# Patient Record
Sex: FEMALE | Race: BLACK OR AFRICAN AMERICAN | Employment: OTHER | URBAN - METROPOLITAN AREA
[De-identification: names, ages, dates, MRNs, and addresses within clinical notes are randomized per-mention and may not be internally consistent; named-entity substitution may affect disease eponyms.]

---

## 2018-08-09 ENCOUNTER — APPOINTMENT (OUTPATIENT)
Dept: GENERAL RADIOLOGY | Age: 63
DRG: 871 | End: 2018-08-09
Attending: EMERGENCY MEDICINE
Payer: COMMERCIAL

## 2018-08-09 ENCOUNTER — HOSPITAL ENCOUNTER (INPATIENT)
Age: 63
LOS: 14 days | Discharge: HOME | DRG: 871 | End: 2018-08-23
Attending: EMERGENCY MEDICINE | Admitting: INTERNAL MEDICINE
Payer: COMMERCIAL

## 2018-08-09 ENCOUNTER — APPOINTMENT (OUTPATIENT)
Dept: ULTRASOUND IMAGING | Age: 63
DRG: 871 | End: 2018-08-09
Attending: EMERGENCY MEDICINE
Payer: COMMERCIAL

## 2018-08-09 DIAGNOSIS — L03.116 CELLULITIS OF LEFT LOWER EXTREMITY: Primary | ICD-10-CM

## 2018-08-09 DIAGNOSIS — A41.9 SEPSIS, DUE TO UNSPECIFIED ORGANISM: ICD-10-CM

## 2018-08-09 LAB
ALBUMIN SERPL-MCNC: 3.5 G/DL (ref 3.5–5)
ALBUMIN/GLOB SERPL: 0.6 {RATIO} (ref 1.1–2.2)
ALP SERPL-CCNC: 108 U/L (ref 45–117)
ALT SERPL-CCNC: 23 U/L (ref 12–78)
ANION GAP SERPL CALC-SCNC: 7 MMOL/L (ref 5–15)
APPEARANCE UR: CLEAR
AST SERPL-CCNC: 22 U/L (ref 15–37)
BACTERIA URNS QL MICRO: NEGATIVE /HPF
BASOPHILS # BLD: 0 K/UL (ref 0–0.1)
BASOPHILS NFR BLD: 0 % (ref 0–1)
BILIRUB SERPL-MCNC: 0.8 MG/DL (ref 0.2–1)
BILIRUB UR QL CFM: NEGATIVE
BNP SERPL-MCNC: 603 PG/ML (ref 0–125)
BUN SERPL-MCNC: 22 MG/DL (ref 6–20)
BUN/CREAT SERPL: 21 (ref 12–20)
CALCIUM SERPL-MCNC: 10 MG/DL (ref 8.5–10.1)
CHLORIDE SERPL-SCNC: 99 MMOL/L (ref 97–108)
CO2 SERPL-SCNC: 26 MMOL/L (ref 21–32)
COLOR UR: ABNORMAL
CREAT SERPL-MCNC: 1.07 MG/DL (ref 0.55–1.02)
DIFFERENTIAL METHOD BLD: ABNORMAL
EOSINOPHIL # BLD: 0 K/UL (ref 0–0.4)
EOSINOPHIL NFR BLD: 0 % (ref 0–7)
EPITH CASTS URNS QL MICRO: ABNORMAL /LPF
ERYTHROCYTE [DISTWIDTH] IN BLOOD BY AUTOMATED COUNT: 14.6 % (ref 11.5–14.5)
GLOBULIN SER CALC-MCNC: 5.8 G/DL (ref 2–4)
GLUCOSE SERPL-MCNC: 102 MG/DL (ref 65–100)
GLUCOSE UR STRIP.AUTO-MCNC: NEGATIVE MG/DL
HCT VFR BLD AUTO: 41.1 % (ref 35–47)
HGB BLD-MCNC: 13.4 G/DL (ref 11.5–16)
HGB UR QL STRIP: NEGATIVE
HYALINE CASTS URNS QL MICRO: ABNORMAL /LPF (ref 0–5)
IMM GRANULOCYTES # BLD: 0 K/UL (ref 0–0.04)
IMM GRANULOCYTES NFR BLD AUTO: 0 % (ref 0–0.5)
KETONES UR QL STRIP.AUTO: ABNORMAL MG/DL
LACTATE SERPL-SCNC: 1.1 MMOL/L (ref 0.4–2)
LEUKOCYTE ESTERASE UR QL STRIP.AUTO: ABNORMAL
LYMPHOCYTES # BLD: 0.9 K/UL (ref 0.8–3.5)
LYMPHOCYTES NFR BLD: 4 % (ref 12–49)
MCH RBC QN AUTO: 28.8 PG (ref 26–34)
MCHC RBC AUTO-ENTMCNC: 32.6 G/DL (ref 30–36.5)
MCV RBC AUTO: 88.4 FL (ref 80–99)
MONOCYTES # BLD: 0.9 K/UL (ref 0–1)
MONOCYTES NFR BLD: 4 % (ref 5–13)
NEUTS BAND NFR BLD MANUAL: 10 %
NEUTS SEG # BLD: 20.8 K/UL (ref 1.8–8)
NEUTS SEG NFR BLD: 82 % (ref 32–75)
NITRITE UR QL STRIP.AUTO: NEGATIVE
NRBC # BLD: 0 K/UL (ref 0–0.01)
NRBC BLD-RTO: 0 PER 100 WBC
PH UR STRIP: 5 [PH] (ref 5–8)
PLATELET # BLD AUTO: 207 K/UL (ref 150–400)
PMV BLD AUTO: 11.3 FL (ref 8.9–12.9)
POTASSIUM SERPL-SCNC: 4 MMOL/L (ref 3.5–5.1)
PROT SERPL-MCNC: 9.3 G/DL (ref 6.4–8.2)
PROT UR STRIP-MCNC: ABNORMAL MG/DL
RBC # BLD AUTO: 4.65 M/UL (ref 3.8–5.2)
RBC #/AREA URNS HPF: ABNORMAL /HPF (ref 0–5)
RBC MORPH BLD: ABNORMAL
SODIUM SERPL-SCNC: 132 MMOL/L (ref 136–145)
SP GR UR REFRACTOMETRY: 1.03 (ref 1–1.03)
TROPONIN I SERPL-MCNC: <0.05 NG/ML
UA: UC IF INDICATED,UAUC: ABNORMAL
UROBILINOGEN UR QL STRIP.AUTO: 1 EU/DL (ref 0.2–1)
WBC # BLD AUTO: 22.6 K/UL (ref 3.6–11)
WBC URNS QL MICRO: ABNORMAL /HPF (ref 0–4)

## 2018-08-09 PROCEDURE — 96361 HYDRATE IV INFUSION ADD-ON: CPT

## 2018-08-09 PROCEDURE — 81001 URINALYSIS AUTO W/SCOPE: CPT | Performed by: EMERGENCY MEDICINE

## 2018-08-09 PROCEDURE — 87040 BLOOD CULTURE FOR BACTERIA: CPT | Performed by: EMERGENCY MEDICINE

## 2018-08-09 PROCEDURE — 74011250637 HC RX REV CODE- 250/637: Performed by: INTERNAL MEDICINE

## 2018-08-09 PROCEDURE — 83880 ASSAY OF NATRIURETIC PEPTIDE: CPT | Performed by: EMERGENCY MEDICINE

## 2018-08-09 PROCEDURE — 84484 ASSAY OF TROPONIN QUANT: CPT | Performed by: EMERGENCY MEDICINE

## 2018-08-09 PROCEDURE — 85025 COMPLETE CBC W/AUTO DIFF WBC: CPT | Performed by: EMERGENCY MEDICINE

## 2018-08-09 PROCEDURE — 74011000258 HC RX REV CODE- 258: Performed by: EMERGENCY MEDICINE

## 2018-08-09 PROCEDURE — 96374 THER/PROPH/DIAG INJ IV PUSH: CPT

## 2018-08-09 PROCEDURE — 74011250636 HC RX REV CODE- 250/636: Performed by: EMERGENCY MEDICINE

## 2018-08-09 PROCEDURE — 83605 ASSAY OF LACTIC ACID: CPT | Performed by: EMERGENCY MEDICINE

## 2018-08-09 PROCEDURE — 93005 ELECTROCARDIOGRAM TRACING: CPT

## 2018-08-09 PROCEDURE — 87086 URINE CULTURE/COLONY COUNT: CPT | Performed by: EMERGENCY MEDICINE

## 2018-08-09 PROCEDURE — 74011000258 HC RX REV CODE- 258: Performed by: INTERNAL MEDICINE

## 2018-08-09 PROCEDURE — 71046 X-RAY EXAM CHEST 2 VIEWS: CPT

## 2018-08-09 PROCEDURE — 36415 COLL VENOUS BLD VENIPUNCTURE: CPT | Performed by: EMERGENCY MEDICINE

## 2018-08-09 PROCEDURE — 65660000000 HC RM CCU STEPDOWN

## 2018-08-09 PROCEDURE — 74011250636 HC RX REV CODE- 250/636: Performed by: INTERNAL MEDICINE

## 2018-08-09 PROCEDURE — 74011250637 HC RX REV CODE- 250/637: Performed by: EMERGENCY MEDICINE

## 2018-08-09 PROCEDURE — 99285 EMERGENCY DEPT VISIT HI MDM: CPT

## 2018-08-09 PROCEDURE — 80053 COMPREHEN METABOLIC PANEL: CPT | Performed by: EMERGENCY MEDICINE

## 2018-08-09 PROCEDURE — 94762 N-INVAS EAR/PLS OXIMTRY CONT: CPT

## 2018-08-09 PROCEDURE — 93971 EXTREMITY STUDY: CPT

## 2018-08-09 RX ORDER — SODIUM CHLORIDE 9 MG/ML
75 INJECTION, SOLUTION INTRAVENOUS CONTINUOUS
Status: DISCONTINUED | OUTPATIENT
Start: 2018-08-09 | End: 2018-08-10

## 2018-08-09 RX ORDER — VANCOMYCIN/0.9 % SOD CHLORIDE 1.5G/250ML
1500 PLASTIC BAG, INJECTION (ML) INTRAVENOUS
Status: COMPLETED | OUTPATIENT
Start: 2018-08-10 | End: 2018-08-18

## 2018-08-09 RX ORDER — BUSPIRONE HYDROCHLORIDE 15 MG/1
15 TABLET ORAL
COMMUNITY
End: 2018-10-16 | Stop reason: SDUPTHER

## 2018-08-09 RX ORDER — LABETALOL 300 MG/1
300 TABLET, FILM COATED ORAL EVERY 12 HOURS
COMMUNITY
End: 2018-12-17 | Stop reason: SDUPTHER

## 2018-08-09 RX ORDER — SODIUM CHLORIDE 0.9 % (FLUSH) 0.9 %
5-10 SYRINGE (ML) INJECTION EVERY 8 HOURS
Status: DISCONTINUED | OUTPATIENT
Start: 2018-08-09 | End: 2018-08-23 | Stop reason: HOSPADM

## 2018-08-09 RX ORDER — ASCORBIC ACID 500 MG
500 TABLET ORAL DAILY
Status: DISCONTINUED | OUTPATIENT
Start: 2018-08-10 | End: 2018-08-23 | Stop reason: HOSPADM

## 2018-08-09 RX ORDER — ENOXAPARIN SODIUM 100 MG/ML
40 INJECTION SUBCUTANEOUS EVERY 12 HOURS
Status: DISCONTINUED | OUTPATIENT
Start: 2018-08-10 | End: 2018-08-23 | Stop reason: HOSPADM

## 2018-08-09 RX ORDER — ACETAMINOPHEN 325 MG/1
650 TABLET ORAL
Status: COMPLETED | OUTPATIENT
Start: 2018-08-09 | End: 2018-08-09

## 2018-08-09 RX ORDER — SODIUM CHLORIDE 0.9 % (FLUSH) 0.9 %
5-10 SYRINGE (ML) INJECTION AS NEEDED
Status: DISCONTINUED | OUTPATIENT
Start: 2018-08-09 | End: 2018-08-23 | Stop reason: HOSPADM

## 2018-08-09 RX ORDER — ONDANSETRON 2 MG/ML
4 INJECTION INTRAMUSCULAR; INTRAVENOUS
Status: DISCONTINUED | OUTPATIENT
Start: 2018-08-09 | End: 2018-08-23 | Stop reason: HOSPADM

## 2018-08-09 RX ORDER — OLMESARTAN MEDOXOMIL, AMLODIPINE AND HYDROCHLOROTHIAZIDE TABLET 40/10/25 MG 40; 10; 25 MG/1; MG/1; MG/1
1 TABLET ORAL DAILY
COMMUNITY
End: 2018-09-24

## 2018-08-09 RX ORDER — ACETAMINOPHEN 325 MG/1
650 TABLET ORAL
Status: DISCONTINUED | OUTPATIENT
Start: 2018-08-09 | End: 2018-08-23 | Stop reason: HOSPADM

## 2018-08-09 RX ORDER — HYDRALAZINE HYDROCHLORIDE 20 MG/ML
20 INJECTION INTRAMUSCULAR; INTRAVENOUS
Status: DISCONTINUED | OUTPATIENT
Start: 2018-08-09 | End: 2018-08-23 | Stop reason: HOSPADM

## 2018-08-09 RX ORDER — LOSARTAN POTASSIUM 50 MG/1
50 TABLET ORAL DAILY
Status: DISCONTINUED | OUTPATIENT
Start: 2018-08-10 | End: 2018-08-23 | Stop reason: HOSPADM

## 2018-08-09 RX ORDER — FUROSEMIDE 40 MG/1
40 TABLET ORAL EVERY OTHER DAY
COMMUNITY
End: 2018-08-23

## 2018-08-09 RX ORDER — BUSPIRONE HYDROCHLORIDE 5 MG/1
15 TABLET ORAL
Status: DISCONTINUED | OUTPATIENT
Start: 2018-08-09 | End: 2018-08-23 | Stop reason: HOSPADM

## 2018-08-09 RX ORDER — OXYCODONE AND ACETAMINOPHEN 5; 325 MG/1; MG/1
1 TABLET ORAL
Status: DISCONTINUED | OUTPATIENT
Start: 2018-08-09 | End: 2018-08-23 | Stop reason: HOSPADM

## 2018-08-09 RX ORDER — AMLODIPINE BESYLATE 5 MG/1
10 TABLET ORAL DAILY
Status: DISCONTINUED | OUTPATIENT
Start: 2018-08-09 | End: 2018-08-23 | Stop reason: HOSPADM

## 2018-08-09 RX ORDER — FACIAL-BODY WIPES
10 EACH TOPICAL DAILY PRN
Status: DISCONTINUED | OUTPATIENT
Start: 2018-08-09 | End: 2018-08-23 | Stop reason: HOSPADM

## 2018-08-09 RX ORDER — NALOXONE HYDROCHLORIDE 0.4 MG/ML
0.4 INJECTION, SOLUTION INTRAMUSCULAR; INTRAVENOUS; SUBCUTANEOUS AS NEEDED
Status: DISCONTINUED | OUTPATIENT
Start: 2018-08-09 | End: 2018-08-23 | Stop reason: HOSPADM

## 2018-08-09 RX ADMIN — ACETAMINOPHEN 650 MG: 325 TABLET ORAL at 19:06

## 2018-08-09 RX ADMIN — ACETAMINOPHEN 650 MG: 325 TABLET ORAL at 14:04

## 2018-08-09 RX ADMIN — SODIUM CHLORIDE 75 ML/HR: 0.9 INJECTION, SOLUTION INTRAVENOUS at 15:45

## 2018-08-09 RX ADMIN — LABETALOL HYDROCHLORIDE 300 MG: 200 TABLET, FILM COATED ORAL at 20:19

## 2018-08-09 RX ADMIN — CEFTRIAXONE 2 G: 2 INJECTION, POWDER, FOR SOLUTION INTRAMUSCULAR; INTRAVENOUS at 23:35

## 2018-08-09 RX ADMIN — AMLODIPINE BESYLATE 10 MG: 5 TABLET ORAL at 18:13

## 2018-08-09 RX ADMIN — VANCOMYCIN HYDROCHLORIDE 2500 MG: 10 INJECTION, POWDER, LYOPHILIZED, FOR SOLUTION INTRAVENOUS at 18:04

## 2018-08-09 RX ADMIN — SODIUM CHLORIDE 1000 ML: 900 INJECTION, SOLUTION INTRAVENOUS at 14:04

## 2018-08-09 RX ADMIN — Medication 10 ML: at 18:01

## 2018-08-09 RX ADMIN — SODIUM CHLORIDE 3 G: 900 INJECTION, SOLUTION INTRAVENOUS at 15:32

## 2018-08-09 RX ADMIN — BUSPIRONE HYDROCHLORIDE 15 MG: 5 TABLET ORAL at 20:20

## 2018-08-09 RX ADMIN — Medication 10 ML: at 20:19

## 2018-08-09 NOTE — PROGRESS NOTES
[address] [address] approved protocol for lovenox: Wt: 136.1kg  BMI: 45.61kg/m2  Crcl: ~81mL/min    Per protocol, will change dvt prophylaxis dose to lovenox 40mg SC every 12h for BMI > 40.     Jovon Elise

## 2018-08-09 NOTE — PROGRESS NOTES
Pharmacy Automatic Renal Dosing Protocol - Antimicrobials    Indication for Antimicrobials: SSTI    Current Regimen of Each Antimicrobial:  Vancomycin 2500 mg x 1 then 1500 mg Q16H (Start , Day )  Ceftriaxone 2 grams Q24H (Start , Day 1)    Previous Antibiotics  Unasyn 3 gm q6h    Significant Cultures:   18 - blood - pending    Radiology / Imaging results: (X-ray, CT scan or MRI): none at this time    Paralysis, amputations, malnutrition: n/a    Labs:  Recent Labs      18   1405   CREA  1.07*   BUN  22*   WBC  22.6*     Temp (24hrs), Av.6 °F (39.2 °C), Min:101.9 °F (38.8 °C), Max:103.2 °F (39.6 °C)    Creatinine Clearance (mL/min) or Dialysis: 56    Impression/Plan:   · Vancomycin 2500 mg x 1 then 1500 mg Q16H with anticipated trough of 15 mcg/ml  · BMP daily  · Vancomycin x 7 days     Pharmacy will follow daily and adjust medications as appropriate for renal function and/or serum levels. Thank you,  Kimberly Hernández, [facility]    Recommended duration of therapy  http://[facility]/[place]/[facility]/Pharmacy/Clinical%20Companion/Duration%20of%20ABX%20therapy. docx    Renal Dosing  http://[facility]/[place]/[facility]/Pharmacy/Clinical%20Companion/Renal%[email or web address]. pdf

## 2018-08-09 NOTE — ROUTINE PROCESS
TRANSFER - IN REPORT:    Verbal report received from [address], RN (name) on Sussy Torresr  being received from ED (unit) for routine progression of care      Report consisted of patients Situation, Background, Assessment and   Recommendations(SBAR). Information from the following report(s) SBAR, ED Summary, Intake/Output, MAR, Accordion and Recent Results was reviewed with the receiving nurse. Opportunity for questions and clarification was provided. Assessment completed upon patients arrival to unit and care assumed.

## 2018-08-09 NOTE — ED PROVIDER NOTES
EMERGENCY DEPARTMENT HISTORY AND PHYSICAL EXAM      Date: 8/9/2018  Patient Name: Cat Vanessa    History of Presenting Illness     Chief Complaint   Patient presents with    Fever     pt arrived via ems, pt comes to the ED after going to [address] [place] [address] for increased dyspnea and swelling and redness in her left lower leg, pt has increased wheezing and says she hasnt been feeling well for the last 4 days       History Provided By: Patient    HPI: Cat Vanessa, 66 y.o. female with PMHx significant for HTN, arthritis and morbid obesity, presents via EMS to the ED with cc of a fever of 103.2 today with associated SOB, wheezing, and swelling with redness to her left lower leg for 4 days. She does not have a hx of blood clots or asthma. She also notes that she works at a , and has multiple sick contacts through children. Pt denies any nausea, vomiting, coughing, or urinary symptoms. There are no other complaints, changes, or physical findings at this time. PCP: Pancho Flores MD    Current Facility-Administered Medications   Medication Dose Route Frequency Provider Last Rate Last Dose    ampicillin-sulbactam (UNASYN) 3 g in 0.9% sodium chloride (MBP/ADV) 100 mL  3 g IntraVENous Q6H Bob Rothman MD         Current Outpatient Prescriptions   Medication Sig Dispense Refill    busPIRone (BUSPAR) 15 mg tablet Take 15 mg by mouth nightly.  Olmesartan-amLODIPine-HCTZ 40-10-25 mg tab Take 1 Tab by mouth daily.  furosemide (LASIX) 40 mg tablet Take 40 mg by mouth every other day. Takes every other day in the evening (confirmed w/ patient)      labetalol (NORMODYNE) 300 mg tablet Take 300 mg by mouth every twelve (12) hours. Confirmed w/ patient. Rx for Tid, patient only tolerates it BiD      potassium chloride SA (MICRO-K) 10 mEq capsule Take 10 mEq by mouth daily.  cholecalciferol (VITAMIN D3) 1,000 unit tablet Take 1,000 Units by mouth every Sunday.  Indications: Vitamin D Deficiency, Takes unknown units of D3 once weekly      ascorbic acid (VITAMIN C) 500 mg tablet Take 500 mg by mouth daily. Past History     Past Medical History:  Past Medical History:   Diagnosis Date    Arthritis     knees, lower back.  Hypertension     Morbid obesity ([address]        Past Surgical History:  Past Surgical History:   Procedure Laterality Date    HX GYN  2009    D&C       Family History:  Family History   Problem Relation Age of Onset    Stroke Father 36    Heart Disease Father     Arthritis-osteo Mother     Cancer Mother [de-identified]     breast cancer       Social History:  Social History   Substance Use Topics    Smoking status: Never Smoker    Smokeless tobacco: Never Used    Alcohol use No       Allergies:  No Known Allergies      Review of Systems   Review of Systems   Constitutional: Positive for fever. Negative for activity change and chills. HENT: Negative for congestion and sore throat. Eyes: Negative for pain and redness. Respiratory: Positive for shortness of breath and wheezing. Negative for cough and chest tightness. Cardiovascular: Positive for leg swelling (left lower leg). Negative for chest pain and palpitations. Gastrointestinal: Negative for abdominal pain, diarrhea, nausea and vomiting. Genitourinary: Negative for dysuria, frequency and urgency. Musculoskeletal: Negative for back pain and neck pain. Skin: Negative for rash. Neurological: Negative for syncope, light-headedness and headaches. Psychiatric/Behavioral: Negative for confusion. All other systems reviewed and are negative. Physical Exam   Physical Exam   Constitutional: She is oriented to person, place, and time. She appears well-developed and well-nourished. No distress. Nasal cannula in place. HENT:   Head: Normocephalic. Nose: Nose normal.   Mouth/Throat: Oropharynx is clear and moist. No oropharyngeal exudate. Eyes: Conjunctivae are normal. Pupils are equal, round, and reactive to light.  No scleral icterus. Neck: Normal range of motion. Neck supple. No JVD present. No tracheal deviation present. No thyromegaly present. Cardiovascular: Regular rhythm and intact distal pulses. Tachycardia present. Exam reveals no gallop and no friction rub. No murmur heard. Pulmonary/Chest: Effort normal and breath sounds normal. No stridor. No respiratory distress. She has no wheezes. She has no rales. Abdominal: Soft. Bowel sounds are normal. She exhibits no distension. There is no tenderness. There is no rebound and no guarding. Musculoskeletal: Normal range of motion. She exhibits no edema. Lymphadenopathy:     She has no cervical adenopathy. Neurological: She is alert and oriented to person, place, and time. No cranial nerve deficit. She exhibits normal muscle tone. Coordination normal.   Skin: Skin is warm, dry and intact. No rash noted. She is not diaphoretic. There is erythema (circumferential distal left leg from ankle to mid shin). Mild increased warmth to left leg, no drainage   Psychiatric: She has a normal mood and affect. Her behavior is normal.   Nursing note and vitals reviewed.       Diagnostic Study Results     Labs -     Recent Results (from the past 12 hour(s))   EKG, 12 LEAD, INITIAL    Collection Time: 08/09/18  1:52 PM   Result Value Ref Range    Ventricular Rate 113 BPM    Atrial Rate 113 BPM    P-R Interval 170 ms    QRS Duration 74 ms    Q-T Interval 306 ms    QTC Calculation (Bezet) 419 ms    Calculated P Axis 31 degrees    Calculated R Axis -19 degrees    Calculated T Axis 50 degrees    Diagnosis       Sinus tachycardia with occasional premature ventricular complexes  Minimal voltage criteria for LVH, may be normal variant  No previous ECGs available     CBC WITH AUTOMATED DIFF    Collection Time: 08/09/18  2:05 PM   Result Value Ref Range    WBC 22.6 (H) 3.6 - 11.0 K/uL    RBC 4.65 3.80 - 5.20 M/uL    HGB 13.4 11.5 - 16.0 g/dL    HCT 41.1 35.0 - 47.0 %    MCV 88.4 80.0 - 99.0 FL    MCH 28.8 26.0 - 34.0 PG    MCHC 32.6 30.0 - 36.5 g/dL    RDW 14.6 (H) 11.5 - 14.5 %    PLATELET 541 402 - 953 K/uL    MPV 11.3 8.9 - 12.9 FL    NRBC 0.0 0  WBC    ABSOLUTE NRBC 0.00 0.00 - 0.01 K/uL    NEUTROPHILS 82 (H) 32 - 75 %    BAND NEUTROPHILS 10 %    LYMPHOCYTES 4 (L) 12 - 49 %    MONOCYTES 4 (L) 5 - 13 %    EOSINOPHILS 0 0 - 7 %    BASOPHILS 0 0 - 1 %    IMMATURE GRANULOCYTES 0 0.0 - 0.5 %    ABS. NEUTROPHILS 20.8 (H) 1.8 - 8.0 K/UL    ABS. LYMPHOCYTES 0.9 0.8 - 3.5 K/UL    ABS. MONOCYTES 0.9 0.0 - 1.0 K/UL    ABS. EOSINOPHILS 0.0 0.0 - 0.4 K/UL    ABS. BASOPHILS 0.0 0.0 - 0.1 K/UL    ABS. IMM. GRANS. 0.0 0.00 - 0.04 K/UL    DF MANUAL      RBC COMMENTS NORMOCYTIC, NORMOCHROMIC     LACTIC ACID    Collection Time: 08/09/18  2:05 PM   Result Value Ref Range    Lactic acid 1.1 0.4 - 2.0 MMOL/L   TROPONIN I    Collection Time: 08/09/18  2:05 PM   Result Value Ref Range    Troponin-I, Qt. <0.05 <0.05 ng/mL   NT-PRO BNP    Collection Time: 08/09/18  2:05 PM   Result Value Ref Range    NT pro- (H) 0 - 679 PG/ML   METABOLIC PANEL, COMPREHENSIVE    Collection Time: 08/09/18  2:05 PM   Result Value Ref Range    Sodium 132 (L) 136 - 145 mmol/L    Potassium 4.0 3.5 - 5.1 mmol/L    Chloride 99 97 - 108 mmol/L    CO2 26 21 - 32 mmol/L    Anion gap 7 5 - 15 mmol/L    Glucose 102 (H) 65 - 100 mg/dL    BUN 22 (H) 6 - 20 MG/DL    Creatinine 1.07 (H) 0.55 - 1.02 MG/DL    BUN/Creatinine ratio 21 (H) 12 - 20      GFR est AA >60 >60 ml/min/1.73m2    GFR est non-AA 52 (L) >60 ml/min/1.73m2    Calcium 10.0 8.5 - 10.1 MG/DL    Bilirubin, total 0.8 0.2 - 1.0 MG/DL    ALT (SGPT) 23 12 - 78 U/L    AST (SGOT) 22 15 - 37 U/L    Alk.  phosphatase 108 45 - 117 U/L    Protein, total 9.3 (H) 6.4 - 8.2 g/dL    Albumin 3.5 3.5 - 5.0 g/dL    Globulin 5.8 (H) 2.0 - 4.0 g/dL    A-G Ratio 0.6 (L) 1.1 - 2.2         Radiologic Studies -   XR CHEST PA LAT   Final Result      DUPLEX LOWER EXT VENOUS LEFT    (Results Pending) CT Results  (Last 48 hours)    None        CXR Results  (Last 48 hours)               08/09/18 1434  XR CHEST PA LAT Final result    Impression:   impression: Limited inspiration. Elevation of the left hemidiaphragm no focal   consolidation. Narrative:  Clinical indication: Increased dyspnea swelling left lower leg increased   wheezing worsening over past 4 days no trauma. Fever. Frontal and lateral views of the chest obtained no prior. Inspiration is very   limited related to patient's body habitus. There is elevation of the left   hemidiaphragm. The heart size is prominent. Ectasia of the thoracic aorta. No   focal consolidation. Medical Decision Making   I am the first provider for this patient. I reviewed the vital signs, available nursing notes, past medical history, past surgical history, family history and social history. Vital Signs-Reviewed the patient's vital signs. Patient Vitals for the past 12 hrs:   Temp Pulse Resp BP SpO2   08/09/18 1453 (!) 101.9 °F (38.8 °C) - - - -   08/09/18 1416 - (!) 109 26 (!) 174/134 93 %   08/09/18 1400 - (!) 113 (!) 44 (!) 177/94 94 %   08/09/18 1348 (!) 103.2 °F (39.6 °C) (!) 117 20 (!) 162/91 90 %       Pulse Oximetry Analysis - 95% on 2L nasal cannula    Cardiac Monitor:   Rate: 112 bpm  Rhythm: Sinus Tachycardia 177/94     ED EKG interpretation: 13:52  Rhythm: sinus tachycardia; and regular . Rate (approx.): 113; Axis: normal; AR Interval: normal; QRS interval: normal ; ST/T wave: non-specific changes; This EKG was interpreted by Alyssa Cheng MD,ED Provider. Records Reviewed: Nursing Notes, Old Medical Records and Ambulance Run Sheet    Provider Notes (Medical Decision Making):   DDx: cellulitis, PNA, sepsis    ED Course:   Initial assessment performed. The patients presenting problems have been discussed, and they are in agreement with the care plan formulated and outlined with them.   I have encouraged them to ask questions as they arise throughout their visit. CONSULT NOTE:   3:25 PM  Claire León MD spoke with Cuba San MD   Specialty: Hospitalist  Discussed pt's hx, disposition, and available diagnostic and imaging results. Reviewed care plans. Consultant will evaluate pt for admission. Written by Callie Stoll, ED Scribe, as dictated by Claire León MD.    Disposition:  ADMIT NOTE:  3:25 PM  Patient is being admitted to the hospital by Dr. Jailene Rivera. The results of their tests and reasons for their admission have been discussed with them and/or available family. They convey agreement and understanding for the need to be admitted and for their admission diagnosis. Consultation has been made with the inpatient physician specialist for hospitalization. Will admit to hospitalist for lle cellulitis and sepsis; wbc markedly increased at 22. Fever resolved; vss; started unasyn; doppler negative for dvt; clear cxr; normal lactate; negative troponin; Claire León MD      PLAN:  Admit to Hospitalist    Diagnosis     Clinical Impression:   1. Cellulitis of left lower extremity    2. Sepsis, due to unspecified organism [facility])        Attestations: This note is prepared by Callie Stoll, acting as Scribe for Claire León MD.    Claire León MD: The scribe's documentation has been prepared under my direction and personally reviewed by me in its entirety. I confirm that the note above accurately reflects all work, treatment, procedures, and medical decision making performed by me.

## 2018-08-09 NOTE — PROGRESS NOTES
PCU SHIFT NURSING NOTE          Shift Summary:   Patient arrived to the unit and I assumed care of patient. Sinus tach on the monitor. Mild fever of 100.1. Tylenol last administered at 1404 so unable to give tylenol at this time due to Q6 hour administration. Patient reports no pain unless LLE touched or moved too much. LLE red, hot, and tender. Lungs bilaterally diminished with audible expiratory wheezes. Patient tachypneic with shallow breathing. Patient stated she is very hungry. Called nutrition services to make sure they are sending a dinner tray. Tried to provide comfort with ice chips. Set up Vancomycin and administered any other overdue meds. /56 (BP 1 Location: Left arm, BP Patient Position: At rest)  Pulse (!) 107  Temp 100 °F (37.8 °C)  Resp 22  Ht 5' 8\" (1.727 m)  Wt 136.1 kg (300 lb)  SpO2 97%  BMI 45.61 kg/m2      Bedside and Verbal shift change report given to OLYA Lux (oncoming nurse) by Perla Pendleton RN (offgoing nurse). Report included the following information SBAR, Kardex, ED Summary, Intake/Output, MAR, Accordion and Recent Results. Admission Date 8/9/2018   Admission Diagnosis Sepsis ([facility] [address]   Consults IP CONSULT TO INTENSIVIST        Consults   []PT   []OT   []Speech   []Case Management      [] Palliative      Cardiac Monitoring Order   [x]Yes   []No     IV drips   [x]Yes    Drip:     NS                       Dose: 75 ml/hr  Drip:                            Dose:  Drip:                            Dose:   []No     GI Prophylaxis   []Yes   [x]No         DVT Prophylaxis   SCDs:             Thanh stockings:         [x] Medication   []Contraindicated   []None      Activity Level Activity Level: Up with Assistance     Activity Assistance: Partial (one person)   Purposeful Rounding every 1-2 hour?    [x]Yes   Quintanilla Score  Total Score: 1   Bed Alarm (If score 3 or >)   []Yes   [] Refused (See signed refusal form in chart)   Celestino Score  Celestino Score: 21   Celestino Score (if score 14 or less)   []PMT consult   []Wound Care consult      []Specialty bed   [] Nutrition consult          Needs prior to discharge:   Home O2 required:    []Yes   [x]No    If yes, how much O2 required? Other:    Last Bowel Movement: Last Bowel Movement Date: 08/09/18      Influenza Vaccine          Pneumonia Vaccine           Diet Active Orders   Diet    DIET CARDIAC Regular      LDAs               Peripheral IV 08/09/18 Left Forearm (Active)   Site Assessment Clean, dry, & intact 8/9/2018  2:10 PM   Phlebitis Assessment 0 8/9/2018  2:10 PM   Infiltration Assessment 0 8/9/2018  2:10 PM   Dressing Status Clean, dry, & intact 8/9/2018  2:10 PM   Dressing Type Tape;Transparent 8/9/2018  2:10 PM   Hub Color/Line Status Pink;Flushed 8/9/2018  2:10 PM   Action Taken Blood drawn 8/9/2018  2:10 PM                      Urinary Catheter      Intake & Output        Readmission Risk Assessment Tool Score Low Risk            0       Total Score            Criteria that do not apply:    Has Seen PCP in Last 6 Months (Yes=3, No=0)    . Living with Significant Other. Assisted Living. LTAC. SNF. or   Rehab    Patient Length of Stay (>5 days = 3)    IP Visits Last 12 Months (1-3=4, 4=9, >4=11)    Pt.  Coverage (Medicare=5 , Medicaid, or Self-Pay=4)    Charlson Comorbidity Score (Age + Comorbid Conditions)       Expected Length of Stay - - -   Actual Length of Stay 0

## 2018-08-09 NOTE — IP AVS SNAPSHOT
[address] [facility] 
[phone] Patient: Annalise Quintana MRN: [id number] UVE:[id number] About your hospitalization You were admitted on:  August 9, 2018 You last received care in the:  [facility] TELE You were discharged on:  August 23, 2018 Why you were hospitalized Your primary diagnosis was:  Not on File Your diagnoses also included:  Sepsis (Hcc) Follow-up Information Follow up With Details Comments Contact Info Rhina Lancaster MD Go on 8/29/2018 Hospital follow-up scheduled at [facility] ( If you have questions or need to reschedule please call [facility] [address] [facility] 
[phone] [address] [place] [address]  This is your home care provider. [address] [facility] [address] [phone] [employer]  This is the provider for your rolling walker. and oxygen [address] [facility] 3 [facility] [address] [phone] Ann Campuzano MD  call make an appt [address] [facility] 
[phone] [facility] Schedule an appointment as soon as possible for a visit  [address] [facility] [facility] 
[phone] Your Scheduled Appointments Wednesday August 29, 2018 10:30 AM EDT  
ESTABLISHED PATIENT with Rosanne Santiago MD  
[facility]) [address] [facility]  
[phone] Wednesday August 29, 2018 12:00 PM EDT Office Visit with Rhina Lancaster MD  
[facility] [place] [facility] [address] [facility]  
[phone] Discharge Orders None A check mariam indicates which time of day the medication should be taken. My Medications START taking these medications Instructions Each Dose to Equal  
 Morning Noon Evening Bedtime  
 oxyCODONE-acetaminophen 5-325 mg per tablet Commonly known as:  PERCOCET Your last dose was: Your next dose is: Take 1 Tab by mouth every six (6) hours as needed. Max Daily Amount: 4 Tabs. 1 Tab  
    
   
   
   
  
 potassium chloride SR 10 mEq tablet Commonly known as:  KLOR-CON 10 Replaces:  potassium chloride SA 10 mEq capsule Your last dose was: Your next dose is: Take 2 Tabs by mouth two (2) times a day. 20 mEq CHANGE how you take these medications Instructions Each Dose to Equal  
 Morning Noon Evening Bedtime  
 busPIRone 15 mg tablet Commonly known as:  BUSPAR What changed:  Another medication with the same name was removed. Continue taking this medication, and follow the directions you see here. Your last dose was: Your next dose is: Take 15 mg by mouth nightly. 15 mg  
    
   
   
   
  
 furosemide 80 mg tablet Commonly known as:  LASIX What changed:   
- medication strength 
- how much to take 
- how to take this - when to take this 
- additional instructions - Another medication with the same name was removed. Continue taking this medication, and follow the directions you see here. Your last dose was: Your next dose is: One tab daily in the morning  
     
   
   
   
  
 labetalol 300 mg tablet Commonly known as:  Yuki Emerson What changed:  Another medication with the same name was removed. Continue taking this medication, and follow the directions you see here. Your last dose was: Your next dose is: Take 300 mg by mouth every twelve (12) hours. Confirmed w/ patient. Rx for Tid, patient only tolerates it BiD  
 300 mg CONTINUE taking these medications  Instructions Each Dose to Equal  
 Morning Noon Evening Bedtime Olmesartan-amLODIPine-HCTZ 40-10-25 mg Tab Your last dose was: Your next dose is: Take 1 Tab by mouth daily. 1 Tab VITAMIN C 500 mg tablet Generic drug:  ascorbic acid (vitamin C) Your last dose was: Your next dose is: Take 500 mg by mouth daily. 500 mg  
    
   
   
   
  
 VITAMIN D3 1,000 unit tablet Generic drug:  cholecalciferol Your last dose was: Your next dose is: Take 1,000 Units by mouth every Sunday. Indications: Vitamin D Deficiency, Takes unknown units of D3 once weekly 1000 Units STOP taking these medications   
 potassium chloride SA 10 mEq capsule Commonly known as:  Estefanía Garciasavi Replaced by:  potassium chloride SR 10 mEq tablet Where to Get Your Medications Information on where to get these meds will be given to you by the nurse or doctor. ! Ask your nurse or doctor about these medications  
  furosemide 80 mg tablet  
 oxyCODONE-acetaminophen 5-325 mg per tablet  
 potassium chloride SR 10 mEq tablet Opioid Education Prescription Opioids: What You Need to Know: 
 
 
NAME: Roberta Miner :  1955 MRN:  [id number] Date/Time:  2018 9:26 AM 
 
ADMIT DATE: 2018 DISCHARGE DATE: 2018 · It is important that you take the medication exactly as they are prescribed. · Keep your medication in the bottles provided by the pharmacist and keep a list of the medication names, dosages, and times to be taken in your wallet. · Do not take other medications without consulting your doctor. What to do at [facility] Recommended diet:  Cardiac Diet Recommended activity: Activity as tolerated If you have questions regarding the hospital related prescriptions or hospital related issues please call [facility] at [phone]. You can always direct your questions to your primary care doctor if you are unable to reach your hospital physician; your PCP works as an extension of your hospital doctor just like your hospital doctor is an extension of your PCP for your time at the hospital [facility]) If you experience any of the following symptoms then please call your primary care physician or return to the emergency room if you cannot get hold of your doctor: 
 
Fever, chills, nausea, vomiting, or persistent diarrhea Worsening weakness or new problems with your speech or balance Dark stools or visible blood in your stools New Leg swelling or shortness of breath as these could be signs of a clot Additional Instructions: 
 
USE OXYGEN at 1 Liter  At Rest and with Exertion  And at for Sleep. Weigh yourself Daily. Take your Lasix daily. If you gain more than 5 pounds while taking your Lasix,  Please make an appt. With your PCP  For a dose change FOllowup with Pulmonary  To see if your oxygen can be weaned off so you can do a Sleep Study Bring these papers with you to your follow up appointments. The papers will help your doctors be sure to continue the care plan from the hospital. 
 
 
 
 
 
 
Information obtained by : 
I understand that if any problems occur once I am at home I am to contact my physician. I understand and acknowledge receipt of the instructions indicated above. Physician's or R.N.'s Signature                                                                  Date/Time Patient or Representative Signature [employer] ([employer]) Announcement We are excited to announce that we are making your provider's discharge notes available to you in [employer] ([employer]). You will see these notes when they are completed and signed by the physician that discharged you from your recent hospital stay. If you have any questions or concerns about any information you see in [employer] ([employer]), please call the Health Information Department where you were seen or reach out to your Primary Care Provider for more information about your plan of care. Introducing [facility]! Dear Trevor Stauffer: Thank you for requesting a [employer] (formerly [employer] Not[employer]) account. Our records indicate that you already have an active [employer] ([employer]) account. You can access your account anytime at [email or web address] Did you know that you can access your hospital and ER discharge instructions at any time in [employer] ([employer])? You can also review all of your test results from your hospital stay or ER visit. Additional Information If you have questions, please visit the Frequently Asked Questions section of the [employer] (formerly [employer] Not[employer]) website at [email or web address] Remember, [employer] (formerly [employer] Not[employer]) is NOT to be used for urgent needs. For medical emergencies, dial 911. Now available from your iPhone and Android! Introducing Dennys Grijalva As a [employer] patient, I wanted to make you aware of our electronic visit tool called Dennys Jimenezbobby. [employer] 24/7 allows you to connect within minutes with a medical provider 24 hours a day, seven days a week via a mobile device or tablet or logging into a secure website from your computer. You can access Dennys Grijalva from anywhere in the [place].  
 
A virtual visit might be right for you when you have a simple condition and feel like you just dont want to get out of bed, or cant get away from work for an appointment, when your regular [facility] provider is not available (evenings, weekends or holidays), or when youre out of town and need minor care. Electronic visits cost only $49 and if the [employer] 24/7 provider determines a prescription is needed to treat your condition, one can be electronically transmitted to a nearby pharmacy*. Please take a moment to enroll today if you have not already done so. The enrollment process is free and takes just a few minutes. To enroll, please download the [employer] orquidea to your tablet or phone, or visit [email or web address] to enroll on your computer. And, as an [address] [place] [address] patient with a [employer] account, the results of your visits will be scanned into your electronic medical record and your primary care provider will be able to view the scanned results. We urge you to continue to see your regular [facility] provider for your ongoing medical care. And while your primary care provider may not be the one available when you seek a [employer] virtual visit, the peace of mind you get from getting a real diagnosis real time can be priceless. For more information on [employer], view our Frequently Asked Questions (FAQs) at [email or web address]. Sincerely, 
 
Gregory Hunter MD 
Chief Medical Officer Stephanie Samantha Wood *:  certain medications cannot be prescribed via [employer] Unresulted Labs-Please follow up with your PCP about these lab tests Order Current Status BILIRUBIN, CONFIRM In process Providers Seen During Your Hospitalization Provider Specialty Primary office phone Pavan Miller MD Emergency Medicine [phone] An Sanchez MD Internal Medicine [phone] Talya Fuentes MD Hospitalist [phone] Curtis Ashby MD Internal Medicine [phone] Miguelito Mccauley MD Internal Medicine [phone] Vinnie Elizalde MD Internal Medicine [phone] Your Primary Care Physician (PCP) Primary Care Physician Office Phone Office Fax Tamela Montemayor [phone] You are allergic to the following No active allergies Recent Documentation Height Weight BMI OB Status Smoking Status 1.727 m 136.8 kg 45.86 kg/m2 Postmenopausal Never Smoker Emergency Contacts Name Discharge Info Relation Home Work Mobile Andrea Corona DISCHARGE CAREGIVER [3] Spouse [3] [phone] Lisa Corona DISCHARGE CAREGIVER [3] Child [2] [phone] Patient Belongings The following personal items are in your possession at time of discharge: 
  Dental Appliances: None  Visual Aid: Glasses      Home Medications: None   Jewelry: None  Clothing: Pajamas, Undergarments, With patient, Slippers, Footwear, Shirt, Pants    Other Valuables: Cell Phone Please provide this summary of care documentation to your next provider. Signatures-by signing, you are acknowledging that this After Visit Summary has been reviewed with you and you have received a copy. Patient Signature:  ____________________________________________________________ Date:  ____________________________________________________________  
  
Yaakov Mendieta Provider Signature:  ____________________________________________________________ Date:  ____________________________________________________________

## 2018-08-09 NOTE — ROUTINE PROCESS
TRANSFER - OUT REPORT:    Verbal report given to Maris Alexander RN(name) on Chris Anderson  being transferred to PCU(unit) for routine progression of care       Report consisted of patients Situation, Background, Assessment and   Recommendations(SBAR). Information from the following report(s) SBAR, Kardex, ED Summary, Intake/Output, MAR, Recent Results and Med Rec Status was reviewed with the receiving nurse. Lines:   Peripheral IV 08/09/18 Left Forearm (Active)   Site Assessment Clean, dry, & intact 8/9/2018  2:10 PM   Phlebitis Assessment 0 8/9/2018  2:10 PM   Infiltration Assessment 0 8/9/2018  2:10 PM   Dressing Status Clean, dry, & intact 8/9/2018  2:10 PM   Dressing Type Tape;Transparent 8/9/2018  2:10 PM   Hub Color/Line Status Pink;Flushed 8/9/2018  2:10 PM   Action Taken Blood drawn 8/9/2018  2:10 PM        Opportunity for questions and clarification was provided.       Patient transported with:   Registered Nurse (Candie Alejandro RN)

## 2018-08-09 NOTE — PROGRESS NOTES
Pharmacy Medication Reconciliation     The patient was interviewed regarding current PTA medication list, use and drug allergies;  patient and patient's  were present in room and obtained permission from patient to discuss drug regimen with visitor(s) present. The patient was questioned regarding use of any other inhalers, topical products, over the counter medications, herbal medications, vitamin products or ophthalmic/nasal/otic medication use. Allergy Update: [facility]    Recommendations/Findings: The following amendments were made to the [facility]:   1) Additions: None    2) Deletions: None    3) Changes:   (Old regimen: exforge (not taking)  Маринаrena Lorne regimen: olmesartan 40-amlodipine 10-hctz 25m tab daily)  (Old regimen: buspirone 5mg qhs /New regimen: buspirone 15mg PO qhs)  (Old regimen: furosemide 40mg daily /New regimen: furosemide 40mg every other day in the evening)(dose due 18 in evening)  (Old regimen: labetalol 300mg PO tiD /New regimen: labetalol 300mg PO BiD)  (Old regimen: potassium chloride 10 meq BiD /New regimen: potassium chloride 10meq daily)      -Clarified PTA med list with patient interview, [facility] on [address] [facility], and Rx query. PTA medication list was corrected to the following:     Prior to Admission Medications   Prescriptions Last Dose Informant Patient Reported? Taking? Olmesartan-amLODIPine-HCTZ 40-10-25 mg tab 2018 at am Other Yes Yes   Sig: Take 1 Tab by mouth daily. ascorbic acid (VITAMIN C) 500 mg tablet 2018 at 2100 Self Yes Yes   Sig: Take 500 mg by mouth daily. busPIRone (BUSPAR) 15 mg tablet 2018 at 2100 Self Yes Yes   Sig: Take 15 mg by mouth nightly. cholecalciferol (VITAMIN D3) 1,000 unit tablet 2018 Self Yes Yes   Sig: Take 1,000 Units by mouth every .  Indications: Vitamin D Deficiency, Takes unknown units of D3 once weekly   furosemide (LASIX) 40 mg tablet 2018 Self Yes Yes   Sig: Take 40 mg by mouth every other day. Takes every other day in the evening (confirmed w/ patient)   labetalol (NORMODYNE) 300 mg tablet 8/9/2018 at Unknown time Other Yes Yes   Sig: Take 300 mg by mouth every twelve (12) hours. Confirmed w/ patient. Rx for Tid, patient only tolerates it BiD   potassium chloride SA (MICRO-K) 10 mEq capsule 8/9/2018 at Unknown time Self Yes Yes   Sig: Take 10 mEq by mouth daily.       Facility-Administered Medications: None          Thank you,  EDUARDO Romero

## 2018-08-09 NOTE — PROGRESS NOTES
Pharmacy Automatic Renal Dosing Protocol - Antimicrobials    Indication for Antimicrobials: sepsis with suspected respiratory infection    Current Regimen of Each Antimicrobial:  Unasyn 3 gm q6h      Significant Cultures:   18 - blood - pending    Radiology / Imaging results: (X-ray, CT scan or MRI): none at this time    Paralysis, amputations, malnutrition: n/a    Labs:  No results for input(s): CREA, BUN, WBC in the last 72 hours. Temp (24hrs), Av.2 °F (39.6 °C), Min:103.2 °F (39.6 °C), Max:103.2 °F (39.6 °C)    Creatinine Clearance (mL/min) or Dialysis: not yet available    Impression/Plan:   · Continue as ordered until able to confirm that dose should be altered or antibiotic changed  · Antimicrobial stop date TBD     Pharmacy will follow daily and adjust medications as appropriate for renal function and/or serum levels.     Thank you,  Yahaira Simon

## 2018-08-09 NOTE — ED NOTES
Patient does not have to urinate and urinate before coming to the ER. Patient does not want to be cathed.

## 2018-08-10 ENCOUNTER — APPOINTMENT (OUTPATIENT)
Dept: CT IMAGING | Age: 63
DRG: 871 | End: 2018-08-10
Attending: INTERNAL MEDICINE
Payer: COMMERCIAL

## 2018-08-10 LAB
ALBUMIN SERPL-MCNC: 3 G/DL (ref 3.5–5)
ALBUMIN/GLOB SERPL: 0.6 {RATIO} (ref 1.1–2.2)
ALP SERPL-CCNC: 97 U/L (ref 45–117)
ALT SERPL-CCNC: 19 U/L (ref 12–78)
ANION GAP SERPL CALC-SCNC: 9 MMOL/L (ref 5–15)
ARTERIAL PATENCY WRIST A: YES
AST SERPL-CCNC: 19 U/L (ref 15–37)
ATRIAL RATE: 113 BPM
BASE EXCESS BLDA CALC-SCNC: 0.7 MMOL/L
BASOPHILS # BLD: 0 K/UL (ref 0–0.1)
BASOPHILS NFR BLD: 0 % (ref 0–1)
BDY SITE: ABNORMAL
BILIRUB SERPL-MCNC: 0.6 MG/DL (ref 0.2–1)
BREATHS.SPONTANEOUS ON VENT: 26
BUN SERPL-MCNC: 23 MG/DL (ref 6–20)
BUN/CREAT SERPL: 20 (ref 12–20)
CALCIUM SERPL-MCNC: 10 MG/DL (ref 8.5–10.1)
CALCULATED P AXIS, ECG09: 31 DEGREES
CALCULATED R AXIS, ECG10: -19 DEGREES
CALCULATED T AXIS, ECG11: 50 DEGREES
CHLORIDE SERPL-SCNC: 101 MMOL/L (ref 97–108)
CK MB CFR SERPL CALC: 0.4 % (ref 0–2.5)
CK MB SERPL-MCNC: 1.5 NG/ML (ref 5–25)
CK SERPL-CCNC: 353 U/L (ref 26–192)
CO2 SERPL-SCNC: 25 MMOL/L (ref 21–32)
CREAT SERPL-MCNC: 1.13 MG/DL (ref 0.55–1.02)
D DIMER PPP FEU-MCNC: 0.97 MG/L FEU (ref 0–0.65)
DIAGNOSIS, 93000: NORMAL
DIFFERENTIAL METHOD BLD: ABNORMAL
EOSINOPHIL # BLD: 0 K/UL (ref 0–0.4)
EOSINOPHIL NFR BLD: 0 % (ref 0–7)
ERYTHROCYTE [DISTWIDTH] IN BLOOD BY AUTOMATED COUNT: 14.9 % (ref 11.5–14.5)
GAS FLOW.O2 O2 DELIVERY SYS: 2 L/MIN
GLOBULIN SER CALC-MCNC: 5.4 G/DL (ref 2–4)
GLUCOSE SERPL-MCNC: 88 MG/DL (ref 65–100)
HCO3 BLDA-SCNC: 27 MMOL/L (ref 22–26)
HCT VFR BLD AUTO: 38.7 % (ref 35–47)
HGB BLD-MCNC: 12.1 G/DL (ref 11.5–16)
IMM GRANULOCYTES # BLD: 0 K/UL (ref 0–0.04)
IMM GRANULOCYTES NFR BLD AUTO: 0 % (ref 0–0.5)
LYMPHOCYTES # BLD: 0.5 K/UL (ref 0.8–3.5)
LYMPHOCYTES NFR BLD: 2 % (ref 12–49)
MCH RBC QN AUTO: 28.7 PG (ref 26–34)
MCHC RBC AUTO-ENTMCNC: 31.3 G/DL (ref 30–36.5)
MCV RBC AUTO: 91.9 FL (ref 80–99)
MONOCYTES # BLD: 0.2 K/UL (ref 0–1)
MONOCYTES NFR BLD: 1 % (ref 5–13)
NEUTS BAND NFR BLD MANUAL: 19 %
NEUTS SEG # BLD: 23.8 K/UL (ref 1.8–8)
NEUTS SEG NFR BLD: 78 % (ref 32–75)
NRBC # BLD: 0 K/UL (ref 0–0.01)
NRBC BLD-RTO: 0 PER 100 WBC
P-R INTERVAL, ECG05: 170 MS
PCO2 BLDA: 49 MMHG (ref 35–45)
PH BLDA: 7.36 [PH] (ref 7.35–7.45)
PLATELET # BLD AUTO: 171 K/UL (ref 150–400)
PMV BLD AUTO: 11.7 FL (ref 8.9–12.9)
PO2 BLDA: 58 MMHG (ref 80–100)
POTASSIUM SERPL-SCNC: 3.6 MMOL/L (ref 3.5–5.1)
PROT SERPL-MCNC: 8.4 G/DL (ref 6.4–8.2)
Q-T INTERVAL, ECG07: 306 MS
QRS DURATION, ECG06: 74 MS
QTC CALCULATION (BEZET), ECG08: 419 MS
RBC # BLD AUTO: 4.21 M/UL (ref 3.8–5.2)
RBC MORPH BLD: ABNORMAL
SAO2 % BLD: 89 % (ref 92–97)
SAO2% DEVICE SAO2% SENSOR NAME: ABNORMAL
SODIUM SERPL-SCNC: 135 MMOL/L (ref 136–145)
SPECIMEN SITE: ABNORMAL
TROPONIN I SERPL-MCNC: <0.05 NG/ML
VENTRICULAR RATE, ECG03: 113 BPM
WBC # BLD AUTO: 24.5 K/UL (ref 3.6–11)

## 2018-08-10 PROCEDURE — 74011250636 HC RX REV CODE- 250/636: Performed by: EMERGENCY MEDICINE

## 2018-08-10 PROCEDURE — 74011000250 HC RX REV CODE- 250: Performed by: INTERNAL MEDICINE

## 2018-08-10 PROCEDURE — 94640 AIRWAY INHALATION TREATMENT: CPT

## 2018-08-10 PROCEDURE — 65660000000 HC RM CCU STEPDOWN

## 2018-08-10 PROCEDURE — 74011636320 HC RX REV CODE- 636/320: Performed by: INTERNAL MEDICINE

## 2018-08-10 PROCEDURE — 36415 COLL VENOUS BLD VENIPUNCTURE: CPT | Performed by: INTERNAL MEDICINE

## 2018-08-10 PROCEDURE — 82803 BLOOD GASES ANY COMBINATION: CPT | Performed by: INTERNAL MEDICINE

## 2018-08-10 PROCEDURE — 84484 ASSAY OF TROPONIN QUANT: CPT | Performed by: INTERNAL MEDICINE

## 2018-08-10 PROCEDURE — 74011250636 HC RX REV CODE- 250/636: Performed by: INTERNAL MEDICINE

## 2018-08-10 PROCEDURE — 82550 ASSAY OF CK (CPK): CPT | Performed by: INTERNAL MEDICINE

## 2018-08-10 PROCEDURE — 85025 COMPLETE CBC W/AUTO DIFF WBC: CPT | Performed by: INTERNAL MEDICINE

## 2018-08-10 PROCEDURE — 77010033678 HC OXYGEN DAILY

## 2018-08-10 PROCEDURE — 80053 COMPREHEN METABOLIC PANEL: CPT | Performed by: INTERNAL MEDICINE

## 2018-08-10 PROCEDURE — 85379 FIBRIN DEGRADATION QUANT: CPT | Performed by: INTERNAL MEDICINE

## 2018-08-10 PROCEDURE — 74011250637 HC RX REV CODE- 250/637: Performed by: INTERNAL MEDICINE

## 2018-08-10 PROCEDURE — 77030013140 HC MSK NEB VYRM -A

## 2018-08-10 PROCEDURE — 5A09457 ASSISTANCE WITH RESPIRATORY VENTILATION, 24-96 CONSECUTIVE HOURS, CONTINUOUS POSITIVE AIRWAY PRESSURE: ICD-10-PCS | Performed by: INTERNAL MEDICINE

## 2018-08-10 PROCEDURE — C8929 TTE W OR WO FOL WCON,DOPPLER: HCPCS

## 2018-08-10 PROCEDURE — 94660 CPAP INITIATION&MGMT: CPT

## 2018-08-10 PROCEDURE — 77030029684 HC NEB SM VOL KT MONA -A

## 2018-08-10 PROCEDURE — 74011000258 HC RX REV CODE- 258: Performed by: INTERNAL MEDICINE

## 2018-08-10 PROCEDURE — 36600 WITHDRAWAL OF ARTERIAL BLOOD: CPT | Performed by: INTERNAL MEDICINE

## 2018-08-10 PROCEDURE — 71275 CT ANGIOGRAPHY CHEST: CPT

## 2018-08-10 PROCEDURE — 77030012879 HC MSK CPAP FLL FAC PHIL -B

## 2018-08-10 RX ORDER — FUROSEMIDE 10 MG/ML
40 INJECTION INTRAMUSCULAR; INTRAVENOUS DAILY
Status: DISCONTINUED | OUTPATIENT
Start: 2018-08-10 | End: 2018-08-10

## 2018-08-10 RX ORDER — IPRATROPIUM BROMIDE AND ALBUTEROL SULFATE 2.5; .5 MG/3ML; MG/3ML
3 SOLUTION RESPIRATORY (INHALATION)
Status: DISCONTINUED | OUTPATIENT
Start: 2018-08-10 | End: 2018-08-12

## 2018-08-10 RX ORDER — SODIUM CHLORIDE 0.9 % (FLUSH) 0.9 %
10 SYRINGE (ML) INJECTION
Status: COMPLETED | OUTPATIENT
Start: 2018-08-10 | End: 2018-08-10

## 2018-08-10 RX ORDER — SODIUM CHLORIDE 9 MG/ML
50 INJECTION, SOLUTION INTRAVENOUS
Status: COMPLETED | OUTPATIENT
Start: 2018-08-10 | End: 2018-08-18

## 2018-08-10 RX ORDER — FUROSEMIDE 10 MG/ML
40 INJECTION INTRAMUSCULAR; INTRAVENOUS EVERY 12 HOURS
Status: DISCONTINUED | OUTPATIENT
Start: 2018-08-10 | End: 2018-08-11

## 2018-08-10 RX ADMIN — AMLODIPINE BESYLATE 10 MG: 5 TABLET ORAL at 08:03

## 2018-08-10 RX ADMIN — LABETALOL HYDROCHLORIDE 300 MG: 200 TABLET, FILM COATED ORAL at 08:03

## 2018-08-10 RX ADMIN — Medication 10 ML: at 13:59

## 2018-08-10 RX ADMIN — OXYCODONE HYDROCHLORIDE AND ACETAMINOPHEN 500 MG: 500 TABLET ORAL at 08:03

## 2018-08-10 RX ADMIN — Medication 10 ML: at 05:39

## 2018-08-10 RX ADMIN — BUSPIRONE HYDROCHLORIDE 15 MG: 5 TABLET ORAL at 23:08

## 2018-08-10 RX ADMIN — PERFLUTREN 2 ML: 6.52 INJECTION, SUSPENSION INTRAVENOUS at 10:24

## 2018-08-10 RX ADMIN — Medication 10 ML: at 13:26

## 2018-08-10 RX ADMIN — ENOXAPARIN SODIUM 40 MG: 100 INJECTION SUBCUTANEOUS at 08:04

## 2018-08-10 RX ADMIN — LABETALOL HYDROCHLORIDE 300 MG: 200 TABLET, FILM COATED ORAL at 20:59

## 2018-08-10 RX ADMIN — IPRATROPIUM BROMIDE AND ALBUTEROL SULFATE 3 ML: .5; 3 SOLUTION RESPIRATORY (INHALATION) at 11:54

## 2018-08-10 RX ADMIN — ACETAMINOPHEN 650 MG: 325 TABLET ORAL at 08:03

## 2018-08-10 RX ADMIN — SODIUM CHLORIDE 50 ML/HR: 900 INJECTION, SOLUTION INTRAVENOUS at 13:26

## 2018-08-10 RX ADMIN — IOPAMIDOL 100 ML: 755 INJECTION, SOLUTION INTRAVENOUS at 13:26

## 2018-08-10 RX ADMIN — ENOXAPARIN SODIUM 40 MG: 100 INJECTION SUBCUTANEOUS at 20:58

## 2018-08-10 RX ADMIN — Medication 10 ML: at 20:58

## 2018-08-10 RX ADMIN — LOSARTAN POTASSIUM 50 MG: 50 TABLET ORAL at 08:03

## 2018-08-10 RX ADMIN — FUROSEMIDE 40 MG: 10 INJECTION, SOLUTION INTRAMUSCULAR; INTRAVENOUS at 20:58

## 2018-08-10 RX ADMIN — ACETAMINOPHEN 650 MG: 325 TABLET ORAL at 16:16

## 2018-08-10 RX ADMIN — CEFTRIAXONE 2 G: 2 INJECTION, POWDER, FOR SOLUTION INTRAMUSCULAR; INTRAVENOUS at 20:58

## 2018-08-10 RX ADMIN — VANCOMYCIN HYDROCHLORIDE 1500 MG: 10 INJECTION, POWDER, LYOPHILIZED, FOR SOLUTION INTRAVENOUS at 11:07

## 2018-08-10 RX ADMIN — Medication 10 ML: at 23:08

## 2018-08-10 RX ADMIN — IPRATROPIUM BROMIDE AND ALBUTEROL SULFATE 3 ML: .5; 3 SOLUTION RESPIRATORY (INHALATION) at 20:46

## 2018-08-10 RX ADMIN — FUROSEMIDE 40 MG: 10 INJECTION, SOLUTION INTRAMUSCULAR; INTRAVENOUS at 11:03

## 2018-08-10 NOTE — PROGRESS NOTES
PCU SHIFT NURSING NOTE      Bedside and Verbal shift change report given to Sofia Salgado RN (oncoming nurse) by Karla Kimbrough RN (offgoing nurse). Report included the following information SBAR, Kardex, Intake/Output, MAR, Recent Results and Cardiac Rhythm ST. Shift Summary:   56 - Spoke with Echo technician; pt is next in line. 9181 - Echo technician at bedside. 0 - Notified Dr. Julian Ordoñez of elevated D-Dimer and pt wheezing; MD to place orders for breathing treatment & CT chest.   1145 - Pt up eating breakfast; notified RT of pt's need for breathing treatment. 1215 - Pt very drowsy; called RT to administer breathing treatment via mask. 26 - Requested Dr. Julian Ordoñez to come see pt as she is very lethargic and labored breathing. Received order to obtain STAT ABGs. Notified RT.   1250 - Waiting for transport to arrived for CT scan; saunders catheter inserted for accurate I/O as pt very short of breath and unable to ambulate to bathroom. Inserted #[id number] PIV to Right AC for CT Scan with Contrast.   1310 - Transported pt with RT to CT scan; pt on CPAP.   1700 - Pt continually removes CPAP, then complains that it is not on appropriately. RR 30s without CPAP & O2 Sats drop to 80s. Admission Date 8/9/2018   Admission Diagnosis Sepsis ([address]   Consults IP CONSULT TO INTENSIVIST        Consults   []PT   []OT   []Speech   []Case Management      [] Palliative      Cardiac Monitoring Order   []Yes   []No     IV drips   []Yes    Drip:                            Dose:  Drip:                            Dose:  Drip:                            Dose:   []No     GI Prophylaxis   []Yes   []No         DVT Prophylaxis   SCDs:             Htanh stockings:         [] Medication   []Contraindicated   []None      Activity Level Activity Level: Bath Room Privileges     Activity Assistance: Partial (one person)   Purposeful Rounding every 1-2 hour?    []Yes   Quintanilla Score  Total Score: 1   Bed Alarm (If score 3 or >)   []Yes   [] Refused (See signed refusal form in chart)   Celestino Score  Celestino Score: 20   Celestino Score (if score 14 or less)   []PMT consult   []Wound Care consult      []Specialty bed   [] Nutrition consult          Needs prior to discharge:   Home O2 required:    []Yes   []No    If yes, how much O2 required? Other:    Last Bowel Movement: Last Bowel Movement Date: 08/09/18      Influenza Vaccine          Pneumonia Vaccine           Diet Active Orders   Diet    DIET CARDIAC Regular      LDAs               Peripheral IV 08/09/18 Left Forearm (Active)   Site Assessment Clean, dry, & intact 8/10/2018 12:00 AM   Phlebitis Assessment 0 8/10/2018 12:00 AM   Infiltration Assessment 0 8/10/2018 12:00 AM   Dressing Status Clean, dry, & intact 8/10/2018 12:00 AM   Dressing Type Transparent;Tape 8/10/2018 12:00 AM   Hub Color/Line Status Pink; Infusing 8/10/2018 12:00 AM   Action Taken Open ports on tubing capped 8/10/2018 12:00 AM   Alcohol Cap Used Yes 8/10/2018 12:00 AM                      Urinary Catheter      Intake & Output   Date 08/09/18 0700 - 08/10/18 0659 08/10/18 0700 - 08/11/18 0659   Shift 7922-9497 3048-6006 24 Hour Total 9937-8482 6769-6267 24 Hour Total   I  N  T  A  K  E   P.O. 240  240         P. O. 240  240       I.V.  (mL/kg/hr) 673.8  (0.4) 830  (0.5) 1503.8  (0.5)         Volume (0.9% sodium chloride infusion) 173.8 780 953.8         Volume (cefTRIAXone (ROCEPHIN) 2 g in 0.9% sodium chloride (MBP/ADV) 50 mL)  50 50         Volume (vancomycin (VANCOCIN) 2,500 mg in 0.9% sodium chloride 500 mL IVPB) 500  500       Shift Total  (mL/kg) 913.8  (6.7) 830  (6.1) 1743.8  (12.8)      O  U  T  P  U  T   Urine  (mL/kg/hr) 110  (0.1)  110  (0)         Urine Voided 110  110         Urine Occurrence(s) 1 x  1 x       Shift Total  (mL/kg) 110  (0.8)  110  (0.8)      .8 830 1633.8      Weight (kg) 136.1 136.1 136.1 136.1 136.1 136.1         Readmission Risk Assessment Tool Score Low Risk            0       Total Score Criteria that do not apply:    Has Seen PCP in Last 6 Months (Yes=3, No=0)    . Living with Significant Other. Assisted Living. LTAC. SNF. or   Rehab    Patient Length of Stay (>5 days = 3)    IP Visits Last 12 Months (1-3=4, 4=9, >4=11)    Pt.  Coverage (Medicare=5 , Medicaid, or Self-Pay=4)    Charlson Comorbidity Score (Age + Comorbid Conditions)       Expected Length of Stay - - -   Actual Length of Stay 1

## 2018-08-10 NOTE — PROGRESS NOTES
Hospitalist Progress Note    NAME: Sagrario Coleman   :  1955   MRN:  [id number]       Assessment / Plan:  Sepsis ([address] (2018) POA WBC 22.6, , RR to 40, temp 103.2, normal lactate  Left leg cellulitis POA: c/w CTX/Vancomycin, Duplex is negative for DVT. HTN urgency POA: c/w Norvasc, BB, ARB, monitor. Use hydralazine prn. Acute Hypoxic Respiratory Failure POA/Elevated left hemidiaphragm POA: CXR looks congested with CMG, she may have some degree of CHF, will monitor I/O and weight, check ECHO, start diuresis. Very tachypnic will start her on CPAP and get Pulmonology evaluation  Hyponatremia POA so far improving, monitor. History of spider bite in left leg 15 years ago  Morbid Obesity POA Body mass index is 45.61 kg/(m^2). NOK:   Code status: Full  Prophylaxis: Lovenox  Recommended Disposition:  PT, OT, RN     Subjective:     Chief Complaint / Reason for Physician Visit  \"I feel SOB\". Discussed with RN events overnight. Review of Systems:  Symptom Y/N Comments  Symptom Y/N Comments   Fever/Chills y   Chest Pain     Poor Appetite    Edema     Cough    Abdominal Pain     Sputum    Joint Pain y    SOB/FLORENCE y   Pruritis/Rash y    Nausea/vomit    Tolerating PT/OT     Diarrhea    Tolerating Diet y    Constipation    Other       Could NOT obtain due to:      Objective:     VITALS:   Last 24hrs VS reviewed since prior progress note.  Most recent are:  Patient Vitals for the past 24 hrs:   Temp Pulse Resp BP SpO2   08/10/18 0803 100.4 °F (38 °C) - - - -   08/10/18 0714 - (!) 110 20 149/88 97 %   08/10/18 0422 99.7 °F (37.6 °C) (!) 107 20 138/73 97 %   18 2345 99.9 °F (37.7 °C) 98 20 117/56 96 %   18 1914 (!) 102.5 °F (39.2 °C) (!) 118 24 151/90 96 %   18 1738 - (!) 107 - 128/56 -   18 1735 100 °F (37.8 °C) (!) 108 22 153/66 97 %   18 1630 (!) 100.8 °F (38.2 °C) (!) 105 16 173/72 93 %   18 1615 - (!) 103 25 155/73 94 %   18 1600 - (!) 101 (!) 33 171/76 92 %   08/09/18 1545 - (!) 105 30 148/81 93 %   08/09/18 1537 - (!) 104 (!) 31 154/75 94 %   08/09/18 1453 (!) 101.9 °F (38.8 °C) - - - -   08/09/18 1416 - (!) 109 26 (!) 174/134 93 %   08/09/18 1400 - (!) 113 (!) 44 (!) 177/94 94 %   08/09/18 1348 (!) 103.2 °F (39.6 °C) (!) 117 20 (!) 162/91 90 %       Intake/Output Summary (Last 24 hours) at 08/10/18 0850  Last data filed at 08/10/18 0428   Gross per 24 hour   Intake          1743.75 ml   Output              110 ml   Net          1633.75 ml        PHYSICAL EXAM:  General: WD, WN. Alert, cooperative, SOB  EENT:  EOMI. Anicteric sclerae. MMM  Resp:  Coarse BS, mild crackles  CV:  Regular  rhythm,  + edema  GI:  Soft, Non distended, Non tender.  +Bowel sounds  Neurologic:  Alert and oriented X 3, normal speech,   Psych:   Good insight. Not anxious nor agitated  Skin:  +  Rashes in left leg. No jaundice    Reviewed most current lab test results and cultures  YES  Reviewed most current radiology test results   YES  Review and summation of old records today    NO  Reviewed patient's current orders and MAR    YES  PMH/SH reviewed - no change compared to H&P  ________________________________________________________________________  Care Plan discussed with:    Comments   Patient y    Family  y    RN y    Care Manager     Consultant                        Multidiciplinary team rounds were held today with , nursing, pharmacist and clinical coordinator. Patient's plan of care was discussed; medications were reviewed and discharge planning was addressed.      ________________________________________________________________________  Total NON critical care TIME:  35   Minutes    Total CRITICAL CARE TIME Spent:   Minutes non procedure based      Comments   >50% of visit spent in counseling and coordination of care y    ________________________________________________________________________  Derrek Olivarez MD     Procedures: see electronic medical records for all procedures/Xrays and details which were not copied into this note but were reviewed prior to creation of Plan. LABS:  I reviewed today's most current labs and imaging studies.   Pertinent labs include:  Recent Labs      08/10/18   0418  08/09/18   1405   WBC  24.5*  22.6*   HGB  12.1  13.4   HCT  38.7  41.1   PLT  171  207     Recent Labs      08/10/18   0418  08/09/18   1405   NA  135*  132*   K  3.6  4.0   CL  101  99   CO2  25  26   GLU  88  102*   BUN  23*  22*   CREA  1.13*  1.07*   CA  10.0  10.0   ALB  3.0*  3.5   TBILI  0.6  0.8   SGOT  19  22   ALT  19  23       Signed: Nilesh Campbell MD

## 2018-08-10 NOTE — PROGRESS NOTES
Bedside shift report received from [facility]. SBAR, MAR,VS, KARDEX reviewed. Pt being medicated with prn Tylenol for fever. LLE edema and redness persists although pt denies pain in the area noted.

## 2018-08-10 NOTE — CONSULTS
[facility]  Pulmonary, Critical Care, and Sleep Medicine    Initial Patient Consult    Name: Sylvia Gudino MRN: [id number]   : 1955 Hospital: [facility]   Date: 8/10/2018        IMPRESSION:   · Acute (and likely chronic) hypoxic/hypercapnic respiratory failure  · Sepsis  · Cellulitis  · Suspect she has MONSERRAT/OHS leading to her hypoxia/hypercapnia   · No evidence of pneumonia or other acute process in the lung  · Eventration of the left hemidiaphragm leading to abnormal chest X-ray   · HTN  · Obesity       RECOMMENDATIONS:   · CPAP for now  · O2 when more awake, wean as tolerated  · Ultimately will need evaluation in the sleep lab and polysomnography  · Continue antibiotics for cellulitis  · DVT prophylaxis     Subjective: This patient has been seen and evaluated at the request of Dr. Mariah Gibson  for respiratory failure and by Dr. Cliff Jimenez for elevated hemidiaphragm. Patient is a 66 y.o. female nonsmoker, admitted with sepsis and cellulitis of the left lower extremity. She became lethargic today and an ABG showed chronic CO2 retention. She is a reluctant historian, but her family at the bedside reports that she has had some chronic shortness of breath over at least the last several months and did not seek out medical care for fear of being hospitalized. They note she has severe snoring and has significant daytime sleepiness. Also awakens at night with dyspnea and has to sit up (though she says it is to sit up to read). Currently she is comfortable on CPAP. Denies pain, dyspnea, cough, etc.     Past Medical History:   Diagnosis Date    Arthritis     knees, lower back.  Hypertension     Morbid obesity [facility])       Past Surgical History:   Procedure Laterality Date    HX GYN  2009    D&C      Prior to Admission medications    Medication Sig Start Date End Date Taking? Authorizing Provider   busPIRone (BUSPAR) 15 mg tablet Take 15 mg by mouth nightly.    Yes Historical Provider   Olmesartan-amLODIPine-HCTZ 40-10-25 mg tab Take 1 Tab by mouth daily. Yes Historical Provider   furosemide (LASIX) 40 mg tablet Take 40 mg by mouth every other day. Takes every other day in the evening (confirmed w/ patient)   Yes Historical Provider   labetalol (NORMODYNE) 300 mg tablet Take 300 mg by mouth every twelve (12) hours. Confirmed w/ patient. Rx for Tid, patient only tolerates it BiD   Yes Historical Provider   potassium chloride SA (MICRO-K) 10 mEq capsule Take 10 mEq by mouth daily. Yes Historical Provider   cholecalciferol (VITAMIN D3) 1,000 unit tablet Take 1,000 Units by mouth every Sunday. Indications: Vitamin D Deficiency, Takes unknown units of D3 once weekly   Yes Historical Provider   ascorbic acid (VITAMIN C) 500 mg tablet Take 500 mg by mouth daily.    Yes Historical Provider     No Known Allergies   Social History   Substance Use Topics    Smoking status: Never Smoker    Smokeless tobacco: Never Used    Alcohol use No      Family History   Problem Relation Age of Onset    Stroke Father 36    Heart Disease Father     Arthritis-osteo Mother     Cancer Mother [de-identified]     breast cancer        Current Facility-Administered Medications   Medication Dose Route Frequency    albuterol-ipratropium (DUO-NEB) 2.5 MG-0.5 MG/3 ML  3 mL Nebulization Q6H RT    [START ON 8/11/2018] VANCOMYCIN INFORMATION NOTE   Other ONCE    furosemide (LASIX) injection 40 mg  40 mg IntraVENous Q12H    sodium chloride (NS) flush 5-10 mL  5-10 mL IntraVENous Q8H    enoxaparin (LOVENOX) injection 40 mg  40 mg SubCUTAneous Q12H    cefTRIAXone (ROCEPHIN) 2 g in 0.9% sodium chloride (MBP/ADV) 50 mL  2 g IntraVENous Q24H    vancomycin (VANCOCIN) 1500 mg in  ml infusion  1,500 mg IntraVENous Q16H    busPIRone (BUSPAR) tablet 15 mg  15 mg Oral QHS    labetalol (NORMODYNE) tablet 300 mg  300 mg Oral Q12H    ascorbic acid (vitamin C) (VITAMIN C) tablet 500 mg  500 mg Oral DAILY    amLODIPine (NORVASC) tablet 10 mg  10 mg Oral DAILY    losartan (COZAAR) tablet 50 mg  50 mg Oral DAILY       Review of Systems:  A comprehensive review of systems was negative except for that written in the HPI. Objective:   Vital Signs:    Visit Vitals    /62    Pulse 89    Temp 98.4 °F (36.9 °C)    Resp 22    Ht 5' 8\" (1.727 m)    Wt 136.1 kg (300 lb)    SpO2 100%    BMI 45.61 kg/m2       O2 Device: CPAP mask   O2 Flow Rate (L/min): 2 l/min   Temp (24hrs), Av.5 °F (38.1 °C), Min:98.4 °F (36.9 °C), Max:102.5 °F (39.2 °C)       Intake/Output:   Last shift:      08/10 07 - 08/10 1900  In: 671.3 [I.V.:671.3]  Out: 700 [Urine:700]  Last 3 shifts: 1901 - 08/10 0700  In: 1932.5 [P.O.:240; I.V.:1692.5]  Out: 110 [Urine:110]    Intake/Output Summary (Last 24 hours) at 08/10/18 1452  Last data filed at 08/10/18 1417   Gross per 24 hour   Intake          2603.75 ml   Output              810 ml   Net          1793.75 ml      Physical Exam:   General:  Alert, obese, on CPAP   Head:  Normocephalic, without obvious abnormality, atraumatic. Eyes:  Conjunctivae/corneas clear. Nose: Nares normal. Septum midline. Mucosa normal.    Throat: Lips, mucosa, and tongue normal.     Neck: Supple, symmetrical, trachea midline    Back:   Symmetric, no curvature. ROM normal.   Lungs:   Clear to auscultation bilaterally. Chest wall:  No tenderness or deformity. Heart:  Regular rate and rhythm    Abdomen:   Soft, non-tender.  Bowel sounds normal.    Extremities: Extremities normal, atraumatic, no cyanosis, no clubbing   Skin: Old brown recluse bite on left foot, with cellulitis on left lower extremity (family reports this is much better than yesterday   Lymph nodes: Cervical, supraclavicular nodes normal.   Neurologic: Grossly nonfocal     Data review:     Recent Results (from the past 24 hour(s))   METABOLIC PANEL, COMPREHENSIVE    Collection Time: 08/10/18  4:18 AM   Result Value Ref Range    Sodium 135 (L) 136 - 145 mmol/L    Potassium 3.6 3.5 - 5.1 mmol/L    Chloride 101 97 - 108 mmol/L    CO2 25 21 - 32 mmol/L    Anion gap 9 5 - 15 mmol/L    Glucose 88 65 - 100 mg/dL    BUN 23 (H) 6 - 20 MG/DL    Creatinine 1.13 (H) 0.55 - 1.02 MG/DL    BUN/Creatinine ratio 20 12 - 20      GFR est AA 59 (L) >60 ml/min/1.73m2    GFR est non-AA 49 (L) >60 ml/min/1.73m2    Calcium 10.0 8.5 - 10.1 MG/DL    Bilirubin, total 0.6 0.2 - 1.0 MG/DL    ALT (SGPT) 19 12 - 78 U/L    AST (SGOT) 19 15 - 37 U/L    Alk. phosphatase 97 45 - 117 U/L    Protein, total 8.4 (H) 6.4 - 8.2 g/dL    Albumin 3.0 (L) 3.5 - 5.0 g/dL    Globulin 5.4 (H) 2.0 - 4.0 g/dL    A-G Ratio 0.6 (L) 1.1 - 2.2     CBC WITH AUTOMATED DIFF    Collection Time: 08/10/18  4:18 AM   Result Value Ref Range    WBC 24.5 (H) 3.6 - 11.0 K/uL    RBC 4.21 3.80 - 5.20 M/uL    HGB 12.1 11.5 - 16.0 g/dL    HCT 38.7 35.0 - 47.0 %    MCV 91.9 80.0 - 99.0 FL    MCH 28.7 26.0 - 34.0 PG    MCHC 31.3 30.0 - 36.5 g/dL    RDW 14.9 (H) 11.5 - 14.5 %    PLATELET 483 366 - 129 K/uL    MPV 11.7 8.9 - 12.9 FL    NRBC 0.0 0  WBC    ABSOLUTE NRBC 0.00 0.00 - 0.01 K/uL    NEUTROPHILS 78 (H) 32 - 75 %    BAND NEUTROPHILS 19 %    LYMPHOCYTES 2 (L) 12 - 49 %    MONOCYTES 1 (L) 5 - 13 %    EOSINOPHILS 0 0 - 7 %    BASOPHILS 0 0 - 1 %    IMMATURE GRANULOCYTES 0 0.0 - 0.5 %    ABS. NEUTROPHILS 23.8 (H) 1.8 - 8.0 K/UL    ABS. LYMPHOCYTES 0.5 (L) 0.8 - 3.5 K/UL    ABS. MONOCYTES 0.2 0.0 - 1.0 K/UL    ABS. EOSINOPHILS 0.0 0.0 - 0.4 K/UL    ABS. BASOPHILS 0.0 0.0 - 0.1 K/UL    ABS. IMM.  GRANS. 0.0 0.00 - 0.04 K/UL    DF MANUAL      RBC COMMENTS NORMOCYTIC, NORMOCHROMIC     TROPONIN I    Collection Time: 08/10/18  4:18 AM   Result Value Ref Range    Troponin-I, Qt. <0.05 <0.05 ng/mL   CK W/ CKMB & INDEX    Collection Time: 08/10/18  4:18 AM   Result Value Ref Range     (H) 26 - 192 U/L    CK - MB 1.5 <3.6 NG/ML    CK-MB Index 0.4 0 - 2.5     D DIMER    Collection Time: 08/10/18  9:14 AM   Result Value Ref Range    D-dimer 0.97 (H) 0.00 - 0.65 mg/L FEU   BLOOD GAS, ARTERIAL    Collection Time: 08/10/18 12:33 PM   Result Value Ref Range    pH 7.36 7.35 - 7.45      PCO2 49 (H) 35.0 - 45.0 mmHg    PO2 58 (L) 80 - 100 mmHg    O2 SAT 89 (L) 92 - 97 %    BICARBONATE 27 (H) 22 - 26 mmol/L    BASE EXCESS 0.7 mmol/L    O2 METHOD JET VENTILATOR      O2 FLOW RATE 2.00 L/min    SPONTANEOUS RATE 26.0      Sample source ARTERIAL      SITE RIGHT RADIAL      JORDAN'S TEST YES         Imaging:  I have personally reviewed the patients radiographs and have reviewed the reports:  Eventration of left hemidiaphragm.   No acute disease in the lung        Joselyn Echols MD

## 2018-08-10 NOTE — PROGRESS NOTES
Physical Therapy Note    Orders received. Chart reviewed. Pt currently receiving ECHO at bedside. Will defer and continue to follow. Thank you,  Kimberley Estrada, PT, DPT    14:53 PM: Spoke with RN. RN requesting PT defer evaluation this date as pt with increased lethargy and SOB, requiring CPAP for support. Will defer and follow back tomorrow as appropriate.

## 2018-08-11 LAB
ALBUMIN SERPL-MCNC: 2.6 G/DL (ref 3.5–5)
ALBUMIN/GLOB SERPL: 0.5 {RATIO} (ref 1.1–2.2)
ALP SERPL-CCNC: 92 U/L (ref 45–117)
ALT SERPL-CCNC: 18 U/L (ref 12–78)
ANION GAP SERPL CALC-SCNC: 8 MMOL/L (ref 5–15)
AST SERPL-CCNC: 20 U/L (ref 15–37)
BACTERIA SPEC CULT: NORMAL
BASOPHILS # BLD: 0 K/UL (ref 0–0.1)
BASOPHILS NFR BLD: 0 % (ref 0–1)
BILIRUB SERPL-MCNC: 0.4 MG/DL (ref 0.2–1)
BUN SERPL-MCNC: 29 MG/DL (ref 6–20)
BUN/CREAT SERPL: 22 (ref 12–20)
CALCIUM SERPL-MCNC: 9.9 MG/DL (ref 8.5–10.1)
CC UR VC: NORMAL
CHLORIDE SERPL-SCNC: 101 MMOL/L (ref 97–108)
CO2 SERPL-SCNC: 24 MMOL/L (ref 21–32)
CREAT SERPL-MCNC: 1.29 MG/DL (ref 0.55–1.02)
DATE LAST DOSE: ABNORMAL
DIFFERENTIAL METHOD BLD: ABNORMAL
EOSINOPHIL # BLD: 0 K/UL (ref 0–0.4)
EOSINOPHIL NFR BLD: 0 % (ref 0–7)
ERYTHROCYTE [DISTWIDTH] IN BLOOD BY AUTOMATED COUNT: 15.2 % (ref 11.5–14.5)
GLOBULIN SER CALC-MCNC: 5.2 G/DL (ref 2–4)
GLUCOSE SERPL-MCNC: 111 MG/DL (ref 65–100)
HCT VFR BLD AUTO: 36 % (ref 35–47)
HGB BLD-MCNC: 11.4 G/DL (ref 11.5–16)
IMM GRANULOCYTES # BLD: 0.1 K/UL (ref 0–0.04)
IMM GRANULOCYTES NFR BLD AUTO: 1 % (ref 0–0.5)
LYMPHOCYTES # BLD: 1.4 K/UL (ref 0.8–3.5)
LYMPHOCYTES NFR BLD: 7 % (ref 12–49)
MAGNESIUM SERPL-MCNC: 2.1 MG/DL (ref 1.6–2.4)
MCH RBC QN AUTO: 28.8 PG (ref 26–34)
MCHC RBC AUTO-ENTMCNC: 31.7 G/DL (ref 30–36.5)
MCV RBC AUTO: 90.9 FL (ref 80–99)
MONOCYTES # BLD: 0.9 K/UL (ref 0–1)
MONOCYTES NFR BLD: 5 % (ref 5–13)
NEUTS SEG # BLD: 16.7 K/UL (ref 1.8–8)
NEUTS SEG NFR BLD: 87 % (ref 32–75)
NRBC # BLD: 0 K/UL (ref 0–0.01)
NRBC BLD-RTO: 0 PER 100 WBC
PLATELET # BLD AUTO: 161 K/UL (ref 150–400)
PMV BLD AUTO: 11.8 FL (ref 8.9–12.9)
POTASSIUM SERPL-SCNC: 3.7 MMOL/L (ref 3.5–5.1)
PROT SERPL-MCNC: 7.8 G/DL (ref 6.4–8.2)
RBC # BLD AUTO: 3.96 M/UL (ref 3.8–5.2)
REPORTED DOSE,DOSE: ABNORMAL UNITS
REPORTED DOSE/TIME,TMG: ABNORMAL
SERVICE CMNT-IMP: NORMAL
SODIUM SERPL-SCNC: 133 MMOL/L (ref 136–145)
VANCOMYCIN TROUGH SERPL-MCNC: 15.5 UG/ML (ref 5–10)
WBC # BLD AUTO: 19.1 K/UL (ref 3.6–11)

## 2018-08-11 PROCEDURE — 74011000250 HC RX REV CODE- 250: Performed by: INTERNAL MEDICINE

## 2018-08-11 PROCEDURE — 85025 COMPLETE CBC W/AUTO DIFF WBC: CPT | Performed by: INTERNAL MEDICINE

## 2018-08-11 PROCEDURE — 74011250637 HC RX REV CODE- 250/637: Performed by: INTERNAL MEDICINE

## 2018-08-11 PROCEDURE — 77010033678 HC OXYGEN DAILY

## 2018-08-11 PROCEDURE — 65660000000 HC RM CCU STEPDOWN

## 2018-08-11 PROCEDURE — G8979 MOBILITY GOAL STATUS: HCPCS | Performed by: PHYSICAL THERAPIST

## 2018-08-11 PROCEDURE — 74011250636 HC RX REV CODE- 250/636: Performed by: INTERNAL MEDICINE

## 2018-08-11 PROCEDURE — 74011250636 HC RX REV CODE- 250/636: Performed by: EMERGENCY MEDICINE

## 2018-08-11 PROCEDURE — 36415 COLL VENOUS BLD VENIPUNCTURE: CPT | Performed by: INTERNAL MEDICINE

## 2018-08-11 PROCEDURE — 74011000258 HC RX REV CODE- 258: Performed by: INTERNAL MEDICINE

## 2018-08-11 PROCEDURE — 83735 ASSAY OF MAGNESIUM: CPT | Performed by: INTERNAL MEDICINE

## 2018-08-11 PROCEDURE — 94760 N-INVAS EAR/PLS OXIMETRY 1: CPT

## 2018-08-11 PROCEDURE — 97530 THERAPEUTIC ACTIVITIES: CPT | Performed by: PHYSICAL THERAPIST

## 2018-08-11 PROCEDURE — 80053 COMPREHEN METABOLIC PANEL: CPT | Performed by: INTERNAL MEDICINE

## 2018-08-11 PROCEDURE — 94660 CPAP INITIATION&MGMT: CPT

## 2018-08-11 PROCEDURE — G8978 MOBILITY CURRENT STATUS: HCPCS | Performed by: PHYSICAL THERAPIST

## 2018-08-11 PROCEDURE — 97161 PT EVAL LOW COMPLEX 20 MIN: CPT | Performed by: PHYSICAL THERAPIST

## 2018-08-11 PROCEDURE — 80202 ASSAY OF VANCOMYCIN: CPT | Performed by: INTERNAL MEDICINE

## 2018-08-11 PROCEDURE — 94640 AIRWAY INHALATION TREATMENT: CPT

## 2018-08-11 RX ORDER — DIPHENHYDRAMINE HYDROCHLORIDE 50 MG/ML
25 INJECTION, SOLUTION INTRAMUSCULAR; INTRAVENOUS
Status: COMPLETED | OUTPATIENT
Start: 2018-08-11 | End: 2018-08-11

## 2018-08-11 RX ORDER — NYSTATIN 100000 [USP'U]/G
POWDER TOPICAL 2 TIMES DAILY
Status: DISCONTINUED | OUTPATIENT
Start: 2018-08-11 | End: 2018-08-23 | Stop reason: HOSPADM

## 2018-08-11 RX ORDER — FUROSEMIDE 10 MG/ML
40 INJECTION INTRAMUSCULAR; INTRAVENOUS DAILY
Status: DISCONTINUED | OUTPATIENT
Start: 2018-08-12 | End: 2018-08-15

## 2018-08-11 RX ADMIN — OXYCODONE HYDROCHLORIDE AND ACETAMINOPHEN 500 MG: 500 TABLET ORAL at 08:56

## 2018-08-11 RX ADMIN — FUROSEMIDE 40 MG: 10 INJECTION, SOLUTION INTRAMUSCULAR; INTRAVENOUS at 08:55

## 2018-08-11 RX ADMIN — IPRATROPIUM BROMIDE AND ALBUTEROL SULFATE 3 ML: .5; 3 SOLUTION RESPIRATORY (INHALATION) at 19:26

## 2018-08-11 RX ADMIN — LOSARTAN POTASSIUM 50 MG: 50 TABLET ORAL at 08:56

## 2018-08-11 RX ADMIN — IPRATROPIUM BROMIDE AND ALBUTEROL SULFATE 3 ML: .5; 3 SOLUTION RESPIRATORY (INHALATION) at 07:55

## 2018-08-11 RX ADMIN — ACETAMINOPHEN 650 MG: 325 TABLET ORAL at 17:45

## 2018-08-11 RX ADMIN — Medication 10 ML: at 04:14

## 2018-08-11 RX ADMIN — Medication 10 ML: at 13:51

## 2018-08-11 RX ADMIN — IPRATROPIUM BROMIDE AND ALBUTEROL SULFATE 3 ML: .5; 3 SOLUTION RESPIRATORY (INHALATION) at 14:49

## 2018-08-11 RX ADMIN — NYSTATIN: 100000 POWDER TOPICAL at 17:46

## 2018-08-11 RX ADMIN — ENOXAPARIN SODIUM 40 MG: 100 INJECTION SUBCUTANEOUS at 20:52

## 2018-08-11 RX ADMIN — ACETAMINOPHEN 650 MG: 325 TABLET ORAL at 04:14

## 2018-08-11 RX ADMIN — DIPHENHYDRAMINE HYDROCHLORIDE 25 MG: 50 INJECTION, SOLUTION INTRAMUSCULAR; INTRAVENOUS at 05:59

## 2018-08-11 RX ADMIN — IPRATROPIUM BROMIDE AND ALBUTEROL SULFATE 3 ML: .5; 3 SOLUTION RESPIRATORY (INHALATION) at 02:33

## 2018-08-11 RX ADMIN — LABETALOL HYDROCHLORIDE 300 MG: 200 TABLET, FILM COATED ORAL at 20:53

## 2018-08-11 RX ADMIN — LABETALOL HYDROCHLORIDE 300 MG: 200 TABLET, FILM COATED ORAL at 08:56

## 2018-08-11 RX ADMIN — VANCOMYCIN HYDROCHLORIDE 1500 MG: 10 INJECTION, POWDER, LYOPHILIZED, FOR SOLUTION INTRAVENOUS at 02:45

## 2018-08-11 RX ADMIN — NYSTATIN: 100000 POWDER TOPICAL at 08:57

## 2018-08-11 RX ADMIN — Medication 10 ML: at 20:53

## 2018-08-11 RX ADMIN — BUSPIRONE HYDROCHLORIDE 15 MG: 5 TABLET ORAL at 20:53

## 2018-08-11 RX ADMIN — AMLODIPINE BESYLATE 10 MG: 5 TABLET ORAL at 08:56

## 2018-08-11 RX ADMIN — CEFTRIAXONE 2 G: 2 INJECTION, POWDER, FOR SOLUTION INTRAMUSCULAR; INTRAVENOUS at 20:52

## 2018-08-11 RX ADMIN — ENOXAPARIN SODIUM 40 MG: 100 INJECTION SUBCUTANEOUS at 08:56

## 2018-08-11 NOTE — PROGRESS NOTES
1915  Bedside shift report received from [facility]. SBAR, MAR,VS, KARDEX reviewed. Pt family at bedside. Pt resting in bed on CPAP. Denies pain or discomfort. RLE redness and edema persists. 0500 Pt up getting cleaned up with the help or her daughter. RLE noted to be angry red and itching. 0530 Dr. Lori Hernandez notified. New orders received.

## 2018-08-11 NOTE — PROGRESS NOTES
PCU SHIFT NURSING NOTE      Bedside and Verbal shift change report given to Carol Espinal RN (oncoming nurse) by Connie Zuluaga RN (offgoing nurse). Report included the following information SBAR, Kardex, Intake/Output, MAR, Recent Results and Cardiac Rhythm NSR. Shift Summary:   0 - Oldest daughter at bedside during report, claiming that she is a nurse and being argumentative about a variety of issues (size of blood pressure cuff, her antibiotic being too strong and causing her leg to get worse, she should not be running fevers if she received the right antibiotic, etc.) Offered to call physician this AM for her to speak to him as she refused teaching from this nurse and is very argumentative stating that she \"knows more cause I worked at [employer]. \" Daughter refuses to speak to physician via phone at this time. Daughter requested to know the scheduled time of her vancomycin trough and stated that she does not want her receiving any until it results. Pt expresses desire to follow what her daughter says. 46 - Informed MD of family's arguments this AM. MD to speak with family and pt.   1450 - Received verbal order from Dr. Nimo Odom to place pt on 3L NC and to apply CPAP at night. Pt and family verbalize understanding that if pt becomes tachypnic or begins to labor breathing, that pt needs to go back on CPAP. 1730 - Vancomycin trough; pt refusing vancomycin until trough results as pt believes daughter is correct in thinking. Pt refusing teaching from physician and this nurse. Rescheduled abx for 1900 in the event that night nurse may be able to persuade pt to accept.     Admission Date 8/9/2018   Admission Diagnosis Sepsis ([address]   Consults IP CONSULT TO INTENSIVIST  IP CONSULT TO PULMONOLOGY        Consults   []PT   []OT   []Speech   []Case Management      [] Palliative      Cardiac Monitoring Order   []Yes   []No     IV drips   []Yes    Drip:                            Dose:  Drip:                            Dose:  Drip: Dose:   []No     GI Prophylaxis   []Yes   []No         DVT Prophylaxis   SCDs:             Thanh stockings:         [] Medication   []Contraindicated   []None      Activity Level Activity Level: Bath Room Privileges     Activity Assistance: Partial (one person)   Purposeful Rounding every 1-2 hour? []Yes   Quintanilla Score  Total Score: 3   Bed Alarm (If score 3 or >)   []Yes   [] Refused (See signed refusal form in chart)   Celestino Score  Celestino Score: 20   Celestino Score (if score 14 or less)   []PMT consult   []Wound Care consult      []Specialty bed   [] Nutrition consult          Needs prior to discharge:   Home O2 required:    []Yes   []No    If yes, how much O2 required? Other:    Last Bowel Movement: Last Bowel Movement Date: 08/09/18      Influenza Vaccine          Pneumonia Vaccine           Diet Active Orders   Diet    DIET CARDIAC Regular      LDAs               Peripheral IV 08/09/18 Left Forearm (Active)   Site Assessment Clean, dry, & intact 8/10/2018 10:51 PM   Phlebitis Assessment 0 8/10/2018 10:51 PM   Infiltration Assessment 0 8/10/2018 10:51 PM   Dressing Status Clean, dry, & intact 8/10/2018 10:51 PM   Dressing Type Tape;Transparent 8/10/2018 10:51 PM   Hub Color/Line Status Pink;Patent; Flushed 8/10/2018 10:51 PM   Action Taken Open ports on tubing capped 8/10/2018 10:51 PM   Alcohol Cap Used Yes 8/10/2018 12:00 AM       Peripheral IV 08/10/18 Right Antecubital (Active)   Site Assessment Clean, dry, & intact 8/10/2018 10:51 PM   Phlebitis Assessment 0 8/10/2018 10:51 PM   Infiltration Assessment 0 8/10/2018 10:51 PM   Dressing Status Clean, dry, & intact 8/10/2018 10:51 PM   Dressing Type Tape 8/10/2018 10:51 PM   Hub Color/Line Status Patent;Pink 8/10/2018 10:51 PM                      Urinary Catheter Urinary Catheter 08/10/18 Hernandez-Indications for Use: Accurate measurement of urinary output    Intake & Output   Date 08/10/18 0700 - 08/11/18 0659 08/11/18 0700 - 08/12/18 0659   Shift 8160-7307 1719-4494 24 Hour Total 0705-7862 7380-6938 24 Hour Total   I  N  T  A  K  E   I.V.  (mL/kg/hr) 671.3  (0.4)  671.3  (0.2)         Volume (0.9% sodium chloride infusion) 171.3  171.3         Volume (vancomycin (VANCOCIN) 1500 mg in  ml infusion) 500  500       Shift Total  (mL/kg) 671.3  (4.9)  671.3  (4.5)      O  U  T  P  U  T   Urine  (mL/kg/hr) 700  (0.4) 700  (0.4) 1400  (0.4)         Urine Voided 300  300         Urine Output (mL) (Urinary Catheter 08/10/18 Hernandez)        Shift Total  (mL/kg) 700  (5.1) 700  (4.7) 1400  (9.5)      NET -28.8 -700 -728.8      Weight (kg) 136.1 147.6 147.6 147.6 147.6 147.6         Readmission Risk Assessment Tool Score Low Risk            0       Total Score            Criteria that do not apply:    Has Seen PCP in Last 6 Months (Yes=3, No=0)    . Living with Significant Other. Assisted Living. LTAC. SNF. or   Rehab    Patient Length of Stay (>5 days = 3)    IP Visits Last 12 Months (1-3=4, 4=9, >4=11)    Pt.  Coverage (Medicare=5 , Medicaid, or Self-Pay=4)    Charlson Comorbidity Score (Age + Comorbid Conditions)       Expected Length of Stay 4d 21h   Actual Length of Stay 2

## 2018-08-11 NOTE — PROGRESS NOTES
Hospitalist Progress Note    NAME: Chris Aceves   :  1955   MRN:  [id number]       Assessment / Plan:  Sepsis ([address] (2018) POA WBC 22.6, , RR to 40, temp 103.2, normal lactate, WBC trending down, still spiking fever this AM.  Left leg cellulitis POA: c/w CTX/Vancomycin, Duplex is negative for DVT. HTN urgency POA: c/w Norvasc, BB, ARB, monitor. Use hydralazine prn. Acute Hypoxic Respiratory Failure POA/Elevated left hemidiaphragm POA: CXR looks congested with CMG, she may have some degree of CHF, will monitor I/O and weight, Pulmonology help appreciated, CT chest no pathology in lung, ECHO EF 65%, c/w diuretic decreased dose, still on CPAP, will try to wean off and use it only at night  Obesity Hypoventilation Sd: c/w CPAP at night  Hyponatremia POA so far improving, monitor. History of spider bite in left leg 15 years ago  Morbid Obesity POA Body mass index is 45.61 kg/(m^2). NOK:   Code status: Full  D/W , DTR Lisa Patel [phone]  Prophylaxis: Lovenox  Recommended Disposition:  PT, OT, RN     Subjective:     Chief Complaint / Reason for Physician Visit  \"I feel SOB\". Discussed with RN events overnight. Review of Systems:  Symptom Y/N Comments  Symptom Y/N Comments   Fever/Chills y   Chest Pain     Poor Appetite    Edema     Cough    Abdominal Pain     Sputum    Joint Pain y    SOB/FLORENCE y   Pruritis/Rash y    Nausea/vomit    Tolerating PT/OT     Diarrhea    Tolerating Diet y    Constipation    Other       Could NOT obtain due to:      Objective:     VITALS:   Last 24hrs VS reviewed since prior progress note.  Most recent are:  Patient Vitals for the past 24 hrs:   Temp Pulse Resp BP SpO2   18 1127 99.7 °F (37.6 °C) 81 18 135/72 99 %   18 0757 - - - - 99 %   18 0724 97.9 °F (36.6 °C) 74 24 137/69 100 %   18 0604 98.6 °F (37 °C) - - - -   18 0414 (!) 100.7 °F (38.2 °C) 90 28 111/80 96 %   18 0345 - - - - 96 %   18 0231 - - - - 96 % 08/10/18 2316 99.7 °F (37.6 °C) 90 24 122/68 95 %   08/10/18 2256 - - - - 98 %   08/10/18 2045 - - - - 97 %   08/10/18 1936 99.4 °F (37.4 °C) 95 26 130/68 92 %   08/10/18 1700 99.4 °F (37.4 °C) - - - -   08/10/18 1612 (!) 101 °F (38.3 °C) (!) 109 24 148/75 97 %   08/10/18 1522 - - - - 94 %       Intake/Output Summary (Last 24 hours) at 08/11/18 1355  Last data filed at 08/11/18 0733   Gross per 24 hour   Intake              550 ml   Output             1550 ml   Net            -1000 ml        PHYSICAL EXAM:  General: WD, WN. Alert, cooperative, SOB  EENT:  EOMI. Anicteric sclerae. MMM  Resp:  Coarse BS, mild crackles  CV:  Regular  rhythm,  + edema  GI:  Soft, Non distended, Non tender.  +Bowel sounds  Neurologic:  Alert and oriented X 3, normal speech,   Psych:   Good insight. Not anxious nor agitated  Skin:  +  Rashes in left leg. No jaundice    Reviewed most current lab test results and cultures  YES  Reviewed most current radiology test results   YES  Review and summation of old records today    NO  Reviewed patient's current orders and MAR    YES  PMH/SH reviewed - no change compared to H&P  ________________________________________________________________________  Care Plan discussed with:    Comments   Patient y    Family  y    RN y    Care Manager     Consultant                        Multidiciplinary team rounds were held today with , nursing, pharmacist and clinical coordinator. Patient's plan of care was discussed; medications were reviewed and discharge planning was addressed.      ________________________________________________________________________  Total NON critical care TIME:  35   Minutes    Total CRITICAL CARE TIME Spent:   Minutes non procedure based      Comments   >50% of visit spent in counseling and coordination of care y    ________________________________________________________________________  Shamika Ayers MD     Procedures: see electronic medical records for all procedures/Xrays and details which were not copied into this note but were reviewed prior to creation of Plan. LABS:  I reviewed today's most current labs and imaging studies.   Pertinent labs include:  Recent Labs      08/11/18   0348  08/10/18   0418  08/09/18   1405   WBC  19.1*  24.5*  22.6*   HGB  11.4*  12.1  13.4   HCT  36.0  38.7  41.1   PLT  161  171  207     Recent Labs      08/11/18   0348  08/10/18   0418  08/09/18   1405   NA  133*  135*  132*   K  3.7  3.6  4.0   CL  101  101  99   CO2  24  25  26   GLU  111*  88  102*   BUN  29*  23*  22*   CREA  1.29*  1.13*  1.07*   CA  9.9  10.0  10.0   MG  2.1   --    --    ALB  2.6*  3.0*  3.5   TBILI  0.4  0.6  0.8   SGOT  20  19  22   ALT  18  19  23       Signed: Monique Ruth MD

## 2018-08-11 NOTE — PROGRESS NOTES
Problem: Mobility Impaired (Adult and Pediatric)  Goal: *Acute Goals and Plan of Care (Insert Text)  Physical Therapy Goals  Initiated 8/11/2018  1. Patient will move from supine to sit and sit to supine  in bed with independence within 7 day(s). 2.  Patient will transfer from bed to chair and chair to bed with independence using the least restrictive device within 7 day(s). 3.  Patient will perform sit to stand with independence within 7 day(s). 4.  Patient will ambulate with supervision/set-up for 100 feet with the least restrictive device within 7 day(s). 5.  Patient will ascend/descend 13 stairs with 2 handrail(s) with supervision/set-up within 7 day(s). physical Therapy EVALUATION  Patient: Damon Gates (28 y.o. female)  Date: 8/11/2018  Primary Diagnosis: Sepsis ([address]        Precautions:        ASSESSMENT :  Based on the objective data described below, the patient presents with general weakness/decreased activity tolerance, decreased standing balance/tolerance, and decreased functional mobility (bed mobility, transfers, and gait) as compared to stated baseline. Patient had an episode last night with rapid response and constant CPAP leading up to just before this evaluation (she is now on 3L O2 via nasal cannula), and she feels a little \"shaky\", so gait assessment was deferred today. Vitals stable throughout this evaluation, and SpO2 > 94%. She is able to transfer bed to chair with contact guard x 1-2 for safety. She will hopefully be able to return to her home setting with support of her  and no follow-up PT needed, but HHPT may be necessary to maximize her functional independence/strength. Patient will benefit from skilled intervention to address the above impairments.   Patients rehabilitation potential is considered to be Good  Factors which may influence rehabilitation potential include:   [x]         None noted  []         Mental ability/status  []         Medical condition  [] Home/family situation and support systems  []         Safety awareness  []         Pain tolerance/management  []         Other:      PLAN :  Recommendations and Planned Interventions:  [x]           Bed Mobility Training             [x]    Neuromuscular Re-Education  [x]           Transfer Training                   []    Orthotic/Prosthetic Training  [x]           Gait Training                         []    Modalities  [x]           Therapeutic Exercises           [x]    Edema Management/Control  [x]           Therapeutic Activities            [x]    Patient and Family Training/Education  []           Other (comment):    Frequency/Duration: Patient will be followed by physical therapy  5 times a week to address goals. Discharge Recommendations: Home Health? Further Equipment Recommendations for Discharge: shower chair and grab bars for bathroom, possibly ambulatory device (TBD)     SUBJECTIVE:   Patient stated I run a  from my home.     OBJECTIVE DATA SUMMARY:   HISTORY:    Past Medical History:   Diagnosis Date    Arthritis     knees, lower back.  Hypertension     Morbid obesity ([facility]      Past Surgical History:   Procedure Laterality Date    HX GYN  2009    D&C     Prior Level of Function/Home Situation: Lives in a 2-level home with bedroom on second floor with her . She was working up to the time of admission, running a  (4 children), independent with ADL/IADLs and gait. She does not own any medical equipment.   Personal factors and/or comorbidities impacting plan of care:     Home Situation  Home Environment: Private residence  # Steps to Enter: 3  Rails to Enter: Yes  Hand Rails : Right  One/Two Story Residence: Two story  # of Interior Steps: 15  Interior Rails: Both  Living Alone: No  Support Systems: Family member(s), Spouse/Significant Other/Partner  Patient Expects to be Discharged to[de-identified] Private residence  Tub or Shower Type: Shower    EXAMINATION/PRESENTATION/DECISION MAKING: Critical Behavior:  Neurologic State: Drowsy  Orientation Level: Appropriate for age, Oriented X4  Cognition: Appropriate decision making, Appropriate for age attention/concentration, Appropriate safety awareness  Safety/Judgement: Awareness of environment, Fall prevention, Insight into deficits  Hearing: Auditory  Auditory Impairment: None  Skin:  Redness at distal left LE, + telemetry, + saunders, + O2 (3L)  Edema: distal left LE  Range Of Motion:  AROM: Generally decreased, functional                       Strength:    Strength: Generally decreased, functional                    Tone & Sensation:   Tone: Normal              Sensation: Intact               Coordination:  Coordination: Generally decreased, functional  Vision:      Functional Mobility:  Bed Mobility:     Supine to Sit: Supervision  Sit to Supine: Supervision     Transfers:  Sit to Stand: Contact guard assistance;Assist x1  Stand to Sit: Contact guard assistance;Assist x1        Bed to Chair: Contact guard assistance;Assist x1              Balance:   Sitting: Intact  Standing: Impaired  Standing - Static: Good  Standing - Dynamic : Fair  Ambulation/Gait Training:   Deferred due to patient feeling \"shaky\"                                                           Therapeutic Exercises:   Gentle AROM both UE/LEs prior to mobility. Functional Measure:    Elder Mobility Scale    7/20         EMS and G-code impairment scale:  Percentage of impairment CH  0% CI  1-19% CJ  20-39% CK  40-59% CL  60-79% CM  80-99% CN  100%   EMS Score 0-20 20 17-19 13-16 9-12 5-8 1-4 0      Scores under 10  generally these patients are dependent in mobility maneuvers; require help with  basic ADL, such as transfers, toileting and dressing. Scores between 10  13  generally these patients are borderline in terms of safe mobility and  independence in ADL i.e. they require some help with some mobility maneuvers.     Scores over 14  Generally these patients are able to perform mobility maneuvers alone and safely  and are independent in basic ADL. G codes: In compliance with CMSs Claims Based Outcome Reporting, the following G-code set was chosen for this patient based on their primary functional limitation being treated: The outcome measure chosen to determine the severity of the functional limitation was the Elderly Mobility Scale with a score of 7/20 which was correlated with the impairment scale. ? Mobility - Walking and Moving Around:     - CURRENT STATUS: CL - 60%-79% impaired, limited or restricted    - GOAL STATUS: CI - 1%-19% impaired, limited or restricted    - D/C STATUS:  ---------------To be determined---------------      Physical Therapy Evaluation Charge Determination   History Examination Presentation Decision-Making   MEDIUM  Complexity : 1-2 comorbidities / personal factors will impact the outcome/ POC  LOW Complexity : 1-2 Standardized tests and measures addressing body structure, function, activity limitation and / or participation in recreation  LOW Complexity : Stable, uncomplicated  LOW Complexity : FOTO score of       Based on the above components, the patient evaluation is determined to be of the following complexity level: LOW     Pain:  Pain Scale 1: Numeric (0 - 10)  Pain Intensity 1: 0              Activity Tolerance:   Fair, a little shaky, tolerated sitting at edge of bed well and left sitting in chair. Please refer to the flowsheet for vital signs taken during this treatment. After treatment:   [x]         Patient left in no apparent distress sitting up in chair  []         Patient left in no apparent distress in bed  [x]         Call bell left within reach  [x]         Nursing notified  []         Caregiver present  []         Bed alarm activated    COMMUNICATION/EDUCATION:   The patients plan of care was discussed with: Registered Nurse.   [x]         Fall prevention education was provided and the patient/caregiver indicated understanding. [x]         Patient/family have participated as able in goal setting and plan of care. [x]         Patient/family agree to work toward stated goals and plan of care. []         Patient understands intent and goals of therapy, but is neutral about his/her participation. []         Patient is unable to participate in goal setting and plan of care.     Thank you for this referral.  Kirk Sher, PT   Time Calculation: 14 mins

## 2018-08-12 LAB
ALBUMIN SERPL-MCNC: 2.5 G/DL (ref 3.5–5)
ALBUMIN/GLOB SERPL: 0.5 {RATIO} (ref 1.1–2.2)
ALP SERPL-CCNC: 94 U/L (ref 45–117)
ALT SERPL-CCNC: 19 U/L (ref 12–78)
ANION GAP SERPL CALC-SCNC: 9 MMOL/L (ref 5–15)
ARTERIAL PATENCY WRIST A: YES
AST SERPL-CCNC: 18 U/L (ref 15–37)
BASE DEFICIT BLDA-SCNC: 1.2 MMOL/L
BASOPHILS # BLD: 0 K/UL (ref 0–0.1)
BASOPHILS NFR BLD: 0 % (ref 0–1)
BDY SITE: ABNORMAL
BILIRUB SERPL-MCNC: 0.4 MG/DL (ref 0.2–1)
BREATHS.SPONTANEOUS ON VENT: 24
BUN SERPL-MCNC: 33 MG/DL (ref 6–20)
BUN/CREAT SERPL: 27 (ref 12–20)
CALCIUM SERPL-MCNC: 10.3 MG/DL (ref 8.5–10.1)
CHLORIDE SERPL-SCNC: 102 MMOL/L (ref 97–108)
CO2 SERPL-SCNC: 24 MMOL/L (ref 21–32)
CREAT SERPL-MCNC: 1.24 MG/DL (ref 0.55–1.02)
DIFFERENTIAL METHOD BLD: ABNORMAL
EOSINOPHIL # BLD: 0.1 K/UL (ref 0–0.4)
EOSINOPHIL NFR BLD: 1 % (ref 0–7)
ERYTHROCYTE [DISTWIDTH] IN BLOOD BY AUTOMATED COUNT: 15.3 % (ref 11.5–14.5)
GAS FLOW.O2 O2 DELIVERY SYS: 3 L/MIN
GLOBULIN SER CALC-MCNC: 5.3 G/DL (ref 2–4)
GLUCOSE SERPL-MCNC: 116 MG/DL (ref 65–100)
HCO3 BLDA-SCNC: 25 MMOL/L (ref 22–26)
HCT VFR BLD AUTO: 34.3 % (ref 35–47)
HGB BLD-MCNC: 10.7 G/DL (ref 11.5–16)
IMM GRANULOCYTES # BLD: 0.1 K/UL (ref 0–0.04)
IMM GRANULOCYTES NFR BLD AUTO: 0 % (ref 0–0.5)
LYMPHOCYTES # BLD: 1.4 K/UL (ref 0.8–3.5)
LYMPHOCYTES NFR BLD: 9 % (ref 12–49)
MAGNESIUM SERPL-MCNC: 2.2 MG/DL (ref 1.6–2.4)
MCH RBC QN AUTO: 28.5 PG (ref 26–34)
MCHC RBC AUTO-ENTMCNC: 31.2 G/DL (ref 30–36.5)
MCV RBC AUTO: 91.2 FL (ref 80–99)
MONOCYTES # BLD: 1.2 K/UL (ref 0–1)
MONOCYTES NFR BLD: 7 % (ref 5–13)
NEUTS SEG # BLD: 12.9 K/UL (ref 1.8–8)
NEUTS SEG NFR BLD: 82 % (ref 32–75)
NRBC # BLD: 0 K/UL (ref 0–0.01)
NRBC BLD-RTO: 0 PER 100 WBC
PCO2 BLDA: 49 MMHG (ref 35–45)
PH BLDA: 7.33 [PH] (ref 7.35–7.45)
PLATELET # BLD AUTO: 166 K/UL (ref 150–400)
PMV BLD AUTO: 11.7 FL (ref 8.9–12.9)
PO2 BLDA: 74 MMHG (ref 80–100)
POTASSIUM SERPL-SCNC: 3.9 MMOL/L (ref 3.5–5.1)
PROT SERPL-MCNC: 7.8 G/DL (ref 6.4–8.2)
RBC # BLD AUTO: 3.76 M/UL (ref 3.8–5.2)
SAO2 % BLD: 94 % (ref 92–97)
SAO2% DEVICE SAO2% SENSOR NAME: ABNORMAL
SODIUM SERPL-SCNC: 135 MMOL/L (ref 136–145)
SPECIMEN SITE: ABNORMAL
WBC # BLD AUTO: 15.7 K/UL (ref 3.6–11)

## 2018-08-12 PROCEDURE — 36415 COLL VENOUS BLD VENIPUNCTURE: CPT | Performed by: INTERNAL MEDICINE

## 2018-08-12 PROCEDURE — 94640 AIRWAY INHALATION TREATMENT: CPT

## 2018-08-12 PROCEDURE — 85025 COMPLETE CBC W/AUTO DIFF WBC: CPT | Performed by: INTERNAL MEDICINE

## 2018-08-12 PROCEDURE — 74011250636 HC RX REV CODE- 250/636: Performed by: INTERNAL MEDICINE

## 2018-08-12 PROCEDURE — 82803 BLOOD GASES ANY COMBINATION: CPT | Performed by: INTERNAL MEDICINE

## 2018-08-12 PROCEDURE — 65660000000 HC RM CCU STEPDOWN

## 2018-08-12 PROCEDURE — 74011250637 HC RX REV CODE- 250/637: Performed by: INTERNAL MEDICINE

## 2018-08-12 PROCEDURE — 74011000250 HC RX REV CODE- 250: Performed by: INTERNAL MEDICINE

## 2018-08-12 PROCEDURE — 94660 CPAP INITIATION&MGMT: CPT

## 2018-08-12 PROCEDURE — 74011250636 HC RX REV CODE- 250/636: Performed by: EMERGENCY MEDICINE

## 2018-08-12 PROCEDURE — 83735 ASSAY OF MAGNESIUM: CPT | Performed by: INTERNAL MEDICINE

## 2018-08-12 PROCEDURE — 94760 N-INVAS EAR/PLS OXIMETRY 1: CPT

## 2018-08-12 PROCEDURE — 77010033678 HC OXYGEN DAILY

## 2018-08-12 PROCEDURE — 74011000258 HC RX REV CODE- 258: Performed by: INTERNAL MEDICINE

## 2018-08-12 PROCEDURE — 36600 WITHDRAWAL OF ARTERIAL BLOOD: CPT | Performed by: INTERNAL MEDICINE

## 2018-08-12 PROCEDURE — 80053 COMPREHEN METABOLIC PANEL: CPT | Performed by: INTERNAL MEDICINE

## 2018-08-12 RX ORDER — POLYETHYLENE GLYCOL 3350 17 G/17G
17 POWDER, FOR SOLUTION ORAL DAILY
Status: DISCONTINUED | OUTPATIENT
Start: 2018-08-12 | End: 2018-08-23 | Stop reason: HOSPADM

## 2018-08-12 RX ORDER — IPRATROPIUM BROMIDE AND ALBUTEROL SULFATE 2.5; .5 MG/3ML; MG/3ML
3 SOLUTION RESPIRATORY (INHALATION)
Status: DISCONTINUED | OUTPATIENT
Start: 2018-08-12 | End: 2018-08-23 | Stop reason: HOSPADM

## 2018-08-12 RX ADMIN — NYSTATIN: 100000 POWDER TOPICAL at 09:00

## 2018-08-12 RX ADMIN — IPRATROPIUM BROMIDE AND ALBUTEROL SULFATE 3 ML: .5; 3 SOLUTION RESPIRATORY (INHALATION) at 02:19

## 2018-08-12 RX ADMIN — LABETALOL HYDROCHLORIDE 300 MG: 200 TABLET, FILM COATED ORAL at 11:01

## 2018-08-12 RX ADMIN — LABETALOL HYDROCHLORIDE 300 MG: 200 TABLET, FILM COATED ORAL at 21:04

## 2018-08-12 RX ADMIN — Medication 10 ML: at 05:30

## 2018-08-12 RX ADMIN — NYSTATIN: 100000 POWDER TOPICAL at 16:55

## 2018-08-12 RX ADMIN — BUSPIRONE HYDROCHLORIDE 15 MG: 5 TABLET ORAL at 21:04

## 2018-08-12 RX ADMIN — Medication 10 ML: at 21:05

## 2018-08-12 RX ADMIN — LOSARTAN POTASSIUM 50 MG: 50 TABLET ORAL at 11:04

## 2018-08-12 RX ADMIN — ENOXAPARIN SODIUM 40 MG: 100 INJECTION SUBCUTANEOUS at 21:04

## 2018-08-12 RX ADMIN — VANCOMYCIN HYDROCHLORIDE 1500 MG: 10 INJECTION, POWDER, LYOPHILIZED, FOR SOLUTION INTRAVENOUS at 00:31

## 2018-08-12 RX ADMIN — CEFTRIAXONE 2 G: 2 INJECTION, POWDER, FOR SOLUTION INTRAMUSCULAR; INTRAVENOUS at 21:04

## 2018-08-12 RX ADMIN — IPRATROPIUM BROMIDE AND ALBUTEROL SULFATE 3 ML: .5; 3 SOLUTION RESPIRATORY (INHALATION) at 11:28

## 2018-08-12 RX ADMIN — FUROSEMIDE 40 MG: 10 INJECTION, SOLUTION INTRAMUSCULAR; INTRAVENOUS at 11:04

## 2018-08-12 RX ADMIN — Medication 10 ML: at 14:00

## 2018-08-12 RX ADMIN — VANCOMYCIN HYDROCHLORIDE 1500 MG: 10 INJECTION, POWDER, LYOPHILIZED, FOR SOLUTION INTRAVENOUS at 11:00

## 2018-08-12 RX ADMIN — POLYETHYLENE GLYCOL 3350 17 G: 17 POWDER, FOR SOLUTION ORAL at 11:04

## 2018-08-12 RX ADMIN — ENOXAPARIN SODIUM 40 MG: 100 INJECTION SUBCUTANEOUS at 11:03

## 2018-08-12 RX ADMIN — OXYCODONE HYDROCHLORIDE AND ACETAMINOPHEN 500 MG: 500 TABLET ORAL at 11:02

## 2018-08-12 RX ADMIN — AMLODIPINE BESYLATE 10 MG: 5 TABLET ORAL at 11:03

## 2018-08-12 RX ADMIN — ACETAMINOPHEN 650 MG: 325 TABLET ORAL at 00:37

## 2018-08-12 NOTE — PROGRESS NOTES
Spoke to pt regarding medication Vancomycin antibiotic for LLE cellulitis. Pt agrees to be given this medication. Spoke wit pharmacist Wilkin Learn regarding Vanc trough resulted at 15.5. No increase of dosing required. Medication given at this time.

## 2018-08-12 NOTE — PROGRESS NOTES
ADULT PROTOCOL: JET AEROSOL ASSESSMENT    Patient  Kurtis Coles     66 y.o.   female     8/11/2018  9:53 PM    Breath Sounds Pre Procedure: Right Breath Sounds: Diminished                               Left Breath Sounds: Diminished    Breath Sounds Post Procedure: Right Breath Sounds: Diminished                                 Left Breath Sounds: Diminished    Breathing pattern: Pre procedure Breathing Pattern: Tachypneic          Post procedure Breathing Pattern: Tachypneic    Heart Rate: Pre procedure Pulse: 102           Post procedure Pulse: 103    Resp Rate: Pre procedure Respirations: 26           Post procedure Respirations: 26      Cough: Pre procedure Cough: Non-productive               Post procedure        Oxygen: O2 Device: Nasal cannula   Flow rate (L/min) 3     Changed: NO    SpO2: Pre procedure SpO2: 92 %   with oxygen              Post procedure SpO2: 93 %  with oxygen    Nebulizer Therapy: Current medications Aerosolized Medications: DuoNeb      Changed: NO    Smoking History: never    Problem List:   Patient Active Problem List   Diagnosis Code    Sepsis ([facility] A41.9       Respiratory Therapist: Alexia Robert, RT

## 2018-08-12 NOTE — PROGRESS NOTES
Pharmacy Automatic Renal Dosing Protocol - Antimicrobials    Indication for Antimicrobials: SSTI, Sepsis    Current Regimen of Each Antimicrobial:  Vancomycin 1500 mg IV Q16H (Start  Day 4 of )  Ceftriaxone 2 grams IV Q24H (Start   Day 4 of )    Previous Antibiotics  Unasyn 3 gm q6h    Significant Cultures:    PCX - ng pending   UCx - ng  (10k) final    Radiology / Imaging results: (X-ray, CT scan or MRI): none     Paralysis, amputations, malnutrition: n/a    Labs:  Recent Labs      18   0223  18   0348  08/10/18   0418   CREA  1.24*  1.29*  1.13*   BUN  33*  29*  23*   WBC  15.7*  19.1*  24.5*     Temp (24hrs), Av.2 °F (37.3 °C), Min:96.8 °F (36 °C), Max:101 °F (38.3 °C)    Creatinine Clearance (mL/min) or Dialysis:  Estimated Creatinine Clearance: 72.4 mL/min (based on Cr of 1.24). Estimated Creatinine Clearance (using IBW):48.1 mL/min    Impression/Plan:   · Afebrile this am, WBC improved, SCr stable  · Events of  evening noted. It appears that Vancomycin was retimed to give 16 hours after midnight dose, however since the trough level  17:30 was therapeutic at 15.5 (15.2 extrapolated), waiting until  16:00 will result if subtherapeutic Vancomycin level. Thus, Vancomycin will be retimed to redose 16 hours from previous due time  18:00 or 10:00 . Rationale for Vancomycin retimed dose explained to nurse Iwona Ayala  · Continue Ceftriaxone and Vancomycin current regimens  · Duration - Vancomycin and Ceftriaxone =  7 days     Pharmacy will follow daily and adjust medications as appropriate for renal function and/or serum levels.     Thank you,  Blake Obregon, [facility]

## 2018-08-12 NOTE — PROGRESS NOTES
Hospitalist Progress Note    NAME: Cat Vanessa   :  1955   MRN:  [id number]       Assessment / Plan:  Sepsis ([address] (2018) POA WBC 22.6, , RR to 40, temp 103.2, normal lactate, WBC trending down, last fever was last night  Left leg cellulitis POA: c/w CTX/Vancomycin, Duplex is negative for DVT. Erythema is slightly better, but still warm and swollen  HTN urgency POA: c/w Norvasc, BB, ARB, monitor. Use hydralazine prn. So far better control  Acute Hypoxic Respiratory Failure POA/Elevated left hemidiaphragm POA: CXR looks congested with CMG, she may have some degree of CHF, will monitor I/O and weight, Pulmonology help appreciated, CT chest no pathology in lung, ECHO EF 65%, c/w diuretic decreased dose, ABG is better, c/w O2 during the day, CPAP at night  Obesity Hypoventilation Sd: c/w CPAP at night  Hyponatremia POA so far improving, monitor. History of spider bite in left leg 15 years ago  Morbid Obesity POA Body mass index is 45.61 kg/(m^2). NOK:   Code status: Full  D/W , DTR Lisa Patel [phone]  Prophylaxis: Lovenox  Recommended Disposition:  PT, OT, RN     Subjective:     Chief Complaint / Reason for Physician Visit  \"I feel a little better\". Discussed with RN events overnight. Review of Systems:  Symptom Y/N Comments  Symptom Y/N Comments   Fever/Chills y   Chest Pain     Poor Appetite    Edema     Cough    Abdominal Pain     Sputum    Joint Pain y    SOB/FLORENCE y   Pruritis/Rash y    Nausea/vomit    Tolerating PT/OT     Diarrhea    Tolerating Diet y    Constipation    Other       Could NOT obtain due to:      Objective:     VITALS:   Last 24hrs VS reviewed since prior progress note.  Most recent are:  Patient Vitals for the past 24 hrs:   Temp Pulse Resp BP SpO2   18 1128 - - - - 97 %   18 0750 - - - - 98 %   18 0720 98.3 °F (36.8 °C) 90 24 140/74 95 %   18 0409 98.4 °F (36.9 °C) - - - -   18 0219 - - - - 94 %   18 3751 - - - - 99 % 08/11/18 2352 99.7 °F (37.6 °C) 81 26 124/76 99 %   08/11/18 2350 96.8 °F (36 °C) 80 28 124/76 98 %   08/11/18 1926 - - - - 92 %   08/11/18 1920 100.1 °F (37.8 °C) (!) 104 20 117/65 92 %   08/11/18 1915 100.1 °F (37.8 °C) - - - -   08/11/18 1748 (!) 101 °F (38.3 °C) - - - -   08/11/18 1454 - - - - 99 %   08/11/18 1447 98.4 °F (36.9 °C) 99 18 140/77 99 %       Intake/Output Summary (Last 24 hours) at 08/12/18 1147  Last data filed at 08/12/18 0509   Gross per 24 hour   Intake              910 ml   Output             1450 ml   Net             -540 ml        PHYSICAL EXAM:  General: WD, WN. Alert, cooperative, SOB  EENT:  EOMI. Anicteric sclerae. MMM  Resp:  Coarse BS, mild crackles  CV:  Regular  rhythm,  + edema  GI:  Soft, Non distended, Non tender.  +Bowel sounds  Neurologic:  Alert and oriented X 3, normal speech,   Psych:   Good insight. Not anxious nor agitated  Skin:  +  Rashes in left leg. No jaundice    Reviewed most current lab test results and cultures  YES  Reviewed most current radiology test results   YES  Review and summation of old records today    NO  Reviewed patient's current orders and MAR    YES  PMH/SH reviewed - no change compared to H&P  ________________________________________________________________________  Care Plan discussed with:    Comments   Patient y    Family  y    RN y    Care Manager     Consultant                        Multidiciplinary team rounds were held today with , nursing, pharmacist and clinical coordinator. Patient's plan of care was discussed; medications were reviewed and discharge planning was addressed.      ________________________________________________________________________  Total NON critical care TIME:  25   Minutes    Total CRITICAL CARE TIME Spent:   Minutes non procedure based      Comments   >50% of visit spent in counseling and coordination of care y    ________________________________________________________________________  Pinkey New Mignon Baez MD     Procedures: see electronic medical records for all procedures/Xrays and details which were not copied into this note but were reviewed prior to creation of Plan. LABS:  I reviewed today's most current labs and imaging studies.   Pertinent labs include:  Recent Labs      08/12/18   0223  08/11/18   0348  08/10/18   0418   WBC  15.7*  19.1*  24.5*   HGB  10.7*  11.4*  12.1   HCT  34.3*  36.0  38.7   PLT  166  161  171     Recent Labs      08/12/18   0223  08/11/18   0348  08/10/18   0418   NA  135*  133*  135*   K  3.9  3.7  3.6   CL  102  101  101   CO2  24  24  25   GLU  116*  111*  88   BUN  33*  29*  23*   CREA  1.24*  1.29*  1.13*   CA  10.3*  9.9  10.0   MG  2.2  2.1   --    ALB  2.5*  2.6*  3.0*   TBILI  0.4  0.4  0.6   SGOT  18  20  19   ALT  19  18  19       Signed: Deyanira Jalloh MD

## 2018-08-12 NOTE — PROGRESS NOTES
Bedside report given; Pt is not in any distress and sleeping quietly with daughter at bedside and another visitor. Pt is on C-PAP. AM assessment completed; vss; and Left leg assessed and it is warm to touch along with a old bruise in that area the daughter stated was due to a spider bite and not the actual infection prior to this admit. Will cont to assess pt; bed lock and low; call light within reach.

## 2018-08-13 LAB
ALBUMIN SERPL-MCNC: 2.5 G/DL (ref 3.5–5)
ALBUMIN/GLOB SERPL: 0.4 {RATIO} (ref 1.1–2.2)
ALP SERPL-CCNC: 98 U/L (ref 45–117)
ALT SERPL-CCNC: 20 U/L (ref 12–78)
ANION GAP SERPL CALC-SCNC: 7 MMOL/L (ref 5–15)
AST SERPL-CCNC: 17 U/L (ref 15–37)
BASOPHILS # BLD: 0 K/UL (ref 0–0.1)
BASOPHILS NFR BLD: 0 % (ref 0–1)
BILIRUB SERPL-MCNC: 0.4 MG/DL (ref 0.2–1)
BUN SERPL-MCNC: 29 MG/DL (ref 6–20)
BUN/CREAT SERPL: 30 (ref 12–20)
CALCIUM SERPL-MCNC: 10.2 MG/DL (ref 8.5–10.1)
CHLORIDE SERPL-SCNC: 100 MMOL/L (ref 97–108)
CO2 SERPL-SCNC: 25 MMOL/L (ref 21–32)
CREAT SERPL-MCNC: 0.96 MG/DL (ref 0.55–1.02)
DIFFERENTIAL METHOD BLD: ABNORMAL
EOSINOPHIL # BLD: 0.1 K/UL (ref 0–0.4)
EOSINOPHIL NFR BLD: 1 % (ref 0–7)
ERYTHROCYTE [DISTWIDTH] IN BLOOD BY AUTOMATED COUNT: 15.7 % (ref 11.5–14.5)
GLOBULIN SER CALC-MCNC: 5.8 G/DL (ref 2–4)
GLUCOSE SERPL-MCNC: 110 MG/DL (ref 65–100)
HCT VFR BLD AUTO: 36.2 % (ref 35–47)
HGB BLD-MCNC: 11.1 G/DL (ref 11.5–16)
IMM GRANULOCYTES # BLD: 0.1 K/UL (ref 0–0.04)
IMM GRANULOCYTES NFR BLD AUTO: 1 % (ref 0–0.5)
LYMPHOCYTES # BLD: 1.5 K/UL (ref 0.8–3.5)
LYMPHOCYTES NFR BLD: 10 % (ref 12–49)
MAGNESIUM SERPL-MCNC: 2.3 MG/DL (ref 1.6–2.4)
MCH RBC QN AUTO: 28.5 PG (ref 26–34)
MCHC RBC AUTO-ENTMCNC: 30.7 G/DL (ref 30–36.5)
MCV RBC AUTO: 93.1 FL (ref 80–99)
MONOCYTES # BLD: 1.5 K/UL (ref 0–1)
MONOCYTES NFR BLD: 11 % (ref 5–13)
NEUTS SEG # BLD: 11.1 K/UL (ref 1.8–8)
NEUTS SEG NFR BLD: 77 % (ref 32–75)
NRBC # BLD: 0.02 K/UL (ref 0–0.01)
NRBC BLD-RTO: 0.1 PER 100 WBC
PLATELET # BLD AUTO: 165 K/UL (ref 150–400)
PMV BLD AUTO: 12 FL (ref 8.9–12.9)
POTASSIUM SERPL-SCNC: 4.2 MMOL/L (ref 3.5–5.1)
PROT SERPL-MCNC: 8.3 G/DL (ref 6.4–8.2)
RBC # BLD AUTO: 3.89 M/UL (ref 3.8–5.2)
SODIUM SERPL-SCNC: 132 MMOL/L (ref 136–145)
WBC # BLD AUTO: 14.4 K/UL (ref 3.6–11)

## 2018-08-13 PROCEDURE — 77010033678 HC OXYGEN DAILY

## 2018-08-13 PROCEDURE — 74011250637 HC RX REV CODE- 250/637: Performed by: INTERNAL MEDICINE

## 2018-08-13 PROCEDURE — 74011250636 HC RX REV CODE- 250/636: Performed by: INTERNAL MEDICINE

## 2018-08-13 PROCEDURE — 74011250636 HC RX REV CODE- 250/636: Performed by: EMERGENCY MEDICINE

## 2018-08-13 PROCEDURE — 94640 AIRWAY INHALATION TREATMENT: CPT

## 2018-08-13 PROCEDURE — 97530 THERAPEUTIC ACTIVITIES: CPT

## 2018-08-13 PROCEDURE — 36415 COLL VENOUS BLD VENIPUNCTURE: CPT | Performed by: INTERNAL MEDICINE

## 2018-08-13 PROCEDURE — 83735 ASSAY OF MAGNESIUM: CPT | Performed by: INTERNAL MEDICINE

## 2018-08-13 PROCEDURE — 85025 COMPLETE CBC W/AUTO DIFF WBC: CPT | Performed by: INTERNAL MEDICINE

## 2018-08-13 PROCEDURE — 74011000258 HC RX REV CODE- 258: Performed by: INTERNAL MEDICINE

## 2018-08-13 PROCEDURE — 80053 COMPREHEN METABOLIC PANEL: CPT | Performed by: INTERNAL MEDICINE

## 2018-08-13 PROCEDURE — 94762 N-INVAS EAR/PLS OXIMTRY CONT: CPT

## 2018-08-13 PROCEDURE — 74011000250 HC RX REV CODE- 250: Performed by: INTERNAL MEDICINE

## 2018-08-13 PROCEDURE — 65660000000 HC RM CCU STEPDOWN

## 2018-08-13 RX ADMIN — LABETALOL HYDROCHLORIDE 300 MG: 200 TABLET, FILM COATED ORAL at 09:59

## 2018-08-13 RX ADMIN — AMLODIPINE BESYLATE 10 MG: 5 TABLET ORAL at 09:59

## 2018-08-13 RX ADMIN — ENOXAPARIN SODIUM 40 MG: 100 INJECTION SUBCUTANEOUS at 09:59

## 2018-08-13 RX ADMIN — Medication 10 ML: at 15:00

## 2018-08-13 RX ADMIN — Medication 10 ML: at 05:16

## 2018-08-13 RX ADMIN — CEFTRIAXONE 2 G: 2 INJECTION, POWDER, FOR SOLUTION INTRAMUSCULAR; INTRAVENOUS at 21:53

## 2018-08-13 RX ADMIN — NYSTATIN: 100000 POWDER TOPICAL at 16:55

## 2018-08-13 RX ADMIN — VANCOMYCIN HYDROCHLORIDE 1500 MG: 10 INJECTION, POWDER, LYOPHILIZED, FOR SOLUTION INTRAVENOUS at 19:56

## 2018-08-13 RX ADMIN — LOSARTAN POTASSIUM 50 MG: 50 TABLET ORAL at 09:59

## 2018-08-13 RX ADMIN — IPRATROPIUM BROMIDE AND ALBUTEROL SULFATE 3 ML: .5; 3 SOLUTION RESPIRATORY (INHALATION) at 15:37

## 2018-08-13 RX ADMIN — VANCOMYCIN HYDROCHLORIDE 1500 MG: 10 INJECTION, POWDER, LYOPHILIZED, FOR SOLUTION INTRAVENOUS at 03:33

## 2018-08-13 RX ADMIN — POLYETHYLENE GLYCOL 3350 17 G: 17 POWDER, FOR SOLUTION ORAL at 10:00

## 2018-08-13 RX ADMIN — OXYCODONE HYDROCHLORIDE AND ACETAMINOPHEN 500 MG: 500 TABLET ORAL at 09:59

## 2018-08-13 RX ADMIN — Medication 10 ML: at 21:56

## 2018-08-13 RX ADMIN — ENOXAPARIN SODIUM 40 MG: 100 INJECTION SUBCUTANEOUS at 21:54

## 2018-08-13 RX ADMIN — LABETALOL HYDROCHLORIDE 300 MG: 200 TABLET, FILM COATED ORAL at 21:54

## 2018-08-13 RX ADMIN — NYSTATIN: 100000 POWDER TOPICAL at 09:59

## 2018-08-13 RX ADMIN — FUROSEMIDE 40 MG: 10 INJECTION, SOLUTION INTRAMUSCULAR; INTRAVENOUS at 09:59

## 2018-08-13 RX ADMIN — BUSPIRONE HYDROCHLORIDE 15 MG: 5 TABLET ORAL at 21:55

## 2018-08-13 NOTE — PROGRESS NOTES
Problem: Mobility Impaired (Adult and Pediatric)  Goal: *Acute Goals and Plan of Care (Insert Text)  Physical Therapy Goals  Initiated 8/11/2018  1. Patient will move from supine to sit and sit to supine  in bed with independence within 7 day(s). 2.  Patient will transfer from bed to chair and chair to bed with independence using the least restrictive device within 7 day(s). 3.  Patient will perform sit to stand with independence within 7 day(s). 4.  Patient will ambulate with supervision/set-up for 100 feet with the least restrictive device within 7 day(s). 5.  Patient will ascend/descend 13 stairs with 2 handrail(s) with supervision/set-up within 7 day(s). physical Therapy TREATMENT  Patient: Denise Graham (17 y.o. female)  Date: 8/13/2018  Diagnosis: Sepsis ([address] <principal problem not specified>       Precautions:  Falls  Chart, physical therapy assessment, plan of care and goals were reviewed. ASSESSMENT:  Patient received supine in bed with daughter present. Patient tolerated session fairly. Noted LLE with increased erythema, edema and warm to touch--RN aware. Pt attributed this to a spider bite she had ~15 years ago. Session and mobility limited by elevated BP with minimal exertion--RN aware and recovered on doc flow sheet. Patient completed supine to sit with mod assist; assist provided at LLE and at trunk. HOB elevated ~65 degrees and use of bed rails. Patient performed sit<>stand with min A and increased forward flexed trunk before standing position obtained. Pt able to side step along the bedside with CGA demonstrating lateral trunk sway, decreased shaniqua, wide FRANCISCO J. Pt returned to supine position with mod assist to bring bilateral LEs onto the bed. Pt will continue to benefit from PT to progress mobility as tolerated and reach highest level of independence. D/C recommendations: TBD pending further mobility once BP stable.     Documentation for home O2:     ROOM AIR    AT REST   O2 SATS  89 HR  84   ROOM AIR WITH ACTIVITY 02 SATS  82 HR  83   (3    ) LITERS OF O2 WITH ACTIVITY O2 SATS  91 HR  86   (3    )LITERS OF 02 PATIENT LEFT COMFORTABLY  SITTING/SUPINE 02 SATS  92 HR  86          Progression toward goals:  []    Improving appropriately and progressing toward goals  [x]    Improving slowly and progressing toward goals  []    Not making progress toward goals and plan of care will be adjusted     PLAN:  Patient continues to benefit from skilled intervention to address the above impairments. Continue treatment per established plan of care. Discharge Recommendations: To Be Determined  Further Equipment Recommendations for Discharge:  tbd     SUBJECTIVE:   Patient stated Jameson Coughlin got up to the bathroom earlier.     OBJECTIVE DATA SUMMARY:   Critical Behavior:  Neurologic State: Alert  Orientation Level: Oriented X4  Cognition: Follows commands  Safety/Judgement: Awareness of environment, Fall prevention, Insight into deficits  Functional Mobility Training:  Bed Mobility:     Supine to Sit: Moderate assistance  Sit to Supine: Moderate assistance           Transfers:  Sit to Stand: Minimum assistance  Stand to Sit: Contact guard assistance                             Balance:  Sitting: Intact  Standing: Impaired  Standing - Static: Good  Standing - Dynamic : Fair  Ambulation/Gait Training:  Distance (ft): 3 Feet (ft)  Assistive Device: Gait belt  Ambulation - Level of Assistance: Minimal assistance        Gait Abnormalities: Decreased step clearance;Trunk sway increased; Shuffling gait        Base of Support: Widened     Speed/Jammie: Pace decreased (<100 feet/min)  Step Length: Left shortened;Right shortened       Pain:  Pain Scale 1: Numeric (0 - 10)  Pain Intensity 1: 0              Activity Tolerance:   Fair. Limited by elevated BP from supine to sit EOB. (196/106)--RN made aware.  Pt's BP decreased, but deferred further mobility until meds given  Please refer to the flowsheet for vital signs taken during this treatment.   After treatment:   []    Patient left in no apparent distress sitting up in chair  [x]    Patient left in no apparent distress in bed  [x]    Call bell left within reach  [x]    Nursing notified  [x]    Caregiver present  []    Bed alarm activated    COMMUNICATION/COLLABORATION:   The patients plan of care was discussed with: Registered Nurse    Shira Dao, PT, DPT   Time Calculation: 24 mins

## 2018-08-13 NOTE — PROGRESS NOTES
[facility]  Pulmonary, Critical Care, and Sleep Medicine    Initial Patient Consult    Name: Sagrario Coleman MRN: [id number]   : 1955 Hospital: [facility]   Date: 2018        IMPRESSION:   · Acute (and likely chronic) hypoxic/hypercapnic respiratory failure  · Sepsis  · Cellulitis  · hyponatermia  · Suspect she has MONSERRAT/OHS leading to her hypoxia/hypercapnia   · Anemia and no polycythemia  · No evidence of pneumonia or other acute process in the lung  · Eventration of the left hemidiaphragm leading to abnormal chest X-ray   · HTN  · Obesity       RECOMMENDATIONS:   · CPAP at bedtime HS  · Pt education, weight reduction couseling  · O2 wean as tolerated; may eventually need home O2  · Ultimately will need evaluation in the sleep lab and polysomnography  · Continue antibiotics for cellulitis  · DVT prophylaxis     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 Jessica Stark yearly but never complained of FLORENCE to hi, daughter has not4ed more SOB past 2 months. Pts mother  two yrs agoa nd admits to self neglect since. Five yrs ago pt weighed about 282 and now > 310? Not on home O2. Subjective: This patient has been seen and evaluated at the request of Dr. Mignon Baez  for respiratory failure and by Dr. Jailene Fairbanks for elevated hemidiaphragm. Patient is a 66 y.o. female nonsmoker, admitted with sepsis and cellulitis of the left lower extremity. She became lethargic today and an ABG showed chronic CO2 retention. She is a reluctant historian, but her family at the bedside reports that she has had some chronic shortness of breath over at least the last several months and did not seek out medical care for fear of being hospitalized. They note she has severe snoring and has significant daytime sleepiness. Also awakens at night with dyspnea and has to sit up (though she says it is to sit up to read). Currently she is comfortable on CPAP.   Denies pain, dyspnea, cough, etc.     Past Medical History:   Diagnosis Date    Arthritis     knees, lower back.  Hypertension     Morbid obesity [facility])       Past Surgical History:   Procedure Laterality Date    HX GYN  2009    D&C      Prior to Admission medications    Medication Sig Start Date End Date Taking? Authorizing Provider   busPIRone (BUSPAR) 15 mg tablet Take 15 mg by mouth nightly. Yes Historical Provider   Olmesartan-amLODIPine-HCTZ 40-10-25 mg tab Take 1 Tab by mouth daily. Yes Historical Provider   furosemide (LASIX) 40 mg tablet Take 40 mg by mouth every other day. Takes every other day in the evening (confirmed w/ patient)   Yes Historical Provider   labetalol (NORMODYNE) 300 mg tablet Take 300 mg by mouth every twelve (12) hours. Confirmed w/ patient. Rx for Tid, patient only tolerates it BiD   Yes Historical Provider   potassium chloride SA (MICRO-K) 10 mEq capsule Take 10 mEq by mouth daily. Yes Historical Provider   cholecalciferol (VITAMIN D3) 1,000 unit tablet Take 1,000 Units by mouth every Sunday. Indications: Vitamin D Deficiency, Takes unknown units of D3 once weekly   Yes Historical Provider   ascorbic acid (VITAMIN C) 500 mg tablet Take 500 mg by mouth daily.    Yes Historical Provider     No Known Allergies   Social History   Substance Use Topics    Smoking status: Never Smoker    Smokeless tobacco: Never Used    Alcohol use No      Family History   Problem Relation Age of Onset    Stroke Father 36    Heart Disease Father     Arthritis-osteo Mother     Cancer Mother [de-identified]     breast cancer        Current Facility-Administered Medications   Medication Dose Route Frequency    polyethylene glycol (MIRALAX) packet 17 g  17 g Oral DAILY    nystatin (MYCOSTATIN) 100,000 unit/gram powder   Topical BID    furosemide (LASIX) injection 40 mg  40 mg IntraVENous DAILY    sodium chloride (NS) flush 5-10 mL  5-10 mL IntraVENous Q8H    enoxaparin (LOVENOX) injection 40 mg  40 mg SubCUTAneous Q12H    cefTRIAXone (ROCEPHIN) 2 g in 0.9% sodium chloride (MBP/ADV) 50 mL  2 g IntraVENous Q24H    vancomycin (VANCOCIN) 1500 mg in  ml infusion  1,500 mg IntraVENous Q16H    busPIRone (BUSPAR) tablet 15 mg  15 mg Oral QHS    labetalol (NORMODYNE) tablet 300 mg  300 mg Oral Q12H    ascorbic acid (vitamin C) (VITAMIN C) tablet 500 mg  500 mg Oral DAILY    amLODIPine (NORVASC) tablet 10 mg  10 mg Oral DAILY    losartan (COZAAR) tablet 50 mg  50 mg Oral DAILY       Review of Systems:  A comprehensive review of systems was negative except for that written in the HPI. Objective:   Vital Signs:    Visit Vitals    /81 (BP 1 Location: Left arm, BP Patient Position: At rest)    Pulse 79    Temp 99.1 °F (37.3 °C)    Resp 24    Ht 5' 8\" (1.727 m)    Wt 144.9 kg (319 lb 7.1 oz)    SpO2 95%    BMI 48.57 kg/m2       O2 Device: CPAP mask   O2 Flow Rate (L/min): 2 l/min   Temp (24hrs), Av.3 °F (37.4 °C), Min:99 °F (37.2 °C), Max:99.6 °F (37.6 °C)       Intake/Output:   Last shift:      701 - 1900  In: -   Out: 900 [Urine:900]  Last 3 shifts:  1901 -  0700  In: 960 [P.O.:360; I.V.:600]  Out: 1540 [Urine:1540]    Intake/Output Summary (Last 24 hours) at 18 0911  Last data filed at 18 0715   Gross per 24 hour   Intake               50 ml   Output             2090 ml   Net            -2040 ml      Physical Exam:   General:  Alert, obese, on O2; off CPAP   Head:  Normocephalic, without obvious abnormality, atraumatic. Eyes:  Conjunctivae/corneas clear. Nose: Nares normal. Septum midline. Mucosa normal.    Throat: Lips, mucosa, and tongue normal.     Neck: Supple, symmetrical, trachea midline    Back:   Symmetric, no curvature. ROM normal.   Lungs:   Clear to auscultation bilaterally. Chest wall:  No tenderness or deformity. Heart:  Regular rate and rhythm    Abdomen:   Soft, non-tender.  Bowel sounds normal.    Extremities: Extremities normal, atraumatic, no cyanosis, no clubbing   Skin: Old brown recluse bite on left foot, with cellulitis on left lower extremity; 2+ edema; warm   Lymph nodes: Cervical, supraclavicular nodes normal.   Neurologic: Grossly nonfocal     Data review:     Recent Results (from the past 24 hour(s))   CBC WITH AUTOMATED DIFF    Collection Time: 08/13/18  1:59 AM   Result Value Ref Range    WBC 14.4 (H) 3.6 - 11.0 K/uL    RBC 3.89 3.80 - 5.20 M/uL    HGB 11.1 (L) 11.5 - 16.0 g/dL    HCT 36.2 35.0 - 47.0 %    MCV 93.1 80.0 - 99.0 FL    MCH 28.5 26.0 - 34.0 PG    MCHC 30.7 30.0 - 36.5 g/dL    RDW 15.7 (H) 11.5 - 14.5 %    PLATELET 687 689 - 222 K/uL    MPV 12.0 8.9 - 12.9 FL    NRBC 0.1 (H) 0  WBC    ABSOLUTE NRBC 0.02 (H) 0.00 - 0.01 K/uL    NEUTROPHILS 77 (H) 32 - 75 %    LYMPHOCYTES 10 (L) 12 - 49 %    MONOCYTES 11 5 - 13 %    EOSINOPHILS 1 0 - 7 %    BASOPHILS 0 0 - 1 %    IMMATURE GRANULOCYTES 1 (H) 0.0 - 0.5 %    ABS. NEUTROPHILS 11.1 (H) 1.8 - 8.0 K/UL    ABS. LYMPHOCYTES 1.5 0.8 - 3.5 K/UL    ABS. MONOCYTES 1.5 (H) 0.0 - 1.0 K/UL    ABS. EOSINOPHILS 0.1 0.0 - 0.4 K/UL    ABS. BASOPHILS 0.0 0.0 - 0.1 K/UL    ABS. IMM. GRANS. 0.1 (H) 0.00 - 0.04 K/UL    DF AUTOMATED     MAGNESIUM    Collection Time: 08/13/18  1:59 AM   Result Value Ref Range    Magnesium 2.3 1.6 - 2.4 mg/dL   METABOLIC PANEL, COMPREHENSIVE    Collection Time: 08/13/18  1:59 AM   Result Value Ref Range    Sodium 132 (L) 136 - 145 mmol/L    Potassium 4.2 3.5 - 5.1 mmol/L    Chloride 100 97 - 108 mmol/L    CO2 25 21 - 32 mmol/L    Anion gap 7 5 - 15 mmol/L    Glucose 110 (H) 65 - 100 mg/dL    BUN 29 (H) 6 - 20 MG/DL    Creatinine 0.96 0.55 - 1.02 MG/DL    BUN/Creatinine ratio 30 (H) 12 - 20      GFR est AA >60 >60 ml/min/1.73m2    GFR est non-AA 59 (L) >60 ml/min/1.73m2    Calcium 10.2 (H) 8.5 - 10.1 MG/DL    Bilirubin, total 0.4 0.2 - 1.0 MG/DL    ALT (SGPT) 20 12 - 78 U/L    AST (SGOT) 17 15 - 37 U/L    Alk.  phosphatase 98 45 - 117 U/L    Protein, total 8.3 (H) 6.4 - 8.2 g/dL Albumin 2.5 (L) 3.5 - 5.0 g/dL    Globulin 5.8 (H) 2.0 - 4.0 g/dL    A-G Ratio 0.4 (L) 1.1 - 2.2         Imaging:  I have personally reviewed the patients radiographs and have reviewed the reports: non etoday  Eventration of left hemidiaphragm.   No acute disease in the lung        Zenobia Tovar MD

## 2018-08-13 NOTE — PROGRESS NOTES
PCU SHIFT NURSING NOTE      Bedside and Verbal shift change report given to Rachel Albright RN (oncoming nurse) by Jeff Arora RN (offgoing nurse). Report included the following information SBAR, Kardex, Intake/Output, MAR, Recent Results and Cardiac Rhythm SR. Shift Summary:       Admission Date 8/9/2018   Admission Diagnosis Sepsis ([facility] [address]   Consults IP CONSULT TO INTENSIVIST  IP CONSULT TO PULMONOLOGY        Consults   []PT   []OT   []Speech   []Case Management      [] Palliative      Cardiac Monitoring Order   []Yes   []No     IV drips   []Yes    Drip:                            Dose:  Drip:                            Dose:  Drip:                            Dose:   []No     GI Prophylaxis   []Yes   []No         DVT Prophylaxis   SCDs:             Thanh stockings:         [] Medication   []Contraindicated   []None      Activity Level Activity Level: Up with Assistance     Activity Assistance: Partial (one person)   Purposeful Rounding every 1-2 hour? []Yes   Quintanilla Score  Total Score: 3   Bed Alarm (If score 3 or >)   []Yes   [] Refused (See signed refusal form in chart)   Celestino Score  Celestino Score: 20   Celestino Score (if score 14 or less)   []PMT consult   []Wound Care consult      []Specialty bed   [] Nutrition consult          Needs prior to discharge:   Home O2 required:    []Yes   []No    If yes, how much O2 required?     Other:    Last Bowel Movement: Last Bowel Movement Date: 08/12/18      Influenza Vaccine Received Flu Vaccine for Current Season (usually Sept-March): Not Flu Season        Pneumonia Vaccine           Diet Active Orders   Diet    DIET CARDIAC Regular      LDAs               Peripheral IV 08/09/18 Left Forearm (Active)   Site Assessment Clean, dry, & intact 8/12/2018  8:00 PM   Phlebitis Assessment 0 8/12/2018  8:00 PM   Infiltration Assessment 0 8/12/2018  8:00 PM   Dressing Status Clean, dry, & intact 8/12/2018  8:00 PM   Dressing Type Transparent;Tape 8/12/2018  8:00 PM   Hub Color/Line Status Patent;Pink 8/12/2018  8:00 PM   Action Taken Open ports on tubing capped 8/12/2018  8:00 PM   Alcohol Cap Used Yes 8/12/2018  8:00 PM       Peripheral IV 08/10/18 Right Antecubital (Active)   Site Assessment Clean, dry, & intact 8/12/2018  8:00 PM   Phlebitis Assessment 0 8/12/2018  8:00 PM   Infiltration Assessment 0 8/12/2018  8:00 PM   Dressing Status Clean, dry, & intact 8/12/2018  8:00 PM   Dressing Type Tape;Transparent 8/12/2018  8:00 PM   Hub Color/Line Status Pink;Patent 8/12/2018  8:00 PM   Action Taken Open ports on tubing capped 8/12/2018  8:00 PM   Alcohol Cap Used Yes 8/12/2018  8:00 PM                      Urinary Catheter Urinary Catheter 08/10/18 Hernandez-Indications for Use: Accurate measurement of urinary output    Intake & Output   Date 08/12/18 0700 - 08/13/18 0659 08/13/18 0700 - 08/14/18 0659   Shift [id number] 1900-0659 24 Hour Total 0700-1859 1900-0659 24 Hour Total   I  N  T  A  K  E   I.V.  (mL/kg/hr)  50  (0) 50  (0)         Volume (cefTRIAXone (ROCEPHIN) 2 g in 0.9% sodium chloride (MBP/ADV) 50 mL)  50 50       Shift Total  (mL/kg)  50  (0.3) 50  (0.3)      O  U  T  P  U  T   Urine  (mL/kg/hr) 1190  (0.7)  1190  (0.3) 900  900      Urine Voided 1190  1190         Urine Output (mL) (Urinary Catheter 08/10/18 Hernandez)    900  900    Shift Total  (mL/kg) 1190  (8.2)  1190  (8.2) 900  (6.2)  900  (6.2)   NET -1190 50 -1140 -900  -900   Weight (kg) 144.9 144.9 144.9 144.9 144.9 144.9         Readmission Risk Assessment Tool Score Low Risk            2       Total Score        2 . Living with Significant Other. Assisted Living. LTAC. SNF. or   Rehab        Criteria that do not apply:    Has Seen PCP in Last 6 Months (Yes=3, No=0)    Patient Length of Stay (>5 days = 3)    IP Visits Last 12 Months (1-3=4, 4=9, >4=11)    Pt.  Coverage (Medicare=5 , Medicaid, or Self-Pay=4)    Charlson Comorbidity Score (Age + Comorbid Conditions)       Expected Length of Stay 4d 21h   Actual Length of Stay 4

## 2018-08-13 NOTE — PROGRESS NOTES
Hospitalist Progress Note    NAME: Brandie Lancaster   :  1955   MRN:  [id number]       Assessment / Plan:  Sepsis ([address] (2018) POA WBC 22.6, , RR to 40, temp 103.2, normal lactate, WBC continues trending down, no more fever  Left leg cellulitis POA: c/w CTX/Vancomycin, Duplex is negative for DVT. Erythema is better, but still warm and swollen  HTN urgency POA: c/w Norvasc, BB, ARB, monitor. Use hydralazine prn. So far better control  Acute Hypoxic Respiratory Failure POA/Elevated left hemidiaphragm POA: CXR looks congested with CMG, she may have some degree of CHF, will monitor I/O and weight, Pulmonology help appreciated, CT chest no pathology in lung, ECHO EF 65%, c/w diuretic decreased dose, ABG is better, c/w O2 during the day, CPAP at night, check overnight oxymetry may need Home O2 for now, get PT/OT  Obesity Hypoventilation Sd: c/w CPAP at night, needs sleep study as outpatient  Hyponatremia POA so far improving, monitor. History of spider bite in left leg 15 years ago  Morbid Obesity POA Body mass index is 45.61 kg/(m^2). NOK:   Code status: Full  D/W , DTR Lisa [id number] [id number]  Prophylaxis: Lovenox  Recommended Disposition:  PT, OT, RN     Subjective:     Chief Complaint / Reason for Physician Visit  \"I feel better\". Discussed with RN events overnight. Review of Systems:  Symptom Y/N Comments  Symptom Y/N Comments   Fever/Chills n   Chest Pain     Poor Appetite    Edema     Cough    Abdominal Pain     Sputum    Joint Pain y    SOB/FLORENCE y   Pruritis/Rash y    Nausea/vomit    Tolerating PT/OT     Diarrhea    Tolerating Diet y    Constipation    Other       Could NOT obtain due to:      Objective:     VITALS:   Last 24hrs VS reviewed since prior progress note.  Most recent are:  Patient Vitals for the past 24 hrs:   Temp Pulse Resp BP SpO2   18 1055 98.9 °F (37.2 °C) 86 28 (!) 163/92 95 %   18 0332 99.1 °F (37.3 °C) 79 24 140/81 95 %   18 2314 99.3 °F (37.4 °C) 82 24 121/67 92 %   08/12/18 2201 - - - - 95 %   08/12/18 1930 99.6 °F (37.6 °C) 93 22 146/77 95 %   08/12/18 1549 99 °F (37.2 °C) 96 21 116/86 94 %       Intake/Output Summary (Last 24 hours) at 08/13/18 1258  Last data filed at 08/13/18 0715   Gross per 24 hour   Intake               50 ml   Output             2090 ml   Net            -2040 ml        PHYSICAL EXAM:  General: WD, WN. Alert, cooperative, SOB  EENT:  EOMI. Anicteric sclerae. MMM  Resp:  Coarse BS,   CV:  Regular  rhythm,  + edema  GI:  Soft, Non distended, Non tender.  +Bowel sounds  Neurologic:  Alert and oriented X 3, normal speech,   Psych:   Good insight. Not anxious nor agitated  Skin:  +  Rashes in left leg. No jaundice    Reviewed most current lab test results and cultures  YES  Reviewed most current radiology test results   YES  Review and summation of old records today    NO  Reviewed patient's current orders and MAR    YES  PMH/SH reviewed - no change compared to H&P  ________________________________________________________________________  Care Plan discussed with:    Comments   Patient y    Family  y    RN y    Care Manager     Consultant                        Multidiciplinary team rounds were held today with , nursing, pharmacist and clinical coordinator. Patient's plan of care was discussed; medications were reviewed and discharge planning was addressed. ________________________________________________________________________  Total NON critical care TIME:  25   Minutes    Total CRITICAL CARE TIME Spent:   Minutes non procedure based      Comments   >50% of visit spent in counseling and coordination of care y    ________________________________________________________________________  Enrico Cam MD     Procedures: see electronic medical records for all procedures/Xrays and details which were not copied into this note but were reviewed prior to creation of Plan.       LABS:  I reviewed today's most current labs and imaging studies.   Pertinent labs include:  Recent Labs      08/13/18   0159 08/12/18 0223 08/11/18 0348   WBC  14.4*  15.7*  19.1*   HGB  11.1*  10.7*  11.4*   HCT  36.2  34.3*  36.0   PLT  165  166  161     Recent Labs      08/13/18 0159 08/12/18 0223 08/11/18 0348   NA  132*  135*  133*   K  4.2  3.9  3.7   CL  100  102  101   CO2  25  24  24   GLU  110*  116*  111*   BUN  29*  33*  29*   CREA  0.96  1.24*  1.29*   CA  10.2*  10.3*  9.9   MG  2.3  2.2  2.1   ALB  2.5*  2.5*  2.6*   TBILI  0.4  0.4  0.4   SGOT  17  18  20   ALT  20  19  18       Signed: Liborio Herron MD

## 2018-08-13 NOTE — PROGRESS NOTES
Bedside shift change report given to Jefe Pickens RN (oncoming nurse) by Sunday Eugenio RN (offgoing nurse). Report included the following information SBAR, Kardex, MAR, Recent Results and Cardiac Rhythm NSR.    19:59  Spoke with Dr. Jordin Parikh, to clarify overnight oximetry orders. Per Dr. Denilson Ham, should be taken off of CPAP. Notified Paula, RT of this. Jefe Pickens, OLYA    07:45  Bedside shift change report given to [address]  (oncoming nurse) by Jefe Pickens RN (offgoing nurse). Report included the following information SBAR, Kardex, MAR, Recent Results and Cardiac Rhythm NSR.

## 2018-08-14 LAB
ALBUMIN SERPL-MCNC: 2.3 G/DL (ref 3.5–5)
ALBUMIN/GLOB SERPL: 0.4 {RATIO} (ref 1.1–2.2)
ALP SERPL-CCNC: 99 U/L (ref 45–117)
ALT SERPL-CCNC: 20 U/L (ref 12–78)
ANION GAP SERPL CALC-SCNC: 6 MMOL/L (ref 5–15)
AST SERPL-CCNC: 30 U/L (ref 15–37)
BACTERIA SPEC CULT: NORMAL
BASOPHILS # BLD: 0.1 K/UL (ref 0–0.1)
BASOPHILS NFR BLD: 0 % (ref 0–1)
BILIRUB SERPL-MCNC: 0.5 MG/DL (ref 0.2–1)
BUN SERPL-MCNC: 22 MG/DL (ref 6–20)
BUN/CREAT SERPL: 29 (ref 12–20)
CALCIUM SERPL-MCNC: 10.6 MG/DL (ref 8.5–10.1)
CHLORIDE SERPL-SCNC: 101 MMOL/L (ref 97–108)
CO2 SERPL-SCNC: 26 MMOL/L (ref 21–32)
CREAT SERPL-MCNC: 0.77 MG/DL (ref 0.55–1.02)
DIFFERENTIAL METHOD BLD: ABNORMAL
EOSINOPHIL # BLD: 0.2 K/UL (ref 0–0.4)
EOSINOPHIL NFR BLD: 2 % (ref 0–7)
ERYTHROCYTE [DISTWIDTH] IN BLOOD BY AUTOMATED COUNT: 15.2 % (ref 11.5–14.5)
GLOBULIN SER CALC-MCNC: 6.1 G/DL (ref 2–4)
GLUCOSE SERPL-MCNC: 94 MG/DL (ref 65–100)
HCT VFR BLD AUTO: 34 % (ref 35–47)
HGB BLD-MCNC: 10.6 G/DL (ref 11.5–16)
IMM GRANULOCYTES # BLD: 0.1 K/UL (ref 0–0.04)
IMM GRANULOCYTES NFR BLD AUTO: 1 % (ref 0–0.5)
LYMPHOCYTES # BLD: 1.7 K/UL (ref 0.8–3.5)
LYMPHOCYTES NFR BLD: 11 % (ref 12–49)
MAGNESIUM SERPL-MCNC: 2.3 MG/DL (ref 1.6–2.4)
MCH RBC QN AUTO: 28.2 PG (ref 26–34)
MCHC RBC AUTO-ENTMCNC: 31.2 G/DL (ref 30–36.5)
MCV RBC AUTO: 90.4 FL (ref 80–99)
MONOCYTES # BLD: 1.5 K/UL (ref 0–1)
MONOCYTES NFR BLD: 10 % (ref 5–13)
NEUTS SEG # BLD: 11.4 K/UL (ref 1.8–8)
NEUTS SEG NFR BLD: 76 % (ref 32–75)
NRBC # BLD: 0 K/UL (ref 0–0.01)
NRBC BLD-RTO: 0 PER 100 WBC
PLATELET # BLD AUTO: 200 K/UL (ref 150–400)
PMV BLD AUTO: 11.5 FL (ref 8.9–12.9)
POTASSIUM SERPL-SCNC: 4.6 MMOL/L (ref 3.5–5.1)
PROT SERPL-MCNC: 8.4 G/DL (ref 6.4–8.2)
RBC # BLD AUTO: 3.76 M/UL (ref 3.8–5.2)
SERVICE CMNT-IMP: NORMAL
SODIUM SERPL-SCNC: 133 MMOL/L (ref 136–145)
WBC # BLD AUTO: 14.9 K/UL (ref 3.6–11)

## 2018-08-14 PROCEDURE — 83735 ASSAY OF MAGNESIUM: CPT | Performed by: INTERNAL MEDICINE

## 2018-08-14 PROCEDURE — 97535 SELF CARE MNGMENT TRAINING: CPT

## 2018-08-14 PROCEDURE — 74011250636 HC RX REV CODE- 250/636: Performed by: INTERNAL MEDICINE

## 2018-08-14 PROCEDURE — 94760 N-INVAS EAR/PLS OXIMETRY 1: CPT

## 2018-08-14 PROCEDURE — 36415 COLL VENOUS BLD VENIPUNCTURE: CPT | Performed by: INTERNAL MEDICINE

## 2018-08-14 PROCEDURE — 97116 GAIT TRAINING THERAPY: CPT

## 2018-08-14 PROCEDURE — G8987 SELF CARE CURRENT STATUS: HCPCS

## 2018-08-14 PROCEDURE — 74011000258 HC RX REV CODE- 258: Performed by: INTERNAL MEDICINE

## 2018-08-14 PROCEDURE — 74011250637 HC RX REV CODE- 250/637: Performed by: INTERNAL MEDICINE

## 2018-08-14 PROCEDURE — 85025 COMPLETE CBC W/AUTO DIFF WBC: CPT | Performed by: INTERNAL MEDICINE

## 2018-08-14 PROCEDURE — 77010033678 HC OXYGEN DAILY

## 2018-08-14 PROCEDURE — 74011250636 HC RX REV CODE- 250/636: Performed by: EMERGENCY MEDICINE

## 2018-08-14 PROCEDURE — 94640 AIRWAY INHALATION TREATMENT: CPT

## 2018-08-14 PROCEDURE — 80053 COMPREHEN METABOLIC PANEL: CPT | Performed by: INTERNAL MEDICINE

## 2018-08-14 PROCEDURE — G8988 SELF CARE GOAL STATUS: HCPCS

## 2018-08-14 PROCEDURE — 77030029684 HC NEB SM VOL KT MONA -A

## 2018-08-14 PROCEDURE — 97165 OT EVAL LOW COMPLEX 30 MIN: CPT

## 2018-08-14 PROCEDURE — 74011000250 HC RX REV CODE- 250: Performed by: INTERNAL MEDICINE

## 2018-08-14 PROCEDURE — 65660000000 HC RM CCU STEPDOWN

## 2018-08-14 RX ADMIN — LABETALOL HYDROCHLORIDE 300 MG: 200 TABLET, FILM COATED ORAL at 21:26

## 2018-08-14 RX ADMIN — ENOXAPARIN SODIUM 40 MG: 100 INJECTION SUBCUTANEOUS at 10:36

## 2018-08-14 RX ADMIN — CEFTRIAXONE 2 G: 2 INJECTION, POWDER, FOR SOLUTION INTRAMUSCULAR; INTRAVENOUS at 21:32

## 2018-08-14 RX ADMIN — NYSTATIN: 100000 POWDER TOPICAL at 18:00

## 2018-08-14 RX ADMIN — FUROSEMIDE 40 MG: 10 INJECTION, SOLUTION INTRAMUSCULAR; INTRAVENOUS at 10:36

## 2018-08-14 RX ADMIN — VANCOMYCIN HYDROCHLORIDE 1500 MG: 10 INJECTION, POWDER, LYOPHILIZED, FOR SOLUTION INTRAVENOUS at 14:17

## 2018-08-14 RX ADMIN — POLYETHYLENE GLYCOL 3350 17 G: 17 POWDER, FOR SOLUTION ORAL at 10:36

## 2018-08-14 RX ADMIN — IPRATROPIUM BROMIDE AND ALBUTEROL SULFATE 3 ML: .5; 3 SOLUTION RESPIRATORY (INHALATION) at 18:53

## 2018-08-14 RX ADMIN — LABETALOL HYDROCHLORIDE 300 MG: 200 TABLET, FILM COATED ORAL at 10:35

## 2018-08-14 RX ADMIN — Medication 10 ML: at 16:23

## 2018-08-14 RX ADMIN — ENOXAPARIN SODIUM 40 MG: 100 INJECTION SUBCUTANEOUS at 21:28

## 2018-08-14 RX ADMIN — Medication 10 ML: at 21:32

## 2018-08-14 RX ADMIN — Medication 10 ML: at 05:25

## 2018-08-14 RX ADMIN — AMLODIPINE BESYLATE 10 MG: 5 TABLET ORAL at 10:36

## 2018-08-14 RX ADMIN — BUSPIRONE HYDROCHLORIDE 15 MG: 5 TABLET ORAL at 21:26

## 2018-08-14 RX ADMIN — OXYCODONE HYDROCHLORIDE AND ACETAMINOPHEN 500 MG: 500 TABLET ORAL at 10:36

## 2018-08-14 RX ADMIN — LOSARTAN POTASSIUM 50 MG: 50 TABLET ORAL at 10:36

## 2018-08-14 NOTE — PROGRESS NOTES
Problem: Mobility Impaired (Adult and Pediatric)  Goal: *Acute Goals and Plan of Care (Insert Text)  Physical Therapy Goals  Initiated 8/11/2018  1. Patient will move from supine to sit and sit to supine  in bed with independence within 7 day(s). 2.  Patient will transfer from bed to chair and chair to bed with independence using the least restrictive device within 7 day(s). 3.  Patient will perform sit to stand with independence within 7 day(s). 4.  Patient will ambulate with supervision/set-up for 100 feet with the least restrictive device within 7 day(s). 5.  Patient will ascend/descend 13 stairs with 2 handrail(s) with supervision/set-up within 7 day(s). physical Therapy TREATMENT  Patient: Barbara Colon (34 y.o. female)  Date: 8/14/2018  Diagnosis: Sepsis ([address] <principal problem not specified>       Precautions: Fall, Other (comment) (Bariatric)  Chart, physical therapy assessment, plan of care and goals were reviewed. ASSESSMENT:  Patient was sitting in bedside chair when PT arrived. She agreed to PT and was cleared by her nurse for activity. Demonstrated improved activity tolerance, transfers and gait skills. Sit to stand from chair was sba, chair to bed was cg assistance, and sit to supine needed min a x 1 to assist LLE into the bed. Her gait progressed to 30 feet with cg/sba with a RW from 3 feet on 8/10/18. Gait pattern is widened, step to pattern with decreased step clearance. She will need a wide RW at discharge to use at home. Patient was left in bed with all needs met. Recommend HH PT and OT upon discharge. Progression toward goals:  [x]    Improving appropriately and progressing toward goals  []    Improving slowly and progressing toward goals  []    Not making progress toward goals and plan of care will be adjusted     PLAN:  Patient continues to benefit from skilled intervention to address the above impairments. Continue treatment per established plan of care.   Discharge Recommendations:  Home Health PT and OT. Further Equipment Recommendations for Discharge: Wide Rolling Walker. SUBJECTIVE:   Patient stated my leg is feeling better.     OBJECTIVE DATA SUMMARY:   Critical Behavior:  Neurologic State: Alert  Orientation Level: Appropriate for age  Cognition: Appropriate decision making, Follows commands  Safety/Judgement: Awareness of environment, Insight into deficits  Functional Mobility Training:  Bed Mobility:        Sit to Supine: Minimum assistance;Assist x1 (LLE into bed)  Scooting: Stand-by assistance        Transfers:  Sit to Stand: Stand-by assistance  Stand to Sit: Stand-by assistance                             Balance:  Sitting: Intact  Standing: Impaired  Standing - Static: Good;Constant support  Standing - Dynamic : Fair  Ambulation/Gait Training:  Distance (ft): 35 Feet (ft)  Assistive Device: Walker, rolling;Gait belt  Ambulation - Level of Assistance: Contact guard assistance;Stand-by assistance        Gait Abnormalities: Decreased step clearance; Step to gait        Base of Support: Widened     Speed/Jammie: Slow  Step Length: Right shortened;Left shortened                 Pain:  Pain Scale 1: Numeric (0 - 10)  Pain Intensity 1: 0              Activity Tolerance:   Fair  Please refer to the flowsheet for vital signs taken during this treatment.   After treatment:   []    Patient left in no apparent distress sitting up in chair  [x]    Patient left in no apparent distress in bed  [x]    Call bell left within reach  [x]    Nursing notified  []    Caregiver present  []    Bed alarm activated    COMMUNICATION/COLLABORATION:   The patients plan of care was discussed with: Occupational Therapist and Registered Nurse    Stan Campoverde PT   Time Calculation: 17 mins

## 2018-08-14 NOTE — PROGRESS NOTES
TRANSFER - IN REPORT:    Verbal report received from (name) on Naomy Tobar  being received from PCU (unit) for routine progression of care      Report consisted of patients Situation, Background, Assessment and   Recommendations(SBAR). Information from the following report(NSRSBAR, Kardex, Intake/Output, MAR, Recent Results and Cardiac Rhythm NSR was reviewed with the receiving nurse. Opportunity for questions and clarification was provided. Assessment completed upon patients arrival to unit and care assumed. 1900: Bedside shift change report given to Wood Chin RN (oncoming nurse). Report included the following information SBAR, Kardex, Intake/Output, MAR, Recent Results and Cardiac Rhythm NSR.     SHIFT SUMMARY:            [address] NURSING NOTE   Admission Date 8/9/2018   Admission Diagnosis Sepsis ([address]   Consults IP CONSULT TO INTENSIVIST  IP CONSULT TO PULMONOLOGY      Cardiac Monitoring [x] Yes [] No      Purposeful Hourly Rounding [x] Yes    Naila Score Total Score: 3   Naila score 3 or > [x] Bed Alarm [] Avasys [] 1:1 sitter [] Patient refused (Signed refusal form in chart)   Celestino Score Celestino Score: 19   Celestino score 14 or < [] PMT consult [] Wound Care consult    []  Specialty bed  [] Nutrition consult      Influenza Vaccine Received Flu Vaccine for Current Season (usually Sept-March): Not Flu Season           Oxygen needs? [] Room air Oxygen @  []1L    []2L    [x]3L   []4L    []5L   []6L via  NC   Chronic home O2 use?  [x] Yes [] No  Perform O2 challenge test and document in progress note using smartphrase (.Homeoxygen)      Last bowel movement Last Bowel Movement Date: 08/12/18      Urinary Catheter [REMOVED] Urinary Catheter 08/10/18 Hernandez-Indications for Use: Accurate measurement of urinary output     [REMOVED] Urinary Catheter 08/10/18 Hernandez-Urine Output (mL): 200 ml     LDAs               Peripheral IV 08/09/18 Left Forearm (Active)   Site Assessment Clean, dry, & intact 8/14/2018 4:26 PM   Phlebitis Assessment 0 8/14/2018  4:26 PM   Infiltration Assessment 0 8/14/2018  4:26 PM   Dressing Status Clean, dry, & intact 8/14/2018  4:26 PM   Dressing Type Tape;Transparent 8/14/2018  4:26 PM   Hub Color/Line Status Pink 8/14/2018  4:26 PM   Action Taken Open ports on tubing capped 8/12/2018  8:00 PM   Alcohol Cap Used Yes 8/12/2018  8:00 PM       Peripheral IV 08/10/18 Right Antecubital (Active)   Site Assessment Clean, dry, & intact 8/14/2018  4:26 PM   Phlebitis Assessment 0 8/14/2018  4:26 PM   Infiltration Assessment 0 8/14/2018  4:26 PM   Dressing Status Clean, dry, & intact 8/14/2018  4:26 PM   Dressing Type Tape;Transparent 8/14/2018  4:26 PM   Hub Color/Line Status Pink 8/14/2018  4:26 PM   Action Taken Open ports on tubing capped 8/12/2018  8:00 PM   Alcohol Cap Used Yes 8/12/2018  8:00 PM                         Readmission Risk Assessment Tool Score Low Risk            5       Total Score        2 . Living with Significant Other. Assisted Living. LTAC. SNF. or   Rehab    3 Patient Length of Stay (>5 days = 3)        Criteria that do not apply:    Has Seen PCP in Last 6 Months (Yes=3, No=0)    IP Visits Last 12 Months (1-3=4, 4=9, >4=11)    Pt.  Coverage (Medicare=5 , Medicaid, or Self-Pay=4)    Charlson Comorbidity Score (Age + Comorbid Conditions)       Expected Length of Stay 4d 21h   Actual Length of Stay 5

## 2018-08-14 NOTE — PROGRESS NOTES
Hospitalist Progress Note    NAME: Shira Oreilly   :  1955   MRN:  [id number]       Assessment / Plan:  Sepsis ([address] (2018) POA WBC 22.6, , RR to 40, temp 103.2, normal lactate, WBC continues trending down, no more fever  Left leg cellulitis POA- Erythema is better, but still warm and swollen  Duplex is negative for DVT. Cont IV  Rocephin/Vancomycin for now    HTN urgency POA  Cont Norvasc, BB, ARB, monitor. Use hydralazine prn. So far better control    Acute Hypoxic Respiratory Failure POA/Elevated left hemidiaphragm POA:   CXR looks congested with cardiomegaly, she may have some degree of CHF, will monitor I/O and weight,   Pulmonology help appreciated,   CT chest no pathology in lung,   ECHO EF 65%,   ABG is better,     c/w O2 during the day- taper as able, if not able to will need oxygen  CPAP at night as per pulm  check overnight oxymetry may need Home O2 for now,   c/w diuretic decreased dose  PT/OT eval noted- HH recommended, CM working on arranging it    Obesity Hypoventilation Syndrome: c/w CPAP at night, needs sleep study as outpatient, may need oxygen too  Hyponatremia POA so far improving, monitor. History of spider bite in left leg 15 years ago    Morbid Obesity POA Body mass index is 45.61 kg/(m^2). - weight loss recommended  NOK:   Code status: Full  D/W , DTR Lisa 850 [id number]  Prophylaxis: Lovenox  Recommended Disposition: HH PT, OT, RN likely soon when Home CPAP/Oxygen arranged     Subjective:     Chief Complaint / Reason for Physician Visit: F/U   \"I feel better\". Discussed with RN events overnight.      Review of Systems:  Symptom Y/N Comments  Symptom Y/N Comments   Fever/Chills n   Chest Pain n    Poor Appetite n   Edema y    Cough n   Abdominal Pain     Sputum    Joint Pain y    SOB/FLORENCE y   Pruritis/Rash y improved   Nausea/vomit    Tolerating PT/OT y    Diarrhea    Tolerating Diet y    Constipation    Other       Could NOT obtain due to: Objective:     VITALS:   Last 24hrs VS reviewed since prior progress note. Most recent are:  Patient Vitals for the past 24 hrs:   Temp Pulse Resp BP SpO2   08/14/18 1113 98.9 °F (37.2 °C) 74 24 155/72 95 %   08/14/18 1025 - - - - 90 %   08/14/18 1016 - 88 - - 96 %   08/14/18 0825 - 80 - - 95 %   08/14/18 0818 - - 22 146/90 96 %   08/14/18 0521 99.2 °F (37.3 °C) 86 26 151/77 92 %   08/13/18 2343 97.8 °F (36.6 °C) 76 28 141/69 90 %   08/13/18 2153 - 88 - 168/76 -   08/13/18 1926 99 °F (37.2 °C) 85 (!) 38 153/75 90 %       Intake/Output Summary (Last 24 hours) at 08/14/18 1533  Last data filed at 08/14/18 0500   Gross per 24 hour   Intake                0 ml   Output             2825 ml   Net            -2825 ml        PHYSICAL EXAM:  General: WD, WN. Alert, cooperative, SOB  EENT:  EOMI. Anicteric sclerae. MMM  Resp:  Coarse BS,   CV:  Regular  rhythm,  + edema  GI:  Soft, Non distended, Non tender.  +Bowel sounds  Neurologic:  Alert and oriented X 3, normal speech,   Psych:   Good insight. Not anxious nor agitated  Skin:  +  Rashes in left leg. No jaundice    Reviewed most current lab test results and cultures  YES  Reviewed most current radiology test results   YES  Review and summation of old records today    NO  Reviewed patient's current orders and MAR    YES  PMH/ reviewed - no change compared to H&P  ________________________________________________________________________  Care Plan discussed with:    Comments   Patient x    Family      RN x    Care Manager     Consultant                        Multidiciplinary team rounds were held today with , nursing, pharmacist and clinical coordinator. Patient's plan of care was discussed; medications were reviewed and discharge planning was addressed.      ________________________________________________________________________  Total NON critical care TIME:  16   Minutes    Total CRITICAL CARE TIME Spent:   Minutes non procedure based      Comments >50% of visit spent in counseling and coordination of care     ________________________________________________________________________  Francisca Wiley MD     Procedures: see electronic medical records for all procedures/Xrays and details which were not copied into this note but were reviewed prior to creation of Plan. LABS:  I reviewed today's most current labs and imaging studies.   Pertinent labs include:  Recent Labs      08/14/18 0518 08/13/18 0159 08/12/18 0223   WBC  14.9*  14.4*  15.7*   HGB  10.6*  11.1*  10.7*   HCT  34.0*  36.2  34.3*   PLT  200  165  166     Recent Labs      08/14/18 0518 08/13/18 0159 08/12/18 0223   NA  133*  132*  135*   K  4.6  4.2  3.9   CL  101  100  102   CO2  26  25  24   GLU  94  110*  116*   BUN  22*  29*  33*   CREA  0.77  0.96  1.24*   CA  10.6*  10.2*  10.3*   MG  2.3  2.3  2.2   ALB  2.3*  2.5*  2.5*   TBILI  0.5  0.4  0.4   SGOT  30  17  18   ALT  20  20  19       Signed: Francisca Wiley MD

## 2018-08-14 NOTE — PROGRESS NOTES
[facility]  Pulmonary, Critical Care, and Sleep Medicine    Initial Patient Consult    Name: Rafia Vann MRN: [id number]   : 1955 Hospital: [facility]   Date: 2018        IMPRESSION:   · Acute (and likely chronic) hypoxic/hypercapnic respiratory failure  · Sepsis  · Cellulitis  · hyponatermia  · Suspect she has MONSERRAT/OHS leading to her hypoxia/hypercapnia   · Anemia and no polycythemia  · No evidence of pneumonia or other acute process in the lung  · Eventration of the left hemidiaphragm leading to abnormal chest X-ray   · HTN  · Obesity       RECOMMENDATIONS:   · Pt education, weight reduction counseling- stressed safety  · Diuresis  · O2 wean as tolerated; may eventually need home O2  · Ultimately will need evaluation in the sleep lab and polysomnography- explained PSG procedure, daughter has MONSERRAT but non adherent to treatment, pt willing to do better if prescribed  · She will allow us to arrange appt with our sleep clinic  · Continue antibiotics for cellulitis per team  · DVT prophylaxis      OOB in chair eating breakfast. Discussed her plan to get healthier.  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 Whit Rodrigues yearly but never complained of FLORENCE to hi, daughter has not4ed more SOB past 2 months. Pts mother  two yrs agoa nd admits to self neglect since. Five yrs ago pt weighed about 282 and now > 310? Not on home O2. Subjective: This patient has been seen and evaluated at the request of Dr. Seema López  for respiratory failure and by Dr. Jen Montano for elevated hemidiaphragm. Patient is a 66 y.o. female nonsmoker, admitted with sepsis and cellulitis of the left lower extremity. She became lethargic today and an ABG showed chronic CO2 retention.   She is a reluctant historian, but her family at the bedside reports that she has had some chronic shortness of breath over at least the last several months and did not seek out medical care for fear of being hospitalized. They note she has severe snoring and has significant daytime sleepiness. Also awakens at night with dyspnea and has to sit up (though she says it is to sit up to read). Currently she is comfortable on CPAP. Denies pain, dyspnea, cough, etc.     Past Medical History:   Diagnosis Date    Arthritis     knees, lower back.  Hypertension     Morbid obesity [facility])       Past Surgical History:   Procedure Laterality Date    HX GYN  2009    D&C      Prior to Admission medications    Medication Sig Start Date End Date Taking? Authorizing Provider   busPIRone (BUSPAR) 15 mg tablet Take 15 mg by mouth nightly. Yes Historical Provider   Olmesartan-amLODIPine-HCTZ 40-10-25 mg tab Take 1 Tab by mouth daily. Yes Historical Provider   furosemide (LASIX) 40 mg tablet Take 40 mg by mouth every other day. Takes every other day in the evening (confirmed w/ patient)   Yes Historical Provider   labetalol (NORMODYNE) 300 mg tablet Take 300 mg by mouth every twelve (12) hours. Confirmed w/ patient. Rx for Tid, patient only tolerates it BiD   Yes Historical Provider   potassium chloride SA (MICRO-K) 10 mEq capsule Take 10 mEq by mouth daily. Yes Historical Provider   cholecalciferol (VITAMIN D3) 1,000 unit tablet Take 1,000 Units by mouth every Sunday. Indications: Vitamin D Deficiency, Takes unknown units of D3 once weekly   Yes Historical Provider   ascorbic acid (VITAMIN C) 500 mg tablet Take 500 mg by mouth daily.    Yes Historical Provider     No Known Allergies   Social History   Substance Use Topics    Smoking status: Never Smoker    Smokeless tobacco: Never Used    Alcohol use No      Family History   Problem Relation Age of Onset    Stroke Father 36    Heart Disease Father     Arthritis-osteo Mother     Cancer Mother [de-identified]     breast cancer        Current Facility-Administered Medications   Medication Dose Route Frequency    polyethylene glycol (MIRALAX) packet 17 g  17 g Oral DAILY    nystatin (MYCOSTATIN) 100,000 unit/gram powder   Topical BID    furosemide (LASIX) injection 40 mg  40 mg IntraVENous DAILY    sodium chloride (NS) flush 5-10 mL  5-10 mL IntraVENous Q8H    enoxaparin (LOVENOX) injection 40 mg  40 mg SubCUTAneous Q12H    cefTRIAXone (ROCEPHIN) 2 g in 0.9% sodium chloride (MBP/ADV) 50 mL  2 g IntraVENous Q24H    vancomycin (VANCOCIN) 1500 mg in  ml infusion  1,500 mg IntraVENous Q16H    busPIRone (BUSPAR) tablet 15 mg  15 mg Oral QHS    labetalol (NORMODYNE) tablet 300 mg  300 mg Oral Q12H    ascorbic acid (vitamin C) (VITAMIN C) tablet 500 mg  500 mg Oral DAILY    amLODIPine (NORVASC) tablet 10 mg  10 mg Oral DAILY    losartan (COZAAR) tablet 50 mg  50 mg Oral DAILY       Review of Systems:  A comprehensive review of systems was negative except for that written in the HPI. Objective:   Vital Signs:    Visit Vitals    /90 (BP 1 Location: Right arm, BP Patient Position: Sitting)    Pulse 80    Temp 99.2 °F (37.3 °C)    Resp 22    Ht 5' 8\" (1.727 m)    Wt 147.9 kg (326 lb 1 oz)    SpO2 95%    BMI 49.58 kg/m2       O2 Device: Nasal cannula   O2 Flow Rate (L/min): 3 l/min   Temp (24hrs), Av.8 °F (37.1 °C), Min:97.8 °F (36.6 °C), Max:99.2 °F (37.3 °C)       Intake/Output:   Last shift:         Last 3 shifts:  1901 -  0700  In: 1050 [I.V.:1050]  Out: 3725 [Urine:3725]    Intake/Output Summary (Last 24 hours) at 18 0928  Last data filed at 18 0500   Gross per 24 hour   Intake                0 ml   Output             2825 ml   Net            -2825 ml      Physical Exam:   General:  Alert, obese, on O2; off CPAP   Head:  Normocephalic, without obvious abnormality, atraumatic. Eyes:  Conjunctivae/corneas clear. Nose: Nares normal. Septum midline.  Mucosa normal.    Throat: Lips, mucosa, and tongue normal.     Neck: Supple, symmetrical, trachea midline    Back:   Symmetric, no curvature. ROM normal.   Lungs:   Clear to auscultation bilaterally. Chest wall:  No tenderness or deformity. Heart:  Regular rate and rhythm    Abdomen:   Soft, non-tender. Bowel sounds normal.    Extremities: Extremities normal, atraumatic, no cyanosis, no clubbing   Skin: Old brown recluse bite on left foot, with cellulitis on left lower extremity; 2+ edema; warm   Lymph nodes: Cervical, supraclavicular nodes normal.   Neurologic: Grossly nonfocal     Data review:     Recent Results (from the past 24 hour(s))   CBC WITH AUTOMATED DIFF    Collection Time: 08/14/18  5:18 AM   Result Value Ref Range    WBC 14.9 (H) 3.6 - 11.0 K/uL    RBC 3.76 (L) 3.80 - 5.20 M/uL    HGB 10.6 (L) 11.5 - 16.0 g/dL    HCT 34.0 (L) 35.0 - 47.0 %    MCV 90.4 80.0 - 99.0 FL    MCH 28.2 26.0 - 34.0 PG    MCHC 31.2 30.0 - 36.5 g/dL    RDW 15.2 (H) 11.5 - 14.5 %    PLATELET 736 131 - 432 K/uL    MPV 11.5 8.9 - 12.9 FL    NRBC 0.0 0  WBC    ABSOLUTE NRBC 0.00 0.00 - 0.01 K/uL    NEUTROPHILS 76 (H) 32 - 75 %    LYMPHOCYTES 11 (L) 12 - 49 %    MONOCYTES 10 5 - 13 %    EOSINOPHILS 2 0 - 7 %    BASOPHILS 0 0 - 1 %    IMMATURE GRANULOCYTES 1 (H) 0.0 - 0.5 %    ABS. NEUTROPHILS 11.4 (H) 1.8 - 8.0 K/UL    ABS. LYMPHOCYTES 1.7 0.8 - 3.5 K/UL    ABS. MONOCYTES 1.5 (H) 0.0 - 1.0 K/UL    ABS. EOSINOPHILS 0.2 0.0 - 0.4 K/UL    ABS. BASOPHILS 0.1 0.0 - 0.1 K/UL    ABS. IMM.  GRANS. 0.1 (H) 0.00 - 0.04 K/UL    DF AUTOMATED     MAGNESIUM    Collection Time: 08/14/18  5:18 AM   Result Value Ref Range    Magnesium 2.3 1.6 - 2.4 mg/dL   METABOLIC PANEL, COMPREHENSIVE    Collection Time: 08/14/18  5:18 AM   Result Value Ref Range    Sodium 133 (L) 136 - 145 mmol/L    Potassium 4.6 3.5 - 5.1 mmol/L    Chloride 101 97 - 108 mmol/L    CO2 26 21 - 32 mmol/L    Anion gap 6 5 - 15 mmol/L    Glucose 94 65 - 100 mg/dL    BUN 22 (H) 6 - 20 MG/DL    Creatinine 0.77 0.55 - 1.02 MG/DL    BUN/Creatinine ratio 29 (H) 12 - 20 GFR est AA >60 >60 ml/min/1.73m2    GFR est non-AA >60 >60 ml/min/1.73m2    Calcium 10.6 (H) 8.5 - 10.1 MG/DL    Bilirubin, total 0.5 0.2 - 1.0 MG/DL    ALT (SGPT) 20 12 - 78 U/L    AST (SGOT) 30 15 - 37 U/L    Alk. phosphatase 99 45 - 117 U/L    Protein, total 8.4 (H) 6.4 - 8.2 g/dL    Albumin 2.3 (L) 3.5 - 5.0 g/dL    Globulin 6.1 (H) 2.0 - 4.0 g/dL    A-G Ratio 0.4 (L) 1.1 - 2.2         Imaging:  I have personally reviewed the patients radiographs and have reviewed the reports: non etoday  Eventration of left hemidiaphragm.   No acute disease in the lung        Delmi Buckley MD

## 2018-08-14 NOTE — PROGRESS NOTES
Reason for Admission:   Pt was admitted on 8/9/18 d/t a Dx of Sepsis, L Leg Cellulitis. HTN urgency. Respiratory Failure with Elevated L hemidiaphragm. Obesity. Hyponatremia. Hx of Spider bite. RRAT Score:          2           Plan for utilizing home health: Therapy is recommending Home Health. Offered choice list.  FOC on bedside chart. 1st choice is [facility].  2nd choice is [facility]. 3rd choice is [facility]. Referral made to [facility] via [facility]. CM will continue to monitor to see if they can accept. Likelihood of Readmission:  LOW                         Transition of Care Plan:                  Pt lives with spouse in two story home with 4 ANNI. Bedroom is on second floor with 13 steps up. DTRS Johanna Staff: [phone] and Carmen Schilling: [phone] live closeby and are supportive. Pt has no DME at home. . No oxygen. Pt is on oxygen here. RN aware that we need to do oxygen challenge to see if Pt oxgyen at home. RN to try to wean down from 3 LPM while in the hospital.  Pt has no experience with [facility] or SNF. Pt I W ADLs. Pt is able to drive but family transports her now mostly. Pharmacy is [facility] on [address]. Family can transport her home in car when medically stable. 3:47 PM   CM received a call from [address] [place] [address] and they indicated that Pt has not seen Dr. Pham Espinoza in 1.5 years. Pt had a new PCP last time she was here and she was a no show. CM explained to Pt and DTR that need PCP to follow [facility] orders. CM asked them how to proceed. They would like a new PCP. Either [facility] Sherie Gee at [facility] or [id number] [facility] PCP  First available. AL sent email to CM specialist to make PCP appt ASAP for next week. 4:23 PM   PCP appt scheduled for 8/22/18. CM will set up [facility] to Home visit for this weekend and Pt will go to PCP appt on 8/22/18. HH will hopefully be able to start after PCP appt for therapy. Pt/DTR agreement to this plan.      5:19 PM   RN started Oxygen Challenge but did not walk her. RN needs to complete oxygen challenge to see if she qualifies for home oxygen. MD anticipating DC in next 2 days possibly. DTR indicated that she made appointment with pulmonology, Dr. Abimael Beckwith office. CM needs to call to confirm appt time and place on AVS.  Pt also needs Cardiology appointment with [facility] in 2 weeks. CM will continue to monitor discharge plan. Care Management Interventions  PCP Verified by CM: Yes  Palliative Care Criteria Met (RRAT>21 & CHF Dx)?: No  Mode of Transport at Discharge:  Other (see comment)  Transition of Care Consult (CM Consult): [address]: Yes  MyChart Signup: Yes  Discharge Durable Medical Equipment: No  Physical Therapy Consult: Yes  Occupational Therapy Consult: Yes  Speech Therapy Consult: No  Current Support Network: Lives with Spouse, Own Home, Family Lives Nearby  Confirm Follow Up Transport: Family  Plan discussed with Pt/Family/Caregiver: Yes  Freedom of Choice Offered: Yes  Discharge Location  Discharge Placement: Home with home health    [place], [phone]

## 2018-08-14 NOTE — ADT AUTH CERT NOTES
Utilization Review           Cellulitis - Care Day 6 (8/14/2018) by Mark Goldman        Review Status Review Entered       Completed 8/14/2018       Details              Care Day: 6 Care Date: 8/14/2018 Level of Care: Step Down       Guideline Day 3        Clinical Status       (X) * Hemodynamic stability       8/14/2018 4:05 PM EDT by Olinda Ball         98.9-155/72-74-24-95% RA              (X) * Fever absent or improved       8/14/2018 4:05 PM EDT by Olinda Ball         T 98.9              ( ) * Skin exam stable or improved       8/14/2018 4:05 PM EDT by Olinda Ball         Erythema is better, but still warm and swollen              (X) * Mental status at baseline       ( ) * Antibiotic treatment needs appropriate for next level of care       (X) * Pain absent or manageable at next level of care       8/14/2018 4:05 PM EDT by Olinda Ball         managed              ( ) * Discharge plans and education understood              Activity       (X) * Ambulatory       8/14/2018 4:05 PM EDT by Olinda Ball         ambulate with assistance                     Routes       (X) * Oral hydration, medications, [F] and diet       8/14/2018 4:05 PM EDT by Olinda Ball         cardiac diet                     Interventions       (X) WBC       8/14/2018 4:05 PM EDT by Olinda Ball         wbc 14.9                     Medications       (X) Parenteral or oral antibiotics [B]       8/14/2018 4:05 PM EDT by Olinda Ball         rocephin iv q24hrs vanco iv x1                                          * Milestone              Additional Notes       Sepsis ([facility] [address] (8/9/2018) POA WBC 22.6, , RR to 40, temp 103.2, normal lactate, WBC continues trending down, no more fever       Left leg cellulitis POA- Erythema is better, but still warm and swollen       Duplex is negative for DVT.               Cont IV  Rocephin/Vancomycin for now               HTN urgency POA       Cont Norvasc, BB, ARB, monitor.  Use hydralazine prn.  So far better control               Acute Hypoxic Respiratory Failure POA/Elevated left hemidiaphragm POA:        CXR looks congested with cardiomegaly, she may have some degree of CHF, will monitor I/O and weight,        Pulmonology help appreciated,        CT chest no pathology in lung,        ECHO EF 65%,        ABG is better,                c/w O2 during the day- taper as able, if not able to will need oxygen       CPAP at night as per pulm       check overnight oxymetry may need Home O2 for now,        c/w diuretic decreased dose       PT/OT eval noted- HH recommended, CM working on arranging it               Obesity Hypoventilation Syndrome: c/w CPAP at night, needs sleep study as outpatient, may need oxygen too       Hyponatremia POA so far improving, monitor.       History of spider bite in left leg 15 years ago               rbc 3.76 hgb 10.6 Na 133 bun 22        cardiac monitoring       care management consult       daily weight       Norvasc po x1       Lasix iv x1       labetalol po x1       cozaar po x1           Cellulitis - Care Day 5 (8/13/2018) by Sathya Mckoy RN        Review Status Review Entered       Completed 8/13/2018       Details              Care Day: 5 Care Date: 8/13/2018 Level of Care: Step Down       Guideline Day 3        Level Of Care       ( ) Floor to discharge [E]       8/13/2018 5:59 PM EDT by Luis Knight         loc step down review inpatient concurrent 8/13/18              ( ) Complete discharge planning       8/13/2018 5:59 PM EDT by Luis Knight         consutl pt/ot Recommended Disposition:  PT, OT, RN                     Clinical Status       (X) * Hemodynamic stability       8/13/2018 5:59 PM EDT by Luis Knight         99.1  hr 80  rr 24  155/75  94% 3lpm nc              (X) * Fever absent or improved       8/13/2018 5:59 PM EDT by Luis Knight         Sepsis ([address] (8/9/2018) POA WBC 22.6, , RR to 40, temp 103.2, normal lactate, WBC continues trending down, no more fever              (X) * Skin exam stable or improved       8/13/2018 5:59 PM EDT by Rony Haynes         Left leg cellulitis POA: c/w CTX/Vancomycin, Duplex is negative for DVT. Erythema is better, but still warm and swollen              (X) * Mental status at baseline       8/13/2018 5:59 PM EDT by Rony Haynes         Neurologic:                Alert and oriented X 3, normal speech              ( ) * Antibiotic treatment needs appropriate for next level of care       (X) * Pain absent or manageable at next level of care       ( ) * Discharge plans and education understood              Activity       (X) * Ambulatory       8/13/2018 5:59 PM EDT by Rony Haynes         Distance (ft): 3 Feet (ft) Assistive Device: Gait belt Ambulation - Level of Assistance: Minimal assistance Gait Abnormalities: Decreased step clearance;Trunk sway increased; Shuffling gait Base of Support: Widened Speed/Jammie: Pace decreased (<100                     Routes       (X) * Oral hydration, medications, [F] and diet       8/13/2018 5:59 PM EDT by Rony Haynes         duoneb prn x1, po norvasc qd, sq lovenox qd, po labetalol q12h, po cozaar qd,                     Interventions       (X) WBC       8/13/2018 5:59 PM EDT by Rony Haynes         wbc 14.4, h/h 11.1/36.2,  na 132, bun 29  cr 0.96  , alb 2.5,                     Medications       (X) Parenteral or oral antibiotics [B]       8/13/2018 5:59 PM EDT by Rony Haynes         iv rocephin qd, iv vancomyicn q16h                     8/13/2018 5:59 PM EDT by Rony Haynes       Subject: Additional Clinical Information       HTN urgency POA: c/w Norvasc, BB, ARB, monitor. Use hydralazine prn.  So far better controlAcute Hypoxic Respiratory Failure POA/Elevated left hemidiaphragm POA: CXR looks congested with CMG, she may have some degree of CHF, will monitor I/O and weight, Pulmonology help appreciated, CT chest no pathology in lung, ECHO EF 65%, c/w diuretic decreased dose, ABG is better, c/w O2 during the day, CPAP at night, check overnight oxymetry may need Home O2 for now, get PT/OTObesity Hypoventilation Sd: c/w CPAP at night, needs sleep study as outpatientHyponatremia POA  so far improving, monitor. History of spider bite in left leg 15 years ago                                   * Milestone                  Cellulitis - Care Day 4 (8/12/2018) by Maria E Lunsford RN        Review Status Review Entered       Completed 8/13/2018       Details              Care Day: 4 Care Date: 8/12/2018 Level of Care: Step Down       Guideline Day 2        Level Of Care       ( ) Floor       8/13/2018 5:53 PM EDT by Ann Ag         loc stepdown                     Clinical Status       (X) * Dehydration absent       (X) * Mental status at baseline       (X) * Hemodynamic stability       8/13/2018 5:53 PM EDT by Ann Ag         99.6  hr 93  rr 22  146/77  97% 2lpm nc              (X) * Fever absent or improved              Activity       (X) Activity as tolerated              Routes       (X) * Oral hydration, medications       8/13/2018 5:53 PM EDT by Ann Ag         duoneb x2, po norvasc qd,sq lovenox q12h, iv lasix 40 mg qd, po labetalol q12h, po cozaar qd,              (X) Diet as tolerated              Interventions       (X) WBC       8/13/2018 5:53 PM EDT by Ann Ag         wbc 15.7  h/h 10.7/34.3,  na 135,  bun 33  cr 1.24  gfr 44, ca 10.3, alb 2.5,  abg 3lpm nc ph 7.33, pco2 49, po2 74,                     Medications       (X) IV antibiotics       8/13/2018 5:53 PM EDT by Ann Ag         iv rocephin qd, iv vancomyicn q16h,                     8/13/2018 5:53 PM EDT by Ann Ag       Subject: Additional Clinical Information                review inpatient concurrent 8/12/18                Sepsis ([facility] [address] (8/9/2018) POA  WBC 22.6, , RR to 40, temp 103.2, normal lactate, WBC trending down, last fever was last night                Left leg cellulitis POA: c/w CTX/Vancomycin, Duplex is negative for DVT. Erythema is slightly better, but still warm and swollen                HTN urgency POA: c/w Norvasc, BB, ARB, monitor. Use hydralazine prn.  So far better control                Acute Hypoxic Respiratory Failure POA/Elevated left hemidiaphragm POA: CXR looks congested with CMG, she may have some degree of CHF, will monitor I/O and weight, Pulmonology help appreciated, CT chest no pathology in lung, ECHO EF 65%, c/w diuretic decreased dose, ABG is better, c/w O2 during the day, CPAP at night                Obesity Hypoventilation Sd: c/w CPAP at night                Hyponatremia POA  so far improving, monitor.                History of spider bite in left leg 15 years ago

## 2018-08-14 NOTE — PROGRESS NOTES
Problem: Self Care Deficits Care Plan (Adult)  Goal: *Acute Goals and Plan of Care (Insert Text)  Occupational Therapy Goals  Initiated 2018  1. Patient will perform grooming standing at sink with modified independence within 7 day(s). 2.  Patient will perform upper body dressing with modified independence within 7 day(s). 3.  Patient will perform lower body dressing with AE as needed with minimal assistance/contact guard assist within 7 day(s). 4.  Patient will perform toilet transfers with SBA within 7 day(s). 5.  Patient will perform all aspects of toileting with minimal assistance/contact guard assist within 7 day(s). 6.  Patient will participate in upper extremity therapeutic exercise/activities with independence for 5 minutes within 7 day(s). 7.  Patient will utilize energy conservation techniques during functional activities with verbal cues within 7 day(s). Occupational Therapy EVALUATION  Patient: Lanney Brunner (72 y.o. female)  Date: 2018  Primary Diagnosis: Sepsis ([facility] [address]        Precautions: fall       ASSESSMENT :  Based on the objective data described below, the patient presents with poor endurance, strength, activity tolerance and FLORENCE/SOB with sitting and all activity s/p admission with sepsis, L LE cellulitis and respiratory failure, now on 3L NC SPO2 between 90-96% during session with mild FLORENCE, CGA for sit to stand from chair for 2 minutes with CGA, patient with L LE edema and knee pain, total A for LB ADls to min A to setup for UBADLs, poor functional reach due to abdominal gurth and edema. Patient was independent, running a  keeping 4 children including a , normally on feet all day, 2 story home with full fight upstairs. Patient eager to return home, educated briefly on energy conservation and building strength, Pending progress patient may need short IP rehab stay versus home with [facility].     Patient will benefit from skilled intervention to address the above impairments. Patients rehabilitation potential is considered to be Good  Factors which may influence rehabilitation potential include:   []             None noted  []             Mental ability/status  []             Medical condition  []             Home/family situation and support systems  []             Safety awareness  [x]             Pain tolerance/management  []             Other:      PLAN :  Recommendations and Planned Interventions:  [x]               Self Care Training                  [x]        Therapeutic Activities  [x]               Functional Mobility Training    []        Cognitive Retraining  [x]               Therapeutic Exercises           [x]        Endurance Activities  [x]               Balance Training                   []        Neuromuscular Re-Education  []               Visual/Perceptual Training     [x]   Home Safety Training  [x]               Patient Education                 []        Family Training/Education  []               Other (comment):    Frequency/Duration: Patient will be followed by occupational therapy 5 times a week to address goals. Discharge Recommendations: Inpatient Rehab and To Be Determined  Further Equipment Recommendations for Discharge: TBD     SUBJECTIVE:   Patient stated I keep 4 kids, ones a .     OBJECTIVE DATA SUMMARY:   HISTORY:   Past Medical History:   Diagnosis Date    Arthritis     knees, lower back.  Hypertension     Morbid obesity ([facility] [address]      Past Surgical History:   Procedure Laterality Date    HX GYN  2009    D&C       Prior Level of Function/Environment/Context: lives with , independent with functional mobitliy with AD, runs a , fair functional reach and mild SOB at baseline, worsened recently.   Occupations in which the patient is/was successful, what are the barriers preventing that success:   Performance Patterns (routines, roles, habits, and rituals):   Personal Interests and/or values:   Expanded or extensive additional review of patient history:     Home Situation  Home Environment: Private residence  # Steps to Enter: 3  Rails to Enter: Yes  Hand Rails : Right  One/Two Story Residence: Two story  # of Interior Steps: 15  Interior Rails: Both  Living Alone: No  Support Systems: Spouse/Significant Other/Partner  Patient Expects to be Discharged to[de-identified] Private residence  Tub or Shower Type: Shower    Hand dominance: Right    EXAMINATION OF PERFORMANCE DEFICITS:  Cognitive/Behavioral Status:  Neurologic State: Alert  Orientation Level: Appropriate for age  Cognition: Appropriate decision making; Follows commands  Perception: Appears intact  Perseveration: No perseveration noted  Safety/Judgement: Awareness of environment; Insight into deficits    Skin: intact    Edema: max L LE knee distal end, trightness    Hearing: Auditory  Auditory Impairment: None    Vision/Perceptual:                           Acuity: Able to read employee name badge without difficulty    Corrective Lenses: Glasses    Range of Motion:  B UE 1/2 range shoulder flexion,fair functional reach  AROM: Generally decreased, functional                         Strength:  B UE 3/5-4/5  Strength: Generally decreased, functional                Coordination:     Fine Motor Skills-Upper: Left Intact; Right Intact         Tone & Sensation:  B UE intact     Sensation: Intact                      Balance:  Sitting: Intact  Standing: Impaired; With support  Standing - Static: Fair  Standing - Dynamic : Fair    Functional Mobility and Transfers for ADLs:  Bed Mobility:       Transfers:  Sit to Stand: Contact guard assistance  Toilet Transfer : Contact guard assistance; Other (comment) (BS only inferred)    ADL Assessment:  Feeding: Modified independent; Additional time (SOB)    Oral Facial Hygiene/Grooming: Setup; Additional time; Other (comment) (poor endurance)    Bathing: Maximum assistance    Upper Body Dressing: Minimum assistance    Lower Body Dressing:  Total assistance    Toileting: Total assistance (saunders and poor reach)                ADL Intervention and task modifications:         Completed OT evaluation and ADLs seated EOB and standing as able with HHA for balance. Educated on safety and endurance training with encouragement for full participation in ADLs while in hospital. Good understanding noted. Patient instructed and indicated understanding the benefits of maintaining activity tolerance, functional mobility, and independence with self care tasks during acute stay  to ensure safe return home and to baseline. Encouraged patient to increase frequency and duration OOB, be out of bed for all meals, perform daily ADLs (as approved by RN/MD regarding bathing etc), and performing functional mobility to/from bathroom. Cognitive Retraining  Safety/Judgement: Awareness of environment; Insight into deficits    Therapeutic Exercise:  encouraged OOB and full participation with ADLs to improve strength and endurance. Functional Measure:  Barthel Index:    Bathin  Bladder: 0  Bowels: 5  Groomin  Dressin  Feedin  Mobility: 0  Stairs: 0  Toilet Use: 0  Transfer (Bed to Chair and Back): 5  Total: 20       Barthel and G-code impairment scale:  Percentage of impairment CH  0% CI  1-19% CJ  20-39% CK  40-59% CL  60-79% CM  80-99% CN  100%   Barthel Score 0-100 100 99-80 79-60 59-40 20-39 1-19   0   Barthel Score 0-20 20 17-19 13-16 9-12 5-8 1-4 0      The Barthel ADL Index: Guidelines  1. The index should be used as a record of what a patient does, not as a record of what a patient could do. 2. The main aim is to establish degree of independence from any help, physical or verbal, however minor and for whatever reason. 3. The need for supervision renders the patient not independent. 4. A patient's performance should be established using the best available evidence.  Asking the patient, friends/relatives and nurses are the usual sources, but direct observation and common sense are also important. However direct testing is not needed. 5. Usually the patient's performance over the preceding 24-48 hours is important, but occasionally longer periods will be relevant. 6. Middle categories imply that the patient supplies over 50 per cent of the effort. 7. Use of aids to be independent is allowed. [address] Barthel, D.W. (0765). Functional evaluation: the Barthel Index. [address] (14)2. KRISTYN EstradaF, Yu Art., [place], [address] (1999). Measuring the change indisability after inpatient rehabilitation; comparison of the responsiveness of the Barthel Index and Functional [place] Measure. Journal of Neurology, Neurosurgery, and Psychiatry, 66(4), 025-294. SAROJ Adame, YEE Gomez, & Catrina Kearney, M.A. (2004.) Assessment of post-stroke quality of life in cost-effectiveness studies: The usefulness of the Barthel Index and the EuroQoL-5D. Quality of Life Research, 13, 867-87         G codes: In compliance with CMSs Claims Based Outcome Reporting, the following G-code set was chosen for this patient based on their primary functional limitation being treated: The outcome measure chosen to determine the severity of the functional limitation was the barthel index with a score of 20/100 which was correlated with the impairment scale. ?  Self Care:     - CURRENT STATUS: CL - 60%-79% impaired, limited or restricted    - GOAL STATUS: CJ - 20%-39% impaired, limited or restricted    - D/C STATUS:  ---------------To be determined---------------     Occupational Therapy Evaluation Charge Determination   History Examination Decision-Making   LOW Complexity : Brief history review  HIGH Complexity : 5 or more performance deficits relating to physical, cognitive , or psychosocial skils that result in activity limitations and / or participation restrictions MEDIUM Complexity : Patient may present with comorbidities that affect occupational performnce. Miniml to moderate modification of tasks or assistance (eg, physical or verbal ) with assesment(s) is necessary to enable patient to complete evaluation       Based on the above components, the patient evaluation is determined to be of the following complexity level: MEDIUM  Pain:  Pain Scale 1: Numeric (0 - 10)  Pain Intensity 1: 0              Activity Tolerance:   Fair, SPO2 stable  Please refer to the flowsheet for vital signs taken during this treatment. After treatment:   [x] Patient left in no apparent distress sitting up in chair  [] Patient left in no apparent distress in bed  [x] Call bell left within reach  [x] Nursing notified  [x] Caregiver present  [] Bed alarm activated    COMMUNICATION/EDUCATION:   The patients plan of care was discussed with: Physical Therapist and Registered Nurse. [x] Home safety education was provided and the patient/caregiver indicated understanding. [x] Patient/family have participated as able in goal setting and plan of care. [x] Patient/family agree to work toward stated goals and plan of care. [] Patient understands intent and goals of therapy, but is neutral about his/her participation. [] Patient is unable to participate in goal setting and plan of care. This patients plan of care is appropriate for delegation to [facility].     Thank you for this referral.  Louise Andrade OT  Time Calculation: 23 mins

## 2018-08-15 ENCOUNTER — HOME HEALTH ADMISSION (OUTPATIENT)
Dept: HOME HEALTH SERVICES | Facility: HOME HEALTH | Age: 63
End: 2018-08-15

## 2018-08-15 ENCOUNTER — APPOINTMENT (OUTPATIENT)
Dept: GENERAL RADIOLOGY | Age: 63
DRG: 871 | End: 2018-08-15
Attending: INTERNAL MEDICINE
Payer: COMMERCIAL

## 2018-08-15 LAB
ANION GAP SERPL CALC-SCNC: 3 MMOL/L (ref 5–15)
ARTERIAL PATENCY WRIST A: YES
BASE EXCESS BLDA CALC-SCNC: 5 MMOL/L
BDY SITE: ABNORMAL
BREATHS.SPONTANEOUS ON VENT: 25
BUN SERPL-MCNC: 20 MG/DL (ref 6–20)
BUN/CREAT SERPL: 28 (ref 12–20)
CALCIUM SERPL-MCNC: 11.2 MG/DL (ref 8.5–10.1)
CHLORIDE SERPL-SCNC: 102 MMOL/L (ref 97–108)
CO2 SERPL-SCNC: 31 MMOL/L (ref 21–32)
CREAT SERPL-MCNC: 0.72 MG/DL (ref 0.55–1.02)
ERYTHROCYTE [DISTWIDTH] IN BLOOD BY AUTOMATED COUNT: 15.3 % (ref 11.5–14.5)
GAS FLOW.O2 O2 DELIVERY SYS: 3 L/MIN
GLUCOSE BLD STRIP.AUTO-MCNC: 104 MG/DL (ref 65–100)
GLUCOSE SERPL-MCNC: 110 MG/DL (ref 65–100)
HCO3 BLDA-SCNC: 31 MMOL/L (ref 22–26)
HCT VFR BLD AUTO: 33.3 % (ref 35–47)
HGB BLD-MCNC: 10.4 G/DL (ref 11.5–16)
MCH RBC QN AUTO: 28.7 PG (ref 26–34)
MCHC RBC AUTO-ENTMCNC: 31.2 G/DL (ref 30–36.5)
MCV RBC AUTO: 91.7 FL (ref 80–99)
NRBC # BLD: 0 K/UL (ref 0–0.01)
NRBC BLD-RTO: 0 PER 100 WBC
PCO2 BLDA: 51 MMHG (ref 35–45)
PH BLDA: 7.4 [PH] (ref 7.35–7.45)
PLATELET # BLD AUTO: 231 K/UL (ref 150–400)
PMV BLD AUTO: 10.7 FL (ref 8.9–12.9)
PO2 BLDA: 62 MMHG (ref 80–100)
POTASSIUM SERPL-SCNC: 4 MMOL/L (ref 3.5–5.1)
RBC # BLD AUTO: 3.63 M/UL (ref 3.8–5.2)
SAO2 % BLD: 92 % (ref 92–97)
SAO2% DEVICE SAO2% SENSOR NAME: ABNORMAL
SERVICE CMNT-IMP: ABNORMAL
SODIUM SERPL-SCNC: 136 MMOL/L (ref 136–145)
SPECIMEN SITE: ABNORMAL
WBC # BLD AUTO: 16.3 K/UL (ref 3.6–11)

## 2018-08-15 PROCEDURE — 97530 THERAPEUTIC ACTIVITIES: CPT | Performed by: OCCUPATIONAL THERAPIST

## 2018-08-15 PROCEDURE — 74011250637 HC RX REV CODE- 250/637: Performed by: INTERNAL MEDICINE

## 2018-08-15 PROCEDURE — 77010033678 HC OXYGEN DAILY

## 2018-08-15 PROCEDURE — 82803 BLOOD GASES ANY COMBINATION: CPT | Performed by: INTERNAL MEDICINE

## 2018-08-15 PROCEDURE — 97530 THERAPEUTIC ACTIVITIES: CPT

## 2018-08-15 PROCEDURE — 74011000258 HC RX REV CODE- 258: Performed by: INTERNAL MEDICINE

## 2018-08-15 PROCEDURE — 80048 BASIC METABOLIC PNL TOTAL CA: CPT | Performed by: INTERNAL MEDICINE

## 2018-08-15 PROCEDURE — 82962 GLUCOSE BLOOD TEST: CPT

## 2018-08-15 PROCEDURE — 71045 X-RAY EXAM CHEST 1 VIEW: CPT

## 2018-08-15 PROCEDURE — 65660000000 HC RM CCU STEPDOWN

## 2018-08-15 PROCEDURE — 94760 N-INVAS EAR/PLS OXIMETRY 1: CPT

## 2018-08-15 PROCEDURE — 74011250636 HC RX REV CODE- 250/636: Performed by: EMERGENCY MEDICINE

## 2018-08-15 PROCEDURE — 36415 COLL VENOUS BLD VENIPUNCTURE: CPT | Performed by: INTERNAL MEDICINE

## 2018-08-15 PROCEDURE — 94660 CPAP INITIATION&MGMT: CPT

## 2018-08-15 PROCEDURE — 36600 WITHDRAWAL OF ARTERIAL BLOOD: CPT | Performed by: INTERNAL MEDICINE

## 2018-08-15 PROCEDURE — 74011250636 HC RX REV CODE- 250/636: Performed by: INTERNAL MEDICINE

## 2018-08-15 PROCEDURE — 85027 COMPLETE CBC AUTOMATED: CPT | Performed by: INTERNAL MEDICINE

## 2018-08-15 PROCEDURE — 94640 AIRWAY INHALATION TREATMENT: CPT

## 2018-08-15 PROCEDURE — 74011000250 HC RX REV CODE- 250: Performed by: INTERNAL MEDICINE

## 2018-08-15 RX ORDER — LORAZEPAM 0.5 MG/1
0.5 TABLET ORAL
Status: DISCONTINUED | OUTPATIENT
Start: 2018-08-15 | End: 2018-08-23 | Stop reason: HOSPADM

## 2018-08-15 RX ORDER — FUROSEMIDE 10 MG/ML
40 INJECTION INTRAMUSCULAR; INTRAVENOUS 2 TIMES DAILY
Status: DISCONTINUED | OUTPATIENT
Start: 2018-08-15 | End: 2018-08-19

## 2018-08-15 RX ADMIN — Medication 10 ML: at 20:47

## 2018-08-15 RX ADMIN — AMLODIPINE BESYLATE 10 MG: 5 TABLET ORAL at 11:24

## 2018-08-15 RX ADMIN — POLYETHYLENE GLYCOL 3350 17 G: 17 POWDER, FOR SOLUTION ORAL at 11:24

## 2018-08-15 RX ADMIN — FUROSEMIDE 40 MG: 10 INJECTION, SOLUTION INTRAMUSCULAR; INTRAVENOUS at 11:36

## 2018-08-15 RX ADMIN — VANCOMYCIN HYDROCHLORIDE 1500 MG: 10 INJECTION, POWDER, LYOPHILIZED, FOR SOLUTION INTRAVENOUS at 19:46

## 2018-08-15 RX ADMIN — VANCOMYCIN HYDROCHLORIDE 1500 MG: 10 INJECTION, POWDER, LYOPHILIZED, FOR SOLUTION INTRAVENOUS at 02:00

## 2018-08-15 RX ADMIN — ENOXAPARIN SODIUM 40 MG: 100 INJECTION SUBCUTANEOUS at 11:40

## 2018-08-15 RX ADMIN — ENOXAPARIN SODIUM 40 MG: 100 INJECTION SUBCUTANEOUS at 20:35

## 2018-08-15 RX ADMIN — Medication 10 ML: at 17:40

## 2018-08-15 RX ADMIN — LORAZEPAM 0.5 MG: 0.5 TABLET ORAL at 22:56

## 2018-08-15 RX ADMIN — SODIUM CHLORIDE 3 G: 900 INJECTION, SOLUTION INTRAVENOUS at 11:38

## 2018-08-15 RX ADMIN — LABETALOL HYDROCHLORIDE 300 MG: 200 TABLET, FILM COATED ORAL at 20:36

## 2018-08-15 RX ADMIN — OXYCODONE HYDROCHLORIDE AND ACETAMINOPHEN 500 MG: 500 TABLET ORAL at 11:25

## 2018-08-15 RX ADMIN — Medication 10 ML: at 05:18

## 2018-08-15 RX ADMIN — Medication 10 ML: at 11:37

## 2018-08-15 RX ADMIN — LOSARTAN POTASSIUM 50 MG: 50 TABLET ORAL at 11:24

## 2018-08-15 RX ADMIN — FUROSEMIDE 40 MG: 10 INJECTION, SOLUTION INTRAMUSCULAR; INTRAVENOUS at 17:40

## 2018-08-15 RX ADMIN — IPRATROPIUM BROMIDE AND ALBUTEROL SULFATE 3 ML: .5; 3 SOLUTION RESPIRATORY (INHALATION) at 15:43

## 2018-08-15 RX ADMIN — NYSTATIN: 100000 POWDER TOPICAL at 17:40

## 2018-08-15 RX ADMIN — NYSTATIN: 100000 POWDER TOPICAL at 11:41

## 2018-08-15 RX ADMIN — BUSPIRONE HYDROCHLORIDE 15 MG: 5 TABLET ORAL at 20:36

## 2018-08-15 RX ADMIN — LABETALOL HYDROCHLORIDE 300 MG: 200 TABLET, FILM COATED ORAL at 11:24

## 2018-08-15 RX ADMIN — IPRATROPIUM BROMIDE AND ALBUTEROL SULFATE 3 ML: .5; 3 SOLUTION RESPIRATORY (INHALATION) at 09:17

## 2018-08-15 RX ADMIN — SODIUM CHLORIDE 3 G: 900 INJECTION, SOLUTION INTRAVENOUS at 20:36

## 2018-08-15 NOTE — PROGRESS NOTES
Hospitalist Progress Note    NAME: Turner Castro   :  1955   MRN:  [id number]       Assessment / Plan:  Sepsis ([address] (2018) POA WBC 22.6, , RR to 40, temp 103.2, normal lactate, WBC still up ~ 16k, no more fever  Left leg cellulitis POA- Erythema is better, but still warm and swollen  H/o Chronic L Leg lymphedema s/p Spider bite in remote past  Duplex is negative for DVT. s/p IV  Rocephin  Cont IV Vancomycin  Will change today to unasyn due to persistent leukocytosis    HTN urgency POA- now controlled  Cont Norvasc, BB, ARB, monitor. Use hydralazine prn. So far better control    Acute Hypoxic Respiratory Failure POA/Elevated left hemidiaphragm POA  Likely Chronic Diastolic CHF POA  CXR looks congested with cardiomegaly, she may have some degree of CHF,   CT chest no pathology in lung,   ECHO EF 65%,   ABG is better,     Cont to monitor I/O and weight, diuresis IV with lasix for now, increase to Q 12 today, will change to PO on discharge, Cr stable 0.7 today  Pulmonology help appreciated- will need MONSERRAT taken cared off for real improvement       c/w O2 during the day- taper as able, if not able to will need oxygen  CPAP at night likely as per pulm- will need OP Sleep study - appointment set up with Pulm associates  check overnight oxymetry may need Home O2 for now,   c/w diuretic decreased dose  PT/OT aneesh noted- HH recommended, CM working on arranging it    Obesity Hypoventilation Syndrome: c/w CPAP at night, needs sleep study as outpatient, may need oxygen too  Hyponatremia POA so far improving, monitor. Morbid Obesity POA Body mass index is 45.61 kg/(m^2). - weight loss recommended  NOK:   Code status: Full  D/W , DTR Lisa [id number]  Prophylaxis: Lovenox    Recommended Disposition: HH PT, OT, RN likely soon when Home CPAP/Oxygen arranged & improved WBC/leg cellulitis     Subjective:     Chief Complaint / Reason for Physician Visit: F/U L LE cellulitis, sepsis, Lymphedema, MONSERRAT/OHS  \"I feel better\". Discussed with RN events overnight. Review of Systems:  Symptom Y/N Comments  Symptom Y/N Comments   Fever/Chills n   Chest Pain n    Poor Appetite n   Edema y    Cough n   Abdominal Pain     Sputum    Joint Pain y    SOB/FLORENCE y   Pruritis/Rash y improved   Nausea/vomit    Tolerating PT/OT y    Diarrhea    Tolerating Diet y    Constipation    Other       Could NOT obtain due to:      Objective:     VITALS:   Last 24hrs VS reviewed since prior progress note. Most recent are:  Patient Vitals for the past 24 hrs:   Temp Pulse Resp BP SpO2   08/15/18 0712 98.7 °F (37.1 °C) 82 23 152/77 91 %   08/15/18 0300 98.7 °F (37.1 °C) 79 24 (!) 157/98 91 %   08/15/18 0020 - - - - 92 %   08/14/18 2247 98.2 °F (36.8 °C) 74 26 151/86 91 %   08/14/18 2000 - - - - 95 %   08/14/18 1906 99.5 °F (37.5 °C) 84 26 160/77 90 %   08/14/18 1853 - - - - 95 %   08/14/18 1817 99.3 °F (37.4 °C) 78 25 155/78 95 %   08/14/18 1626 98.3 °F (36.8 °C) 78 28 152/66 100 %   08/14/18 1624 98.8 °F (37.1 °C) 80 25 152/66 100 %   08/14/18 1113 98.9 °F (37.2 °C) 74 24 155/72 95 %   08/14/18 1025 - - - - 90 %   08/14/18 1016 - 88 - - 96 %       Intake/Output Summary (Last 24 hours) at 08/15/18 0840  Last data filed at 08/15/18 0200   Gross per 24 hour   Intake              500 ml   Output             1725 ml   Net            -1225 ml        PHYSICAL EXAM:  General: WD, WN. Alert, cooperative, SOB  EENT:  EOMI. Anicteric sclerae. MMM  Resp:  Coarse BS,   CV:  Regular  rhythm,  + edema  GI:  Soft, Non distended, Non tender.  +Bowel sounds  Neurologic:  Alert and oriented X 3, normal speech,   Psych:   Good insight. Not anxious nor agitated  Skin/extremities:  +  Erythema of the left leg (improved as per pt/family),Chronic Lymphedema noted +, .   No jaundice    Reviewed most current lab test results and cultures  YES  Reviewed most current radiology test results   YES  Review and summation of old records today NO  Reviewed patient's current orders and MAR    YES  PMH/SH reviewed - no change compared to H&P  ________________________________________________________________________  Care Plan discussed with:    Comments   Patient x    Family  x Daughter   RN x    Care Manager x Casandra   Consultant                        Multidiciplinary team rounds were held today with , nursing, pharmacist and clinical coordinator. Patient's plan of care was discussed; medications were reviewed and discharge planning was addressed. ________________________________________________________________________  Total NON critical care TIME:  16   Minutes    Total CRITICAL CARE TIME Spent:   Minutes non procedure based      Comments   >50% of visit spent in counseling and coordination of care     ________________________________________________________________________  Robert Oconnell MD     Procedures: see electronic medical records for all procedures/Xrays and details which were not copied into this note but were reviewed prior to creation of Plan. LABS:  I reviewed today's most current labs and imaging studies.   Pertinent labs include:  Recent Labs      08/15/18   0259  08/14/18   0518  08/13/18   0159   WBC  16.3*  14.9*  14.4*   HGB  10.4*  10.6*  11.1*   HCT  33.3*  34.0*  36.2   PLT  231  200  165     Recent Labs      08/15/18   0259  08/14/18   0518  08/13/18   0159   NA  136  133*  132*   K  4.0  4.6  4.2   CL  102  101  100   CO2  31  26  25   GLU  110*  94  110*   BUN  20  22*  29*   CREA  0.72  0.77  0.96   CA  11.2*  10.6*  10.2*   MG   --   2.3  2.3   ALB   --   2.3*  2.5*   TBILI   --   0.5  0.4   SGOT   --   30  17   ALT   --   20  20       Signed: Robert Oconnell MD

## 2018-08-15 NOTE — PROGRESS NOTES
Problem: Mobility Impaired (Adult and Pediatric)  Goal: *Acute Goals and Plan of Care (Insert Text)  Physical Therapy Goals  Initiated 8/11/2018  1. Patient will move from supine to sit and sit to supine  in bed with independence within 7 day(s). 2.  Patient will transfer from bed to chair and chair to bed with independence using the least restrictive device within 7 day(s). 3.  Patient will perform sit to stand with independence within 7 day(s). 4.  Patient will ambulate with supervision/set-up for 100 feet with the least restrictive device within 7 day(s). 5.  Patient will ascend/descend 13 stairs with 2 handrail(s) with supervision/set-up within 7 day(s). physical Therapy TREATMENT  Patient: Dami Katz (68 y.o. female)  Date: 8/15/2018  Diagnosis: Sepsis ([address] <principal problem not specified>       Precautions: Fall, Other (comment) (Bariatric)  Chart, physical therapy assessment, plan of care and goals were reviewed. ASSESSMENT:  Pt received supine in bed with RN present and agreeable to PT intervention. Pt cleared by nursing for mobility. VS assessed prior to mobilizing and deemed stable and appropriate for mobility. Pt did exhibit increased RR (40 breaths/min) in supine with SOB on 3 L/min O2. Pt with fatigue, however agreeable to mobilize. She did randomly fall asleep at times, needing cueing to remain awake, and noted that she has been told that this \"is something that [she] does. \" Needed mod A x 1-2 for all bed mobility and encouragement to mobilize OOB to reposition self prior to returning back to bed (pt insistent on laying back down). Sit<>stand transfers performed with min-CGA and she side stepped towards [facility] with CGA and RW support. Pt continued to remain very drowsy and with increased SOB with SaO2 88% on 3 L/min O2 post-activity. Pt was left supine in bed with all needs met, RN aware, and bed alarm on following therapy session.  Pt is not safe to discharge home at this time and recommend SNF rehab to improve independence and mobility for safe transition home. Vitals:    08/15/18 0918 08/15/18 1109 08/15/18 1206 08/15/18 1207   BP:  (!) 204/74  Comment: notified OLYA Roy 154/79 146/76   BP 1 Location:  Left arm Left arm Left arm   BP Patient Position:  At rest Supine  Comment: HOB 90 degrees Supine  Comment: HOB 90 degrees with large adult cuff   Pulse:  90     Resp:  30     Temp:  98 °F (36.7 °C)     SpO2: 92% 92%  93%   Weight:       Height:         Progression toward goals:  []    Improving appropriately and progressing toward goals  [x]    Improving slowly and progressing toward goals  []    Not making progress toward goals and plan of care will be adjusted     PLAN:  Patient continues to benefit from skilled intervention to address the above impairments. Continue treatment per established plan of care. Discharge Recommendations:  Skilled Nursing Facility  Further Equipment Recommendations for Discharge:  TBD by rehab     SUBJECTIVE:   Patient stated I don't want to do anymore today.     OBJECTIVE DATA SUMMARY:   Critical Behavior:  Neurologic State: Drowsy  Orientation Level: Appropriate for age, Oriented to person, Oriented to place, Oriented to situation, Oriented to time  Cognition: Decreased attention/concentration  Safety/Judgement: Awareness of environment, Insight into deficits  Functional Mobility Training:  Bed Mobility:  Rolling: Moderate assistance  Supine to Sit: Moderate assistance;Assist x2  Sit to Supine: Minimum assistance  Scooting: Stand-by assistance        Transfers:  Sit to Stand: Contact guard assistance;Minimum assistance;Assist x1;Additional time (pulling up on RW)  Stand to Sit: Contact guard assistance                             Balance:  Sitting: Intact  Standing: Impaired; With support  Standing - Static: Good;Constant support  Standing - Dynamic : Good (with RW support)  Ambulation/Gait Training:  Distance (ft): 3 Feet (ft) (side steps at EOB [facility])  Assistive Device: Walker, rolling  Ambulation - Level of Assistance: Contact guard assistance;Assist x1;Additional time; Adaptive equipment        Gait Abnormalities: Step to gait; Decreased step clearance        Base of Support: Widened     Speed/Jammie: Pace decreased (<100 feet/min); Shuffled  Step Length: Left shortened;Right shortened    Pain:  Pain Scale 1: Numeric (0 - 10)  Pain Intensity 1: 0              Activity Tolerance:   Poor - significant drowsiness, requiring cueing to remain awake; SaO2 88-92% on 3 L/min O2; increased fatigue  Please refer to the flowsheet for vital signs taken during this treatment.   After treatment:   []    Patient left in no apparent distress sitting up in chair  [x]    Patient left in no apparent distress in bed  [x]    Call bell left within reach  [x]    Nursing notified  []    Caregiver present  [x]    Bed alarm activated    COMMUNICATION/COLLABORATION:   The patients plan of care was discussed with: Physical Therapist, Occupational Therapist and Registered Nurse    Glory Espino PT, DPT   Time Calculation: 25 mins

## 2018-08-15 NOTE — PROGRESS NOTES
ADULT PROTOCOL: JET AEROSOL ASSESSMENT    Patient  Denise Graham     66 y.o.   female     8/15/2018  10:36 AM    Breath Sounds Pre Procedure: Right Breath Sounds: Clear, Diminished                               Left Breath Sounds: Clear, Diminished    Breath Sounds Post Procedure: Right Breath Sounds: Clear, Diminished                                 Left Breath Sounds: Clear, Diminished    Breathing pattern: Pre procedure Breathing Pattern: Regular          Post procedure Breathing Pattern: Regular    Heart Rate: Pre procedure Pulse: 76           Post procedure Pulse: 83    Resp Rate: Pre procedure Respirations: 20           Post procedure Respirations: 20      Oxygen: O2 Device: Nasal cannula   Flow rate (L/min) 2     Changed: No       SpO2: Pre procedure SpO2: 92 %    2LPM NC              Post procedure SpO2: 91 %  2LPM NC      Nebulizer Therapy: Current medications Aerosolized Medications: DuoNeb Q6 PRN       Changed: No      Smoking History: Never Smoked      Problem List:   Patient Active Problem List   Diagnosis Code    Sepsis ([facility] A41.9       Respiratory Therapist: RT Brianna

## 2018-08-15 NOTE — PROGRESS NOTES
Spiritual Care Assessment/Progress Note  [facility]      NAME: Barbara Colon      MRN: [id number]  AGE: 66 y.o.  SEX: female  Adventism Affiliation: Other   Language: English     8/15/2018     Total Time (in minutes): 11     Spiritual Assessment begun in [facility] CARDIOPULMONARY CARE through conversation with:         [x]Patient        [] Family    [] Friend(s)        Reason for Consult: Initial/Spiritual assessment, patient floor     Spiritual beliefs: (Please include comment if needed)     [x] Identifies with a lv tradition:         [x] Supported by a lv community:            [] Claims no spiritual orientation:           [] Seeking spiritual identity:                [] Adheres to an individual form of spirituality:           [] Not able to assess:                           Identified resources for coping:      [x] Prayer                               [] Music                  [] Guided Imagery     [x] Family/friends                 [] Pet visits     [] Devotional reading                         [] Unknown     [] Other:                                               Interventions offered during this visit: (See comments for more details)    Patient Interventions: Affirmation of emotions/emotional suffering, Catharsis/review of pertinent events in supportive environment, Coping skills reviewed/reinforced, Iconic (affirming the presence of God/Higher Power), Initial/Spiritual assessment, patient floor, Normalization of emotional/spiritual concerns, Prayer (assurance of)           Plan of Care:     [] Support spiritual and/or cultural needs    [] Support AMD and/or advance care planning process      [] Support grieving process   [] Coordinate Rites and/or Rituals    [] Coordination with community clergy   [x] No spiritual needs identified at this time   [] Detailed Plan of Care below (See Comments)  [] Make referral to Music Therapy  [] Make referral to Pet Therapy     [] Make referral to Addiction services  [] Make referral to [facility]  [] Make referral to Spiritual Care Partner  [] No future visits requested        [] Follow up visits as needed     Comments: Initial visit with patient on [address] in response to length of hospitalization. Introduced self and role of chaplains in the hospital to patient. Provided empathetic listening as patient shared her frustrations with the length of her hospitalization and her hopes to be discharged tomorrow. Acknowledged and affirmed the difficulties of being in the hospital and explored coping resources. Patient stated that she has excellent support from her friends and family and shared that when she is having a difficult day she finds comfort and reassurance in prayer. Concluded visit as patient's lunch arrived and assured of ongoing support as needed and desired. greg Quintana M.Div.    Paging Service [phone])

## 2018-08-15 NOTE — PROGRESS NOTES
Problem: Self Care Deficits Care Plan (Adult)  Goal: *Acute Goals and Plan of Care (Insert Text)  Occupational Therapy Goals  Initiated 8/14/2018  1. Patient will perform grooming standing at sink with modified independence within 7 day(s). 2.  Patient will perform upper body dressing with modified independence within 7 day(s). 3.  Patient will perform lower body dressing with AE as needed with minimal assistance/contact guard assist within 7 day(s). 4.  Patient will perform toilet transfers with SBA within 7 day(s). 5.  Patient will perform all aspects of toileting with minimal assistance/contact guard assist within 7 day(s). 6.  Patient will participate in upper extremity therapeutic exercise/activities with independence for 5 minutes within 7 day(s). 7.  Patient will utilize energy conservation techniques during functional activities with verbal cues within 7 day(s). Occupational Therapy TREATMENT  Patient: Turner Castro (73 y.o. female)  Date: 8/15/2018  Diagnosis: Sepsis ([facility] <principal problem not specified>       Precautions: Fall, Other (comment) (Bariatric)  Chart, occupational therapy assessment, plan of care, and goals were reviewed. ASSESSMENT:  Pt was drowsy this session and falling asleep mid sentence and needed significant cues to attend to task. Pt reports that she \"does this sometimes at home. \"  Extensive time needed for supine to sit edge of  Bed with moderate assist to achieve seated. Pt needed assist with left LE to edge of bed as well. Once seated pt reported that she did not want to do anymore activity. Pt was educated on the importance of trying in order to regain her strength. Pt reported that she wanted to lay back down. Informed pt that standing and side stepping up [address] would be better as she was not in a good position in the bed to lay down (legs would be over end of foot board). Pt attempted to scoot up [address] but was unsurcessful.   Informed pt that it would be easier to stand and side step. Pt performed sit to stand from edg eof bed with min assist and side stepped up [address] with RW and verbal cues with min assist.  Manage gown in standing with CGA. Returned back to bed managing LE back onto bed with min assist.  Pt did not have chair at bedside and nurse was informed that pt would need recliner chair and should be OOB for meals. Discussed concern about pts drowsiness. Bp was stable but O2 sat on 88% on 3 liters NC. Pt was not on home O2 at home. Pt was also breathing with short quick respirations 33-40 with minimal activity. O2 sat at rest was 92%   Progression toward goals:  []       Improving appropriately and progressing toward goals  [x]       Improving slowly and progressing toward goals  []       Not making progress toward goals and plan of care will be adjusted     PLAN:  Patient continues to benefit from skilled intervention to address the above impairments. Continue treatment per established plan of care. Discharge Recommendations:  Skilled Nursing Facility  Further Equipment Recommendations for Discharge:  TBD     SUBJECTIVE:   Patient stated I think that is enough.   After coming to sit on side of bed. OBJECTIVE DATA SUMMARY:   Cognitive/Behavioral Status:  Neurologic State: Drowsy  Orientation Level: Appropriate for age;Oriented to person;Oriented to place;Oriented to situation;Oriented to time  Cognition: Decreased attention/concentration             Functional Mobility and Transfers for ADLs:  Bed Mobility:  Rolling: Moderate assistance  Supine to Sit: Moderate assistance;Assist x2  Sit to Supine: Minimum assistance  Scooting: Stand-by assistance    Transfers:  Sit to Stand: Contact guard assistance;Minimum assistance;Assist x1;Additional time (pulling up on RW)          Balance:  Sitting: Intact  Standing: Impaired; With support  Standing - Static: Good;Constant support  Standing - Dynamic : Good (with RW support)    ADL Intervention:     Unable today due to drowsiness and decreased endurance    Pain:  Pain Scale 1: Numeric (0 - 10)  Pain Intensity 1: 0              Activity Tolerance:     Please refer to the flowsheet for vital signs taken during this treatment.   After treatment:   [] Patient left in no apparent distress sitting up in chair  [x] Patient left in no apparent distress in bed  [x] Call bell left within reach  [x] Nursing notified  [] Caregiver present  [x] Bed alarm activated    COMMUNICATION/COLLABORATION:   The patients plan of care was discussed with: Physical Therapist, Registered Nurse and patient    Gordo Donaldson OTR/L  Time Calculation: 27 mins

## 2018-08-15 NOTE — PROGRESS NOTES
Initial Nutrition Assessment:    INTERVENTIONS/RECOMMENDATIONS:   Initial/Brief Nutrition Education: Purpose of nutrition education, Survival information: low Na diet and general healthy diet tips  Consider referral to [employer] outpatient nutrition counseling, [place] [phone]    ASSESSMENT:   Chart reviewed, medically noted for Acute Hypoxic Respiratory Failure and PMH shown below. Nutrition consulted to provide diet education. Pt was provided with verbal education as well as printed material on low Na diet and general healthy diet tips. Her  had brought her [employer] for breakfast, we discussed avoiding fast food. She was provided with contact information for Outpatient nutrition counseling for weight loss. Past Medical History:   Diagnosis Date    Arthritis     knees, lower back.  Hypertension     Morbid obesity ([facility]        Diet Order: Cardiac  % Eaten:  No data found. Pertinent Medications: [x]Reviewed: vit C, lasix, miralax,   Pertinent Labs: [x]Reviewed:   Food Allergies: [x]NKFA  []Other   Last BM: 8/12  Edema:        []RUE   []LUE   []RLE   [x]LLE 4+     Pressure Injury:      [] Stage I   [] Stage II   [] Stage III   [] Stage IV      Wt Readings from Last 30 Encounters:   08/15/18 148 kg (326 lb 4.5 oz)   06/10/13 136.5 kg (301 lb)       Anthropometrics:   Height: 5' 8\" (172.7 cm) Weight: 148 kg (326 lb 4.5 oz)   IBW (%IBW):   ( ) UBW (%UBW):   (  %)   Last Weight Metrics:  Weight Loss Metrics 8/15/2018 6/10/2013   Today's Wt 326 lb 4.5 oz 301 lb   BMI 49.61 kg/m2 45.78 kg/m2       BMI: Body mass index is 49.61 kg/(m^2). This BMI is indicative of:   []Underweight    []Normal    []Overweight    [] Obesity   [x] Extreme Obesity (BMI>40)     Estimated Nutrition Needs (Based on):   1800 Kcals/day (BMR: 2100 x 1.1 -500) , 120 g (0.8 g/kg) Protein  Carbohydrate:  At Least 130 g/day  Fluids: 1800 mL/day (1ml/kcal) or per primary team    NUTRITION DIAGNOSES:   Problem:  Food and nutrition-related knowledge deficit      Etiology: related to cardiac diet      Signs/Symptoms: as evidenced by consult for diet education and pt having [employer] on bedside table      NUTRITION INTERVENTIONS:            Initial/Brief Nutrition Education: Purpose of nutrition education, Survival information        GOAL:   teach back low Na diet material in 5-7 days    LEARNING NEEDS (Diet, Food/Nutrient-Drug Interaction):    [] None Identified   [x] Identified and Education Provided/Documented   [] Identified and Pt declined/was not appropriate     Cultureal, Baptism, OR Ethnic Dietary Needs:    [x] None Identified   [] Identified and Addressed     [x] Interdisciplinary Care Plan Reviewed/Documented    [x] Discharge Planning:   Low Na diet.  Consider referral to [employer] outpatient nutrition counseling, [place] [phone]    Prema Ruth Outcomes: Food/nutrition knowledge, Behavior, Readiness to change  Food/Nutrient Intake Outcomes: Mineral/element intake  Physical Signs/Symptoms Outcomes: Weight/weight change, Electrolyte and renal profile, GI    NUTRITION RISK:    [] High              [] Moderate           [x]  Low  []  Minimal/Uncompromised    PT SEEN FOR:    [x]  MD Consult: []Calorie Count      []Diabetic Diet Education        [x]Diet Education     []Electrolyte Management     []General Nutrition Management and Supplements     []Management of Tube Feeding     []TPN Recommendations    []  RN Referral:  []MST score >=2     []Enteral/Parenteral Nutrition PTA     []Pregnant: Gestational DM or Multigestation     []Pressure Ulcer/Wound Care needs        []  Low BMI  []  LOS Referral       Bandar Cole RDN  Pager [phone]  Weekend Pager [phone]

## 2018-08-15 NOTE — PROGRESS NOTES
Patient dyspneic at rest. 02 intact 2 lpm. Lung sounds have been very diminished.  CPAP in room for HS

## 2018-08-15 NOTE — PROGRESS NOTES
[facility]  Pulmonary, Critical Care, and Sleep Medicine    Initial Patient Consult    Name: Naomy Tobar MRN: [id number]   : 1955 Hospital[facility] hospital   Date: 8/15/2018        IMPRESSION:   · Acute (and likely chronic) hypoxic/hypercapnic respiratory failure  · Sepsis  · Cellulitis- not really looking any better? ?  · hyponatermia  · Suspect she has MONSERRAT/OHS leading to her hypoxia/hypercapnia   · Anemia and no polycythemia  · No evidence of pneumonia or other acute process in the lung  · Eventration of the left hemidiaphragm leading to abnormal chest X-ray   · HTN  · Obesity       RECOMMENDATIONS:   · Pt education, weight reduction counseling- stressed safety  · Diuresis  · Daily weight  · O2 wean as tolerated; may eventually need home O2  · Ultimately will need evaluation in the sleep lab and polysomnography- explained PSG procedure, daughter has MONSERRAT but non adherent to treatment, pt willing to do better if prescribed  · She will allow us to arrange appt with our sleep clinic  · Continue antibiotics for cellulitis per team  · DVT prophylaxis     8/15 bad night. Unable to rest. Needed BIPAP last night. Wheeze this Am. Tired. Out of sorts     OOB in chair eating breakfast. Discussed her plan to get healthier.  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 Oscar Mc yearly but never complained of FLORENCE to hi, daughter has not4ed more SOB past 2 months. Pts mother  two yrs agoa nd admits to self neglect since. Five yrs ago pt weighed about 282 and now > 310? Not on home O2. Subjective: This patient has been seen and evaluated at the request of Dr. Issac Xavier  for respiratory failure and by Dr. Tamara Lux for elevated hemidiaphragm. Patient is a 66 y.o. female nonsmoker, admitted with sepsis and cellulitis of the left lower extremity.   She became lethargic today and an ABG showed chronic CO2 retention. She is a reluctant historian, but her family at the bedside reports that she has had some chronic shortness of breath over at least the last several months and did not seek out medical care for fear of being hospitalized. They note she has severe snoring and has significant daytime sleepiness. Also awakens at night with dyspnea and has to sit up (though she says it is to sit up to read). Currently she is comfortable on CPAP. Denies pain, dyspnea, cough, etc.     Past Medical History:   Diagnosis Date    Arthritis     knees, lower back.  Hypertension     Morbid obesity [facility])       Past Surgical History:   Procedure Laterality Date    HX GYN  2009    D&C      Prior to Admission medications    Medication Sig Start Date End Date Taking? Authorizing Provider   busPIRone (BUSPAR) 15 mg tablet Take 15 mg by mouth nightly. Yes Historical Provider   Olmesartan-amLODIPine-HCTZ 40-10-25 mg tab Take 1 Tab by mouth daily. Yes Historical Provider   furosemide (LASIX) 40 mg tablet Take 40 mg by mouth every other day. Takes every other day in the evening (confirmed w/ patient)   Yes Historical Provider   labetalol (NORMODYNE) 300 mg tablet Take 300 mg by mouth every twelve (12) hours. Confirmed w/ patient. Rx for Tid, patient only tolerates it BiD   Yes Historical Provider   potassium chloride SA (MICRO-K) 10 mEq capsule Take 10 mEq by mouth daily. Yes Historical Provider   cholecalciferol (VITAMIN D3) 1,000 unit tablet Take 1,000 Units by mouth every Sunday. Indications: Vitamin D Deficiency, Takes unknown units of D3 once weekly   Yes Historical Provider   ascorbic acid (VITAMIN C) 500 mg tablet Take 500 mg by mouth daily.    Yes Historical Provider     No Known Allergies   Social History   Substance Use Topics    Smoking status: Never Smoker    Smokeless tobacco: Never Used    Alcohol use No      Family History   Problem Relation Age of Onset    Stroke Father 36    Heart Disease Father     Arthritis-osteo Mother     Cancer Mother [de-identified]     breast cancer        Current Facility-Administered Medications   Medication Dose Route Frequency    ampicillin-sulbactam (UNASYN) 3 g in 0.9% sodium chloride (MBP/ADV) 100 mL  3 g IntraVENous Q8H    furosemide (LASIX) injection 40 mg  40 mg IntraVENous BID    polyethylene glycol (MIRALAX) packet 17 g  17 g Oral DAILY    nystatin (MYCOSTATIN) 100,000 unit/gram powder   Topical BID    sodium chloride (NS) flush 5-10 mL  5-10 mL IntraVENous Q8H    enoxaparin (LOVENOX) injection 40 mg  40 mg SubCUTAneous Q12H    vancomycin (VANCOCIN) 1500 mg in  ml infusion  1,500 mg IntraVENous Q16H    busPIRone (BUSPAR) tablet 15 mg  15 mg Oral QHS    labetalol (NORMODYNE) tablet 300 mg  300 mg Oral Q12H    ascorbic acid (vitamin C) (VITAMIN C) tablet 500 mg  500 mg Oral DAILY    amLODIPine (NORVASC) tablet 10 mg  10 mg Oral DAILY    losartan (COZAAR) tablet 50 mg  50 mg Oral DAILY       Review of Systems:  A comprehensive review of systems was negative except for that written in the HPI. Objective:   Vital Signs:    Visit Vitals    /77 (BP 1 Location: Left arm, BP Patient Position: At rest)    Pulse 82    Temp 98.7 °F (37.1 °C)    Resp 23    Ht 5' 8\" (1.727 m)    Wt 148 kg (326 lb 4.5 oz)    SpO2 92%    BMI 49.61 kg/m2       O2 Device: Nasal cannula   O2 Flow Rate (L/min): 2 l/min   Temp (24hrs), Av.8 °F (37.1 °C), Min:98.2 °F (36.8 °C), Max:99.5 °F (37.5 °C)       Intake/Output:   Last shift:         Last 3 shifts:  1901 - 08/15 0700  In: 1050 [I.V.:1050]  Out: 3400 [Urine:3400]    Intake/Output Summary (Last 24 hours) at 08/15/18 0956  Last data filed at 08/15/18 0200   Gross per 24 hour   Intake              500 ml   Output             1725 ml   Net            -1225 ml      Physical Exam:   General:  Alert, obese, on O2; off BIPAP   Head:  Normocephalic, without obvious abnormality, atraumatic.    Eyes:  Conjunctivae/corneas clear.    Nose: Nares normal. Septum midline. Mucosa normal.    Throat: Lips, mucosa, and tongue normal.     Neck: Supple, symmetrical, trachea midline    Back:   Symmetric, no curvature. ROM normal.   Lungs:   Clear to auscultation bilaterally. Chest wall:  No tenderness or deformity. Heart:  Regular rate and rhythm    Abdomen:   Soft, non-tender. Bowel sounds normal.    Extremities: Erythema and edema LLE > RLE, no clubbing   Skin: Old brown recluse bite on left foot, with cellulitis on left lower extremity; 2+ edema; warm   Lymph nodes: Cervical, supraclavicular nodes normal.   Neurologic: Grossly nonfocal     Data review:     Recent Results (from the past 24 hour(s))   CBC W/O DIFF    Collection Time: 08/15/18  2:59 AM   Result Value Ref Range    WBC 16.3 (H) 3.6 - 11.0 K/uL    RBC 3.63 (L) 3.80 - 5.20 M/uL    HGB 10.4 (L) 11.5 - 16.0 g/dL    HCT 33.3 (L) 35.0 - 47.0 %    MCV 91.7 80.0 - 99.0 FL    MCH 28.7 26.0 - 34.0 PG    MCHC 31.2 30.0 - 36.5 g/dL    RDW 15.3 (H) 11.5 - 14.5 %    PLATELET 678 308 - 985 K/uL    MPV 10.7 8.9 - 12.9 FL    NRBC 0.0 0  WBC    ABSOLUTE NRBC 0.00 0.00 - 2.04 K/uL   METABOLIC PANEL, BASIC    Collection Time: 08/15/18  2:59 AM   Result Value Ref Range    Sodium 136 136 - 145 mmol/L    Potassium 4.0 3.5 - 5.1 mmol/L    Chloride 102 97 - 108 mmol/L    CO2 31 21 - 32 mmol/L    Anion gap 3 (L) 5 - 15 mmol/L    Glucose 110 (H) 65 - 100 mg/dL    BUN 20 6 - 20 MG/DL    Creatinine 0.72 0.55 - 1.02 MG/DL    BUN/Creatinine ratio 28 (H) 12 - 20      GFR est AA >60 >60 ml/min/1.73m2    GFR est non-AA >60 >60 ml/min/1.73m2    Calcium 11.2 (H) 8.5 - 10.1 MG/DL       Imaging:  I have personally reviewed the patients radiographs and have reviewed the reports: non etoday  Eventration of left hemidiaphragm.   No acute disease in the lung        Blayne Draper MD

## 2018-08-15 NOTE — PROGRESS NOTES
TRANSFER - OUT REPORT:    Verbal report given to Montez Rice (name) on Barbara Colon  being transferred to PCU (unit) for change in patient condition(see note. patient FLORENCE, poor lung sounds, tremor)       Report consisted of patients Situation, Background, Assessment and   Recommendations(SBAR). Information from the following report(s) SBAR was reviewed with the receiving nurse. Lines:   Peripheral IV 08/10/18 Right Antecubital (Active)   Site Assessment Clean, dry, & intact 8/15/2018  4:00 AM   Phlebitis Assessment 0 8/15/2018  4:00 AM   Infiltration Assessment 0 8/15/2018  4:00 AM   Dressing Status Clean, dry, & intact 8/15/2018  4:00 AM   Dressing Type Tape;Transparent 8/15/2018  4:00 AM   Hub Color/Line Status Pink;Flushed 8/15/2018  4:00 AM   Action Taken Open ports on tubing capped 8/12/2018  8:00 PM   Alcohol Cap Used Yes 8/12/2018  8:00 PM        Opportunity for questions and clarification was provided.       Patient transported with:   O2 @ 3 liters

## 2018-08-15 NOTE — PROGRESS NOTES
Report received from Madelin Puri [facility]. SBAR were discussed.     Patricia Guadalupe RN

## 2018-08-15 NOTE — PROGRESS NOTES
Following pt's progress today - proposed transfer to PCU for increased SOB noted. Ref was sent earlier today too [place] via [employer] to check pt's insur for possible home 02 at dc. Overnight oximetry was performed on 8/13/18. [employer] responsed that pt has not met deductible or out of pocket requirement for the year so she may need to pay $120/mo for at least first month. They will need to have a credit card on file for the 1st payment. Will also need either 02 Challenge documentation or Overnight Oximetry within 2 days of dc and MD orders for 02 set up. CM will continue to follow to assist with dc arrgmts.     El Chang, CARYN

## 2018-08-15 NOTE — PROGRESS NOTES
Patient sitting up straight in the bed. She is requesting her CPAP. Respirations 28/minute. Lung sounds diminished throughout as they were this am. She is on 3 lpm nasal 02. She gets very winded with any exertion and at rest. Respirations very shallow. RT in to initiate breathing tx as was done this am. Paged Dr. Manju Pierre to discuss patient. Patient back in bed and HOB  Up.patient has full body tremor  Addendum: Dr. Manju Pierre wants patient transferred to PCU on CPAP. Abgs ordered and obtained. Dr. Melissa Zamora to be called.  Dr. Melissa Zamora aware patient being transferred and ordered xray chest

## 2018-08-16 ENCOUNTER — APPOINTMENT (OUTPATIENT)
Dept: GENERAL RADIOLOGY | Age: 63
DRG: 871 | End: 2018-08-16
Attending: INTERNAL MEDICINE
Payer: COMMERCIAL

## 2018-08-16 LAB
ANION GAP SERPL CALC-SCNC: 3 MMOL/L (ref 5–15)
BUN SERPL-MCNC: 16 MG/DL (ref 6–20)
BUN/CREAT SERPL: 21 (ref 12–20)
CALCIUM SERPL-MCNC: 10.6 MG/DL (ref 8.5–10.1)
CHLORIDE SERPL-SCNC: 102 MMOL/L (ref 97–108)
CO2 SERPL-SCNC: 33 MMOL/L (ref 21–32)
CREAT SERPL-MCNC: 0.76 MG/DL (ref 0.55–1.02)
ERYTHROCYTE [DISTWIDTH] IN BLOOD BY AUTOMATED COUNT: 15.3 % (ref 11.5–14.5)
GLUCOSE SERPL-MCNC: 94 MG/DL (ref 65–100)
HCT VFR BLD AUTO: 31.9 % (ref 35–47)
HGB BLD-MCNC: 9.8 G/DL (ref 11.5–16)
MCH RBC QN AUTO: 28.2 PG (ref 26–34)
MCHC RBC AUTO-ENTMCNC: 30.7 G/DL (ref 30–36.5)
MCV RBC AUTO: 91.9 FL (ref 80–99)
NRBC # BLD: 0.04 K/UL (ref 0–0.01)
NRBC BLD-RTO: 0.2 PER 100 WBC
PLATELET # BLD AUTO: 262 K/UL (ref 150–400)
PMV BLD AUTO: 10.7 FL (ref 8.9–12.9)
POTASSIUM SERPL-SCNC: 3.7 MMOL/L (ref 3.5–5.1)
RBC # BLD AUTO: 3.47 M/UL (ref 3.8–5.2)
SODIUM SERPL-SCNC: 138 MMOL/L (ref 136–145)
WBC # BLD AUTO: 16.6 K/UL (ref 3.6–11)

## 2018-08-16 PROCEDURE — 65660000000 HC RM CCU STEPDOWN

## 2018-08-16 PROCEDURE — 74011000258 HC RX REV CODE- 258: Performed by: INTERNAL MEDICINE

## 2018-08-16 PROCEDURE — 77030011255 HC DSG AQUACEL AG BMS -A

## 2018-08-16 PROCEDURE — 74011250637 HC RX REV CODE- 250/637: Performed by: INTERNAL MEDICINE

## 2018-08-16 PROCEDURE — 77030009526 HC GEL CARSYN MDII -A

## 2018-08-16 PROCEDURE — 74011250636 HC RX REV CODE- 250/636: Performed by: INTERNAL MEDICINE

## 2018-08-16 PROCEDURE — 97116 GAIT TRAINING THERAPY: CPT

## 2018-08-16 PROCEDURE — 74011250636 HC RX REV CODE- 250/636: Performed by: EMERGENCY MEDICINE

## 2018-08-16 PROCEDURE — 36415 COLL VENOUS BLD VENIPUNCTURE: CPT | Performed by: INTERNAL MEDICINE

## 2018-08-16 PROCEDURE — 74011000250 HC RX REV CODE- 250: Performed by: INTERNAL MEDICINE

## 2018-08-16 PROCEDURE — 97164 PT RE-EVAL EST PLAN CARE: CPT

## 2018-08-16 PROCEDURE — 97530 THERAPEUTIC ACTIVITIES: CPT

## 2018-08-16 PROCEDURE — 71045 X-RAY EXAM CHEST 1 VIEW: CPT

## 2018-08-16 PROCEDURE — 77030027138 HC INCENT SPIROMETER -A

## 2018-08-16 PROCEDURE — 94660 CPAP INITIATION&MGMT: CPT

## 2018-08-16 PROCEDURE — 80048 BASIC METABOLIC PNL TOTAL CA: CPT | Performed by: INTERNAL MEDICINE

## 2018-08-16 PROCEDURE — 85027 COMPLETE CBC AUTOMATED: CPT | Performed by: INTERNAL MEDICINE

## 2018-08-16 RX ADMIN — LABETALOL HYDROCHLORIDE 300 MG: 200 TABLET, FILM COATED ORAL at 10:26

## 2018-08-16 RX ADMIN — ENOXAPARIN SODIUM 40 MG: 100 INJECTION SUBCUTANEOUS at 10:28

## 2018-08-16 RX ADMIN — Medication 10 ML: at 21:09

## 2018-08-16 RX ADMIN — SODIUM CHLORIDE 3 G: 900 INJECTION, SOLUTION INTRAVENOUS at 22:10

## 2018-08-16 RX ADMIN — LABETALOL HYDROCHLORIDE 300 MG: 200 TABLET, FILM COATED ORAL at 21:07

## 2018-08-16 RX ADMIN — Medication 10 ML: at 06:00

## 2018-08-16 RX ADMIN — LOSARTAN POTASSIUM 50 MG: 50 TABLET ORAL at 10:26

## 2018-08-16 RX ADMIN — SODIUM CHLORIDE 3 G: 900 INJECTION, SOLUTION INTRAVENOUS at 03:18

## 2018-08-16 RX ADMIN — NYSTATIN: 100000 POWDER TOPICAL at 19:06

## 2018-08-16 RX ADMIN — NYSTATIN: 100000 POWDER TOPICAL at 10:29

## 2018-08-16 RX ADMIN — FUROSEMIDE 40 MG: 10 INJECTION, SOLUTION INTRAMUSCULAR; INTRAVENOUS at 10:24

## 2018-08-16 RX ADMIN — BUSPIRONE HYDROCHLORIDE 15 MG: 5 TABLET ORAL at 21:07

## 2018-08-16 RX ADMIN — LORAZEPAM 0.5 MG: 0.5 TABLET ORAL at 03:52

## 2018-08-16 RX ADMIN — FUROSEMIDE 40 MG: 10 INJECTION, SOLUTION INTRAMUSCULAR; INTRAVENOUS at 19:05

## 2018-08-16 RX ADMIN — LORAZEPAM 0.5 MG: 0.5 TABLET ORAL at 21:07

## 2018-08-16 RX ADMIN — AMLODIPINE BESYLATE 10 MG: 5 TABLET ORAL at 10:26

## 2018-08-16 RX ADMIN — SODIUM CHLORIDE 3 G: 900 INJECTION, SOLUTION INTRAVENOUS at 15:07

## 2018-08-16 RX ADMIN — POLYETHYLENE GLYCOL 3350 17 G: 17 POWDER, FOR SOLUTION ORAL at 10:26

## 2018-08-16 RX ADMIN — ENOXAPARIN SODIUM 40 MG: 100 INJECTION SUBCUTANEOUS at 21:06

## 2018-08-16 RX ADMIN — Medication 5 ML: at 15:09

## 2018-08-16 RX ADMIN — OXYCODONE HYDROCHLORIDE AND ACETAMINOPHEN 500 MG: 500 TABLET ORAL at 10:26

## 2018-08-16 RX ADMIN — VANCOMYCIN HYDROCHLORIDE 1500 MG: 10 INJECTION, POWDER, LYOPHILIZED, FOR SOLUTION INTRAVENOUS at 12:01

## 2018-08-16 NOTE — WOUND CARE
Wound care Nurse consult from staff nurse stating \" posterior LLE blisters with red edematous skin from cellulitis\". Patient is a 67 y/o morbidly obese AAF (5'8\", 323 lbs) who was admitted for sepsis, acute hypoxic resp failure and chronic CHF. Past Medical History:   Diagnosis Date    Arthritis     knees, lower back.  Hypertension     Morbid obesity ([address]      Patients LLE is edematous, has erythema, warm to touch and has spontaneous cloudy white blisters appearing. The largest bullae is on calf and easily opened to drain clear, yellow drainage - no odor. Surface dead skin removed and area cleansed with Carraklenz dermal cleanser. WBC's=16.6; ABX's= Vancomycin at this time. Other smaller milky blister/bullae appearing at medial ankle area and lateral calf. The skin is thick over these and not at risk of opening yet. Recommend:    LLE: daily, cleanse opened or closed blister/bullae with Carraklenz dermal cleanser, wipe clean. Apply Carrysen gel to Aquacell-AG and cover each open/closed bullae/blister. Cover with ABD pad or 4x4's and secure with es. Elevate leg! Plan: LLE:once cellulitis gone and opened blister are dry change wound care to Xeroform gauze instead of Aquacell-AG and wound gel.     Luz Maria Aragon RN, [address]ozada RN, [facility]

## 2018-08-16 NOTE — PROGRESS NOTES
Noted transfer from [address] [place] [address] to PCU due to respiratory status. Attempted to see pt for OT session. Had family at bedside and had just returned from bedside commode to edge of bed. Family was assisting pt back to bed and pt was fatigued. Assisted pt back to supine. Noted tele leads were half off and they were replaced. Pts O2 was also off and replaced in pts nose. O2 sat on room air was 88% and 93% at bed level on 3 liters. Session aborted. Will follow up at a later time for treatment.

## 2018-08-16 NOTE — PROGRESS NOTES
Problem: Mobility Impaired (Adult and Pediatric)  Goal: *Acute Goals and Plan of Care (Insert Text)  Physical Therapy Goals  Revised 8/16/2018  1. Patient will move from supine to sit and sit to supine  and scoot up and down in bed with modified independence within 7 day(s). 2.  Patient will transfer from bed to chair and chair to bed with modified independence using the least restrictive device within 7 day(s). 3.  Patient will perform sit to stand with independence within 7 day(s). 4.  Patient will ambulate with modified independence for 150 feet with the least restrictive device within 7 day(s). 5.  Patient will ascend/descend 13 stairs with 2 handrail(s) with supervision/set-up within 7 day(s). Physical Therapy Goals  Initiated 8/11/2018  1. Patient will move from supine to sit and sit to supine  in bed with independence within 7 day(s). 2.  Patient will transfer from bed to chair and chair to bed with independence using the least restrictive device within 7 day(s). 3.  Patient will perform sit to stand with independence within 7 day(s). 4.  Patient will ambulate with supervision/set-up for 100 feet with the least restrictive device within 7 day(s). 5.  Patient will ascend/descend 13 stairs with 2 handrail(s) with supervision/set-up within 7 day(s). physical Therapy REEVALUATION  Patient: Jose Anthony (33 y.o. female)  Date: 8/16/2018  Primary Diagnosis: Sepsis ([address]        Precautions:  Fall, Other (comment) (Bariatric)  Chart, physical therapy assessment, plan of care and goals were reviewed. ASSESSMENT :  Based on the objective data described below, the patient presents with continued generalized weakness, decreased standing balance and decreased mobility skills. She was transferred to a higher level of care yesterday due to worsening respiratory status. She was sitting up in recliner when PT session started. She agreed to PT and was cleared by nursing.   Currently using O2 at 3L/min via nc and breathing without difficulty. O2 sats at 94% at rest. Her sit to stand transfers were cg assistance using a RW for support in standing. Gait needed a RW with cg assistance for 35 feet - distance was limited by fatigue and O2 sats decreased to 88% by end of ambulation. Provided instruction on using PLB to improve O2 sats and patient demonstrated fair understanding and performance. Following sitting rest of about 3-4 mins O2 sats measured 96%. She declined further PT at this time. With transfers she needed cues for hand placement and to slow down when turning to pre-position for stand to sit transfers. Her Barthel Score increased today to 50 from 20 on 8/14/18. As a result she is doing better functionally then the initial PT assessment and may be able to go home with [facility] PT and OT rather than a SNF. She will benefit from continued PT to improve her strength, balance, activity tolerance and mobility skills. Recommend SNF vs  PT and OT depending on her progress toward PT goals. Patient will benefit from skilled intervention to address the above impairments.   Patients rehabilitation potential is considered to be Good  Factors which may influence rehabilitation potential include:   []           None noted  []           Mental ability/status  [x]           Medical condition  []           Home/family situation and support systems  []           Safety awareness  []           Pain tolerance/management  []           Other:      PLAN :  Recommendations and Planned Interventions:  [x]             Bed Mobility Training             [x]      Neuromuscular Re-Education  [x]             Transfer Training                   []      Orthotic/Prosthetic Training  [x]             Gait Training                         []      Modalities  [x]             Therapeutic Exercises           []      Edema Management/Control  [x]             Therapeutic Activities            [x]      Patient and Family Training/Education  [] Other (comment):  Frequency/Duration: Patient will be followed by physical therapy 4 times a week to address goals. Discharge Recommendations:  [facility] vs [place] [facility] and OT  Further Equipment Recommendations for Discharge: wide walker if going home     SUBJECTIVE:   Patient stated I'd rather go home when discharged.     OBJECTIVE DATA SUMMARY:     Past Medical History:   Diagnosis Date    Arthritis     knees, lower back.  Hypertension     Morbid obesity ([address]      Past Surgical History:   Procedure Laterality Date    HX GYN  2009    [facility] course since last seen and reason for reevaluation: patients progress toward her PT goals have been limited by her variable medical status resulting in limited ability to participate in therapy sessions. Yesterday there was a decline in her respiratory status requiring her to be transferred to PCU.   Critical Behavior:  Neurologic State: Confused, Eyes open spontaneously  Orientation Level: Oriented to person, Oriented to place, Oriented to time, Disoriented to situation  Cognition: Follows commands  Safety/Judgement: Awareness of environment, Insight into deficits  Skin:    Strength:    Strength: Generally decreased, functional                      Tone & Sensation:   Tone: Normal              Sensation: Intact               Range Of Motion:  AROM: Generally decreased, functional                       Coordination:  Coordination: Within functional limits    Functional Mobility:  Bed Mobility:              Transfers:  Sit to Stand: Contact guard assistance;Assist x1  Stand to Sit: Stand-by assistance;Assist x1        Bed to Chair: Contact guard assistance;Assist x1;Additional time           Balance:   Sitting: Intact  Standing: Impaired  Standing - Static: Good;Constant support  Standing - Dynamic : Fair  Ambulation/Gait Training:     Assistive Device: Walker, rolling;Gait belt  Ambulation - Level of Assistance: Contact guard assistance;Assist x1;Additional time        Gait Abnormalities: Step to gait;Circumduction        Base of Support: Widened  Stance: Left decreased  Speed/Jammie: Slow  Step Length: Right shortened                      Functional Measure:  Barthel Index:    Bathin  Bladder: 5  Bowels: 10  Groomin  Dressin  Feeding: 10  Mobility: 0  Stairs: 0  Toilet Use: 5  Transfer (Bed to Chair and Back): 10  Total: 50       Barthel and G-code impairment scale:  Percentage of impairment CH  0% CI  1-19% CJ  20-39% CK  40-59% CL  60-79% CM  80-99% CN  100%   Barthel Score 0-100 100 99-80 79-60 59-40 20-39 1-19   0   Barthel Score 0-20 20 17-19 13-16 9-12 5-8 1-4 0      The Barthel ADL Index: Guidelines  1. The index should be used as a record of what a patient does, not as a record of what a patient could do. 2. The main aim is to establish degree of independence from any help, physical or verbal, however minor and for whatever reason. 3. The need for supervision renders the patient not independent. 4. A patient's performance should be established using the best available evidence. Asking the patient, friends/relatives and nurses are the usual sources, but direct observation and common sense are also important. However direct testing is not needed. 5. Usually the patient's performance over the preceding 24-48 hours is important, but occasionally longer periods will be relevant. 6. Middle categories imply that the patient supplies over 50 per cent of the effort. 7. Use of aids to be independent is allowed. Sil Rodriguez., Barthel, D.W. (1090). Functional evaluation: the Barthel Index. [address] [facility] (14)2. RICHELLE Leonardo, Carla Fountain., Sherman Barker., Delia Harada, [address] [place] [address] (). Measuring the change indisability after inpatient rehabilitation; comparison of the responsiveness of the Barthel Index and Functional [place] Measure. Journal of Neurology, Neurosurgery, and Psychiatry, 66(4), 357-393.   Jamil Bradley, [place].PORFIRIO, Hai dc Schwab M.A. (2004.) Assessment of post-stroke quality of life in cost-effectiveness studies: The usefulness of the Barthel Index and the EuroQoL-5D. Quality of Life Research, 13, 340-09         G codes: In compliance with CMSs Claims Based Outcome Reporting, the following G-code set was chosen for this patient based on their primary functional limitation being treated: The outcome measure chosen to determine the severity of the functional limitation was the Barthel with a score of 50/100 which was correlated with the impairment scale. ? Mobility - Walking and Moving Around:     - CURRENT STATUS: CK - 40%-59% impaired, limited or restricted    - GOAL STATUS: CJ - 20%-39% impaired, limited or restricted    - D/C STATUS:  ---------------To be determined---------------     Pain:  Pain Scale 1: Numeric (0 - 10)  Pain Intensity 1: 0              Activity Tolerance:   Fair  Please refer to the flowsheet for vital signs taken during this treatment. After treatment:   [x]  Patient left in no apparent distress sitting up in chair  []  Patient left in no apparent distress in bed  [x]  Call bell left within reach  [x]  Nursing notified  [x]  Caregiver present  []  Bed alarm activated    COMMUNICATION/EDUCATION:   The patients plan of care was discussed with: Registered Nurse, Physician,  and Rehabilitation Attendant. [x]  Fall prevention education was provided and the patient/caregiver indicated understanding. [x]  Patient/family have participated as able in goal setting and plan of care. [x]  Patient/family agree to work toward stated goals and plan of care. []  Patient understands intent and goals of therapy, but is neutral about his/her participation. []  Patient is unable to participate in goal setting and plan of care.     Thank you for this referral.  Elio Caceres, PT   Time Calculation: 24 mins

## 2018-08-16 NOTE — PROGRESS NOTES
1130 : Report received from [facility], [facility]. SBAR, Kardex, Intake/Output, MAR and Recent Results were discussed. [address] [facility],[address] started late, updated pharmacy. Continues to infuse with PIV in right wrist. IV antibiotics late, will reschedule pharmacy. 1800 : Patient given IS with teaching. 1920 : Report given to Paige Mcneal RN. SBAR, Kardex, MAR, Accordion or Recent Results were discussed. Paige Mcneal RN assumed care of the pt.     Frieda Diaz RN

## 2018-08-16 NOTE — PROGRESS NOTES
Problem: Pressure Injury - Risk of  Goal: *Prevention of pressure injury  Document Celestino Scale and appropriate interventions in the flowsheet. Outcome: Progressing Towards Goal  Pressure Injury Interventions:  Sensory Interventions: Assess changes in LOC, Check visual cues for pain, Discuss PT/OT consult with provider, Float heels, Keep linens dry and wrinkle-free, Minimize linen layers, Turn and reposition approx. every two hours (pillows and wedges if needed), Pressure redistribution bed/mattress (bed type)    Moisture Interventions: Absorbent underpads, Apply protective barrier, creams and emollients, Limit adult briefs, Maintain skin hydration (lotion/cream), Minimize layers, Moisture barrier, Offer toileting Q_hr, Check for incontinence Q2 hours and as needed    Activity Interventions: Increase time out of bed, Pressure redistribution bed/mattress(bed type), PT/OT evaluation    Mobility Interventions: Float heels, HOB 30 degrees or less, Pressure redistribution bed/mattress (bed type), PT/OT evaluation, Turn and reposition approx.  every two hours(pillow and wedges)    Nutrition Interventions: Document food/fluid/supplement intake, Offer support with meals,snacks and hydration, Discuss nutritional consult with provider    Friction and Shear Interventions: Foam dressings/transparent film/skin sealants, HOB 30 degrees or less, Lift sheet, Lift team/patient mobility team, Minimize layers, Transferring/repositioning devices

## 2018-08-16 NOTE — PROGRESS NOTES
PCU SHIFT NURSING NOTE      Bedside shift change report given to OLYA Rodrigues (oncoming nurse) by Lilly Roldan (offgoing nurse). Report included the following information SBAR and Cardiac Rhythm NSR. Shift Summary:   0799 Patient is alert and oriented. Family at bedside, vitals stable. Admission Date 8/9/2018   Admission Diagnosis Sepsis ([facility] [address]   Consults IP CONSULT TO INTENSIVIST  IP CONSULT TO PULMONOLOGY        Consults   []PT   []OT   []Speech   []Case Management      [] Palliative      Cardiac Monitoring Order   []Yes   []No     IV drips   []Yes    Drip:                            Dose:  Drip:                            Dose:  Drip:                            Dose:   []No     GI Prophylaxis   []Yes   []No         DVT Prophylaxis   SCDs:             Thanh stockings:         [] Medication   []Contraindicated   []None      Activity Level Activity Level: Up with Assistance     Activity Assistance: Partial (two people)   Purposeful Rounding every 1-2 hour? []Yes   Quintanilla Score  Total Score: 4   Bed Alarm (If score 3 or >)   []Yes   [] Refused (See signed refusal form in chart)   Celestino Score  Celestino Score: 17   Celestino Score (if score 14 or less)   []PMT consult   []Wound Care consult      []Specialty bed   [] Nutrition consult          Needs prior to discharge:   Home O2 required:    []Yes   []No    If yes, how much O2 required?     Other:    Last Bowel Movement: Last Bowel Movement Date: 08/12/18      Influenza Vaccine Received Flu Vaccine for Current Season (usually Sept-March): Not Flu Season        Pneumonia Vaccine           Diet Active Orders   Diet    DIET CARDIAC Regular      LDAs               Peripheral IV 08/10/18 Right Antecubital (Active)   Site Assessment Clean, dry, & intact 8/16/2018  3:33 AM   Phlebitis Assessment 0 8/16/2018  3:33 AM   Infiltration Assessment 0 8/16/2018  3:33 AM   Dressing Status Clean, dry, & intact 8/16/2018  3:33 AM   Dressing Type Tape;Transparent 8/16/2018  3:33 AM   Hub Color/Line Status Pink;Capped;Flushed 8/16/2018  3:33 AM   Action Taken Open ports on tubing capped 8/16/2018  3:33 AM   Alcohol Cap Used Yes 8/16/2018  3:33 AM       Peripheral IV 08/15/18 Right Wrist (Active)   Site Assessment Clean, dry, & intact 8/16/2018  3:33 AM   Phlebitis Assessment 0 8/16/2018  3:33 AM   Infiltration Assessment 0 8/16/2018  3:33 AM   Dressing Status Clean, dry, & intact 8/16/2018  3:33 AM   Dressing Type Tape;Transparent 8/16/2018  3:33 AM   Hub Color/Line Status Blue;Capped;Flushed 8/16/2018  3:33 AM   Action Taken Open ports on tubing capped 8/16/2018  3:33 AM   Alcohol Cap Used Yes 8/16/2018  3:33 AM                      Urinary Catheter [REMOVED] Urinary Catheter 08/10/18 Hernandez-Indications for Use: Accurate measurement of urinary output    Intake & Output   Date 08/15/18 0700 - 08/16/18 0659 08/16/18 0700 - 08/17/18 0659   Shift 2437-0632 9287-6041 24 Hour Total 8702-1776 3642-4193 24 Hour Total   I  N  T  A  K  E   P.O. 280  280         P. O. 280  280       Shift Total  (mL/kg) 280  (1.9)  280  (1.9)      O  U  T  P  U  T   Urine  (mL/kg/hr) 1000  (0.6) 800  (0.5) 1800  (0.5) 350  350      Urine Voided 7228 845 3376 350  350      Urine Occurrence(s)  2 x 2 x       Shift Total  (mL/kg) 1000  (6.8) 800  (5.4) 1800  (12.3) 350  (2.4)  350  (2.4)   NET -720 -800 -1520 -350  -350   Weight (kg) 148 146.9 146.9 146.9 146.9 146.9         Readmission Risk Assessment Tool Score Low Risk            5       Total Score        2 . Living with Significant Other. Assisted Living. LTAC. SNF. or   Rehab    3 Patient Length of Stay (>5 days = 3)        Criteria that do not apply:    Has Seen PCP in Last 6 Months (Yes=3, No=0)    IP Visits Last 12 Months (1-3=4, 4=9, >4=11)    Pt.  Coverage (Medicare=5 , Medicaid, or Self-Pay=4)    Charlson Comorbidity Score (Age + Comorbid Conditions)       Expected Length of Stay 4d 21h   Actual Length of Stay 7

## 2018-08-16 NOTE — PROGRESS NOTES
[facility]  Pulmonary, Critical Care, and Sleep Medicine    Initial Patient Consult    Name: Diana Nichole MRN: [id number]   : 1955 Hospital: [facility]   Date: 2018        IMPRESSION:   · Acute (and likely chronic) hypoxic/hypercapnic respiratory failure  · Sepsis  · Severe LLE Cellulitis- wound care consult notes reviewed  · hyponatermia  · Suspect she has MONSERRAT/OHS leading to her hypoxia/hypercapnia   · Anemia and no polycythemia  · No evidence of pneumonia or other acute process in the lung  · Eventration of the left hemidiaphragm leading to abnormal chest X-ray   · HTN  · Obesity   · Severe DJD knees- no surgery unless she loses weight      RECOMMENDATIONS:   · Pt education, weight reduction counseling- stressed safety  · Diuresis  · Daily weight  · Pt and OT here and should get some at home for guidance and safety tips  · Weight loss will be tough if she cannot ambulate  · Ambulate with O2  · Asked pt to invest in portable pulse ox  · Will need home O2  · Ultimately will need evaluation in the sleep lab and polysomnography- explained PSG procedure, daughter has MONSERRAT but non adherent to treatment, pt willing to do better if prescribed  · She will allow us to arrange appt with our sleep clinic  · Continue antibiotics for cellulitis per team  · DVT prophylaxis      movexd to PCU and placed on BIPAP the other day. Just walked with PT on 3 LPM and dropped sats to 88% within the room while walking to doorway    8/15 bad night. Unable to rest. Needed BIPAP last night. Wheeze this Am. Tired. Out of sorts     OOB in chair eating breakfast. Discussed her plan to get healthier.  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 Jose Yoo yearly but never complained of FLORENCE to hi, daughter has not4ed more SOB past 2 months.  Pts mother  two yrs agoa nd admits to self neglect since. Five yrs ago pt weighed about 282 and now > 310? Not on home O2. Subjective: This patient has been seen and evaluated at the request of Dr. Laura Sellers  for respiratory failure and by Dr. Krista Cheng for elevated hemidiaphragm. Patient is a 66 y.o. female nonsmoker, admitted with sepsis and cellulitis of the left lower extremity. She became lethargic today and an ABG showed chronic CO2 retention. She is a reluctant historian, but her family at the bedside reports that she has had some chronic shortness of breath over at least the last several months and did not seek out medical care for fear of being hospitalized. They note she has severe snoring and has significant daytime sleepiness. Also awakens at night with dyspnea and has to sit up (though she says it is to sit up to read). Currently she is comfortable on CPAP. Denies pain, dyspnea, cough, etc.     Past Medical History:   Diagnosis Date    Arthritis     knees, lower back.  Hypertension     Morbid obesity [facility])       Past Surgical History:   Procedure Laterality Date    HX GYN  2009    D&C      Prior to Admission medications    Medication Sig Start Date End Date Taking? Authorizing Provider   busPIRone (BUSPAR) 15 mg tablet Take 15 mg by mouth nightly. Yes Historical Provider   Olmesartan-amLODIPine-HCTZ 40-10-25 mg tab Take 1 Tab by mouth daily. Yes Historical Provider   furosemide (LASIX) 40 mg tablet Take 40 mg by mouth every other day. Takes every other day in the evening (confirmed w/ patient)   Yes Historical Provider   labetalol (NORMODYNE) 300 mg tablet Take 300 mg by mouth every twelve (12) hours. Confirmed w/ patient. Rx for Tid, patient only tolerates it BiD   Yes Historical Provider   potassium chloride SA (MICRO-K) 10 mEq capsule Take 10 mEq by mouth daily. Yes Historical Provider   cholecalciferol (VITAMIN D3) 1,000 unit tablet Take 1,000 Units by mouth every Sunday.  Indications: Vitamin D Deficiency, Takes unknown units of D3 once weekly   Yes Historical Provider   ascorbic acid (VITAMIN C) 500 mg tablet Take 500 mg by mouth daily. Yes Historical Provider     No Known Allergies   Social History   Substance Use Topics    Smoking status: Never Smoker    Smokeless tobacco: Never Used    Alcohol use No      Family History   Problem Relation Age of Onset    Stroke Father 36    Heart Disease Father     Arthritis-osteo Mother     Cancer Mother [de-identified]     breast cancer        Current Facility-Administered Medications   Medication Dose Route Frequency    ampicillin-sulbactam (UNASYN) 3 g in 0.9% sodium chloride (MBP/ADV) 100 mL  3 g IntraVENous Q8H    furosemide (LASIX) injection 40 mg  40 mg IntraVENous BID    polyethylene glycol (MIRALAX) packet 17 g  17 g Oral DAILY    nystatin (MYCOSTATIN) 100,000 unit/gram powder   Topical BID    sodium chloride (NS) flush 5-10 mL  5-10 mL IntraVENous Q8H    enoxaparin (LOVENOX) injection 40 mg  40 mg SubCUTAneous Q12H    vancomycin (VANCOCIN) 1500 mg in  ml infusion  1,500 mg IntraVENous Q16H    busPIRone (BUSPAR) tablet 15 mg  15 mg Oral QHS    labetalol (NORMODYNE) tablet 300 mg  300 mg Oral Q12H    ascorbic acid (vitamin C) (VITAMIN C) tablet 500 mg  500 mg Oral DAILY    amLODIPine (NORVASC) tablet 10 mg  10 mg Oral DAILY    losartan (COZAAR) tablet 50 mg  50 mg Oral DAILY       Review of Systems:  A comprehensive review of systems was negative except for that written in the HPI.     Objective:   Vital Signs:    Visit Vitals    /68    Pulse 83    Temp 98.9 °F (37.2 °C)    Resp 28    Ht 5' 8\" (1.727 m)    Wt 146.9 kg (323 lb 13.7 oz)    SpO2 99%    BMI 49.24 kg/m2       O2 Device: BIPAP   O2 Flow Rate (L/min): 3 l/min   Temp (24hrs), Av.1 °F (37.3 °C), Min:98.8 °F (37.1 °C), Max:99.5 °F (37.5 °C)       Intake/Output:   Last shift:       07 - 1900  In: -   Out: 350 [Urine:350]  Last 3 shifts: 1901 -  0700  In: 280 [P.O.:280]  Out: 0153 [Urine:2325]    Intake/Output Summary (Last 24 hours) at 08/16/18 1139  Last data filed at 08/16/18 1021   Gross per 24 hour   Intake              100 ml   Output             2150 ml   Net            -2050 ml      Physical Exam:   General:  Alert, obese, on O2; off BIPAP   Head:  Normocephalic, without obvious abnormality, atraumatic. Eyes:  Conjunctivae/corneas clear. Nose: Nares normal. Septum midline. Mucosa normal.    Throat: Lips, mucosa, and tongue normal.     Neck: Supple, symmetrical, trachea midline    Back:   Symmetric, no curvature. ROM normal.   Lungs:   Clear to auscultation bilaterally. Chest wall:  No tenderness or deformity. Heart:  Regular rate and rhythm    Abdomen:   Soft, non-tender.  Bowel sounds normal.    Extremities: Erythema and edema LLE > RLE, no clubbing   Skin: Old brown recluse bite on left foot, with cellulitis on left lower extremity; 2+ edema; warm   Lymph nodes: Cervical, supraclavicular nodes normal.   Neurologic: Grossly nonfocal     Data review:     Recent Results (from the past 24 hour(s))   BLOOD GAS, ARTERIAL    Collection Time: 08/15/18  3:55 PM   Result Value Ref Range    pH 7.40 7.35 - 7.45      PCO2 51 (H) 35.0 - 45.0 mmHg    PO2 62 (L) 80 - 100 mmHg    O2 SAT 92 92 - 97 %    BICARBONATE 31 (H) 22 - 26 mmol/L    BASE EXCESS 5.0 mmol/L    O2 METHOD NASAL O2      O2 FLOW RATE 3.00 L/min    SPONTANEOUS RATE 25.0      Sample source ARTERIAL      SITE RIGHT RADIAL      JORDAN'S TEST YES     GLUCOSE, POC    Collection Time: 08/15/18  9:15 PM   Result Value Ref Range    Glucose (POC) 104 (H) 65 - 100 mg/dL    Performed by Sherie Robles (PCT)    METABOLIC PANEL, BASIC    Collection Time: 08/16/18  3:20 AM   Result Value Ref Range    Sodium 138 136 - 145 mmol/L    Potassium 3.7 3.5 - 5.1 mmol/L    Chloride 102 97 - 108 mmol/L    CO2 33 (H) 21 - 32 mmol/L    Anion gap 3 (L) 5 - 15 mmol/L    Glucose 94 65 - 100 mg/dL    BUN 16 6 - 20 MG/DL    Creatinine 0.76 0.55 - 1.02 MG/DL    BUN/Creatinine ratio 21 (H) 12 - 20      GFR est AA >60 >60 ml/min/1.73m2    GFR est non-AA >60 >60 ml/min/1.73m2    Calcium 10.6 (H) 8.5 - 10.1 MG/DL   CBC W/O DIFF    Collection Time: 08/16/18  3:20 AM   Result Value Ref Range    WBC 16.6 (H) 3.6 - 11.0 K/uL    RBC 3.47 (L) 3.80 - 5.20 M/uL    HGB 9.8 (L) 11.5 - 16.0 g/dL    HCT 31.9 (L) 35.0 - 47.0 %    MCV 91.9 80.0 - 99.0 FL    MCH 28.2 26.0 - 34.0 PG    MCHC 30.7 30.0 - 36.5 g/dL    RDW 15.3 (H) 11.5 - 14.5 %    PLATELET 753 884 - 472 K/uL    MPV 10.7 8.9 - 12.9 FL    NRBC 0.2 (H) 0  WBC    ABSOLUTE NRBC 0.04 (H) 0.00 - 0.01 K/uL       Imaging:  I have personally reviewed the patients radiographs and have reviewed the reports: non etoday  Eventration of left hemidiaphragm.   No acute disease in the lung        Yimi Hermosillo MD

## 2018-08-16 NOTE — PROGRESS NOTES
Hospitalist Progress Note    NAME: Cat Vanessa   :  1955   MRN:  [id number]       Assessment / Plan:  Acute Hypoxic Respiratory Failure POA/Elevated left hemidiaphragm POA  Likely Chronic Diastolic CHF POA  CXR looks congested with cardiomegaly, she may have some degree of CHF,   CT chest no pathology in lung,   ECHO EF 65%,   ABG 8/15- still hypoxic on 3L/m, Co2 ok    Cont to monitor I/O and weight,  Currently back on Bipap  Cont diuresis IV with lasix for now, increased to Q 12 8/15, will change to PO on discharge, Cr stable 0.7 again today  Pulmonology help appreciated- will need MONSERRAT taken cared off for real improvement   Cont O2 during the day- taper as able, if not able to will need oxygen  CPAP at night likely as per pulm- will need OP Sleep study - appointment set up with Pulm associates  check overnight oxymetry may need Home O2 for now,   PT/OT eval noted- initially [facility] was recommended but now pt likely needs [facility]- CM updated on arranging it when medically stable- currently transferred to PCU on BIPAP last evening      Sepsis ([facility] [address] (2018) POA WBC 22.6, , RR to 40, temp 103.2, normal lactate, WBC still up ~ 16k, no more fever  Left leg cellulitis POA- Erythema is better, but still warm and swollen  H/o Chronic L Leg lymphedema s/p Spider bite in remote past  Duplex is negative for DVT. s/p IV  Rocephin  Cont IV Vancomycin  Changed to unasyn (8/15) due to persistent leukocytosis    HTN urgency POA- now controlled  Cont Norvasc, BB, ARB, monitor. Use hydralazine prn. So far better control      Obesity Hypoventilation Syndrome: c/w CPAP at night, needs sleep study as outpatient, may need oxygen too  Hyponatremia POA so far improving, monitor. Morbid Obesity POA Body mass index is 45.61 kg/(m^2). - weight loss recommended  NOK:   Code status: Full  D/W , DTR Lisa [phone]  Prophylaxis: Lovenox    Recommended Disposition:  PT, OT, RN likely soon when Home CPAP/Oxygen arranged & improved WBC/leg cellulitis & respiratory status stabilized     Subjective:     Chief Complaint / Reason for Physician Visit: F/U L LE cellulitis, sepsis, Lymphedema, MONSERRAT/OHS  \"I feel better on BIPAP\". Discussed with RN events overnight. - transferred to PCU on BIPAP 8/15 evening    Review of Systems:  Symptom Y/N Comments  Symptom Y/N Comments   Fever/Chills    Chest Pain     Poor Appetite    Edema     Cough    Abdominal Pain     Sputum    Joint Pain y    SOB/FLORENCE y Worsened last evening- back on BIPAP  Pruritis/Rash y L Leg   Nausea/vomit    Tolerating PT/OT y    Diarrhea    Tolerating Diet y    Constipation    Other y Anxious     Could NOT obtain due to:      Objective:     VITALS:   Last 24hrs VS reviewed since prior progress note. Most recent are:  Patient Vitals for the past 24 hrs:   Temp Pulse Resp BP SpO2   08/16/18 0738 - - - - 99 %   08/16/18 0438 - - - - 98 %   08/16/18 0333 98.9 °F (37.2 °C) 76 28 157/64 95 %   08/16/18 0114 - - - - 94 %   08/15/18 2237 98.8 °F (37.1 °C) 74 22 137/64 91 %   08/15/18 2035 - 75 - 164/74 -   08/15/18 1948 98.9 °F (37.2 °C) 77 (!) 36 157/82 95 %   08/15/18 1742 - - - - 96 %   08/15/18 1719 99.3 °F (37.4 °C) 88 (!) 43 154/81 98 %   08/15/18 1543 - - - - 91 %   08/15/18 1523 99.5 °F (37.5 °C) 82 28 126/84 94 %   08/15/18 1207 - - - 146/76 93 %   08/15/18 1206 - - - 154/79 -   08/15/18 1109 98 °F (36.7 °C) 90 30 (!) 204/74 92 %   08/15/18 0918 - - - - 92 %       Intake/Output Summary (Last 24 hours) at 08/16/18 0851  Last data filed at 08/16/18 0438   Gross per 24 hour   Intake              100 ml   Output             1800 ml   Net            -1700 ml        PHYSICAL EXAM:  General: WD, WN. Alert, cooperative, SOB  EENT:  EOMI. Anicteric sclerae.  MMM, BIPAP +  Resp:  Coarse BS,   CV:  Regular  rhythm,  + edema  GI:  Soft, Non distended, Non tender.  +Bowel sounds  Neurologic:  Alert and oriented X 3, normal speech,   Psych:   poor insight. mildly anxious +  Skin/extremities:  +  Erythema of the left leg (improved as per pt/family),Chronic Lymphedema noted +, . No jaundice    Reviewed most current lab test results and cultures  YES  Reviewed most current radiology test results   YES  Review and summation of old records today    NO  Reviewed patient's current orders and MAR    YES  PMH/SH reviewed - no change compared to H&P  ________________________________________________________________________  Care Plan discussed with:    Comments   Patient x    Family  x  yesterday   RN x    Care Manager x    Consultant                        Multidiciplinary team rounds were held today with , nursing, pharmacist and clinical coordinator. Patient's plan of care was discussed; medications were reviewed and discharge planning was addressed. ________________________________________________________________________  Total NON critical care TIME:  36   Minutes    Total CRITICAL CARE TIME Spent:   Minutes non procedure based      Comments   >50% of visit spent in counseling and coordination of care     ________________________________________________________________________  Curtis Ashby MD     Procedures: see electronic medical records for all procedures/Xrays and details which were not copied into this note but were reviewed prior to creation of Plan. LABS:  I reviewed today's most current labs and imaging studies.   Pertinent labs include:  Recent Labs      08/16/18   0320  08/15/18   0259  08/14/18   0518   WBC  16.6*  16.3*  14.9*   HGB  9.8*  10.4*  10.6*   HCT  31.9*  33.3*  34.0*   PLT  262  231  200     Recent Labs      08/16/18   0320  08/15/18   0259  08/14/18   0518   NA  138  136  133*   K  3.7  4.0  4.6   CL  102  102  101   CO2  33*  31  26   GLU  94  110*  94   BUN  16  20  22*   CREA  0.76  0.72  0.77   CA  10.6*  11.2*  10.6*   MG   --    --   2.3   ALB   --    --   2.3*   TBILI   --    --   0.5   SGOT   --    --   30   ALT   -- --   20       Signed: Stefany Hooper MD

## 2018-08-16 NOTE — PROGRESS NOTES
TRANSFER - IN REPORT:    Verbal report received from ,RN(name) on Kelvin Tillman  being received from [facility](unit) for change in patient condition(increase CO2)      Report consisted of patients Situation, Background, Assessment and   Recommendations(SBAR). Information from the following report(s) SBAR, Kardex and Cardiac Rhythm NSR was reviewed with the receiving nurse. Opportunity for questions and clarification was provided. Assessment completed upon patients arrival to unit and care assumed.

## 2018-08-16 NOTE — PROGRESS NOTES
PCU SHIFT NURSING NOTE      Bedside and Verbal shift change report given to Lenora Ford RN (oncoming nurse) by Kim Barrientos (offgoing nurse). Report included the following information SBAR, Kardex, ED Summary, Intake/Output, MAR, Accordion, Recent Results and Cardiac Rhythm NSR. Shift Summary:   1929: Pt sitting up in recliner eating dinner. VSS. HR NSR. No complaints at this time. Call bell within reach. Will continue to monitor. 2107: Pt requesting ativan to help relax and allow for sleep. PRN ativan given. LLE dressing saturated with urine. Wound cleaned with dermal cleanser and redressed with wound gel on aquacel, 4x4, & gauze es. 2150: Pt RR upper 20s-low 30s. Pulmonology suggesting outpatient sleep study. Therefore, pt may benefit from wearing BIPAP overnight. RN explained benefits of BIPAP to pt and pt willing to wear overnight. BIPAP 45% FiO2 placed on pt. RT at bedside. 2221: Pts MEWS score 3 due to elevated RR. Pt was placed on BIPAP to help control RR as pt is now sleeping. Will continue to monitor closely. 0220: Pt took off mask and now refusing to wear rest of night. RT at bedside. 3L NC applied. Will continue to monitor. Bedside and Verbal shift change report given to [address] [place] [address] Street (oncoming nurse) by Lenora Ford RN (offgoing nurse). Report included the following information SBAR, Kardex, ED Summary, Intake/Output, MAR, Accordion, Recent Results and Cardiac Rhythm NSR.        Admission Date 8/9/2018   Admission Diagnosis Sepsis ([facility] [address]   Consults IP CONSULT TO INTENSIVIST  IP CONSULT TO PULMONOLOGY        Consults   []PT   []OT   []Speech   []Case Management      [] Palliative      Cardiac Monitoring Order   []Yes   []No     IV drips   []Yes    Drip:                            Dose:  Drip:                            Dose:  Drip:                            Dose:   []No     GI Prophylaxis   []Yes   []No         DVT Prophylaxis   SCDs:             Thanh stockings:         [] Medication   []Contraindicated   []None Activity Level Activity Level: Up with Assistance     Activity Assistance: Partial (two people)   Purposeful Rounding every 1-2 hour? []Yes   Quintanilla Score  Total Score: 4   Bed Alarm (If score 3 or >)   []Yes   [] Refused (See signed refusal form in chart)   Celestino Score  Celestino Score: 17   Celestino Score (if score 14 or less)   []PMT consult   []Wound Care consult      []Specialty bed   [] Nutrition consult          Needs prior to discharge:   Home O2 required:    []Yes   []No    If yes, how much O2 required?     Other:    Last Bowel Movement: Last Bowel Movement Date: 08/12/18      Influenza Vaccine Received Flu Vaccine for Current Season (usually Sept-March): Not Flu Season        Pneumonia Vaccine           Diet Active Orders   Diet    DIET CARDIAC Regular      LDAs               Peripheral IV 08/10/18 Right Antecubital (Active)   Site Assessment Clean, dry, & intact 8/16/2018 12:00 PM   Phlebitis Assessment 0 8/16/2018 12:00 PM   Infiltration Assessment 0 8/16/2018 12:00 PM   Dressing Status Clean, dry, & intact 8/16/2018 12:00 PM   Dressing Type Transparent 8/16/2018 12:00 PM   Hub Color/Line Status Pink;Capped 8/16/2018 12:00 PM   Action Taken Open ports on tubing capped 8/16/2018  7:33 AM   Alcohol Cap Used Yes 8/16/2018  7:33 AM       Peripheral IV 08/15/18 Right Wrist (Active)   Site Assessment Clean, dry, & intact 8/16/2018 12:00 PM   Phlebitis Assessment 0 8/16/2018 12:00 PM   Infiltration Assessment 0 8/16/2018 12:00 PM   Dressing Status Clean, dry, & intact 8/16/2018 12:00 PM   Dressing Type Transparent 8/16/2018 12:00 PM   Hub Color/Line Status Blue;Capped 8/16/2018 12:00 PM   Action Taken Open ports on tubing capped 8/16/2018  7:33 AM   Alcohol Cap Used Yes 8/16/2018  7:33 AM                      Urinary Catheter [REMOVED] Urinary Catheter 08/10/18 Hernandez-Indications for Use: Accurate measurement of urinary output    Intake & Output   Date 08/15/18 0700 - 08/16/18 0659 08/16/18 0700 - 08/17/18 [address] 5042-7815 24 Hour Total 9315-1671 8488-4611 24 Hour Total   I  N  T  A  K  E   P.O. 280  280 120  120      P. O. 280  280 120  120    Shift Total  (mL/kg) 280  (1.9)  280  (1.9) 120  (0.8)  120  (0.8)   O  U  T  P  U  T   Urine  (mL/kg/hr) 1000  (0.6) 800  (0.5) 1800  (0.5) 350  350      Urine Voided 3661 152 2793 350  350      Urine Occurrence(s)  2 x 2 x       Shift Total  (mL/kg) 1000  (6.8) 800  (5.4) 1800  (12.3) 350  (2.4)  350  (2.4)   NET -720 -800 -1520 -230  -230   Weight (kg) 148 146.9 146.9 146.9 146.9 146.9         Readmission Risk Assessment Tool Score Low Risk            5       Total Score        2 . Living with Significant Other. Assisted Living. LTAC. SNF. or   Rehab    3 Patient Length of Stay (>5 days = 3)        Criteria that do not apply:    Has Seen PCP in Last 6 Months (Yes=3, No=0)    IP Visits Last 12 Months (1-3=4, 4=9, >4=11)    Pt.  Coverage (Medicare=5 , Medicaid, or Self-Pay=4)    Charlson Comorbidity Score (Age + Comorbid Conditions)       Expected Length of Stay 4d 21h   Actual Length of Stay 7

## 2018-08-16 NOTE — PROGRESS NOTES
Problem: Falls - Risk of  Goal: *Absence of Falls  Document Naila Fall Risk and appropriate interventions in the flowsheet.    Outcome: Progressing Towards Goal  Fall Risk Interventions:  Mobility Interventions: Communicate number of staff needed for ambulation/transfer, Bed/chair exit alarm, Patient to call before getting OOB, OT consult for ADLs, PT Consult for mobility concerns, PT Consult for assist device competence    Mentation Interventions: Bed/chair exit alarm    Medication Interventions: Assess postural VS orthostatic hypotension, Bed/chair exit alarm, Evaluate medications/consider consulting pharmacy, Patient to call before getting OOB, Teach patient to arise slowly    Elimination Interventions: Bed/chair exit alarm, Call light in reach, Patient to call for help with toileting needs, Toileting schedule/hourly rounds, Toilet paper/wipes in reach

## 2018-08-16 NOTE — PROGRESS NOTES
PCU SHIFT NURSING NOTE      Bedside and Verbal shift change report given to Sherryle Fire RN (oncoming nurse) by Jeff Escalona RN (offgoing nurse). Report included the following information SBAR, Kardex, ED Summary, Intake/Output, MAR, Accordion, Recent Results and Cardiac Rhythm NSR. Shift Summary:   1948: Pt drowsy but alert and oriented to self and place. VSS. Pts HR NSR. Pts MEWS score is a 3 due to elevated RR. Pt was transferred to PCU this afternoon for high CO2 and pt was placed on continuous BIPAP. Due to high CO2, pts RR is elevated. Will continue to monitor & pts RR should decrease as BIPAP pulls off CO2. Call bell within reach. Will continue to monitor closely. 2240: Pt very anxious and refusing to wear BIPAP mask. Pt and family requesting for something to \"relax\" her. Paged Dr. Jose Antonio Davis. Spoke with Dr. Jose Antonio Davis at [phone] and made aware of pt condition and history. New order for 0.5mg PO ativan q4h prn.  2258: PRN ativan given for anxiety. 2330: Assisted pt to [facility]. Pt now back on BIPAP.  0350: Pt anxious and taking off BIPAP refusing to wear it. PRN ativan given. Bedside and Verbal shift change report given to Stephania (oncoming nurse) by Sherryle Fire RN (offgoing nurse). Report included the following information SBAR, Kardex, ED Summary, Intake/Output, MAR, Accordion, Recent Results and Cardiac Rhythm NSR. Admission Date 8/9/2018   Admission Diagnosis Sepsis ([address]   Consults IP CONSULT TO INTENSIVIST  IP CONSULT TO PULMONOLOGY        Consults   []PT   []OT   []Speech   []Case Management      [] Palliative      Cardiac Monitoring Order   [x]Yes   []No     IV drips   []Yes    Drip:                            Dose:  Drip:                            Dose:  Drip:                            Dose:   [x]No     GI Prophylaxis   []Yes   []No         DVT Prophylaxis   SCDs:             Thanh stockings:         [x] Medication   []Contraindicated   []None      Activity Level Activity Level:  Up with Assistance     Activity Assistance: Partial (one person)   Purposeful Rounding every 1-2 hour? [x]Yes   Quintanilla Score  Total Score: 3   Bed Alarm (If score 3 or >)   [x]Yes   [] Refused (See signed refusal form in chart)   Celestino Score  Celestino Score: 17   Celestino Score (if score 14 or less)   []PMT consult   [x]Wound Care consult      []Specialty bed   [] Nutrition consult          Needs prior to discharge:   Home O2 required:    []Yes   [x]No    If yes, how much O2 required? Other:    Last Bowel Movement: Last Bowel Movement Date: 08/12/18      Influenza Vaccine Received Flu Vaccine for Current Season (usually Sept-March): Not Flu Season        Pneumonia Vaccine           Diet Active Orders   Diet    DIET CARDIAC Regular      LDAs               Peripheral IV 08/10/18 Right Antecubital (Active)   Site Assessment Clean, dry, & intact 8/15/2018  7:48 PM   Phlebitis Assessment 0 8/15/2018  7:48 PM   Infiltration Assessment 0 8/15/2018  7:48 PM   Dressing Status Clean, dry, & intact 8/15/2018  7:48 PM   Dressing Type Tape;Transparent 8/15/2018  7:48 PM   Hub Color/Line Status Pink; Infusing 8/15/2018  7:48 PM   Action Taken Open ports on tubing capped 8/15/2018  7:48 PM   Alcohol Cap Used Yes 8/15/2018  7:48 PM                      Urinary Catheter [REMOVED] Urinary Catheter 08/10/18 Hernandez-Indications for Use: Accurate measurement of urinary output    Intake & Output   Date 08/14/18 1900 - 08/15/18 0659 08/15/18 0700 - 08/16/18 0659   Shift 2923-2691 24 Hour Total 3068-9715 9496-6175 24 Hour Total   I  N  T  A  K  E   P.O.   280  280      P. O.   280  280    I.V.  (mL/kg/hr)  500         Volume (vancomycin (VANCOCIN) 1500 mg in  ml infusion)  500       Shift Total  (mL/kg)  500  (3.4) 280  (1.9)  280  (1.9)   O  U  T  P  U  T   Urine  (mL/kg/hr) 525 1725 1000  (0.6)  1000      Urine Voided   1000      Urine Occurrence(s) 1 x 1 x         Urine Output (mL) ([REMOVED] Urinary Catheter 08/10/18 Hernandez)  1200       Shift Total  (mL/kg) 525  (3.5) 1725  (11.7) 1000  (6.8)  1000  (6.8)   NET -525 -1225 -720  -720   Weight (kg) 148 148 148 148 148         Readmission Risk Assessment Tool Score Low Risk            5       Total Score        2 . Living with Significant Other. Assisted Living. LTAC. SNF. or   Rehab    3 Patient Length of Stay (>5 days = 3)        Criteria that do not apply:    Has Seen PCP in Last 6 Months (Yes=3, No=0)    IP Visits Last 12 Months (1-3=4, 4=9, >4=11)    Pt.  Coverage (Medicare=5 , Medicaid, or Self-Pay=4)    Charlson Comorbidity Score (Age + Comorbid Conditions)       Expected Length of Stay 4d 21h   Actual Length of Stay 6

## 2018-08-17 PROCEDURE — 74011250637 HC RX REV CODE- 250/637: Performed by: INTERNAL MEDICINE

## 2018-08-17 PROCEDURE — 65660000000 HC RM CCU STEPDOWN

## 2018-08-17 PROCEDURE — 77010033678 HC OXYGEN DAILY

## 2018-08-17 PROCEDURE — 97116 GAIT TRAINING THERAPY: CPT

## 2018-08-17 PROCEDURE — 74011000250 HC RX REV CODE- 250: Performed by: INTERNAL MEDICINE

## 2018-08-17 PROCEDURE — 74011000258 HC RX REV CODE- 258: Performed by: INTERNAL MEDICINE

## 2018-08-17 PROCEDURE — 97530 THERAPEUTIC ACTIVITIES: CPT

## 2018-08-17 PROCEDURE — 74011250636 HC RX REV CODE- 250/636: Performed by: INTERNAL MEDICINE

## 2018-08-17 PROCEDURE — 74011250636 HC RX REV CODE- 250/636: Performed by: EMERGENCY MEDICINE

## 2018-08-17 RX ADMIN — FUROSEMIDE 40 MG: 10 INJECTION, SOLUTION INTRAMUSCULAR; INTRAVENOUS at 18:28

## 2018-08-17 RX ADMIN — NYSTATIN: 100000 POWDER TOPICAL at 09:00

## 2018-08-17 RX ADMIN — LABETALOL HYDROCHLORIDE 300 MG: 200 TABLET, FILM COATED ORAL at 22:22

## 2018-08-17 RX ADMIN — FUROSEMIDE 40 MG: 10 INJECTION, SOLUTION INTRAMUSCULAR; INTRAVENOUS at 10:22

## 2018-08-17 RX ADMIN — ENOXAPARIN SODIUM 40 MG: 100 INJECTION SUBCUTANEOUS at 22:22

## 2018-08-17 RX ADMIN — SODIUM CHLORIDE 3 G: 900 INJECTION, SOLUTION INTRAVENOUS at 10:20

## 2018-08-17 RX ADMIN — SODIUM CHLORIDE 3 G: 900 INJECTION, SOLUTION INTRAVENOUS at 15:59

## 2018-08-17 RX ADMIN — Medication 10 ML: at 06:00

## 2018-08-17 RX ADMIN — LORAZEPAM 0.5 MG: 0.5 TABLET ORAL at 22:22

## 2018-08-17 RX ADMIN — VANCOMYCIN HYDROCHLORIDE 1500 MG: 10 INJECTION, POWDER, LYOPHILIZED, FOR SOLUTION INTRAVENOUS at 01:21

## 2018-08-17 RX ADMIN — OXYCODONE HYDROCHLORIDE AND ACETAMINOPHEN 500 MG: 500 TABLET ORAL at 10:30

## 2018-08-17 RX ADMIN — LOSARTAN POTASSIUM 50 MG: 50 TABLET ORAL at 10:31

## 2018-08-17 RX ADMIN — POLYETHYLENE GLYCOL 3350 17 G: 17 POWDER, FOR SOLUTION ORAL at 10:21

## 2018-08-17 RX ADMIN — LABETALOL HYDROCHLORIDE 300 MG: 200 TABLET, FILM COATED ORAL at 10:30

## 2018-08-17 RX ADMIN — BUSPIRONE HYDROCHLORIDE 15 MG: 5 TABLET ORAL at 22:22

## 2018-08-17 RX ADMIN — NYSTATIN: 100000 POWDER TOPICAL at 18:30

## 2018-08-17 RX ADMIN — ENOXAPARIN SODIUM 40 MG: 100 INJECTION SUBCUTANEOUS at 10:21

## 2018-08-17 RX ADMIN — Medication 5 ML: at 22:00

## 2018-08-17 RX ADMIN — AMLODIPINE BESYLATE 10 MG: 5 TABLET ORAL at 10:30

## 2018-08-17 NOTE — PROGRESS NOTES
0800: Report received  SBAR, CHARMAINE, ED SUMMARY, MAR, RECENT RESULTS were discussed.     Patricia Barth RN

## 2018-08-17 NOTE — PROGRESS NOTES
[facility]  Pulmonary, Critical Care, and Sleep Medicine    Initial Patient Consult    Name: Turner Castro MRN: [id number]   : 1955 Hospital: [facility]   Date: 2018        IMPRESSION:   · Acute (and likely chronic) hypoxic/hypercapnic respiratory failure  · Sepsis  · Severe LLE Cellulitis- wound care consult notes reviewed  · hyponatermia  · Suspect she has MONSERRAT/OHS leading to her hypoxia/hypercapnia   · Anemia and no polycythemia  · No evidence of pneumonia or other acute process in the lung  · Eventration of the left hemidiaphragm leading to abnormal chest X-ray   · HTN  · Obesity   · Severe DJD knees- no surgery unless she loses weight      RECOMMENDATIONS:   · BIPAP prn only  · Pt education, weight reduction counseling- stressed safety  · Diuresis  · Daily weight  · Pt and OT here and should get some at home for guidance and safety tips  · Weight loss will be tough if she cannot ambulate  · Ambulate with O2  · Asked pt to invest in portable pulse ox  · Will need home O2  · Ultimately will need evaluation in the sleep lab and polysomnography- explained PSG procedure, daughter has MONSERRAT but non adherent to treatment, pt willing to do better if prescribed  · She will allow us to arrange appt with our sleep clinic  · Continue antibiotics for cellulitis per team  · Will see again Monday or sooner prn  · DVT prophylaxis      did not wear BIPAP last night. Good diuresis. Breathing better     movexd to PCU and placed on BIPAP the other day. Just walked with PT on 3 LPM and dropped sats to 88% within the room while walking to doorway    8/15 bad night. Unable to rest. Needed BIPAP last night. Wheeze this Am. Tired. Out of sorts     OOB in chair eating breakfast. Discussed her plan to get healthier.   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 Joan Hernandez yearly but never complained of FLORENCE to hi, daughter has not4ed more SOB past 2 months. Pts mother  two yrs agoa nd admits to self neglect since. Five yrs ago pt weighed about 282 and now > 310? Not on home O2. Subjective: This patient has been seen and evaluated at the request of Dr. Ingris Sanders  for respiratory failure and by Dr. Kirby Kaufman for elevated hemidiaphragm. Patient is a 66 y.o. female nonsmoker, admitted with sepsis and cellulitis of the left lower extremity. She became lethargic today and an ABG showed chronic CO2 retention. She is a reluctant historian, but her family at the bedside reports that she has had some chronic shortness of breath over at least the last several months and did not seek out medical care for fear of being hospitalized. They note she has severe snoring and has significant daytime sleepiness. Also awakens at night with dyspnea and has to sit up (though she says it is to sit up to read). Currently she is comfortable on CPAP. Denies pain, dyspnea, cough, etc.     Past Medical History:   Diagnosis Date    Arthritis     knees, lower back.  Hypertension     Morbid obesity [facility])       Past Surgical History:   Procedure Laterality Date    HX GYN      D&C      Prior to Admission medications    Medication Sig Start Date End Date Taking? Authorizing Provider   busPIRone (BUSPAR) 15 mg tablet Take 15 mg by mouth nightly. Yes Historical Provider   Olmesartan-amLODIPine-HCTZ 40-10-25 mg tab Take 1 Tab by mouth daily. Yes Historical Provider   furosemide (LASIX) 40 mg tablet Take 40 mg by mouth every other day. Takes every other day in the evening (confirmed w/ patient)   Yes Historical Provider   labetalol (NORMODYNE) 300 mg tablet Take 300 mg by mouth every twelve (12) hours. Confirmed w/ patient. Rx for Tid, patient only tolerates it BiD   Yes Historical Provider   potassium chloride SA (MICRO-K) 10 mEq capsule Take 10 mEq by mouth daily.    Yes Historical Provider cholecalciferol (VITAMIN D3) 1,000 unit tablet Take 1,000 Units by mouth every . Indications: Vitamin D Deficiency, Takes unknown units of D3 once weekly   Yes Historical Provider   ascorbic acid (VITAMIN C) 500 mg tablet Take 500 mg by mouth daily. Yes Historical Provider     No Known Allergies   Social History   Substance Use Topics    Smoking status: Never Smoker    Smokeless tobacco: Never Used    Alcohol use No      Family History   Problem Relation Age of Onset    Stroke Father 36    Heart Disease Father     Arthritis-osteo Mother     Cancer Mother [de-identified]     breast cancer        Current Facility-Administered Medications   Medication Dose Route Frequency    ampicillin-sulbactam (UNASYN) 3 g in 0.9% sodium chloride (MBP/ADV) 100 mL  3 g IntraVENous Q8H    furosemide (LASIX) injection 40 mg  40 mg IntraVENous BID    polyethylene glycol (MIRALAX) packet 17 g  17 g Oral DAILY    nystatin (MYCOSTATIN) 100,000 unit/gram powder   Topical BID    sodium chloride (NS) flush 5-10 mL  5-10 mL IntraVENous Q8H    enoxaparin (LOVENOX) injection 40 mg  40 mg SubCUTAneous Q12H    busPIRone (BUSPAR) tablet 15 mg  15 mg Oral QHS    labetalol (NORMODYNE) tablet 300 mg  300 mg Oral Q12H    ascorbic acid (vitamin C) (VITAMIN C) tablet 500 mg  500 mg Oral DAILY    amLODIPine (NORVASC) tablet 10 mg  10 mg Oral DAILY    losartan (COZAAR) tablet 50 mg  50 mg Oral DAILY       Review of Systems:  A comprehensive review of systems was negative except for that written in the HPI.     Objective:   Vital Signs:    Visit Vitals    /76    Pulse (P) 90    Temp 98.6 °F (37 °C)    Resp (P) 25    Ht 5' 8\" (1.727 m)    Wt 145.6 kg (320 lb 15.8 oz)    SpO2 (P) 98%    BMI 48.81 kg/m2       O2 Device: Nasal cannula   O2 Flow Rate (L/min): 3 l/min   Temp (24hrs), Av.7 °F (37.1 °C), Min:98 °F (36.7 °C), Max:99.1 °F (37.3 °C)       Intake/Output:   Last shift:         Last 3 shifts: 08/15 1901 -  0700  In: 360 [P.O.:360]  Out: 2450 [Urine:2450]    Intake/Output Summary (Last 24 hours) at 08/17/18 1120  Last data filed at 08/16/18 2205   Gross per 24 hour   Intake              360 ml   Output             1300 ml   Net             -940 ml      Physical Exam:   General:  Alert, obese, on O2; off BIPAP   Head:  Normocephalic, without obvious abnormality, atraumatic. Eyes:  Conjunctivae/corneas clear. Nose: Nares normal. Septum midline. Mucosa normal.    Throat: Lips, mucosa, and tongue normal.     Neck: Supple, symmetrical, trachea midline    Back:   Symmetric, no curvature. ROM normal.   Lungs:   Clear to auscultation bilaterally. Chest wall:  No tenderness or deformity. Heart:  Regular rate and rhythm    Abdomen:   Soft, non-tender. Bowel sounds normal.    Extremities: Erythema and edema LLE > RLE, no clubbing   Skin: Old brown recluse bite on left foot, with cellulitis on left lower extremity; 2+ edema; warm   Lymph nodes: Cervical, supraclavicular nodes normal.   Neurologic: Grossly nonfocal     Data review:     No results found for this or any previous visit (from the past 24 hour(s)). Imaging:  I have personally reviewed the patients radiographs and have reviewed the reports: non etoday  Eventration of left hemidiaphragm.   No acute disease in the lung        Augusto Lancaster MD

## 2018-08-17 NOTE — PROGRESS NOTES
1:40PM  Nursing notified CM pt's dtr wanting to discuss d/c planning. CM met with pt and her dtr, Molly Kasper. They want pt to return home and are choosing [facility] for [facility]. CM discussed need for O2 at home and will help facilitate. Pt has new pt PCP appt already scheduled for Wednesday. Pt will need RW.     3:13PM  Referral sent to [facility] for nursing/PT/OT. Plan for pt to d/c home on Monday. CM will f/u on O2 needs on Monday as order must be within 48 hrs of d/c.     4:29PM  [facility] accepted for [facility] and/or , pending PCP appt on Wednesday. Referral sent to [facility] for RW. CM will continue to follow and assist with d/c planning.      Asuncion Lofton MSW  Care Manager

## 2018-08-17 NOTE — PROGRESS NOTES
Hospitalist Progress Note    NAME: Cat Vanessa   :  1955   MRN:  [id number]       Assessment / Plan:  Acute Hypoxic Respiratory Failure POA/Elevated left hemidiaphragm POA  Likely Chronic Diastolic CHF POA  CXR looks congested with cardiomegaly, she may have some degree of CHF,   CT chest no pathology in lung,   ECHO EF 65%,   ABG 8/15- still hypoxic on 3L/m, Co2 ok    Cont to monitor I/O and weight,  Didn't use Bipap last night  Cont diuresis IV with lasix for now, increased to Q 12 8/15, will change to PO on discharge, Cr stable 0.7 again today  Pulmonology help appreciated- will need MONSERRAT taken cared off for real improvement   Cont O2 during the day- taper as able, if not able to will need oxygen  CPAP at night likely as per pulm- will need OP Sleep study - appointment set up with [facility]  check overnight oxymetry may need Home O2 for now,   PT/OT eval noted- initially [facility] was recommended but now pt likely needs SNF- CM updated on arranging it when medically stable  keep on PCU for now as pt may need to use BIPAP prn as per pulmonary      Sepsis ([facility] [address] (2018) POA WBC 22.6, , RR to 40, temp 103.2, normal lactate, WBC still up ~ 16k, no more fever  Left leg cellulitis POA- Erythema is better, but still warm and swollen  H/o Chronic L Leg lymphedema s/p Spider bite in remote past  Duplex is negative for DVT. s/p IV  Rocephin  Cont IV Vancomycin  Changed to unasyn (8/15) due to persistent leukocytosis- cont for now    HTN urgency POA- now controlled  Cont Norvasc, BB, ARB, monitor. Use hydralazine prn. So far better control      Obesity Hypoventilation Syndrome: c/w CPAP at night, needs sleep study as outpatient, may need oxygen too  Hyponatremia POA so far improving, monitor. Morbid Obesity POA Body mass index is 45.61 kg/(m^2). - weight loss recommended  NOK:   Code status: Full  D/W , DTR Lisa [id number]  Prophylaxis: Lovenox    Recommended Disposition:  PT, OT, RN likely soon when Home CPAP/Oxygen arranged & improved WBC/leg cellulitis & respiratory status stabilized     Subjective:     Chief Complaint / Reason for Physician Visit: F/U L LE cellulitis, sepsis, Lymphedema, MONSERRAT/OHS  \"I feel better on BIPAP\". Discussed with RN events overnight. - transferred to PCU on BIPAP 8/15 evening    Review of Systems:  Symptom Y/N Comments  Symptom Y/N Comments   Fever/Chills    Chest Pain     Poor Appetite    Edema     Cough    Abdominal Pain     Sputum    Joint Pain y    SOB/FLORENCE y Worsened last evening- back on BIPAP  Pruritis/Rash y L Leg   Nausea/vomit    Tolerating PT/OT y    Diarrhea    Tolerating Diet y    Constipation    Other y Anxious     Could NOT obtain due to:      Objective:     VITALS:   Last 24hrs VS reviewed since prior progress note. Most recent are:  Patient Vitals for the past 24 hrs:   Temp Pulse Resp BP SpO2   08/17/18 1055 98.6 °F (37 °C) (P) 90 (P) 25 - (P) 98 %   08/17/18 0751 99 °F (37.2 °C) 83 25 157/76 94 %   08/17/18 0232 98.6 °F (37 °C) 81 26 146/73 95 %   08/16/18 2221 98.9 °F (37.2 °C) 72 (!) 32 112/74 100 %   08/16/18 2152 - - 28 - -   08/16/18 2150 - - - - 99 %   08/16/18 2106 - 96 - 165/75 -   08/16/18 1915 99.1 °F (37.3 °C) 88 18 157/74 95 %   08/16/18 1520 98 °F (36.7 °C) 85 18 156/74 -       Intake/Output Summary (Last 24 hours) at 08/17/18 1226  Last data filed at 08/16/18 2205   Gross per 24 hour   Intake              240 ml   Output             1300 ml   Net            -1060 ml        PHYSICAL EXAM:  General: WD, WN. Alert, cooperative, SOB  EENT:  EOMI. Anicteric sclerae. MMM, off BIPAP now  Resp:  Coarse BS,   CV:  Regular  rhythm,  + edema  GI:  Soft, Non distended, Non tender.  +Bowel sounds  Neurologic:  Alert and oriented X 3, normal speech,   Psych:   poor insight. mildly anxious +  Skin/extremities:  +  Erythema of the left leg (improved as per pt/family),Chronic Lymphedema noted +, .   No jaundice    Reviewed most current lab test results and cultures  YES  Reviewed most current radiology test results   YES  Review and summation of old records today    NO  Reviewed patient's current orders and MAR    YES  PMH/SH reviewed - no change compared to H&P  ________________________________________________________________________  Care Plan discussed with:    Comments   Patient x    Family  x Daughter on phone yesterday   RN x    Care Manager x    Consultant  x Dr Mona Ron (Pulm)                     Multidiciplinary team rounds were held today with , nursing, pharmacist and clinical coordinator. Patient's plan of care was discussed; medications were reviewed and discharge planning was addressed. ________________________________________________________________________  Total NON critical care TIME:  26   Minutes    Total CRITICAL CARE TIME Spent:   Minutes non procedure based      Comments   >50% of visit spent in counseling and coordination of care     ________________________________________________________________________  Kaylene Sullivan MD     Procedures: see electronic medical records for all procedures/Xrays and details which were not copied into this note but were reviewed prior to creation of Plan. LABS:  I reviewed today's most current labs and imaging studies.   Pertinent labs include:  Recent Labs      08/16/18   0320  08/15/18   0259   WBC  16.6*  16.3*   HGB  9.8*  10.4*   HCT  31.9*  33.3*   PLT  262  231     Recent Labs      08/16/18   0320  08/15/18   0259   NA  138  136   K  3.7  4.0   CL  102  102   CO2  33*  31   GLU  94  110*   BUN  16  20   CREA  0.76  0.72   CA  10.6*  11.2*       Signed: Kaylene Sullivan MD

## 2018-08-17 NOTE — PROGRESS NOTES
Problem: Mobility Impaired (Adult and Pediatric)  Goal: *Acute Goals and Plan of Care (Insert Text)  Physical Therapy Goals  Revised 8/16/2018  1. Patient will move from supine to sit and sit to supine  and scoot up and down in bed with modified independence within 7 day(s). 2.  Patient will transfer from bed to chair and chair to bed with modified independence using the least restrictive device within 7 day(s). 3.  Patient will perform sit to stand with independence within 7 day(s). 4.  Patient will ambulate with modified independence for 150 feet with the least restrictive device within 7 day(s). 5.  Patient will ascend/descend 13 stairs with 2 handrail(s) with supervision/set-up within 7 day(s). Physical Therapy Goals  Initiated 8/11/2018  1. Patient will move from supine to sit and sit to supine  in bed with independence within 7 day(s). 2.  Patient will transfer from bed to chair and chair to bed with independence using the least restrictive device within 7 day(s). 3.  Patient will perform sit to stand with independence within 7 day(s). 4.  Patient will ambulate with supervision/set-up for 100 feet with the least restrictive device within 7 day(s). 5.  Patient will ascend/descend 13 stairs with 2 handrail(s) with supervision/set-up within 7 day(s). physical Therapy TREATMENT  Patient: Fahad Van (65 y.o. female)  Date: 8/17/2018  Diagnosis: Sepsis ([address] <principal problem not specified>       Precautions: Fall, Other (comment) (Bariatric)  Chart, physical therapy assessment, plan of care and goals were reviewed. ASSESSMENT:  Patient was lying in bed when PT arrived. Agreed to therapy and was cleared by nursing. Good overall progress improving her mobility skills and activity tolerance compared to yesterday. Currently demonstrates independent skill with bed mobility and supine to sit transfers.  Sit to stand improved to sb assistance and gait tolerated for 90 feet(30 ft and 60 ft) with RW and sb assistance. Her O2 sats decreased while on 3L/min from 95% at rest to 86% after walking 60 feet. HR and BP were WNL. Post activity she was 94% while resting in the chair. Recommend  PT and OT upon discharge at this time. Progression toward goals:   [x]    Improving appropriately and progressing toward goals  []    Improving slowly and progressing toward goals  []    Not making progress toward goals and plan of care will be adjusted     PLAN:  Patient continues to benefit from skilled intervention to address the above impairments. Continue treatment per established plan of care. Discharge Recommendations:  Home Health PT and OT  Further Equipment Recommendations for Discharge:  rolling walker - wide     SUBJECTIVE:   Patient stated I'm more tired today    OBJECTIVE DATA SUMMARY:   Critical Behavior:  Neurologic State: Alert  Orientation Level: Oriented X4  Cognition: Follows commands  Safety/Judgement: Awareness of environment, Insight into deficits  Functional Mobility Training:  Bed Mobility:  Rolling: Independent  Supine to Sit: Independent     Scooting: Independent        Transfers:  Sit to Stand: Stand-by assistance  Stand to Sit: Stand-by assistance        Bed to Chair: Stand-by assistance                    Balance:  Sitting: Intact  Standing: Impaired  Standing - Static: Good;Occassional  Standing - Dynamic : Fair  Ambulation/Gait Training:  Distance (ft): 90 Feet (ft)  Assistive Device: Walker, rolling;Gait belt  Ambulation - Level of Assistance: Stand-by assistance        Gait Abnormalities: Step to gait        Base of Support: Widened     Speed/Jammie: Pace decreased (<100 feet/min)  Step Length: Right shortened;Left shortened                    Pain:  Pain Scale 1: Numeric (0 - 10)  Pain Intensity 1: 0              Activity Tolerance:   Fair. Please refer to the flowsheet for vital signs taken during this treatment.   After treatment:   [x]    Patient left in no apparent distress sitting up in chair  []    Patient left in no apparent distress in bed  [x]    Call bell left within reach  [x]    Nursing notified  []    Caregiver present  []    Bed alarm activated    COMMUNICATION/COLLABORATION:   The patients plan of care was discussed with: Registered Nurse and     Roxanna Chaudhry, PT   Time Calculation: 35 mins

## 2018-08-17 NOTE — PROGRESS NOTES
O2 challenge:    2.5L/min O2:  rest 92% with 78 bpm  Walking 30 ft 82% with 93 bpm    3.0 L/min O2  Rest 95% with 84 bpm  Walking 30 ft 91% with 99 bpm  Walking 60 ft 86% with 110 bpm    Did not test on room air.      Left, PT

## 2018-08-18 LAB
ANION GAP SERPL CALC-SCNC: 5 MMOL/L (ref 5–15)
BUN SERPL-MCNC: 12 MG/DL (ref 6–20)
BUN/CREAT SERPL: 18 (ref 12–20)
CALCIUM SERPL-MCNC: 10.3 MG/DL (ref 8.5–10.1)
CHLORIDE SERPL-SCNC: 98 MMOL/L (ref 97–108)
CO2 SERPL-SCNC: 33 MMOL/L (ref 21–32)
CREAT SERPL-MCNC: 0.65 MG/DL (ref 0.55–1.02)
ERYTHROCYTE [DISTWIDTH] IN BLOOD BY AUTOMATED COUNT: 14.9 % (ref 11.5–14.5)
GLUCOSE BLD STRIP.AUTO-MCNC: 166 MG/DL (ref 65–100)
GLUCOSE SERPL-MCNC: 111 MG/DL (ref 65–100)
HCT VFR BLD AUTO: 32 % (ref 35–47)
HGB BLD-MCNC: 10 G/DL (ref 11.5–16)
MAGNESIUM SERPL-MCNC: 2 MG/DL (ref 1.6–2.4)
MCH RBC QN AUTO: 28.7 PG (ref 26–34)
MCHC RBC AUTO-ENTMCNC: 31.3 G/DL (ref 30–36.5)
MCV RBC AUTO: 92 FL (ref 80–99)
NRBC # BLD: 0 K/UL (ref 0–0.01)
NRBC BLD-RTO: 0 PER 100 WBC
PLATELET # BLD AUTO: 297 K/UL (ref 150–400)
PMV BLD AUTO: 10.3 FL (ref 8.9–12.9)
POTASSIUM SERPL-SCNC: 3.6 MMOL/L (ref 3.5–5.1)
RBC # BLD AUTO: 3.48 M/UL (ref 3.8–5.2)
SERVICE CMNT-IMP: ABNORMAL
SODIUM SERPL-SCNC: 136 MMOL/L (ref 136–145)
WBC # BLD AUTO: 15.1 K/UL (ref 3.6–11)

## 2018-08-18 PROCEDURE — 77030011255 HC DSG AQUACEL AG BMS -A

## 2018-08-18 PROCEDURE — 77030011256 HC DRSG MEPILEX <16IN NO BORD MOLN -A

## 2018-08-18 PROCEDURE — 74011250636 HC RX REV CODE- 250/636: Performed by: INTERNAL MEDICINE

## 2018-08-18 PROCEDURE — 74011250637 HC RX REV CODE- 250/637: Performed by: INTERNAL MEDICINE

## 2018-08-18 PROCEDURE — 65660000000 HC RM CCU STEPDOWN

## 2018-08-18 PROCEDURE — 74011000258 HC RX REV CODE- 258: Performed by: INTERNAL MEDICINE

## 2018-08-18 PROCEDURE — 82962 GLUCOSE BLOOD TEST: CPT

## 2018-08-18 PROCEDURE — 83735 ASSAY OF MAGNESIUM: CPT | Performed by: INTERNAL MEDICINE

## 2018-08-18 PROCEDURE — 36415 COLL VENOUS BLD VENIPUNCTURE: CPT | Performed by: INTERNAL MEDICINE

## 2018-08-18 PROCEDURE — 77010033678 HC OXYGEN DAILY

## 2018-08-18 PROCEDURE — 85027 COMPLETE CBC AUTOMATED: CPT | Performed by: INTERNAL MEDICINE

## 2018-08-18 PROCEDURE — 74011000250 HC RX REV CODE- 250: Performed by: INTERNAL MEDICINE

## 2018-08-18 PROCEDURE — 80048 BASIC METABOLIC PNL TOTAL CA: CPT | Performed by: INTERNAL MEDICINE

## 2018-08-18 RX ADMIN — SODIUM CHLORIDE 3 G: 900 INJECTION, SOLUTION INTRAVENOUS at 09:05

## 2018-08-18 RX ADMIN — SODIUM CHLORIDE 3 G: 900 INJECTION, SOLUTION INTRAVENOUS at 18:20

## 2018-08-18 RX ADMIN — OXYCODONE HYDROCHLORIDE AND ACETAMINOPHEN 1 TABLET: 5; 325 TABLET ORAL at 09:10

## 2018-08-18 RX ADMIN — SODIUM CHLORIDE 3 G: 900 INJECTION, SOLUTION INTRAVENOUS at 01:20

## 2018-08-18 RX ADMIN — ENOXAPARIN SODIUM 40 MG: 100 INJECTION SUBCUTANEOUS at 09:05

## 2018-08-18 RX ADMIN — FUROSEMIDE 40 MG: 10 INJECTION, SOLUTION INTRAMUSCULAR; INTRAVENOUS at 09:06

## 2018-08-18 RX ADMIN — OXYCODONE HYDROCHLORIDE AND ACETAMINOPHEN 1 TABLET: 5; 325 TABLET ORAL at 21:10

## 2018-08-18 RX ADMIN — Medication 10 ML: at 21:00

## 2018-08-18 RX ADMIN — OXYCODONE HYDROCHLORIDE AND ACETAMINOPHEN 500 MG: 500 TABLET ORAL at 09:06

## 2018-08-18 RX ADMIN — AMLODIPINE BESYLATE 10 MG: 5 TABLET ORAL at 09:06

## 2018-08-18 RX ADMIN — Medication 10 ML: at 05:07

## 2018-08-18 RX ADMIN — ENOXAPARIN SODIUM 40 MG: 100 INJECTION SUBCUTANEOUS at 20:59

## 2018-08-18 RX ADMIN — POLYETHYLENE GLYCOL 3350 17 G: 17 POWDER, FOR SOLUTION ORAL at 09:07

## 2018-08-18 RX ADMIN — LABETALOL HYDROCHLORIDE 300 MG: 200 TABLET, FILM COATED ORAL at 20:58

## 2018-08-18 RX ADMIN — BUSPIRONE HYDROCHLORIDE 15 MG: 5 TABLET ORAL at 20:58

## 2018-08-18 RX ADMIN — LOSARTAN POTASSIUM 50 MG: 50 TABLET ORAL at 09:06

## 2018-08-18 RX ADMIN — FUROSEMIDE 40 MG: 10 INJECTION, SOLUTION INTRAMUSCULAR; INTRAVENOUS at 18:18

## 2018-08-18 RX ADMIN — NYSTATIN: 100000 POWDER TOPICAL at 09:00

## 2018-08-18 RX ADMIN — LABETALOL HYDROCHLORIDE 300 MG: 200 TABLET, FILM COATED ORAL at 09:06

## 2018-08-18 NOTE — PROGRESS NOTES
Patient sitting in recliner and refused to stand for weight for [facility] PCT. Subjective:      Ashley Márquez is a 70 y.o. male presents for postop care . Procedure:  01/09/18: LVATS, Full pulmonary decortication    Appetite is good. Eating a regular diet without difficulty. Bowel movements are regular. The patient is voiding without difficulty. The patient is not having any pain. .    Objective:     Visit Vitals    BP (!) 171/91 (BP 1 Location: Left arm, BP Patient Position: Sitting)    Pulse (!) 101    Resp 18    Ht 5' 8\" (1.727 m)    Wt 146 lb 9.6 oz (66.5 kg)    SpO2 94%    BMI 22.29 kg/m2       Physical Exam:  GENERAL: alert, cooperative, no distress, appears stated age, LUNG: congested but clear with cough, HEART: regular rate and rhythm, EXTREMITIES:  extremities normal, atraumatic, no cyanosis or edema    Data Review images and reports reviewed with Dr Skip Slaughter    Assessment:     Patient Active Problem List   Diagnosis Code    Recurrent left pleural effusion J90       Doing well postoperatively. Completed Augmentin. Still using inhalers and neb treatment with productive cough for thin white sputum          Plan/Recommendations/Medical Decision Making: We will see him again if needed    We discussed the importance of proper diet and 30 minutes/day of proper exercise. All questions were personally answered. Thank you for allowing us to participate in the care of your patient.     Alba Au 36  Thoracic Surgery

## 2018-08-19 LAB
GLUCOSE BLD STRIP.AUTO-MCNC: 111 MG/DL (ref 65–100)
SERVICE CMNT-IMP: ABNORMAL

## 2018-08-19 PROCEDURE — 77010033678 HC OXYGEN DAILY

## 2018-08-19 PROCEDURE — 74011250636 HC RX REV CODE- 250/636: Performed by: INTERNAL MEDICINE

## 2018-08-19 PROCEDURE — 65660000000 HC RM CCU STEPDOWN

## 2018-08-19 PROCEDURE — 74011000258 HC RX REV CODE- 258: Performed by: INTERNAL MEDICINE

## 2018-08-19 PROCEDURE — 77030011256 HC DRSG MEPILEX <16IN NO BORD MOLN -A

## 2018-08-19 PROCEDURE — 74011000250 HC RX REV CODE- 250: Performed by: INTERNAL MEDICINE

## 2018-08-19 PROCEDURE — 74011250637 HC RX REV CODE- 250/637: Performed by: INTERNAL MEDICINE

## 2018-08-19 PROCEDURE — 82962 GLUCOSE BLOOD TEST: CPT

## 2018-08-19 RX ORDER — FUROSEMIDE 10 MG/ML
60 INJECTION INTRAMUSCULAR; INTRAVENOUS 2 TIMES DAILY
Status: DISCONTINUED | OUTPATIENT
Start: 2018-08-20 | End: 2018-08-23 | Stop reason: HOSPADM

## 2018-08-19 RX ADMIN — Medication 10 ML: at 17:07

## 2018-08-19 RX ADMIN — POLYETHYLENE GLYCOL 3350 17 G: 17 POWDER, FOR SOLUTION ORAL at 10:11

## 2018-08-19 RX ADMIN — NYSTATIN: 100000 POWDER TOPICAL at 10:14

## 2018-08-19 RX ADMIN — LABETALOL HYDROCHLORIDE 300 MG: 200 TABLET, FILM COATED ORAL at 22:40

## 2018-08-19 RX ADMIN — LABETALOL HYDROCHLORIDE 300 MG: 200 TABLET, FILM COATED ORAL at 10:13

## 2018-08-19 RX ADMIN — LOSARTAN POTASSIUM 50 MG: 50 TABLET ORAL at 10:13

## 2018-08-19 RX ADMIN — OXYCODONE HYDROCHLORIDE AND ACETAMINOPHEN 1 TABLET: 5; 325 TABLET ORAL at 12:23

## 2018-08-19 RX ADMIN — Medication 10 ML: at 22:00

## 2018-08-19 RX ADMIN — SODIUM CHLORIDE 3 G: 900 INJECTION, SOLUTION INTRAVENOUS at 17:06

## 2018-08-19 RX ADMIN — OXYCODONE HYDROCHLORIDE AND ACETAMINOPHEN 1 TABLET: 5; 325 TABLET ORAL at 17:16

## 2018-08-19 RX ADMIN — SODIUM CHLORIDE 3 G: 900 INJECTION, SOLUTION INTRAVENOUS at 10:13

## 2018-08-19 RX ADMIN — OXYCODONE HYDROCHLORIDE AND ACETAMINOPHEN 500 MG: 500 TABLET ORAL at 10:13

## 2018-08-19 RX ADMIN — ENOXAPARIN SODIUM 40 MG: 100 INJECTION SUBCUTANEOUS at 22:37

## 2018-08-19 RX ADMIN — AMLODIPINE BESYLATE 10 MG: 5 TABLET ORAL at 10:13

## 2018-08-19 RX ADMIN — SODIUM CHLORIDE 3 G: 900 INJECTION, SOLUTION INTRAVENOUS at 00:16

## 2018-08-19 RX ADMIN — BUSPIRONE HYDROCHLORIDE 15 MG: 5 TABLET ORAL at 22:40

## 2018-08-19 RX ADMIN — SODIUM CHLORIDE 3 G: 900 INJECTION, SOLUTION INTRAVENOUS at 22:37

## 2018-08-19 RX ADMIN — FUROSEMIDE 40 MG: 10 INJECTION, SOLUTION INTRAMUSCULAR; INTRAVENOUS at 17:07

## 2018-08-19 RX ADMIN — FUROSEMIDE 40 MG: 10 INJECTION, SOLUTION INTRAMUSCULAR; INTRAVENOUS at 10:13

## 2018-08-19 RX ADMIN — ENOXAPARIN SODIUM 40 MG: 100 INJECTION SUBCUTANEOUS at 10:13

## 2018-08-19 NOTE — PROGRESS NOTES
Decrease oxygen to 3 LNC. Oxygen frequently falls off sats drop to 80% and patient unaware throughout. Must encourage to move and assist with lifting limbs for vitals, wound care, ambulating in general. Good appetite ate hospital dinner tray and  brought full plate and finished both.

## 2018-08-19 NOTE — PROGRESS NOTES
Hospitalist Progress Note    NAME: Turner Castro   :  1955   MRN:  [id number]       Assessment / Plan:  Acute Hypoxic Respiratory Failure POA/Elevated left hemidiaphragm POA  Likely Chronic Diastolic CHF POA  CXR looks congested with cardiomegaly, she may have some degree of CHF,   CT chest no pathology in lung,   ECHO EF 65%,   ABG 8/15- still hypoxic on 3L/m, Co2 ok    Cont to monitor I/O and weight,  Didn't use Bipap last night  Cont diuresis IV with lasix for now, increased to Q 12 8/15, will change to PO on discharge, Cr stable 0.7 again today  Pulmonology help appreciated- will need MONSERRAT taken cared off for real improvement   Cont O2 during the day- taper as able, if not able to will need oxygen  CPAP at night likely as per pulm- will need OP Sleep study - appointment set up with [facility]  check overnight oxymetry may need Home O2 for now,   PT/OT eval noted- initially [facility] was recommended but now pt likely needs SNF- CM updated on arranging it when medically stable  keep on PCU for now as pt may need to use BIPAP prn as per pulmonary      Sepsis ([facility] [address] (2018) POA WBC 22.6, , RR to 40, temp 103.2, normal lactate, WBC still up ~ 16k, no more fever  Left leg cellulitis POA- Erythema is better, but still warm and swollen  H/o Chronic L Leg lymphedema s/p Spider bite in remote past  Suspect also Venous insufficiency  Duplex is negative for DVT. s/p IV  Rocephin  Cont IV Vancomycin  Changed to unasyn (8/15) due to persistent leukocytosis- cont for now    HTN urgency POA- now controlled  Cont Norvasc, BB, ARB, monitor. Use hydralazine prn. So far better control      Obesity Hypoventilation Syndrome: c/w CPAP at night, needs sleep study as outpatient, may need oxygen too  Hyponatremia POA so far improving, monitor. Morbid Obesity POA Body mass index is 45.61 kg/(m^2). - weight loss recommended  NOK:   Code status: Full  D/W , DTR Lisa [phone]  Prophylaxis: Lovenox    Recommended Disposition:  PT, OT, RN likely soon when Home CPAP/Oxygen arranged & improved WBC/leg cellulitis & respiratory status stabilized     Subjective:     Chief Complaint / Reason for Physician Visit: F/U L LE cellulitis, sepsis, Lymphedema, MONSERRAT/OHS  \"I feel better on BIPAP\". Discussed with RN events overnight. - transferred to PCU on BIPAP 8/15 evening    Review of Systems:  Symptom Y/N Comments  Symptom Y/N Comments   Fever/Chills    Chest Pain     Poor Appetite    Edema     Cough    Abdominal Pain     Sputum    Joint Pain y    SOB/FLORENCE y Worsened last evening- back on BIPAP  Pruritis/Rash y L Leg   Nausea/vomit    Tolerating PT/OT y    Diarrhea    Tolerating Diet y    Constipation    Other y Anxious     Could NOT obtain due to:      Objective:     VITALS:   Last 24hrs VS reviewed since prior progress note. Most recent are:  Patient Vitals for the past 24 hrs:   Temp Pulse Resp BP SpO2   08/18/18 0907 98.6 °F (37 °C) 79 20 160/72 96 %   08/18/18 0219 98.5 °F (36.9 °C) 79 20 151/63 95 %   08/17/18 2300 98.7 °F (37.1 °C) 77 20 154/68 96 %   08/17/18 2235 98.7 °F (37.1 °C) - - - -   08/17/18 2222 - 79 - 156/73 -       Intake/Output Summary (Last 24 hours) at 08/18/18 2017  Last data filed at 08/18/18 0550   Gross per 24 hour   Intake                0 ml   Output              400 ml   Net             -400 ml        PHYSICAL EXAM:  General: WD, WN. Alert, cooperative, SOB  EENT:  EOMI. Anicteric sclerae. MMM, off BIPAP now  Resp:  Coarse BS,   CV:  Regular  rhythm,  + edema, still with erythema suspect Vascular insufficiency  GI:  Soft, Non distended, Non tender.  +Bowel sounds  Neurologic:  Alert and oriented X 3, normal speech,   Psych:   poor insight. mildly anxious +  Skin/extremities:  +  Erythema of the left leg (improved as per pt/family),Chronic Lymphedema noted +, .   No jaundice    Reviewed most current lab test results and cultures  YES  Reviewed most current radiology test results YES  Review and summation of old records today    NO  Reviewed patient's current orders and MAR    YES  PMH/SH reviewed - no change compared to H&P  ________________________________________________________________________  Care Plan discussed with:    Comments   Patient x    Family  x  bedside   RN x    Care Manager     Consultant                        Multidiciplinary team rounds were held today with , nursing, pharmacist and clinical coordinator. Patient's plan of care was discussed; medications were reviewed and discharge planning was addressed. ________________________________________________________________________  Total NON critical care TIME:  26   Minutes    Total CRITICAL CARE TIME Spent:   Minutes non procedure based      Comments   >50% of visit spent in counseling and coordination of care     ________________________________________________________________________  Miguelito Mccauley MD     Procedures: see electronic medical records for all procedures/Xrays and details which were not copied into this note but were reviewed prior to creation of Plan. LABS:  I reviewed today's most current labs and imaging studies.   Pertinent labs include:  Recent Labs      08/18/18   0131 08/16/18   0320   WBC  15.1*  16.6*   HGB  10.0*  9.8*   HCT  32.0*  31.9*   PLT  297  262     Recent Labs      08/18/18   0131 08/16/18   0320   NA  136  138   K  3.6  3.7   CL  98  102   CO2  33*  33*   GLU  111*  94   BUN  12  16   CREA  0.65  0.76   CA  10.3*  10.6*   MG  2.0   --        Signed: Miguelito Mccauley MD

## 2018-08-19 NOTE — PROGRESS NOTES
Hospitalist Progress Note    NAME: Sussy Haro   :  1955   MRN:  [id number]       Assessment / Plan:  Acute Hypoxic Respiratory Failure POA/Elevated left hemidiaphragm POA  Likely Chronic Diastolic CHF POA  With Leg edema  CXR looks congested with cardiomegaly, she may have some degree of CHF,   CT chest no pathology in lung,   ECHO EF 65%,   ABG 8/15- still hypoxic on 3L/m, Co2 ok    Cont to monitor I/O and weight,  Didn't use Bipap last night  Cont  IV with lasix  increased to Q 12 8/15, inrease to 60 mg BID   Pulmonology help appreciated- will need MONSERRAT taken cared off for real improvement   Cont O2 during the day- taper as able, if not able to will need oxygen  CPAP at night likely as per pulm- will need OP Sleep study - appointment set up with Pulm associates  check overnight oxymetry may need Home O2 for now,   PT/OT eval noted- initially HH was recommended but now pt likely needs SNF- CM updated on arranging it when medically stable  keep on PCU for now as pt may need to use BIPAP prn as per pulmonary      Sepsis ([facility] [address] (2018) POA WBC 22.6, , RR to 40, temp 103.2, normal lactate, WBC still up ~ 16k, no more fever  Left leg cellulitis POA- Erythema is better, but still warm and swollen  H/o Chronic L Leg lymphedema s/p Spider bite in remote past  Suspect also Venous insufficiency  Duplex is negative for DVT. s/p IV  Rocephin  Cont IV Vancomycin  Changed to unasyn (8/15) due to persistent leukocytosis- cont for now    HTN urgency POA- now controlled  Cont Norvasc, BB, ARB, monitor. Use hydralazine prn. So far better control      Obesity Hypoventilation Syndrome: c/w CPAP at night, needs sleep study as outpatient, may need oxygen too  Hyponatremia POA so far improving, monitor. Morbid Obesity POA Body mass index is 45.61 kg/(m^2). - weight loss recommended  NOK:   Code status: Full  D/W , DTR Lisa 156 [id number]  Prophylaxis: Lovenox    Recommended Disposition: [place] PT, OT, RN likely soon when Home CPAP/Oxygen arranged & improved WBC/leg cellulitis & respiratory status stabilized     Subjective:  Has lost 3 lbs in fluid weight      Chief Complaint / Reason for Physician Visit: F/U L LE cellulitis, sepsis, Lymphedema, MONSERRAT/OHS  Kerry not used BIpap  \"I feel better on BIPAP\". Discussed with RN events overnight. - transferred to PCU on BIPAP 8/15 evening    Review of Systems:  Symptom Y/N Comments  Symptom Y/N Comments   Fever/Chills    Chest Pain     Poor Appetite    Edema     Cough    Abdominal Pain     Sputum    Joint Pain y    SOB/[place]ORENCE y Worsened last evening- back on BIPAP  Pruritis/Rash y L Leg   Nausea/vomit    Tolerating PT/OT y    Diarrhea    Tolerating Diet y    Constipation    Other y Anxious     Could NOT obtain due to:      Objective:     VITALS:   Last 24hrs VS reviewed since prior progress note. Most recent are:  Patient Vitals for the past 24 hrs:   Temp Pulse Resp BP SpO2   08/19/18 1006 99 °F (37.2 °C) 85 20 158/71 97 %   08/18/18 2233 99.1 °F (37.3 °C) 84 20 105/44 97 %   08/18/18 2058 98.7 °F (37.1 °C) 84 24 - 97 %       Intake/Output Summary (Last 24 hours) at 08/19/18 1911  Last data filed at 08/19/18 0016   Gross per 24 hour   Intake              200 ml   Output                0 ml   Net              200 ml        PHYSICAL EXAM:  General: WD, WN. Alert, cooperative, SOB  EENT:  EOMI. Anicteric sclerae. MMM, off BIPAP now  Resp:  Coarse BS,   CV:  Regular  rhythm,  + edema, still with erythema suspect Vascular insufficiency  GI:  Soft, Non distended, Non tender.  +Bowel sounds  Neurologic:  Alert and oriented X 3, normal speech,   Psych:   poor insight. mildly anxious +  Skin/extremities:  +  Erythema of the left leg (improved as per pt/family),Chronic Lymphedema noted +, .  REDNESS improves with Elevation of Lower Ext No jaundice    Reviewed most current lab test results and cultures  YES  Reviewed most current radiology test results   YES  Review and summation of old records today    NO  Reviewed patient's current orders and MAR    YES  PMH/SH reviewed - no change compared to H&P  ________________________________________________________________________  Care Plan discussed with:    Comments   Patient x    Family      RN x bedside   Care Manager     Consultant                        Multidiciplinary team rounds were held today with , nursing, pharmacist and clinical coordinator. Patient's plan of care was discussed; medications were reviewed and discharge planning was addressed. ________________________________________________________________________  Total NON critical care TIME:  26   Minutes    Total CRITICAL CARE TIME Spent:   Minutes non procedure based      Comments   >50% of visit spent in counseling and coordination of care     ________________________________________________________________________  Jose Camarillo MD     Procedures: see electronic medical records for all procedures/Xrays and details which were not copied into this note but were reviewed prior to creation of Plan. LABS:  I reviewed today's most current labs and imaging studies.   Pertinent labs include:  Recent Labs      08/18/18   0131   WBC  15.1*   HGB  10.0*   HCT  32.0*   PLT  297     Recent Labs      08/18/18 0131   NA  136   K  3.6   CL  98   CO2  33*   GLU  111*   BUN  12   CREA  0.65   CA  10.3*   MG  2.0       Signed: Jose Camarillo MD

## 2018-08-19 NOTE — PROGRESS NOTES
Assumed care at shift change, bedside report done. Patient had no complaints or needs at that time. Is looking forward to discharge. Affect is a bit flat but has been consistent throughout her stay.  is bedside for the night.

## 2018-08-19 NOTE — PROGRESS NOTES
Patient called me in to assess redness on backside. Noted diffuse red spots on backside and lower back. Patient says they are itchy but no other complaints related to the spots. Per the patient this is new, and per report this was not present today. Hospitalist paged.

## 2018-08-20 LAB
BASOPHILS # BLD: 0 K/UL (ref 0–0.1)
BASOPHILS NFR BLD: 0 % (ref 0–1)
DIFFERENTIAL METHOD BLD: ABNORMAL
EOSINOPHIL # BLD: 0.3 K/UL (ref 0–0.4)
EOSINOPHIL NFR BLD: 3 % (ref 0–7)
ERYTHROCYTE [DISTWIDTH] IN BLOOD BY AUTOMATED COUNT: 14.8 % (ref 11.5–14.5)
HCT VFR BLD AUTO: 32.3 % (ref 35–47)
HGB BLD-MCNC: 9.9 G/DL (ref 11.5–16)
IMM GRANULOCYTES # BLD: 0.1 K/UL (ref 0–0.04)
IMM GRANULOCYTES NFR BLD AUTO: 1 % (ref 0–0.5)
LYMPHOCYTES # BLD: 1.2 K/UL (ref 0.8–3.5)
LYMPHOCYTES NFR BLD: 12 % (ref 12–49)
MCH RBC QN AUTO: 28.7 PG (ref 26–34)
MCHC RBC AUTO-ENTMCNC: 30.7 G/DL (ref 30–36.5)
MCV RBC AUTO: 93.6 FL (ref 80–99)
MONOCYTES # BLD: 0.8 K/UL (ref 0–1)
MONOCYTES NFR BLD: 8 % (ref 5–13)
NEUTS SEG # BLD: 8 K/UL (ref 1.8–8)
NEUTS SEG NFR BLD: 77 % (ref 32–75)
NRBC # BLD: 0 K/UL (ref 0–0.01)
NRBC BLD-RTO: 0 PER 100 WBC
PLATELET # BLD AUTO: 329 K/UL (ref 150–400)
PMV BLD AUTO: 10.4 FL (ref 8.9–12.9)
RBC # BLD AUTO: 3.45 M/UL (ref 3.8–5.2)
WBC # BLD AUTO: 10.4 K/UL (ref 3.6–11)

## 2018-08-20 PROCEDURE — 85025 COMPLETE CBC W/AUTO DIFF WBC: CPT | Performed by: INTERNAL MEDICINE

## 2018-08-20 PROCEDURE — 74011250636 HC RX REV CODE- 250/636: Performed by: INTERNAL MEDICINE

## 2018-08-20 PROCEDURE — 74011000258 HC RX REV CODE- 258: Performed by: INTERNAL MEDICINE

## 2018-08-20 PROCEDURE — 65660000000 HC RM CCU STEPDOWN

## 2018-08-20 PROCEDURE — 36415 COLL VENOUS BLD VENIPUNCTURE: CPT | Performed by: INTERNAL MEDICINE

## 2018-08-20 PROCEDURE — 77030011255 HC DSG AQUACEL AG BMS -A

## 2018-08-20 PROCEDURE — 74011250637 HC RX REV CODE- 250/637: Performed by: INTERNAL MEDICINE

## 2018-08-20 PROCEDURE — 74011000250 HC RX REV CODE- 250: Performed by: INTERNAL MEDICINE

## 2018-08-20 RX ORDER — HYDROXYZINE PAMOATE 25 MG/1
25 CAPSULE ORAL
Status: DISCONTINUED | OUTPATIENT
Start: 2018-08-20 | End: 2018-08-20

## 2018-08-20 RX ORDER — HYDROXYZINE 25 MG/1
25 TABLET, FILM COATED ORAL
Status: DISCONTINUED | OUTPATIENT
Start: 2018-08-20 | End: 2018-08-23 | Stop reason: HOSPADM

## 2018-08-20 RX ADMIN — OXYCODONE HYDROCHLORIDE AND ACETAMINOPHEN 500 MG: 500 TABLET ORAL at 09:00

## 2018-08-20 RX ADMIN — Medication 10 ML: at 23:05

## 2018-08-20 RX ADMIN — LABETALOL HYDROCHLORIDE 300 MG: 200 TABLET, FILM COATED ORAL at 09:00

## 2018-08-20 RX ADMIN — NYSTATIN: 100000 POWDER TOPICAL at 09:00

## 2018-08-20 RX ADMIN — LABETALOL HYDROCHLORIDE 300 MG: 200 TABLET, FILM COATED ORAL at 20:41

## 2018-08-20 RX ADMIN — Medication 10 ML: at 14:18

## 2018-08-20 RX ADMIN — FUROSEMIDE 60 MG: 10 INJECTION, SOLUTION INTRAMUSCULAR; INTRAVENOUS at 17:36

## 2018-08-20 RX ADMIN — OXYCODONE HYDROCHLORIDE AND ACETAMINOPHEN 1 TABLET: 5; 325 TABLET ORAL at 20:41

## 2018-08-20 RX ADMIN — BUSPIRONE HYDROCHLORIDE 15 MG: 5 TABLET ORAL at 23:02

## 2018-08-20 RX ADMIN — ENOXAPARIN SODIUM 40 MG: 100 INJECTION SUBCUTANEOUS at 09:00

## 2018-08-20 RX ADMIN — POLYETHYLENE GLYCOL 3350 17 G: 17 POWDER, FOR SOLUTION ORAL at 09:00

## 2018-08-20 RX ADMIN — LOSARTAN POTASSIUM 50 MG: 50 TABLET ORAL at 09:00

## 2018-08-20 RX ADMIN — FUROSEMIDE 60 MG: 10 INJECTION, SOLUTION INTRAMUSCULAR; INTRAVENOUS at 09:00

## 2018-08-20 RX ADMIN — OXYCODONE HYDROCHLORIDE AND ACETAMINOPHEN 1 TABLET: 5; 325 TABLET ORAL at 09:00

## 2018-08-20 RX ADMIN — ENOXAPARIN SODIUM 40 MG: 100 INJECTION SUBCUTANEOUS at 20:41

## 2018-08-20 RX ADMIN — OXYCODONE HYDROCHLORIDE AND ACETAMINOPHEN 1 TABLET: 5; 325 TABLET ORAL at 14:40

## 2018-08-20 RX ADMIN — SODIUM CHLORIDE 3 G: 900 INJECTION, SOLUTION INTRAVENOUS at 06:02

## 2018-08-20 RX ADMIN — Medication 10 ML: at 05:40

## 2018-08-20 RX ADMIN — AMLODIPINE BESYLATE 10 MG: 5 TABLET ORAL at 09:00

## 2018-08-20 NOTE — PROGRESS NOTES
Hospitalist Progress Note    NAME: Salvador Vasquez   :  1955   MRN:  [id number]       Assessment / Plan:  Acute Hypoxic Respiratory Failure POA/Elevated left hemidiaphragm POA  Likely Chronic Diastolic CHF POA  With Leg edema  CXR looks congested with cardiomegaly, she may have some degree of CHF,   CT chest no pathology in lung,   ECHO EF 65%,   ABG 8/15- still hypoxic on 3L/m, Co2 ok  STIll Unable to wean OFF O2    Cont to monitor I/O and weight,   Cont  IV with lasix  increased to Q 12 8/15, inrease to 60 mg BID . HAS DIURESED from 323 to 300 lbs. Pulmonology help appreciated- will need MONSERRAT taken cared off for real improvement   Cont O2 during the day- taper as able, if not able to will need oxygen  CPAP at night likely as per pulm- will need OP Sleep study - appointment set up with [facility]  check overnight oxymetry may need Home O2 for now,   PT/OT valentinal noted- initially HH was recommended but now pt likely needs SNF- CM updated on arranging it when medically stable        Sepsis ([facility] [address] (2018) POA WBC 22.6, , RR to 40, temp 103.2, normal lactate, WBC still up ~ 16k, no more fever  Left leg cellulitis POA- Erythema is better, but still warm and swollen  H/o Chronic L Leg lymphedema s/p Spider bite in remote past  Suspect also Venous insufficiency  Duplex is negative for DVT. s/p IV  Rocephin  Cont IV Vancomycin  Changed to unasyn (8/15) due to persistent leukocytosis- cont for now    HTN urgency POA- now controlled  Cont Norvasc, BB, ARB, monitor. Use hydralazine prn. So far better control      Obesity Hypoventilation Syndrome: c/w CPAP at night, needs sleep study as outpatient, may need oxygen too  Hyponatremia POA so far improving, monitor. Morbid Obesity POA Body mass index is 45.61 kg/(m^2). - weight loss recommended  NOK:   Code status: Full  D/W , DTR Lisa 436 [id number]  Prophylaxis: Lovenox    Recommended Disposition: HH PT, OT, RN likely soon when Home CPAP/Oxygen arranged & improved WBC/leg cellulitis & respiratory status stabilized     Subjective:  Has lost 3 lbs in fluid weight      Chief Complaint / Reason for Physician Visit: F/U L LE cellulitis, sepsis, Lymphedema, MONSERRAT/OHS  Kerry not used BIpap  \"I feel better on BIPAP\". Discussed with RN events overnight. - transferred to PCU on BIPAP 8/15 evening    Review of Systems:  Symptom Y/N Comments  Symptom Y/N Comments   Fever/Chills    Chest Pain     Poor Appetite    Edema     Cough    Abdominal Pain     Sputum    Joint Pain y    SOB/FLORENCE y Worsened last evening- back on BIPAP  Pruritis/Rash y L Leg   Nausea/vomit    Tolerating PT/OT y    Diarrhea    Tolerating Diet y    Constipation    Other y Anxious     Could NOT obtain due to:      Objective:     VITALS:   Last 24hrs VS reviewed since prior progress note. Most recent are:  Patient Vitals for the past 24 hrs:   Temp Pulse Resp BP SpO2   08/20/18 1454 98.7 °F (37.1 °C) 66 20 137/67 94 %   08/20/18 1129 98.5 °F (36.9 °C) 65 18 117/51 93 %   08/20/18 0859 97.6 °F (36.4 °C) 67 18 116/75 97 %   08/20/18 0400 97.4 °F (36.3 °C) - 18 122/61 -   08/20/18 0023 97.5 °F (36.4 °C) 68 28 112/51 96 %   08/19/18 2240 - 67 - 129/90 -   08/19/18 1915 98.4 °F (36.9 °C) 69 19 117/52 94 %     No intake or output data in the 24 hours ending 08/20/18 1808     PHYSICAL EXAM:  General: WD, WN. Alert, cooperative, SOB  EENT:  EOMI. Anicteric sclerae. MMM, off BIPAP now  Resp:  Coarse BS,   CV:  Regular  rhythm,  + edema, still with erythema suspect Vascular insufficiency  GI:  Soft, Non distended, Non tender.  +Bowel sounds  Neurologic:  Alert and oriented X 3, normal speech,   Psych:   poor insight. mildly anxious +  Skin/extremities:  +  Erythema of the left leg (improved as per pt/family),Chronic Lymphedema noted +, .  REDNESS improves with Elevation of Lower Ext No jaundice    Reviewed most current lab test results and cultures  YES  Reviewed most current radiology test results YES  Review and summation of old records today    NO  Reviewed patient's current orders and MAR    YES  PMH/SH reviewed - no change compared to H&P  ________________________________________________________________________  Care Plan discussed with:    Comments   Patient x    Family      RN x bedside   Care Manager     Consultant                        Multidiciplinary team rounds were held today with , nursing, pharmacist and clinical coordinator. Patient's plan of care was discussed; medications were reviewed and discharge planning was addressed. ________________________________________________________________________  Total NON critical care TIME:  26   Minutes    Total CRITICAL CARE TIME Spent:   Minutes non procedure based      Comments   >50% of visit spent in counseling and coordination of care     ________________________________________________________________________  Blaire Zendejas MD     Procedures: see electronic medical records for all procedures/Xrays and details which were not copied into this note but were reviewed prior to creation of Plan. LABS:  I reviewed today's most current labs and imaging studies.   Pertinent labs include:  Recent Labs      08/20/18   0427  08/18/18   0131   WBC  10.4  15.1*   HGB  9.9*  10.0*   HCT  32.3*  32.0*   PLT  329  297     Recent Labs      08/18/18 0131   NA  136   K  3.6   CL  98   CO2  33*   GLU  111*   BUN  12   CREA  0.65   CA  10.3*   MG  2.0       Signed: Blaire Zendejas MD

## 2018-08-20 NOTE — PROGRESS NOTES
[facility]  Pulmonary, Critical Care, and Sleep Medicine    Initial Patient Consult    Name: Sagrario Coleman MRN: [id number]   : 1955 Hospital: [facility]   Date: 2018        IMPRESSION:   · Acute (and likely chronic) hypoxic/hypercapnic respiratory failure  · Sepsis  · Severe LLE Cellulitis- wound care consult notes reviewed  · hyponatermia  · Suspect she has MONSERRAT/OHS leading to her hypoxia/hypercapnia   · Anemia and no polycythemia  · No evidence of pneumonia or other acute process in the lung  · Eventration of the left hemidiaphragm leading to abnormal chest X-ray   · HTN  · Obesity   · Severe DJD knees- no surgery unless she loses weight      RECOMMENDATIONS:   · DC BIPAP   · Wean O2 to RA  · Pt education, weight reduction counseling- stressed safety  · Diuresis  · Daily weight  · Pt and OT here and should get some at home for guidance and safety tips  · Weight loss will be tough if she cannot ambulate  · Ultimately will need evaluation in the sleep lab and polysomnography- explained PSG procedure, daughter has MONSERRAT but non adherent to treatment, pt willing to do better if prescribed  · She will allow us to arrange appt with our sleep clinic  · Continue antibiotics for cellulitis per team  · DVT prophylaxis  · Will see again prn          Subjective:     No acute events this weekend  Pt feeling better  No acute distress  No acute complaints      Current Facility-Administered Medications   Medication Dose Route Frequency    furosemide (LASIX) injection 60 mg  60 mg IntraVENous BID    polyethylene glycol (MIRALAX) packet 17 g  17 g Oral DAILY    nystatin (MYCOSTATIN) 100,000 unit/gram powder   Topical BID    sodium chloride (NS) flush 5-10 mL  5-10 mL IntraVENous Q8H    enoxaparin (LOVENOX) injection 40 mg  40 mg SubCUTAneous Q12H    busPIRone (BUSPAR) tablet 15 mg  15 mg Oral QHS    labetalol (NORMODYNE) tablet 300 mg  300 mg Oral Q12H    ascorbic acid (vitamin C) (VITAMIN C) tablet 500 mg  500 mg Oral DAILY    amLODIPine (NORVASC) tablet 10 mg  10 mg Oral DAILY    losartan (COZAAR) tablet 50 mg  50 mg Oral DAILY       Review of Systems:  A comprehensive review of systems was negative except for that written in the HPI. Objective:   Vital Signs:    Visit Vitals    /61 (BP 1 Location: Right arm, BP Patient Position: At rest)    Pulse 68    Temp 97.4 °F (36.3 °C)    Resp 18    Ht 5' 8\" (1.727 m)    Wt 146.9 kg (323 lb 13.7 oz)    SpO2 96%    BMI 49.24 kg/m2       O2 Device: Nasal cannula   O2 Flow Rate (L/min): 3 l/min   Temp (24hrs), Av.2 °F (36.8 °C), Min:97.4 °F (36.3 °C), Max:99 °F (37.2 °C)       Intake/Output:   Last shift:         Last 3 shifts:  1901 -  0700  In: 200 [I.V.:200]  Out: -   No intake or output data in the 24 hours ending 18 0749   Physical Exam:   General:  Alert, obese, on O2; off BIPAP   Head:  Normocephalic, without obvious abnormality, atraumatic. Eyes:  Conjunctivae/corneas clear. Nose: Nares normal. Septum midline. Mucosa normal.    Throat: Lips, mucosa, and tongue normal.     Neck: Supple, symmetrical, trachea midline    Back:   Symmetric, no curvature. ROM normal.   Lungs:   Clear to auscultation bilaterally. Chest wall:  No tenderness or deformity. Heart:  Regular rate and rhythm    Abdomen:   Soft, non-tender.  Bowel sounds normal.    Extremities: Erythema and edema LLE > RLE, no clubbing   Skin: Old brown recluse bite on left foot, with cellulitis on left lower extremity; 2+ edema; warm   Lymph nodes: Cervical, supraclavicular nodes normal.   Neurologic: Grossly nonfocal     Data review:     Recent Results (from the past 24 hour(s))   GLUCOSE, POC    Collection Time: 18 10:21 PM   Result Value Ref Range    Glucose (POC) 111 (H) 65 - 100 mg/dL    Performed by [address] [place] [address] DIFF    Collection Time: 18  4:27 AM   Result Value Ref Range    WBC 10.4 3.6 - 11.0 K/uL    RBC 3.45 (L) 3.80 - 5.20 M/uL    HGB 9.9 (L) 11.5 - 16.0 g/dL    HCT 32.3 (L) 35.0 - 47.0 %    MCV 93.6 80.0 - 99.0 FL    MCH 28.7 26.0 - 34.0 PG    MCHC 30.7 30.0 - 36.5 g/dL    RDW 14.8 (H) 11.5 - 14.5 %    PLATELET 879 070 - 512 K/uL    MPV 10.4 8.9 - 12.9 FL    NRBC 0.0 0  WBC    ABSOLUTE NRBC 0.00 0.00 - 0.01 K/uL    NEUTROPHILS 77 (H) 32 - 75 %    LYMPHOCYTES 12 12 - 49 %    MONOCYTES 8 5 - 13 %    EOSINOPHILS 3 0 - 7 %    BASOPHILS 0 0 - 1 %    IMMATURE GRANULOCYTES 1 (H) 0.0 - 0.5 %    ABS. NEUTROPHILS 8.0 1.8 - 8.0 K/UL    ABS. LYMPHOCYTES 1.2 0.8 - 3.5 K/UL    ABS. MONOCYTES 0.8 0.0 - 1.0 K/UL    ABS. EOSINOPHILS 0.3 0.0 - 0.4 K/UL    ABS. BASOPHILS 0.0 0.0 - 0.1 K/UL    ABS. IMM.  GRANS. 0.1 (H) 0.00 - 0.04 K/UL    DF AUTOMATED         Imaging:  I have personally reviewed the patients radiographs and have reviewed the reports: fred Mcnally MD

## 2018-08-20 NOTE — PROGRESS NOTES
7557: Daughter request benadryl for rash to patient right arm. None ordered. Page to Dr. Grey Davis.

## 2018-08-20 NOTE — PROGRESS NOTES
PCU SHIFT NURSING NOTE      1915: The bedside shift change report given to Ellis Clemente (oncoming nurse) by Pat Etienne (offgoing nurse). Report included the following information Kardex, Intake/Output, MAR, Recent Results, Cardiac Rhythm NSR, Alarm Parameters  and Quality Measures. 2130: The patient resting in bed with eyes open. The daughter at the bedside with her. No complains of pain at this time. Evening meds administered as per schedule. O2 sat is 96% on 3 litre's via nasal canula. To continue to monitor. 0130. The patient sleeping quitely in bed. No signs of pain or distress noted at this time. To continue to monitor     0400. The patient resting in bed with eyes open. She was helped to walk to the bathroom using a walker. No signs of distress noted. He reported no pain. 0630. The patient resting quietly in bed. No complains of pain at this time. 02 saturation was at 98%. 6AM meds administered as per schedule. To continue to monitor. Shift Summary:         Admission Date 8/9/2018   Admission Diagnosis Sepsis ([address]   Consults IP CONSULT TO INTENSIVIST  IP CONSULT TO PULMONOLOGY        Consults   [x]PT   [x]OT   []Speech   [x]Case Management      [] Palliative      Cardiac Monitoring Order   [x]Yes   []No     IV drips   []Yes    Drip:                            Dose:  Drip:                            Dose:  Drip:                            Dose:   [x]No     GI Prophylaxis   []Yes   [x]No         DVT Prophylaxis   SCDs:             Thanh stockings:         [x] Medication   []Contraindicated   []None      Activity Level Activity Level: Up with Assistance     Activity Assistance: Partial (two people)   Purposeful Rounding every 1-2 hour?    [x]Yes   Quintanilla Score  Total Score: 3   Bed Alarm (If score 3 or >)   [x]Yes   [] Refused (See signed refusal form in chart)   Celestino Score  Celestino Score: 15   Celestino Score (if score 14 or less)   [x]PMT consult   []Wound Care consult []Specialty bed   [] Nutrition consult          Needs prior to discharge:   Home O2 required:    [x]Yes   []No    If yes, how much O2 required? Other:    Last Bowel Movement: Last Bowel Movement Date: 08/18/18      Influenza Vaccine Received Flu Vaccine for Current Season (usually Sept-March): Not Flu Season        Pneumonia Vaccine           Diet Active Orders   Diet    DIET CARDIAC Regular      LDAs               Peripheral IV 08/10/18 Right Antecubital (Active)   Site Assessment Clean, dry, & intact 8/19/2018  7:15 PM   Phlebitis Assessment 0 8/19/2018  7:15 PM   Infiltration Assessment 0 8/19/2018  7:15 PM   Dressing Status Clean, dry, & intact 8/19/2018  7:15 PM   Dressing Type Transparent 8/19/2018  7:15 PM   Hub Color/Line Status Pink 8/19/2018  7:15 PM   Action Taken Open ports on tubing capped 8/19/2018  7:15 PM   Alcohol Cap Used Yes 8/19/2018  7:15 PM       Peripheral IV 08/15/18 Right Wrist (Active)   Site Assessment Clean, dry, & intact 8/19/2018  7:15 PM   Phlebitis Assessment 0 8/19/2018  7:15 PM   Infiltration Assessment 0 8/19/2018  7:15 PM   Dressing Status Clean, dry, & intact 8/19/2018  7:15 PM   Dressing Type Transparent 8/19/2018  7:15 PM   Hub Color/Line Status Green 8/19/2018  7:15 PM   Action Taken Open ports on tubing capped 8/19/2018  7:15 PM   Alcohol Cap Used Yes 8/19/2018  7:15 PM                      Urinary Catheter [REMOVED] Urinary Catheter 08/10/18 Hernandez-Indications for Use: Accurate measurement of urinary output    Intake & Output        Readmission Risk Assessment Tool Score Low Risk            8       Total Score        3 Has Seen PCP in Last 6 Months (Yes=3, No=0)    2 . Living with Significant Other. Assisted Living. LTAC. SNF. or   Rehab    3 Patient Length of Stay (>5 days = 3)        Criteria that do not apply:    IP Visits Last 12 Months (1-3=4, 4=9, >4=11)    Pt.  Coverage (Medicare=5 , Medicaid, or Self-Pay=4)    Charlson Comorbidity Score (Age + Comorbid Conditions)       Expected Length of Stay 4d 21h   Actual Length of Stay 11

## 2018-08-20 NOTE — PROGRESS NOTES
OT attempted to see pt and per nursing pt to be transferred to 3263 soon and pt was eating her second breakfast.  Will defer at this time and continue to follow

## 2018-08-21 LAB
ANION GAP SERPL CALC-SCNC: 4 MMOL/L (ref 5–15)
BUN SERPL-MCNC: 12 MG/DL (ref 6–20)
BUN/CREAT SERPL: 15 (ref 12–20)
CALCIUM SERPL-MCNC: 10.3 MG/DL (ref 8.5–10.1)
CHLORIDE SERPL-SCNC: 97 MMOL/L (ref 97–108)
CO2 SERPL-SCNC: 36 MMOL/L (ref 21–32)
CREAT SERPL-MCNC: 0.8 MG/DL (ref 0.55–1.02)
ERYTHROCYTE [DISTWIDTH] IN BLOOD BY AUTOMATED COUNT: 14.7 % (ref 11.5–14.5)
GLUCOSE SERPL-MCNC: 95 MG/DL (ref 65–100)
HCT VFR BLD AUTO: 32.5 % (ref 35–47)
HGB BLD-MCNC: 10 G/DL (ref 11.5–16)
MCH RBC QN AUTO: 28.8 PG (ref 26–34)
MCHC RBC AUTO-ENTMCNC: 30.8 G/DL (ref 30–36.5)
MCV RBC AUTO: 93.7 FL (ref 80–99)
NRBC # BLD: 0 K/UL (ref 0–0.01)
NRBC BLD-RTO: 0 PER 100 WBC
PLATELET # BLD AUTO: 312 K/UL (ref 150–400)
PMV BLD AUTO: 9.9 FL (ref 8.9–12.9)
POTASSIUM SERPL-SCNC: 3.8 MMOL/L (ref 3.5–5.1)
RBC # BLD AUTO: 3.47 M/UL (ref 3.8–5.2)
SODIUM SERPL-SCNC: 137 MMOL/L (ref 136–145)
WBC # BLD AUTO: 9.3 K/UL (ref 3.6–11)

## 2018-08-21 PROCEDURE — 74011250637 HC RX REV CODE- 250/637: Performed by: INTERNAL MEDICINE

## 2018-08-21 PROCEDURE — 77030011255 HC DSG AQUACEL AG BMS -A

## 2018-08-21 PROCEDURE — 97530 THERAPEUTIC ACTIVITIES: CPT | Performed by: PHYSICAL THERAPIST

## 2018-08-21 PROCEDURE — 77030011256 HC DRSG MEPILEX <16IN NO BORD MOLN -A

## 2018-08-21 PROCEDURE — 97116 GAIT TRAINING THERAPY: CPT | Performed by: PHYSICAL THERAPIST

## 2018-08-21 PROCEDURE — 65660000000 HC RM CCU STEPDOWN

## 2018-08-21 PROCEDURE — 80048 BASIC METABOLIC PNL TOTAL CA: CPT | Performed by: INTERNAL MEDICINE

## 2018-08-21 PROCEDURE — 97535 SELF CARE MNGMENT TRAINING: CPT

## 2018-08-21 PROCEDURE — 94760 N-INVAS EAR/PLS OXIMETRY 1: CPT

## 2018-08-21 PROCEDURE — 36415 COLL VENOUS BLD VENIPUNCTURE: CPT | Performed by: INTERNAL MEDICINE

## 2018-08-21 PROCEDURE — 74011250636 HC RX REV CODE- 250/636: Performed by: INTERNAL MEDICINE

## 2018-08-21 PROCEDURE — 97530 THERAPEUTIC ACTIVITIES: CPT

## 2018-08-21 PROCEDURE — 74011000250 HC RX REV CODE- 250: Performed by: INTERNAL MEDICINE

## 2018-08-21 PROCEDURE — 77010033678 HC OXYGEN DAILY

## 2018-08-21 PROCEDURE — 85027 COMPLETE CBC AUTOMATED: CPT | Performed by: INTERNAL MEDICINE

## 2018-08-21 RX ADMIN — OXYCODONE HYDROCHLORIDE AND ACETAMINOPHEN 500 MG: 500 TABLET ORAL at 09:01

## 2018-08-21 RX ADMIN — LOSARTAN POTASSIUM 50 MG: 50 TABLET ORAL at 09:01

## 2018-08-21 RX ADMIN — ENOXAPARIN SODIUM 40 MG: 100 INJECTION SUBCUTANEOUS at 08:58

## 2018-08-21 RX ADMIN — LABETALOL HYDROCHLORIDE 300 MG: 200 TABLET, FILM COATED ORAL at 09:01

## 2018-08-21 RX ADMIN — AMLODIPINE BESYLATE 10 MG: 5 TABLET ORAL at 09:01

## 2018-08-21 RX ADMIN — OXYCODONE HYDROCHLORIDE AND ACETAMINOPHEN 1 TABLET: 5; 325 TABLET ORAL at 07:44

## 2018-08-21 RX ADMIN — Medication 10 ML: at 17:58

## 2018-08-21 RX ADMIN — FUROSEMIDE 60 MG: 10 INJECTION, SOLUTION INTRAMUSCULAR; INTRAVENOUS at 17:58

## 2018-08-21 RX ADMIN — NYSTATIN: 100000 POWDER TOPICAL at 17:58

## 2018-08-21 RX ADMIN — Medication 10 ML: at 05:12

## 2018-08-21 RX ADMIN — ENOXAPARIN SODIUM 40 MG: 100 INJECTION SUBCUTANEOUS at 21:54

## 2018-08-21 RX ADMIN — LABETALOL HYDROCHLORIDE 300 MG: 200 TABLET, FILM COATED ORAL at 21:54

## 2018-08-21 RX ADMIN — OXYCODONE HYDROCHLORIDE AND ACETAMINOPHEN 1 TABLET: 5; 325 TABLET ORAL at 19:38

## 2018-08-21 RX ADMIN — NYSTATIN: 100000 POWDER TOPICAL at 11:00

## 2018-08-21 RX ADMIN — Medication 10 ML: at 08:57

## 2018-08-21 RX ADMIN — Medication 10 ML: at 21:56

## 2018-08-21 RX ADMIN — FUROSEMIDE 60 MG: 10 INJECTION, SOLUTION INTRAMUSCULAR; INTRAVENOUS at 08:56

## 2018-08-21 RX ADMIN — POLYETHYLENE GLYCOL 3350 17 G: 17 POWDER, FOR SOLUTION ORAL at 08:59

## 2018-08-21 NOTE — PROGRESS NOTES
Bedside shift change report given to OLYA Saavedra (oncoming nurse) by Israel Navarro (offgoing nurse). Report included the following information SBAR, Kardex, Procedure Summary, Intake/Output, MAR and Recent Results.

## 2018-08-21 NOTE — PROGRESS NOTES
14:20- Received phone call from pt's daughter, Nataliya Andrade. Nataliya Andrade stated she was told that we just had a meeting with her mother regarding her discharge planning. Explained to Nataliya Andrade that we had just completed IDR's and explained to her what those were all about. Nataliya Andrade stated she was very upset as her name and number are on the white board and that we are to call her with ANY and EVERY update per her. Explained to Nataliya Andrade that he mother was of sound mind and that is why we were speaking with the patient instead of the family. Nataliya Andrade stated we were in the wrong for doing that and that it was per her mothers consent that she would be making all of the decisions regarding her care. Explained to Nataliya Andrade that I would leave a message for the  to call her regarding discharge planning. Nataliya Andrade then proceeded to hang up the phone. 14:22- Notified Devin Sanchez that daughter would like to speak to her. Devin Sanchez stated she would come see the patient and then update daughter.

## 2018-08-21 NOTE — PROGRESS NOTES
Bedside shift change report given to Lisa (oncoming nurse) by Nava [facility] (offgoing nurse). Report included the following information SBAR, Kardex, Intake/Output, MAR, Recent Results and Cardiac Rhythm NSR     Wound care completed per order.

## 2018-08-21 NOTE — PROGRESS NOTES
Problem: Self Care Deficits Care Plan (Adult)  Goal: *Acute Goals and Plan of Care (Insert Text)  Occupational Therapy Goals  Weekly reassessment 8/21/18 continue all goals as below   Initiated 8/14/2018  1. Patient will perform grooming standing at sink with modified independence within 7 day(s). 2.  Patient will perform upper body dressing with modified independence within 7 day(s). 3.  Patient will perform lower body dressing with AE as needed with minimal assistance/contact guard assist within 7 day(s). 4.  Patient will perform toilet transfers with SBA within 7 day(s). 5.  Patient will perform all aspects of toileting with minimal assistance/contact guard assist within 7 day(s). 6.  Patient will participate in upper extremity therapeutic exercise/activities with independence for 5 minutes within 7 day(s). 7.  Patient will utilize energy conservation techniques during functional activities with verbal cues within 7 day(s). Occupational Therapy TREATMENT: WEEKLY REASSESSMENT  Patient: Damon Gates (89 y.o. female)  Date: 8/21/2018  Diagnosis: Sepsis ([address] [address] <principal problem not specified>       Precautions: Fall, Other (comment) (Bariatric)  Chart, occupational therapy assessment, plan of care, and goals were reviewed. ASSESSMENT:  Patient is progressing but continues to require supplemental O2 for functional mobility and ADLs. Pt was mod I for bed mobility with HOB raised, SBA for sit <> stand. Pt completed functional mobility to bathroom without AD, mildly unsteady without and noted to reach for furniture. Pt completed standing ADLs at sink x 5 minutes with SpO2 decreasing to 87% on 1.5 L. Increased to 2 L and SpO2 > 90% during task. Reviewed energy conservation techniques and DME such as shower chair. Recommend addressing LB ADLs next session.    Progression toward goals:  []            Improving appropriately and progressing toward goals  [x]            Improving slowly and progressing toward goals  []            Not making progress toward goals and plan of care will be adjusted     PLAN:  Goals have been updated based on progression since last assessment. Patient continues to benefit from skilled intervention to address the above impairments. Continue to follow patient 4 times a week to address goals. Planned Interventions:  [x]                    Self Care Training                  [x]             Therapeutic Activities  [x]                    Functional Mobility Training    []             Cognitive Retraining  [x]                    Therapeutic Exercises           [x]             Endurance Activities  [x]                    Balance Training                   []             Neuromuscular Re-Education  []                    Visual/Perceptual Training     [x]        Home Safety Training  [x]                    Patient Education                 [x]             Family Training/Education  []                    Other (comment):  Discharge Recommendations: Valorie Saunders and PT, transition to OP pulmonary  Further Equipment Recommendations for Discharge: Shower chair and rollator     SUBJECTIVE:   Patient stated Hanny Held makes sense to use a shower chair.     OBJECTIVE DATA SUMMARY:   Cognitive/Behavioral Status:  Neurologic State: Alert  Orientation Level: Oriented X4  Cognition: Appropriate for age attention/concentration; Appropriate decision making; Appropriate safety awareness; Follows commands             Functional Mobility and Transfers for ADLs:  Bed Mobility:  Supine to Sit: Modified independent (head of bed elevated)  Scooting: Independent    Transfers:  Sit to Stand: Stand-by assistance      Bed to Chair: Stand-by assistance    Balance:  Sitting: Intact  Standing: Impaired  Standing - Static: Good; Unsupported  Standing - Dynamic :  (fair without device; good using RW for support)    ADL Intervention:       Grooming  Brushing Teeth: Supervision/set-up (standing at sink x 5 minutes)          Educated pt on sequencing for pursed lip breathing during ADLs. Reviewed use of shower chair for energy conservation. Informed pt of clearance to use O2 in shower. Pain:  Pain Scale 1: Numeric (0 - 10)  Pain Intensity 1: 0     Pain Orientation 1: Left; Lower; Other (comment) (leg)        Activity Tolerance:   87% on 1.5 L, increased to 2 L and SpO2 >90%  Please refer to the flowsheet for vital signs taken during this treatment.   After treatment:   [x] Patient left in no apparent distress sitting up in chair  [] Patient left in no apparent distress in bed  [x] Call bell left within reach  [x] Nursing notified  [] Caregiver present  [] Bed alarm activated    COMMUNICATION/COLLABORATION:   The patients plan of care was discussed with: Physical Therapist and Registered Nurse    Rick Contreras OT  Time Calculation: 35 mins

## 2018-08-21 NOTE — PROGRESS NOTES
Physical Therapy Goals  Revised 8/16/2018  1. Patient will move from supine to sit and sit to supine  and scoot up and down in bed with modified independence within 7 day(s). 2.  Patient will transfer from bed to chair and chair to bed with modified independence using the least restrictive device within 7 day(s). 3.  Patient will perform sit to stand with independence within 7 day(s). 4.  Patient will ambulate with modified independence for 150 feet with the least restrictive device within 7 day(s). 5.  Patient will ascend/descend 13 stairs with 2 handrail(s) with supervision/set-up within 7 day(s). Physical Therapy Goals  Initiated 8/11/2018  1. Patient will move from supine to sit and sit to supine  in bed with independence within 7 day(s). 2.  Patient will transfer from bed to chair and chair to bed with independence using the least restrictive device within 7 day(s). 3.  Patient will perform sit to stand with independence within 7 day(s). 4.  Patient will ambulate with supervision/set-up for 100 feet with the least restrictive device within 7 day(s). 5.  Patient will ascend/descend 13 stairs with 2 handrail(s) with supervision/set-up within 7 day(s). physical Therapy TREATMENT  Patient: Damon Gates (14 y.o. female)  Date: 8/21/2018  Diagnosis: Sepsis ([facility] [address] <principal problem not specified>       Precautions: Fall, Other (comment) (Bariatric)  Chart, physical therapy assessment, plan of care and goals were reviewed. ASSESSMENT: Patient pleasant and cooperative; very receptive to all education. Patient able to complete overall mobility at SBA level. Ambulated to bathroom without assistive device and gait is slow and guarded. Transitioned to RW with improvements in gait stability noted. Patient able to ambulate 125 feet today- O2 sats decreased to 83% on 2 l/min requiring 3 l/min to recover to 93%. On 3 l/min O2 sats decreased to 88% during ambulation.  At this time recommend 3 l/min when ambulating. Patient educated on pursed lip breathing, activity pacing, energy conservation and use or rollator for ambulation to help assist with improving endurance. Recommend HHPT and OT following discharge with transition to OP Pulmonary rehab. Progression toward goals:  []    Improving appropriately and progressing toward goals  [x]    Improving slowly and progressing toward goals  []    Not making progress toward goals and plan of care will be adjusted     PLAN:  Patient continues to benefit from skilled intervention to address the above impairments. Continue treatment per established plan of care. Discharge Recommendations:  Home Health  Further Equipment Recommendations for Discharge:  rollator     SUBJECTIVE:   Patient stated I'm feeling better today. I'm not as anxious.     OBJECTIVE DATA SUMMARY:   Critical Behavior:  Neurologic State: Alert  Orientation Level: Oriented X4  Cognition: Appropriate for age attention/concentration, Appropriate decision making, Appropriate safety awareness, Follows commands  Safety/Judgement: Awareness of environment, Insight into deficits  Functional Mobility Training:  Bed Mobility:     Supine to Sit: Modified independent (head of bed elevated)     Scooting: Independent        Transfers:  Sit to Stand: Stand-by assistance  Stand to Sit: Stand-by assistance        Bed to Chair: Stand-by assistance                    Balance:  Sitting: Intact  Standing: Impaired  Standing - Static: Good; Unsupported  Standing - Dynamic :  (fair without device; good using RW for support)  Ambulation/Gait Training:  Distance (ft): 125 Feet (ft) (requiring one extended standing break)  Assistive Device: Walker, rolling;Gait belt  Ambulation - Level of Assistance: Stand-by assistance        Gait Abnormalities: Decreased step clearance;Trunk sway increased        Base of Support: Widened     Speed/Jammie: Pace decreased (<100 feet/min); Slow  Step Length: Left shortened;Right shortened Patient ambulated 10 feet to bathroom without use of AD- gait is slow and guarded; mildly unsteady and patient frequently reaching for external support. Transitioned to RW with improvements in gait stability noted- improved step length and shaniqua. Pain:  Pain Scale 1: Numeric (0 - 10)  Pain Intensity 1: 0     Pain Orientation 1: Left; Lower; Other (comment) (leg)        Activity Tolerance:   Pulse Oximetry Assessment   96% at rest on 2LPM  83% while ambulating on 2LPM  97% at rest on 3LPM  88% while ambulating on 3LPM  Please refer to the flowsheet for vital signs taken during this treatment.   After treatment:   [x]    Patient left in no apparent distress sitting up in chair  []    Patient left in no apparent distress in bed  [x]    Call bell left within reach  [x]    Nursing notified  []    Caregiver present  []    Bed alarm activated    COMMUNICATION/COLLABORATION:   The patients plan of care was discussed with: Occupational Therapist and Registered Nurse    Choco Jin, PT   Time Calculation: 38 mins

## 2018-08-21 NOTE — PROGRESS NOTES
Hospitalist Progress Note    NAME: Sagrario Coleman   :  1955   MRN:  [id number]       Assessment / Plan:  Acute Hypoxic Respiratory Failure POA/Elevated left hemidiaphragm POA  Likely Chronic Diastolic CHF POA  With Leg edema  CXR looks congested with cardiomegaly, she may have some degree of CHF,   CT chest no pathology in lung,   ECHO EF 65%,   ABG 8/15- still hypoxic on 3L/m, Co2 ok  STIll Unable to wean OFF O2    Cont to monitor I/O and weight,   Cont  IV with lasix  increased to Q 12 8/15, inrease to 60 mg BID . HAS DIURESED from 323 to 300 lbs. Pulmonology help appreciated- will need MONSERRAT taken cared off for real improvement   Cont O2 during the day- taper as able, if not able to will need oxygen  CPAP at night likely as per pulm- will need OP Sleep study - appointment set up with Pulm associates  check overnight oxymetry may need Home O2 for now,   PT/OT eval noted- initially HH was recommended but now pt likely needs SNF- CM updated on arranging it when medically stable    Sepsis ([facility] [address] (2018) POA WBC 22.6, , RR to 40, temp 103.2, normal lactate, WBC still up ~ 16k, no more fever  Left leg cellulitis POA- Erythema is better, but still warm and swollen  H/o Chronic L Leg lymphedema s/p Spider bite in remote past  Suspect also Venous insufficiency  Duplex is negative for DVT. s/p IV  Rocephin  Cont IV Vancomycin  Changed to unasyn (8/15) due to persistent leukocytosis- cont for now    HTN urgency POA- now controlled  Cont Norvasc, BB, ARB, monitor. Use hydralazine prn. So far better control      Obesity Hypoventilation Syndrome: c/w CPAP at night, needs sleep study as outpatient, may need oxygen too  Hyponatremia POA so far improving, monitor. Morbid Obesity POA Body mass index is 45.61 kg/(m^2). - weight loss recommended  NOK:   Code status: Full  D/W , DTR Lisa 052 [id number]  Prophylaxis: Lovenox    Recommended Disposition: HH PT, OT, RN likely soon when Home CPAP/Oxygen arranged & improved WBC/leg cellulitis & respiratory status stabilized     Subjective:  Has lost 3 lbs in fluid weight      Chief Complaint / Reason for Physician Visit: F/U L LE cellulitis, sepsis, Lymphedema, MONSERRAT/OHS  Kerry not used BIpap  \"I feel better on BIPAP\". Discussed with RN events overnight. Review of Systems:  Symptom Y/N Comments  Symptom Y/N Comments   Fever/Chills    Chest Pain     Poor Appetite    Edema     Cough    Abdominal Pain     Sputum    Joint Pain y    SOB/FLORENCE y Worsened last evening- back on BIPAP  Pruritis/Rash y L Leg   Nausea/vomit    Tolerating PT/OT y    Diarrhea    Tolerating Diet y    Constipation    Other y Anxious     Could NOT obtain due to:      Objective:     VITALS:   Last 24hrs VS reviewed since prior progress note. Most recent are:  Patient Vitals for the past 24 hrs:   Temp Pulse Resp BP SpO2   08/21/18 0724 98.2 °F (36.8 °C) 67 20 141/78 97 %   08/21/18 0304 98.5 °F (36.9 °C) 64 20 133/66 96 %   08/20/18 2322 98.4 °F (36.9 °C) 60 20 113/64 92 %   08/20/18 1937 98 °F (36.7 °C) 72 20 135/61 93 %   08/20/18 1454 98.7 °F (37.1 °C) 66 20 137/67 94 %   08/20/18 1129 98.5 °F (36.9 °C) 65 18 117/51 93 %   08/20/18 0859 97.6 °F (36.4 °C) 67 18 116/75 97 %     No intake or output data in the 24 hours ending 08/21/18 0855     PHYSICAL EXAM:  General: WD, WN. Alert, cooperative, SOB  EENT:  EOMI. Anicteric sclerae. MMM, off BIPAP now  Resp:  Coarse BS,   CV:  Regular  rhythm,  + edema, still with erythema suspect Vascular insufficiency  GI:  Soft, Non distended, Non tender.  +Bowel sounds  Neurologic:  Alert and oriented X 3, normal speech,   Psych:   poor insight. mildly anxious +  Skin/extremities:  +  Erythema of the left leg (improved as per pt/family),Chronic Lymphedema noted +, .  REDNESS improves with Elevation of Lower Ext No jaundice    Reviewed most current lab test results and cultures  YES  Reviewed most current radiology test results   YES  Review and summation of old records today    NO  Reviewed patient's current orders and MAR    YES  PMH/SH reviewed - no change compared to H&P  ________________________________________________________________________  Care Plan discussed with:    Comments   Patient x    Family      RN x bedside   Care Manager     Consultant                        Multidiciplinary team rounds were held today with , nursing, pharmacist and clinical coordinator. Patient's plan of care was discussed; medications were reviewed and discharge planning was addressed. ________________________________________________________________________  Total NON critical care TIME:  26   Minutes    Total CRITICAL CARE TIME Spent:   Minutes non procedure based      Comments   >50% of visit spent in counseling and coordination of care     ________________________________________________________________________  Juana Bear MD     Procedures: see electronic medical records for all procedures/Xrays and details which were not copied into this note but were reviewed prior to creation of Plan. LABS:  I reviewed today's most current labs and imaging studies.   Pertinent labs include:  Recent Labs      08/21/18 0418 08/20/18 0427   WBC  9.3  10.4   HGB  10.0*  9.9*   HCT  32.5*  32.3*   PLT  312  329     Recent Labs      08/21/18 0418   NA  137   K  3.8   CL  97   CO2  36*   GLU  95   BUN  12   CREA  0.80   CA  10.3*       Signed: Juana Bear MD

## 2018-08-22 LAB
ANION GAP SERPL CALC-SCNC: 1 MMOL/L (ref 5–15)
BUN SERPL-MCNC: 11 MG/DL (ref 6–20)
BUN/CREAT SERPL: 15 (ref 12–20)
CALCIUM SERPL-MCNC: 10.4 MG/DL (ref 8.5–10.1)
CHLORIDE SERPL-SCNC: 95 MMOL/L (ref 97–108)
CO2 SERPL-SCNC: 38 MMOL/L (ref 21–32)
CREAT SERPL-MCNC: 0.73 MG/DL (ref 0.55–1.02)
GLUCOSE SERPL-MCNC: 95 MG/DL (ref 65–100)
POTASSIUM SERPL-SCNC: 3.6 MMOL/L (ref 3.5–5.1)
SODIUM SERPL-SCNC: 134 MMOL/L (ref 136–145)

## 2018-08-22 PROCEDURE — 77030019607 HC DSG BURN S&N -A

## 2018-08-22 PROCEDURE — 74011250636 HC RX REV CODE- 250/636: Performed by: INTERNAL MEDICINE

## 2018-08-22 PROCEDURE — 77030011255 HC DSG AQUACEL AG BMS -A

## 2018-08-22 PROCEDURE — 74011250637 HC RX REV CODE- 250/637: Performed by: INTERNAL MEDICINE

## 2018-08-22 PROCEDURE — 65660000000 HC RM CCU STEPDOWN

## 2018-08-22 PROCEDURE — 36415 COLL VENOUS BLD VENIPUNCTURE: CPT | Performed by: INTERNAL MEDICINE

## 2018-08-22 PROCEDURE — 80048 BASIC METABOLIC PNL TOTAL CA: CPT | Performed by: INTERNAL MEDICINE

## 2018-08-22 RX ADMIN — Medication 10 ML: at 03:50

## 2018-08-22 RX ADMIN — ENOXAPARIN SODIUM 40 MG: 100 INJECTION SUBCUTANEOUS at 09:14

## 2018-08-22 RX ADMIN — LABETALOL HYDROCHLORIDE 300 MG: 200 TABLET, FILM COATED ORAL at 09:14

## 2018-08-22 RX ADMIN — BUSPIRONE HYDROCHLORIDE 15 MG: 5 TABLET ORAL at 21:52

## 2018-08-22 RX ADMIN — NYSTATIN: 100000 POWDER TOPICAL at 09:08

## 2018-08-22 RX ADMIN — NYSTATIN: 100000 POWDER TOPICAL at 18:57

## 2018-08-22 RX ADMIN — ENOXAPARIN SODIUM 40 MG: 100 INJECTION SUBCUTANEOUS at 21:00

## 2018-08-22 RX ADMIN — OXYCODONE HYDROCHLORIDE AND ACETAMINOPHEN 1 TABLET: 5; 325 TABLET ORAL at 09:18

## 2018-08-22 RX ADMIN — FUROSEMIDE 60 MG: 10 INJECTION, SOLUTION INTRAMUSCULAR; INTRAVENOUS at 09:18

## 2018-08-22 RX ADMIN — OXYCODONE HYDROCHLORIDE AND ACETAMINOPHEN 1 TABLET: 5; 325 TABLET ORAL at 00:58

## 2018-08-22 RX ADMIN — OXYCODONE HYDROCHLORIDE AND ACETAMINOPHEN 500 MG: 500 TABLET ORAL at 09:15

## 2018-08-22 RX ADMIN — OXYCODONE HYDROCHLORIDE AND ACETAMINOPHEN 1 TABLET: 5; 325 TABLET ORAL at 22:24

## 2018-08-22 RX ADMIN — Medication 5 ML: at 21:00

## 2018-08-22 RX ADMIN — Medication 10 ML: at 14:00

## 2018-08-22 RX ADMIN — FUROSEMIDE 60 MG: 10 INJECTION, SOLUTION INTRAMUSCULAR; INTRAVENOUS at 17:13

## 2018-08-22 RX ADMIN — BUSPIRONE HYDROCHLORIDE 15 MG: 5 TABLET ORAL at 00:14

## 2018-08-22 RX ADMIN — OXYCODONE HYDROCHLORIDE AND ACETAMINOPHEN 1 TABLET: 5; 325 TABLET ORAL at 17:13

## 2018-08-22 RX ADMIN — LABETALOL HYDROCHLORIDE 300 MG: 200 TABLET, FILM COATED ORAL at 21:01

## 2018-08-22 RX ADMIN — LOSARTAN POTASSIUM 50 MG: 50 TABLET ORAL at 09:00

## 2018-08-22 RX ADMIN — AMLODIPINE BESYLATE 10 MG: 5 TABLET ORAL at 09:15

## 2018-08-22 NOTE — PROGRESS NOTES
Nutrition Assessment:    INTERVENTIONS/RECOMMENDATIONS:   Meals/Snacks: General/healthful diet: Cardiac diet  Heart healthy diet education provided to patient again    ASSESSMENT:   Patient medically noted for respiratory failure, CHF, and cellulitis. Continues on a cardiac diet. Patient eating well. Using menu and room service but reports she is not a fan of all of the food. She reports trying to make adjustments and avoiding fried foods. Patient does not recall previous diet education provided by RD last week; provided new handouts on heart healthy/low Na diet. Will likely need a lot of reinforcement. Diet Order: Cardiac  % Eaten:  Patient Vitals for the past 72 hrs:   % Diet Eaten   08/21/18 1245 90 %   08/21/18 0935 50 %   08/21/18 0900 100 %     Pertinent Medications: [x] Reviewed []Other: Vitamin C, Buspar, Lasix, Miralax   Pertinent Labs: [x]Reviewed  []Other:  Food Allergies: [x]None []Other:     Last BM: 8/20   [x]Active     []Hyperactive  []Hypoactive       [] Absent  BS  Skin:    [x] Intact   [] Incision  [] Breakdown   [x]Edema   []Other:    Anthropometrics: Height: 5' 8\" (172.7 cm) Weight: 137.1 kg (302 lb 4 oz)    IBW (%IBW):   ( ) UBW (%UBW):   (  %)    BMI: Body mass index is 45.96 kg/(m^2). This BMI is indicative of:  []Underweight   []Normal   []Overweight   [] Obesity   [x] Extreme Obesity (BMI>40)  Last Weight Metrics:  Weight Loss Metrics 8/22/2018 6/10/2013   Today's Wt 302 lb 4 oz 301 lb   BMI 45.96 kg/m2 45.78 kg/m2       Estimated Nutrition Needs (Based on): 1829 Kcals/day (-2079 BMR (1984) x 1. 3AF -500-750kcal) , 96 g (1.5 g/kg IBW) Protein  Carbohydrate:  At Least 130 g/day  Fluids: 1800 mL/day     Pt expected to meet estimated nutrient needs: [x]Yes []No    NUTRITION DIAGNOSES:   Problem:  Food and nutrition-related knowledge deficit      Etiology: related to BMI 45.96     Signs/Symptoms: as evidenced by doesn't remember diet ed provided by RD last week      NUTRITION INTERVENTIONS:  Meals/Snacks: General/healthful diet         Initial/Brief Nutrition Education: Purpose of nutrition education, Survival information        GOAL:   PO intake >70% of meals and able to name 2 high Na foods next 5-7 days    NUTRITION MONITORING AND EVALUATION   Behavioral-Environmental Outcomes: Behavior, Food/nutrition knowledge  Food/Nutrient Intake Outcomes:  Total energy intake  Physical Signs/Symptoms Outcomes: Weight/weight change    Previous Goal Met:   [] Met              [x] Progressing Towards Goal              [] Not Progressing Towards Goal   Previous Recommendations:   [x] Implemented          [] Not Implemented          [] Not Applicable    LEARNING NEEDS (Diet, Food/Nutrient-Drug Interaction):    [] None Identified   [x] Identified and Education Provided/Documented   [] Identified and Pt declined/was not appropriate     Cultural, Restorationist, OR Ethnic Dietary Needs:    [x] None Identified   [] Identified and Addressed     [x] Interdisciplinary Care Plan Reviewed/Documented    [x] Discharge Planning: Heart healthy/low Na diet   [] Participated in Interdisciplinary Rounds    NUTRITION RISK:    [] High              [] Moderate           [x]  Low  []  Minimal/Uncompromised      Genia Guillaume  Pager [phone]              Weekend Pager [phone]

## 2018-08-22 NOTE — PROGRESS NOTES
RA PO at rest: 85%    1430  D/C plans discussed with pt in IDR and afterwards in person. We discussed the option of SNF vs Critical access hospital and that MD anticipates d/c tomorrow if stable. She is aware she will probably need oxygen at d/c; she wants to go home and not a SNF; she requests Critical access hospital to follow her at home; dtr Geneva Milian will transport her; and she knows she will have a monthly co pay of ~$120 for the oxygen. I will arrange the oxygen if ordered by MD.      1445  D/C plan discussed with MD who requests home oxygen be arranged. Orders faxed to [facility]. Will aim for 11a Thursday d/c.

## 2018-08-22 NOTE — PROGRESS NOTES
Problem: Falls - Risk of  Goal: *Absence of Falls  Document Naila Fall Risk and appropriate interventions in the flowsheet.    Outcome: Progressing Towards Goal  Fall Risk Interventions:  Mobility Interventions: Patient to call before getting OOB    Mentation Interventions: Door open when patient unattended    Medication Interventions: Patient to call before getting OOB, Teach patient to arise slowly    Elimination Interventions: Call light in reach    History of Falls Interventions: Door open when patient unattended

## 2018-08-22 NOTE — PROGRESS NOTES
Hospitalist Progress Note    NAME: Lanney Brunner   :  1955   MRN:  [id number]       Assessment / Plan:  Acute Hypoxic Respiratory Failure POA/Elevated left hemidiaphragm POA  Likely Chronic Diastolic CHF POA  With Leg edema  CXR looks congested with cardiomegaly, she may have some degree of CHF,   CT chest no pathology in lung,   ECHO EF 65%,   ABG 8/15- still hypoxic on 3L/m, Co2 ok  STIll Unable to wean OFF O2    Cont to monitor I/O and weight,   Cont diuresis with lasix Q 12 8/15, inrease to 60 mg BID . HAS DIURESED from 323 to 300 lbs. Pulmonology help appreciated- will need MONSERRAT taken cared off for real improvement   Cont O2 during the day- taper as able, if not able to will need oxygen  CPAP at night likely as per pulm- will need OP Sleep study - appointment set up with Pulm associates  check overnight oxymetry may need Home O2   PT/OT eval noted- initially HH was recommended     Sepsis ([address] (2018) POA WBC 22.6, , RR to 40, temp 103.2, normal lactate, WBC still up ~ 16k, no more fever  Left leg cellulitis POA- Erythema is better, but still warm and swollen  H/o Chronic L Leg lymphedema s/p Spider bite in remote past  Suspect also Venous insufficiency  Duplex is negative for DVT. s/p IV  Rocephin  Cont IV Vancomycin  Changed to unasyn (8/15) due to persistent leukocytosis- cont for now    HTN urgency POA- now controlled  Cont Norvasc, BB, ARB, monitor. Use hydralazine prn. So far better control      Obesity Hypoventilation Syndrome: c/w CPAP at night, needs sleep study as outpatient, may need oxygen too  Hyponatremia POA so far improving, monitor. Morbid Obesity POA Body mass index is 45.61 kg/(m^2). - weight loss recommended  NOK:   Code status: Full  D/W , DTR Lisa [id number]  Prophylaxis: Lovenox    Recommended Disposition: HH PT, OT, RN likely soon when Home CPAP/Oxygen arranged & improved WBC/leg cellulitis & respiratory status stabilized     Subjective: Has lost 3 lbs in fluid weight      Chief Complaint / Reason for Physician Visit: F/U L LE cellulitis, sepsis, Lymphedema, MONSERRAT/OHS  Kerry not used BIpap  \"I feel better\". Discussed with RN events overnight. Wean O2 to keep O2 sat>88%    Review of Systems:  Symptom Y/N Comments  Symptom Y/N Comments   Fever/Chills    Chest Pain     Poor Appetite    Edema     Cough    Abdominal Pain     Sputum    Joint Pain y    SOB/FLORENCE y Worsened last evening- back on BIPAP  Pruritis/Rash y L Leg   Nausea/vomit    Tolerating PT/OT y    Diarrhea    Tolerating Diet y    Constipation    Other y Anxious     Could NOT obtain due to:      Objective:     VITALS:   Last 24hrs VS reviewed since prior progress note. Most recent are:  Patient Vitals for the past 24 hrs:   Temp Pulse Resp BP SpO2   08/22/18 0719 98.2 °F (36.8 °C) 71 20 158/74 91 %   08/22/18 0254 98.2 °F (36.8 °C) 64 16 136/63 94 %   08/21/18 2254 98.1 °F (36.7 °C) 84 18 141/79 92 %   08/21/18 1938 98.3 °F (36.8 °C) 82 18 135/83 92 %   08/21/18 1533 98.2 °F (36.8 °C) 74 22 137/77 93 %   08/21/18 1135 98.1 °F (36.7 °C) 72 24 134/66 92 %       Intake/Output Summary (Last 24 hours) at 08/22/18 0817  Last data filed at 08/21/18 2035   Gross per 24 hour   Intake             1440 ml   Output                0 ml   Net             1440 ml        PHYSICAL EXAM:  General: WD, WN. Alert, cooperative, SOB  EENT:  EOMI. Anicteric sclerae. MMM, off BIPAP now  Resp:  Coarse BS,   CV:  Regular  rhythm,  + edema, still with erythema suspect Vascular insufficiency  GI:  Soft, Non distended, Non tender.  +Bowel sounds  Neurologic:  Alert and oriented X 3, normal speech,   Psych:   poor insight. mildly anxious +  Skin/extremities:  +  Erythema of the left leg (improved as per pt/family),Chronic Lymphedema noted +, .  REDNESS improves with Elevation of Lower Ext No jaundice    Reviewed most current lab test results and cultures  YES  Reviewed most current radiology test results   YES  Review and summation of old records today    NO  Reviewed patient's current orders and MAR    YES  PMH/SH reviewed - no change compared to H&P  ________________________________________________________________________  Care Plan discussed with:    Comments   Patient x    Family      RN x bedside   Care Manager     Consultant                        Multidiciplinary team rounds were held today with , nursing, pharmacist and clinical coordinator. Patient's plan of care was discussed; medications were reviewed and discharge planning was addressed. ________________________________________________________________________  Total NON critical care TIME:  26   Minutes    Total CRITICAL CARE TIME Spent:   Minutes non procedure based      Comments   >50% of visit spent in counseling and coordination of care     ________________________________________________________________________  Ramya Dove MD     Procedures: see electronic medical records for all procedures/Xrays and details which were not copied into this note but were reviewed prior to creation of Plan. LABS:  I reviewed today's most current labs and imaging studies.   Pertinent labs include:  Recent Labs      08/21/18 0418  08/20/18   0427   WBC  9.3  10.4   HGB  10.0*  9.9*   HCT  32.5*  32.3*   PLT  312  329     Recent Labs      08/22/18 0346  08/21/18   0418   NA  134*  137   K  3.6  3.8   CL  95*  97   CO2  38*  36*   GLU  95  95   BUN  11  12   CREA  0.73  0.80   CA  10.4*  10.3*       Signed: Ramya Dove MD

## 2018-08-22 NOTE — CDMP QUERY
Dr. Paul Torres :  Please clarify if this patient is (was) being treated/managed for:     => possible acute on chronic diastolic CHF in setting of congestion on chest ray, continue diuresing   => Other explanation of clinical findings  => Clinically Undetermined (no explanation for clinical findings)    The medical record reflects the following clinical findings, treatment, and risk factors. Risk Factors:  presents with sepsis, AHRF, FLORENCE  Clinical Indicators:  CXR looks congested may have some degree of CHF   Treatment: diuresis with lasix Q 12 8/15, increase to 60 mg BID 8/19. diuresed from 323 to 300 lbs. Please clarify and document your clinical opinion in the progress notes and discharge summary including the definitive and/or presumptive diagnosis, (suspected or probable), related to the above clinical findings. Please include clinical findings supporting your diagnosis. Thank Michael Durham  [address]; [id number]

## 2018-08-22 NOTE — PROGRESS NOTES
Bedside and Verbal shift change report given to Bruce (oncoming nurse) by Juan Wright (offgoing nurse). Report included the following information SBAR, Kardex, Intake/Output, MAR, Recent Results and Med Rec Status.

## 2018-08-22 NOTE — PROGRESS NOTES
Pulse Oximetry Assessment:    86% at rest on room air (If this value is <90%, you do not need to proceed and the other fields may be deleted)  91% on 1L O2 NC at rest    If patient will require home oxygen, please obtain an order from the attending physician for a case management consult to set up home oxygen.

## 2018-08-23 VITALS
HEART RATE: 77 BPM | TEMPERATURE: 98.7 F | SYSTOLIC BLOOD PRESSURE: 154 MMHG | OXYGEN SATURATION: 93 % | WEIGHT: 293 LBS | DIASTOLIC BLOOD PRESSURE: 79 MMHG | RESPIRATION RATE: 16 BRPM | HEIGHT: 68 IN | BODY MASS INDEX: 44.41 KG/M2

## 2018-08-23 LAB
ANION GAP SERPL CALC-SCNC: 4 MMOL/L (ref 5–15)
BUN SERPL-MCNC: 10 MG/DL (ref 6–20)
BUN/CREAT SERPL: 14 (ref 12–20)
CALCIUM SERPL-MCNC: 10.3 MG/DL (ref 8.5–10.1)
CHLORIDE SERPL-SCNC: 97 MMOL/L (ref 97–108)
CO2 SERPL-SCNC: 34 MMOL/L (ref 21–32)
CREAT SERPL-MCNC: 0.74 MG/DL (ref 0.55–1.02)
GLUCOSE SERPL-MCNC: 97 MG/DL (ref 65–100)
POTASSIUM SERPL-SCNC: 3.7 MMOL/L (ref 3.5–5.1)
SODIUM SERPL-SCNC: 135 MMOL/L (ref 136–145)

## 2018-08-23 PROCEDURE — 80048 BASIC METABOLIC PNL TOTAL CA: CPT | Performed by: INTERNAL MEDICINE

## 2018-08-23 PROCEDURE — 36415 COLL VENOUS BLD VENIPUNCTURE: CPT | Performed by: INTERNAL MEDICINE

## 2018-08-23 PROCEDURE — 74011250636 HC RX REV CODE- 250/636: Performed by: INTERNAL MEDICINE

## 2018-08-23 PROCEDURE — 74011250637 HC RX REV CODE- 250/637: Performed by: INTERNAL MEDICINE

## 2018-08-23 PROCEDURE — 94760 N-INVAS EAR/PLS OXIMETRY 1: CPT

## 2018-08-23 RX ORDER — FUROSEMIDE 80 MG/1
TABLET ORAL
Qty: 30 TAB | Refills: 1 | Status: SHIPPED | OUTPATIENT
Start: 2018-08-23 | End: 2018-12-17 | Stop reason: SDUPTHER

## 2018-08-23 RX ORDER — OXYCODONE AND ACETAMINOPHEN 5; 325 MG/1; MG/1
1 TABLET ORAL
Qty: 24 TAB | Refills: 0 | Status: SHIPPED | OUTPATIENT
Start: 2018-08-23 | End: 2018-09-24

## 2018-08-23 RX ORDER — FUROSEMIDE 80 MG/1
TABLET ORAL
Qty: 30 TAB | Refills: 1 | Status: SHIPPED | OUTPATIENT
Start: 2018-08-23 | End: 2018-08-23

## 2018-08-23 RX ORDER — POTASSIUM CHLORIDE 750 MG/1
20 TABLET, FILM COATED, EXTENDED RELEASE ORAL 2 TIMES DAILY
Qty: 60 TAB | Refills: 1 | Status: SHIPPED | OUTPATIENT
Start: 2018-08-23 | End: 2018-10-29 | Stop reason: SDUPTHER

## 2018-08-23 RX ADMIN — NYSTATIN: 100000 POWDER TOPICAL at 09:33

## 2018-08-23 RX ADMIN — LABETALOL HYDROCHLORIDE 300 MG: 200 TABLET, FILM COATED ORAL at 09:32

## 2018-08-23 RX ADMIN — FUROSEMIDE 60 MG: 10 INJECTION, SOLUTION INTRAMUSCULAR; INTRAVENOUS at 09:32

## 2018-08-23 RX ADMIN — OXYCODONE HYDROCHLORIDE AND ACETAMINOPHEN 1 TABLET: 5; 325 TABLET ORAL at 09:32

## 2018-08-23 RX ADMIN — LOSARTAN POTASSIUM 50 MG: 50 TABLET ORAL at 09:32

## 2018-08-23 RX ADMIN — OXYCODONE HYDROCHLORIDE AND ACETAMINOPHEN 500 MG: 500 TABLET ORAL at 09:32

## 2018-08-23 RX ADMIN — ENOXAPARIN SODIUM 40 MG: 100 INJECTION SUBCUTANEOUS at 09:31

## 2018-08-23 RX ADMIN — AMLODIPINE BESYLATE 10 MG: 5 TABLET ORAL at 09:32

## 2018-08-23 RX ADMIN — Medication 5 ML: at 05:04

## 2018-08-23 NOTE — PROGRESS NOTES
Bedside and Verbal shift change report given to Marcos Steve. (oncoming nurse) by Renea Sanches (offgoing nurse). Report included the following information Kardex, MAR and Recent Results.

## 2018-08-23 NOTE — DISCHARGE SUMMARY
Hospitalist Discharge Summary     Patient ID:  Lanney Brunner  [id number]  88 y.o.  1955    PCP on record: Naveed Ryan MD    Admit date: 8/9/2018  Discharge date and time: 8/23/2018      DISCHARGE DIAGNOSIS:    Acute Hypoxic Respiratory Failure POA/Elevated left hemidiaphragm POA  Chronic Diastolic CHF POA  With Leg edema  Sepsis ([address] (8/9/2018) POA   Left leg cellulitis POA-   H/o Chronic L Leg lymphedema s/p Spider bite in remote past  Suspect also Venous insufficiency  HTN urgency POA- now controlled  Obesity Hypoventilation Syndrome: c/w CPAP at night,   Hyponatremia POA   Morbid Obesity POA Body mass index is 45.61 kg/(m^2). CONSULTATIONS:  IP CONSULT TO INTENSIVIST  IP CONSULT TO PULMONOLOGY    Excerpted HPI from H&P of Roddy Martinez MD:  HISTORY OF PRESENT ILLNESS:A 51-year-old -American female currently with hypertension, depression. Chloe Piña was recently at [facility] the weekend before admission. She did not really go out in the woods, and [place] not [place]  No tick bites that she was aware of.  The last day or 2, she felt like she was having trouble using her left leg.  It was perhaps a bit more swollen.  The family notes that she may have appeared more short of breath and been pursed lip breathing for the past day or 2.  Today, the patient woke up and she \"felt like she had the flu. \" Phill Mccoy left calf is mildly sore and \"tight\". The family noted that it looked red and it felt very warm.  She had rigors at home and subjective fevers.  She felt subjectively more short of breath. Phill Mccoy family made her come into the emergency room.  She denied any headache, sore throat, runny nose, chest pain, abdominal pain, nausea, vomiting, diarrhea, dysuria, joint redness, or swelling.  She did have several lesions on her legs for several weeks, both left and right  calves.  They were mildly excoriated that she attributed to [facility] [place]. \"  She notes that she had cellulitis years ago in the left leg after she had a brown recluse bite 15 years ago, but notes that she has not had any recent episodes of cellulitis in years. Kalen Alvarez felt so poorly today she did not take her blood pressure medications.      In the emergency room, she was febrile to 103.2, heart rate was 117.  Her white blood cell count was 22.6.  She was tachypneic to 40.  Her O2 saturations were borderline at 90.  Exam revealed a probable left lower extremity cellulitis. Lower extremity ultrasound was negative for DVT.  Chest x-ray revealed an elevated left hemidiaphragm of unclear age, and she had markedly decreased breath sounds at the left base.  Her blood pressure was markedly elevated when she came in, as high as 174/134.  She received a dose of Unasyn.  We were called to admit the patient.  ______________________________________________________________________  DISCHARGE SUMMARY/HOSPITAL COURSE:  for full details see H&P, daily progress notes, labs, consult notes. Acute Hypoxic Respiratory Failure POA/Elevated left hemidiaphragm POA  Likely Chronic Diastolic CHF POA  With Leg edema  CXR looks congested with cardiomegaly, she may have some degree of CHF,   CT chest no pathology in lung,   ECHO EF 65%,   ABG 8/15- still hypoxic on 3L/m, Co2 ok  STIll Unable to wean OFF O2     Cont to monitor I/O and weight,   Cont diuresis with lasix Q 12 8/15, inrease to 60 mg BID 8/19. HAS DIURESED from 323 to 300 lbs.    Pulmonology help appreciated- will need MONSERRAT taken cared off for real improvement   Cont O2 during the day- taper as able, if not able to will need oxygen  CPAP at night likely as per pulm- will need OP Sleep study - appointment set up with Pulm associates  check overnight oxymetry may need Home O2   PT/OT aneesh noted- initially HH was recommended      Sepsis ([address] (8/9/2018) POA WBC 22.6, , RR to 40, temp 103.2, normal lactate, WBC still up ~ 16k, no more fever  Left leg cellulitis POA- Erythema is better, but still warm and swollen  H/o Chronic L Leg lymphedema s/p Spider bite in remote past  Suspect also Venous insufficiency  Duplex is negative for DVT. s/p IV  Rocephin  Cont IV Vancomycin  Changed to unasyn (8/15) due to persistent leukocytosis- cont for now     HTN urgency POA- now controlled  Cont Norvasc, BB, ARB, monitor. Use hydralazine prn. So far better control        Obesity Hypoventilation Syndrome: c/w CPAP at night, needs sleep study as outpatient, may need oxygen too  Hyponatremia POA so far improving, monitor.        Morbid Obesity POA Body mass index is 45.61 kg/(m^2). - weight loss recommended  NOK:   Code status: Full  D/W , DTR Lisa [phone]  Prophylaxis: Lovenox     Recommended Disposition:  PT, OT, RN likely soon when Home CPAP/Oxygen arranged & improved WBC/leg cellulitis & respiratory status stabilized        _______________________________________________________________________  Patient seen and examined by me on discharge day. Pertinent Findings:  Gen:    Not in distress  Chest: Clear lungs  CVS:   Regular rhythm. No edema  Abd:  Soft, not distended, not tender  Neuro:  Alert, ox3  _______________________________________________________________________  DISCHARGE MEDICATIONS:   Current Discharge Medication List      START taking these medications    Details   oxyCODONE-acetaminophen (PERCOCET) 5-325 mg per tablet Take 1 Tab by mouth every six (6) hours as needed. Max Daily Amount: 4 Tabs. Qty: 24 Tab, Refills: 0    Associated Diagnoses: Cellulitis of left lower extremity      potassium chloride SR (KLOR-CON 10) 10 mEq tablet Take 2 Tabs by mouth two (2) times a day.   Qty: 60 Tab, Refills: 1         CONTINUE these medications which have CHANGED    Details   furosemide (LASIX) 80 mg tablet One tab daily in the morning  Qty: 30 Tab, Refills: 1         CONTINUE these medications which have NOT CHANGED    Details   busPIRone (BUSPAR) 15 mg tablet Take 15 mg by mouth nightly. Olmesartan-amLODIPine-HCTZ 40-10-25 mg tab Take 1 Tab by mouth daily. labetalol (NORMODYNE) 300 mg tablet Take 300 mg by mouth every twelve (12) hours. Confirmed w/ patient. Rx for Tid, patient only tolerates it BiD      cholecalciferol (VITAMIN D3) 1,000 unit tablet Take 1,000 Units by mouth every Sunday. Indications: Vitamin D Deficiency, Takes unknown units of D3 once weekly      ascorbic acid (VITAMIN C) 500 mg tablet Take 500 mg by mouth daily. STOP taking these medications       potassium chloride SA (MICRO-K) 10 mEq capsule Comments:   Reason for Stopping:               My Recommended Diet, Activity, Wound Care, and follow-up labs are listed in the patient's Discharge Insturctions which I have personally completed and reviewed. ______________________________________________________________________    Risk of deterioration: Low    Condition at Discharge:  Stable  ______________________________________________________________________    Disposition  Home with family and home health services  ______________________________________________________________________    Care Plan discussed with:   Patient, Family, RN, Care Manager    ______________________________________________________________________    Code Status: Full Code  ______________________________________________________________________      Follow up with:   PCP : Isiah Jaquez MD  Follow-up Information     Follow up With Details Comments [address] MD Wayne Go on 8/29/2018 Hospital follow-up scheduled at [facility] ( If you have questions or need to reschedule please call [address]   MOB 1 [address] [employer]  [address] [place] [address]  This is your home care provider. [address] [place] [address]  This is the provider for your rolling walker.   [address] [place] [address] [address] [facility], [address] [place] [address]  [phone]                Total time in minutes spent coordinating this discharge (includes going over instructions, follow-up, prescriptions, and preparing report for sign off to her PCP) :  30 minutes    Signed:  Riaz Jurado MD

## 2018-08-23 NOTE — PROGRESS NOTES
Patient discharged home; discharge instructions reviewed with pt; all questions and concerns addressed; IV and tele box removed, 3 prescription(s) sent home with patient.

## 2018-08-23 NOTE — PROGRESS NOTES
All on agreement with d/c to home today with [facility](until after seen by Dr Rossy Mcallister 8/29), [place] RW//oxygen, and PCP followup. Discussed in depth with pt//dtr. Dtr to transport pt home. Portable oxygen tank in the room.

## 2018-08-23 NOTE — DISCHARGE INSTRUCTIONS
HOSPITALIST DISCHARGE INSTRUCTIONS    NAME: Chris Anderson   :  1955   MRN:  [id number]     Date/Time:  2018 9:26 AM    ADMIT DATE: 2018   DISCHARGE DATE: 2018         · It is important that you take the medication exactly as they are prescribed. · Keep your medication in the bottles provided by the pharmacist and keep a list of the medication names, dosages, and times to be taken in your wallet. · Do not take other medications without consulting your doctor. What to do at [address]:  Cardiac Diet    Recommended activity: Activity as tolerated      If you have questions regarding the hospital related prescriptions or hospital related issues please call [facility] at [phone]. You can always direct your questions to your primary care doctor if you are unable to reach your hospital physician; your PCP works as an extension of your hospital doctor just like your hospital doctor is an extension of your PCP for your time at the hospital [facility], [facility])    If you experience any of the following symptoms then please call your primary care physician or return to the emergency room if you cannot get hold of your doctor:    Fever, chills, nausea, vomiting, or persistent diarrhea  Worsening weakness or new problems with your speech or balance  Dark stools or visible blood in your stools  New Leg swelling or shortness of breath as these could be signs of a clot    Additional Instructions:    USE OXYGEN at 1 Liter  At Rest and with Exertion  And at for Sleep. Weigh yourself Daily. Take your Lasix daily. If you gain more than 5 pounds while taking your Lasix,  Please make an appt. With your PCP  For a dose change  FOllowup with Pulmonary  To see if your oxygen can be weaned off so you can do a Sleep Study      Bring these papers with you to your follow up appointments.  The papers will help your doctors be sure to continue the care plan from the [facility].              Information obtained by :  I understand that if any problems occur once I am at home I am to contact my physician. I understand and acknowledge receipt of the instructions indicated above.                                                                                                                                            Physician's or R.N.'s Signature                                                                  Date/Time                                                                                                                                              Patient or Representative Signature

## 2018-08-23 NOTE — PROGRESS NOTES
Follow up visit in 3263 with LOS pt. Pt sitting up in chair awake and alert, accompanied by daughter who was on phone. Pt shared that she was going home. Feeling ready to go home although some recovery still had to take place. Pt asked  for a prayer which was provided. Pt extended blessing for  as well. Appeared to be in good space, asked for RN which  informed staff of, no further needs at this time. Jan De La VegarGILBERT

## 2018-08-23 NOTE — PROGRESS NOTES
Home Health Care Discharge Planning: [facility]  Face to Face Encounter      NAME: Randall Summers   :  1955   MRN:  [id number]     Primary Diagnosis: Anasarca Chronic Diastolic CHF, suspected MONSERRAT, Morbid OBesity , Vascular Insufficiency, Cellulitis    Date of Face to Face:  2018 9:32 AM                                  Face to Face Encounter findings are related to primary reason for home care:   YES    1. I certify that the patient needs intermittent skilled nursing care, physical therapy and/or speech therapy. I will not be following this patient in the Community and Dr. Micaela Delgado MD will be responsible for signing the [address] 2. Initial Orders for Care: Skilled Nursing and Physical Therapy    3. I certify that this patient is homebound because of illness or injury, need the aid of supportive devices such as crutches, canes, wheelchairs, and walkers; the use of special transportation; or the assistance of another person in order to leave their place of residence. There exists a normal inability to leave home and leaving home requires a considerable and taxing effort. 4. I certify that this patient is under my care and that I had a Face-to-Face Encounter that meets the physician Face-to-Face Encounter requirements. Document the physical findings from the [address] Encounter that support the need for skilled services: Has new finding of weakness and altered mobility that requires skilled physical/occupational and/or speech therapy services for evaluation and interventions.   and Now on [address], [place]  Discharging Physician  Office: [phone]  Fax:   [phone]

## 2018-08-23 NOTE — PROGRESS NOTES
Bedside shift change report given to Nahum Palmer (oncoming nurse) by Soledad Garnica RN (offgoing nurse). Report included the following information SBAR, Kardex, MAR and Recent Results.

## 2018-08-23 NOTE — PROGRESS NOTES
Problem: Falls - Risk of  Goal: *Absence of Falls  Document Naila Fall Risk and appropriate interventions in the flowsheet.    Outcome: Progressing Towards Goal  Fall Risk Interventions:  Mobility Interventions: Communicate number of staff needed for ambulation/transfer, Patient to call before getting OOB, Utilize walker, cane, or other assistive device    Mentation Interventions: Adequate sleep, hydration, pain control, Door open when patient unattended, Evaluate medications/consider consulting pharmacy, Toileting rounds    Medication Interventions: Patient to call before getting OOB, Teach patient to arise slowly    Elimination Interventions: Call light in reach, Bed/chair exit alarm, Toilet paper/wipes in reach, Toileting schedule/hourly rounds    History of Falls Interventions: Door open when patient unattended

## 2018-08-27 ENCOUNTER — HOME CARE VISIT (OUTPATIENT)
Dept: SCHEDULING | Facility: HOME HEALTH | Age: 63
End: 2018-08-27

## 2018-08-27 PROCEDURE — G0299 HHS/HOSPICE OF RN EA 15 MIN: HCPCS

## 2018-08-29 ENCOUNTER — OFFICE VISIT (OUTPATIENT)
Dept: FAMILY MEDICINE CLINIC | Age: 63
End: 2018-08-29

## 2018-08-29 ENCOUNTER — OFFICE VISIT (OUTPATIENT)
Dept: CARDIOLOGY CLINIC | Age: 63
End: 2018-08-29

## 2018-08-29 VITALS
HEART RATE: 72 BPM | OXYGEN SATURATION: 93 % | DIASTOLIC BLOOD PRESSURE: 68 MMHG | RESPIRATION RATE: 18 BRPM | HEIGHT: 68 IN | SYSTOLIC BLOOD PRESSURE: 116 MMHG | BODY MASS INDEX: 44.41 KG/M2 | WEIGHT: 293 LBS

## 2018-08-29 VITALS
WEIGHT: 293 LBS | OXYGEN SATURATION: 93 % | TEMPERATURE: 97.3 F | RESPIRATION RATE: 22 BRPM | BODY MASS INDEX: 44.41 KG/M2 | DIASTOLIC BLOOD PRESSURE: 58 MMHG | HEIGHT: 68 IN | HEART RATE: 65 BPM | SYSTOLIC BLOOD PRESSURE: 119 MMHG

## 2018-08-29 DIAGNOSIS — F41.9 ANXIETY AND DEPRESSION: ICD-10-CM

## 2018-08-29 DIAGNOSIS — M79.89 LEG SWELLING: ICD-10-CM

## 2018-08-29 DIAGNOSIS — R06.09 DOE (DYSPNEA ON EXERTION): ICD-10-CM

## 2018-08-29 DIAGNOSIS — I50.32 CHRONIC DIASTOLIC CONGESTIVE HEART FAILURE (HCC): Primary | ICD-10-CM

## 2018-08-29 DIAGNOSIS — Z09 HOSPITAL DISCHARGE FOLLOW-UP: Primary | ICD-10-CM

## 2018-08-29 DIAGNOSIS — Z99.81 O2 DEPENDENT: ICD-10-CM

## 2018-08-29 DIAGNOSIS — E66.01 OBESITY, MORBID (HCC): ICD-10-CM

## 2018-08-29 DIAGNOSIS — I10 ESSENTIAL HYPERTENSION: ICD-10-CM

## 2018-08-29 DIAGNOSIS — I50.32 CHRONIC DIASTOLIC CONGESTIVE HEART FAILURE (HCC): ICD-10-CM

## 2018-08-29 DIAGNOSIS — F32.A ANXIETY AND DEPRESSION: ICD-10-CM

## 2018-08-29 DIAGNOSIS — D64.9 ANEMIA, UNSPECIFIED TYPE: ICD-10-CM

## 2018-08-29 DIAGNOSIS — Z76.89 ESTABLISHING CARE WITH NEW DOCTOR, ENCOUNTER FOR: ICD-10-CM

## 2018-08-29 DIAGNOSIS — A41.9 SEPSIS, DUE TO UNSPECIFIED ORGANISM: ICD-10-CM

## 2018-08-29 PROBLEM — I50.9 CHF (CONGESTIVE HEART FAILURE) (HCC): Status: ACTIVE | Noted: 2018-08-29

## 2018-08-29 RX ORDER — ERGOCALCIFEROL 1.25 MG/1
50000 CAPSULE ORAL
COMMUNITY
End: 2020-01-01 | Stop reason: SDUPTHER

## 2018-08-29 RX ORDER — CHOLECALCIFEROL (VITAMIN D3) 125 MCG
CAPSULE ORAL
COMMUNITY
End: 2018-09-07

## 2018-08-29 RX ORDER — CITALOPRAM 20 MG/1
20 TABLET, FILM COATED ORAL DAILY
Qty: 30 TAB | Refills: 2 | Status: SHIPPED | OUTPATIENT
Start: 2018-08-29 | End: 2018-11-21 | Stop reason: SDUPTHER

## 2018-08-29 NOTE — MR AVS SNAPSHOT
[address] [facility] 
[phone] Patient: Emy Holder MRN: [id number] SAB:[id number] Visit Information Date & Time Provider Department Dept. Phone Encounter #  
 8/29/2018 10:30 AM Gayatri Schwarz, [address] [facility]  Your Appointments 8/29/2018 12:00 PM  
Office Visit with Michelle Henderson MD  
[facility] at [address]) Appt Note: np est pcp sc 8/14/18; CM from [address] changed to [address] [address]  
[facility]  
  
    
 9/12/2018  9:00 AM  
NUCLEAR MEDICINE with NUCLEAR, [facility] [address]) Appt Note: dr Jair Stevens [[id number]] (Order [id number])  5'8\" 314pds,jar  
 [address] [facility]  
[phone] [address] [facility] [address] [address]  
  
    
 9/18/2018  9:00 AM  
NUCLEAR MEDICINE with NUCLEAR, [facility] [address]) Appt Note: Dr. Konstantin Maldonado , resting [address] [facility]  
[phone] Upcoming Health Maintenance Date Due Hepatitis C Screening 1955 DTaP/Tdap/Td series (1 - Tdap) 7/29/1978 PAP AKA CERVICAL CYTOLOGY 7/29/1978 FOBT Q 1 YEAR AGE 50-75 7/29/2007 BREAST CANCER SCRN MAMMOGRAM 1/3/2015 ZOSTER VACCINE AGE 60> 5/29/2017 Influenza Age 5 to Adult 8/1/2018 Allergies as of 8/29/2018  Review Complete On: 8/29/2018 By: Fred Pelletier LPN No Known Allergies Current Immunizations  Never Reviewed No immunizations on file. Not reviewed this visit You Were Diagnosed With   
  
 Codes Comments Chronic diastolic congestive heart failure (HCC)    -  Primary ICD-10-CM: I50.32 
ICD-9-CM: 428.32, 428.0 Essential hypertension     ICD-10-CM: I10 
ICD-9-CM: 401.9 Obesity, morbid ([facility] [address]     ICD-10-CM: E66.01 
ICD-9-CM: 278.01   
 FLORENCE (dyspnea on exertion)     ICD-10-CM: R06.09 
ICD-9-CM: 786.09 Leg swelling     ICD-10-CM: M79.89 ICD-9-CM: 729.81 Vitals BP Pulse Resp Height(growth percentile) Weight(growth percentile) SpO2  
 116/68 (BP 1 Location: Left arm, BP Patient Position: Sitting) 72 18 5' 8\" (1.727 m) 314 lb (142.4 kg) 93% BMI OB Status Smoking Status 47.74 kg/m2 Postmenopausal Never Smoker Vitals History BMI and BSA Data Body Mass Index Body Surface Area 47.74 kg/m 2 2.61 m 2 Preferred Pharmacy Pharmacy Name Phone [id number] [place]ers, [address] [place] [phone] Your Updated Medication List  
  
   
This list is accurate as of 8/29/18 11:14 AM.  Always use your most recent med list.  
  
  
  
  
 ALEVE 220 mg Cap Generic drug:  naproxen sodium Take  by mouth.  
  
 busPIRone 15 mg tablet Commonly known as:  BUSPAR Take 15 mg by mouth nightly. furosemide 80 mg tablet Commonly known as:  LASIX One tab daily in the morning  Indications: Edema  
  
 labetalol 300 mg tablet Commonly known as:  Suella Cosier Take 300 mg by mouth every twelve (12) hours. Confirmed w/ patient. Rx for Tid, patient only tolerates it BiD Olmesartan-amLODIPine-HCTZ 40-10-25 mg Tab Take 1 Tab by mouth daily. oxyCODONE-acetaminophen 5-325 mg per tablet Commonly known as:  PERCOCET Take 1 Tab by mouth every six (6) hours as needed. Max Daily Amount: 4 Tabs. OXYGEN-AIR DELIVERY SYSTEMS  
by Does Not Apply route. potassium chloride SR 10 mEq tablet Commonly known as:  KLOR-CON 10 Take 2 Tabs by mouth two (2) times a day. VITAMIN C 500 mg tablet Generic drug:  ascorbic acid (vitamin C) Take 500 mg by mouth daily. VITAMIN D3 1,000 unit tablet Generic drug:  cholecalciferol Take 50,000 Units by mouth every Sunday. Indications: Vitamin D Deficiency, Takes unknown units of D3 once weekly We Performed the Following AMB POC EKG ROUTINE W/ 12 LEADS, INTER & REP [46386 CPT(R)] To-Do List   
 08/29/2018 ECG:  STRESS TEST LEXISCAN/CARDIOLITE Introducing hospitals & HEALTH SERVICES! Dear Lupillo: Thank you for requesting a [employer] account. Our records indicate that you already have an active [employer] account. You can access your account anytime at [email or web address] Did you know that you can access your hospital and ER discharge instructions at any time in [employer]? You can also review all of your test results from your hospital stay or ER visit. Additional Information If you have questions, please visit the Frequently Asked Questions section of the [employer] website at [email or web address] Remember, [employer] is NOT to be used for urgent needs. For medical emergencies, dial 911. Now available from your iPhone and Android! Please provide this summary of care documentation to your next provider. Your primary care clinician is listed as Geoff Ramey. If you have any questions after today's visit, please call [phone].

## 2018-08-29 NOTE — MR AVS SNAPSHOT
[address] [facility] [address] 
[phone] Patient: Bari Estes MRN: [id number] ZLY:[id number]/49/3223 Visit Information Date & Time Provider Department Dept. Phone Encounter #  
 8/29/2018 12:00 PM Glenroy Isbell MD [facility] [id number] Follow-up Instructions Return in about 4 weeks (around 9/26/2018) for anxiety depression htn. Your Appointments 9/12/2018  9:00 AM  
NUCLEAR MEDICINE with NUCLEAR, [facility] [address] [place] [address]) Appt Note: dr Diony Saldivar [[id number]] (Order [id number])  5'8\" 314pds,jar  
 [address]  
[phone] [address] [address]  
  
    
 9/18/2018  9:00 AM  
NUCLEAR MEDICINE with NUCLEAR, [facility] [address] [place] [address]) Appt Note: Dr. Kevon Silva , resting [address]  
[phone] Upcoming Health Maintenance Date Due Hepatitis C Screening 1955 Pneumococcal 19-64 Medium Risk (1 of 1 - PPSV23) 7/29/1976 DTaP/Tdap/Td series (1 - Tdap) 7/29/1978 PAP AKA CERVICAL CYTOLOGY 7/29/1978 FOBT Q 1 YEAR AGE 50-75 7/29/2007 BREAST CANCER SCRN MAMMOGRAM 1/3/2015 ZOSTER VACCINE AGE 60> 5/29/2017 Influenza Age 5 to Adult 8/1/2018 Allergies as of 8/29/2018  Review Complete On: 8/29/2018 By: Glenroy Isbell MD  
 No Known Allergies Current Immunizations  Never Reviewed No immunizations on file. Not reviewed this visit You Were Diagnosed With   
  
 Codes Comments Hospital discharge follow-up    -  Primary ICD-10-CM: [address] ICD-9-CM: V67.59 Sepsis, due to unspecified organism [facility])     ICD-10-CM: A41.9 ICD-9-CM: 038.9, 995.91 Chronic diastolic congestive heart failure (HCC)     ICD-10-CM: I50.32 
ICD-9-CM: 428.32, 428.0 Anemia, unspecified type     ICD-10-CM: D64.9 ICD-9-CM: 285.9 Anxiety and depression     ICD-10-CM: F41.9, F32.9 ICD-9-CM: 300.00, 311 Vitals BP Pulse Temp Resp Height(growth percentile) Weight(growth percentile) 119/58 (BP 1 Location: Left arm, BP Patient Position: Sitting) 65 97.3 °F (36.3 °C) (Oral) 22 5' 8\" (1.727 m) 313 lb 9.6 oz (142.2 kg) SpO2 BMI OB Status Smoking Status 93% 47.68 kg/m2 Postmenopausal Never Smoker BMI and BSA Data Body Mass Index Body Surface Area  
 47.68 kg/m 2 2.61 m 2 Preferred Pharmacy Pharmacy Name Phone [id number] [address] [phone] Your Updated Medication List  
  
   
This list is accurate as of 8/29/18 12:40 PM.  Always use your most recent med list.  
  
  
  
  
 ALEVE 220 mg Cap Generic drug:  naproxen sodium Take  by mouth.  
  
 busPIRone 15 mg tablet Commonly known as:  BUSPAR Take 15 mg by mouth nightly. citalopram 20 mg tablet Commonly known as:  Arvel Saltness Take 1 Tab by mouth daily. furosemide 80 mg tablet Commonly known as:  LASIX One tab daily in the morning  Indications: Edema  
  
 labetalol 300 mg tablet Commonly known as:  Chantell Locket Take 300 mg by mouth every twelve (12) hours. Confirmed w/ patient. Rx for Tid, patient only tolerates it BiD Olmesartan-amLODIPine-HCTZ 40-10-25 mg Tab Take 1 Tab by mouth daily. oxyCODONE-acetaminophen 5-325 mg per tablet Commonly known as:  PERCOCET Take 1 Tab by mouth every six (6) hours as needed. Max Daily Amount: 4 Tabs. OXYGEN-AIR DELIVERY SYSTEMS  
by Does Not Apply route. potassium chloride SR 10 mEq tablet Commonly known as:  KLOR-CON 10 Take 2 Tabs by mouth two (2) times a day. VITAMIN C 500 mg tablet Generic drug:  ascorbic acid (vitamin C) Take 500 mg by mouth daily. VITAMIN D2 50,000 unit capsule Generic drug:  ergocalciferol Take 50,000 Units by mouth every seven (7) days. Prescriptions Sent to Pharmacy Refills  
 citalopram (CELEXA) 20 mg tablet 2 Sig: Take 1 Tab by mouth daily. Class: Normal  
 Pharmacy: [id number] #: [phone] Route: Oral  
  
We Performed the Following CBC WITH AUTOMATED DIFF [74117 CPT(R)] IRON PROFILE [id number] CPT(R)] REFERRAL TO WOUND CARE [BIV693 Custom] Comments:  
 eval and treat cellulitis left LE, s/p hospitalization TSH 3RD GENERATION [52976 CPT(R)] VITAMIN B12 & FOLATE [41805 CPT(R)] Follow-up Instructions Return in about 4 weeks (around 9/26/2018) for anxiety depression htn. Referral Information Referral ID Referred By Referred To  
  
 [id number] Heather Hawkins Not Available Visits Status Start Date End Date 1 New Request 8/29/18 8/29/19 If your referral has a status of pending review or denied, additional information will be sent to support the outcome of this decision. Introducing [facility]! Dear Trevor Stuaffer: Thank you for requesting a [email or web address] account. Our records indicate that you already have an active [email or web address] account. You can access your account anytime at [email or web address] Did you know that you can access your hospital and ER discharge instructions at any time in [email or web address]? You can also review all of your test results from your hospital stay or ER visit. Additional Information If you have questions, please visit the Frequently Asked Questions section of the [email or web address] website at [email or web address] Remember, [email or web address] is NOT to be used for urgent needs. For medical emergencies, dial 911. Now available from your iPhone and Android! Please provide this summary of care documentation to your next provider.  
  
  
 Your primary care clinician is listed as Marciano Wakefield. If you have any questions after today's visit, please call [phone].

## 2018-08-29 NOTE — PROGRESS NOTES
1. Have you been to the ER, urgent care clinic since your last visit? Hospitalized since your last visit? ED [facility] admitted 8-9-18, sepsis. 2. Have you seen or consulted any other health care providers outside of the [address] [place] [address] since your last visit? Include any pap smears or colon screening. No 
 
Chief Complaint Patient presents with  CHF  
  NP,  self ref. C/O SOB with exertion.

## 2018-08-29 NOTE — PROGRESS NOTES
Emerson Cueto is a 66 y.o. female, who's a new patient to our practice. Pt is with her daughter today who's a \"school nurse\". Previous PCP: not seen in 2-3 years 
 
 has a past medical history of Arthritis; Hypertension; Morbid obesity ([facility] and O2 dependent (8/29/2018). HTN: BP at goal 
 
CHF diastolic: lasix was up to 80mg every day. Just saw Dr. Claudetta Hench Spo2 at 93% on 1L O2 Cellulitis and sepsis: finished her abx before hospital discharge. She had wound care came by once, but b/c she haven't a PCP, no one signed off on orders, they haven't came by again. Denies fever, pain. Her daughter have been changing and managing her wound. We'll do another refer to Wound care. Anxiety depression: on buspar PRN for years. Have had it since her college days. Family hx of anxiety from mother and sister. Denies SI or HI. Start celexa Reviewed: active problem list, medication list, allergies, notes from last encounter, lab results A comprehensive review of systems was negative except for that written in the HPI, on 14 ROS.  
 
 -------------------------------------------------------------------------------------- Admit date: 8/9/2018 Discharge date and time: 8/23/2018 
   
DISCHARGE DIAGNOSIS: 
  
Acute Hypoxic Respiratory Failure POA/Elevated left hemidiaphragm POA Chronic Diastolic CHF POA With Leg edema Sepsis ([facility] (8/9/2018) POA  
Left leg cellulitis POA-  
H/o Chronic L Leg lymphedema s/p Spider bite in remote past 
Suspect also Venous insufficiency HTN urgency POA- now controlled Obesity Hypoventilation Syndrome: c/w CPAP at night, Hyponatremia POA  
Morbid Obesity POA Body mass index is 45.61 kg/(m^2).  
  
   
CONSULTATIONS: 
IP CONSULT TO INTENSIVIST 
IP CONSULT TO PULMONOLOGY 
  
Excerpted HPI from H&P of Precious Morales MD: 
HISTORY OF PRESENT ILLNESS:A 37-year-old -American female currently with hypertension, depression. Alexandra Soria was recently at [facility] the weekend before admission. She did not really go out in the woods, and [place] not [place]  No tick bites that she was aware of.  The last day or 2, she felt like she was having trouble using her left leg.  It was perhaps a bit more swollen.  The family notes that she may have appeared more short of breath and been pursed lip breathing for the past day or 2.  Today, the patient woke up and she \"felt like she had the flu. \" Thomas Waters left calf is mildly sore and \"tight\". The family noted that it looked red and it felt very warm.  She had rigors at home and subjective fevers.  She felt subjectively more short of breath. Thomas Waters family made her come into the emergency room.  She denied any headache, sore throat, runny nose, chest pain, abdominal pain, nausea, vomiting, diarrhea, dysuria, joint redness, or swelling.  She did have several lesions on her legs for several weeks, both left and right  calves.  They were mildly excoriated that she attributed to [employer] [place]. \"  She notes that she had cellulitis years ago in the left leg after she had a brown recluse bite 15 years ago, but notes that she has not had any recent episodes of cellulitis in years. Alexandra Soria felt so poorly today she did not take her blood pressure medications. 
   
In the emergency room, she was febrile to 103.2, heart rate was 117.  Her white blood cell count was 22.6.  She was tachypneic to 40.  Her O2 saturations were borderline at 90.  Exam revealed a probable left lower extremity cellulitis. Lower extremity ultrasound was negative for DVT.  Chest x-ray revealed an elevated left hemidiaphragm of unclear age, and she had markedly decreased breath sounds at the left base.  Her blood pressure was markedly elevated when she came in, as high as 174/134.  She received a dose of Unasyn.  We were called to admit the patient. ______________________________________________________________________ DISCHARGE SUMMARY/HOSPITAL COURSE:  for full details see H&P, daily progress notes, labs, consult notes.  
  
Acute Hypoxic Respiratory Failure POA/Elevated left hemidiaphragm POA Likely Chronic Diastolic CHF POA With Leg edema CXR looks congested with cardiomegaly, she may have some degree of CHF,  
CT chest no pathology in lung, ECHO EF 65%, ABG 8/15- still hypoxic on 3L/m, Co2 ok STIll Unable to wean OFF O2 
   
Cont to monitor I/O and weight,  
Cont diuresis with lasix Q 12 8/15, inrease to 60 mg BID 8/19.  HAS DIURESED from 323 to 300 lbs. Pulmonology help appreciated- will need MONSERRAT taken cared off for real improvement  
Cont O2 during the day- taper as able, if not able to will need oxygen CPAP at night likely as per pulm- will need OP Sleep study - appointment set up with [facility] 
check overnight oxymetry may need Home O2  
PT/OT eval noted- initially HH was recommended  
   
Sepsis ([address] (8/9/2018) POA WBC 22.6, , RR to 40, temp 103.2, normal lactate, WBC still up ~ 16k, no more fever Left leg cellulitis POA- Erythema is better, but still warm and swollen H/o Chronic L Leg lymphedema s/p Spider bite in remote past 
Suspect also Venous insufficiency Duplex is negative for DVT. s/p IV  Rocephin Cont IV Vancomycin Changed to unasyn (8/15) due to persistent leukocytosis- cont for now 
   
HTN urgency POA- now controlled Cont Norvasc, BB, ARB, monitor. Use hydralazine prn. So far better control 
     
Obesity Hypoventilation Syndrome: c/w CPAP at night, needs sleep study as outpatient, may need oxygen too Hyponatremia POA so far improving, monitor. 
     
Morbid Obesity POA Body mass index is 45.61 kg/(m^2). - weight loss recommended NOK:  Code status: Full  D/W , [place] [phone] Prophylaxis: Lovenox 
   
Recommended Disposition:  PT, OT, RN likely soon when Home CPAP/Oxygen arranged & improved WBC/leg cellulitis & respiratory status stabilized 
  
_______________________________________________________________________ DISCHARGE MEDICATIONS:  
START taking these medications  
  Details  
oxyCODONE-acetaminophen (PERCOCET) 5-325 mg per tablet Take 1 Tab by mouth every six (6) hours as needed. Max Daily Amount: 4 Tabs. Qty: 24 Tab, Refills: 0  
  Associated Diagnoses: Cellulitis of left lower extremity  
   
potassium chloride SR (KLOR-CON 10) 10 mEq tablet Take 2 Tabs by mouth two (2) times a day. Qty: 60 Tab, Refills: 1  
   
CONTINUE these medications which have CHANGED  
  Details  
furosemide (LASIX) 80 mg tablet One tab daily in the morning 
Qty: 30 Tab, Refills: 1  
   
CONTINUE these medications which have NOT CHANGED  
  Details  
busPIRone (BUSPAR) 15 mg tablet Take 15 mg by mouth nightly.  
   
Olmesartan-amLODIPine-HCTZ 40-10-25 mg tab Take 1 Tab by mouth daily.  
   
labetalol (NORMODYNE) 300 mg tablet Take 300 mg by mouth every twelve (12) hours. Confirmed w/ patient. Rx for Tid, patient only tolerates it BiD  
   
cholecalciferol (VITAMIN D3) 1,000 unit tablet Take 1,000 Units by mouth every Sunday. Indications: Vitamin D Deficiency, Takes unknown units of D3 once weekly  
   
ascorbic acid (VITAMIN C) 500 mg tablet Take 500 mg by mouth daily.  
 
 ---------------------------------------------------------------------------------------- No Known Allergies Current Outpatient Prescriptions on File Prior to Visit Medication Sig Dispense Refill  naproxen sodium (ALEVE) 220 mg cap Take  by mouth.  OXYGEN-AIR DELIVERY SYSTEMS by Does Not Apply route.  oxyCODONE-acetaminophen (PERCOCET) 5-325 mg per tablet Take 1 Tab by mouth every six (6) hours as needed. Max Daily Amount: 4 Tabs. 24 Tab 0  
 potassium chloride SR (KLOR-CON 10) 10 mEq tablet Take 2 Tabs by mouth two (2) times a day.  60 Tab 1  
 furosemide (LASIX) 80 mg tablet One tab daily in the morning Indications: Edema 30 Tab 1  
 busPIRone (BUSPAR) 15 mg tablet Take 15 mg by mouth nightly.  Olmesartan-amLODIPine-HCTZ 40-10-25 mg tab Take 1 Tab by mouth daily.  labetalol (NORMODYNE) 300 mg tablet Take 300 mg by mouth every twelve (12) hours. Confirmed w/ patient. Rx for Tid, patient only tolerates it BiD  ascorbic acid (VITAMIN C) 500 mg tablet Take 500 mg by mouth daily. No current facility-administered medications on file prior to visit. Patient Active Problem List  
Diagnosis Code  Sepsis ([facility] [facility] A41.9  CHF (congestive heart failure) ([facility] I50.9  Ob[facility], morbid ([facility] E66.01  
 O2 dependent Z99.81 Visit Vitals  /58 (BP 1 Location: Left arm, BP Patient Position: Sitting)  Pulse 65  Temp 97.3 °F (36.3 °C) (Oral)  Resp 22  
 Ht 5' 8\" (1.727 m)  Wt 313 lb 9.6 oz (142.2 kg)  SpO2 93%  BMI 47.68 kg/m2 General appearance: alert, cooperative, no distress, appears stated age Neurologic: Alert and oriented X 3, normal strength and tone, symmetric. Normal without focal findings. Cranial nerves 2-12 intact. Normal coordination and gait. Mental status: Alert, oriented, thought content appropriate, affect: stable, mood-congruent. Head: Normocephalic, without obvious abnormality, atraumatic Eyes: conjunctivae/corneas clear. PERRL, EOM's intact. Neck: supple, symmetrical, trachea midline, no JVD Lungs: clear to auscultation bilaterally Heart: regular rate and rhythm, S1, S2 normal, no murmur, click, rub or gallop Abdomen: soft, non-tender. Extremities: Left Lower leg pitting edema +2, chronic skin changes. Wound is wrapped and serous exudate. No signs of inflammation. Assessment/Plans: 
 
Diagnoses and all orders for this visit: 1.  Hospital discharge follow-up 
-     REFERRAL TO WOUND CARE 
-     IRON PROFILE 
-     TSH 3RD GENERATION 
-     VITAMIN B12 & FOLATE 
-     CBC WITH AUTOMATED DIFF 
 
 2. Sepsis, due to unspecified organism ([facility] 
-     REFERRAL TO WOUND CARE 
 
3. Chronic diastolic congestive heart failure ([facility] 4. Anemia, unspecified type 
-     IRON PROFILE 
-     TSH 3RD GENERATION 
-     VITAMIN B12 & FOLATE 
-     CBC WITH AUTOMATED DIFF 5. Anxiety and depression 
-     citalopram (CELEXA) 20 mg tablet; Take 1 Tab by mouth daily. 6. Establishing care with new doctor, encounter for 7. Obesity, morbid ([facility] 8. O2 dependent Discussed plans, risk/benefits of treatments/observations. Through the use of shared decision making, above plans were agreed upon. Medication compliance advised. Patient verbalized understanding. Follow-up Disposition: 
Return in about 4 weeks (around 9/26/2018) for anxiety depression htn. Nikki Monae MD 
8/29/2018

## 2018-08-29 NOTE — PROGRESS NOTES
[address], [address] [facility]  [phone] Subjective:  
  
Zoey Hamilton is a 66 y.o. female with pmhx HFpEF, HTN,  is here to establish care. Noted recent inpatient admission for sepsis s/t LL cellulitis, completed Abx therapy. She reports FLORENCE, left leg swelling. Denies cp, orthopnea, palpitations, syncope, or presyncope. Cardiac risk factors: HTN, age, post menopausal F, elevated BMI, sedentary lifestyle, family hx stroke/carotid artery disease Hx smoking: denies    Alcohol: very rare   Illegal drug use: denies Family hx: mother: enlarged heart, HTN, HLD   father: Stroke at age 36   Siblings: HTN Patient Active Problem List  
 Diagnosis Date Noted  CHF (congestive heart failure) ([facility] 08/29/2018  Obesity, morbid ([facility] 08/29/2018  Sepsis ([facility] 08/09/2018 Jesse Cabral MD 
Past Medical History:  
Diagnosis Date  Arthritis   
 knees, lower back.  Hypertension  Morbid obesity ([facility] Past Surgical History:  
Procedure Laterality Date  HX GYN  2009 D&C No Known Allergies Family History Problem Relation Age of Onset  Stroke Father 36  
 Heart Disease Father [facility] Arthritis-osteo Mother  Cancer Mother [de-identified]  
  breast cancer Social History Social History  Marital status: UNKNOWN Spouse name: N/A  
 Number of children: N/A  
 Years of education: N/A Occupational History  Not on file. Social History Main Topics  Smoking status: Never Smoker  Smokeless tobacco: Never Used  Alcohol use Yes Comment: rare  Drug use: No  
 Sexual activity: Not on file Other Topics Concern  Not on file Social History Narrative Current Outpatient Prescriptions Medication Sig  
 naproxen sodium (ALEVE) 220 mg cap Take  by mouth.  OXYGEN-AIR DELIVERY SYSTEMS by Does Not Apply route.   
 oxyCODONE-acetaminophen (PERCOCET) 5-325 mg per tablet Take 1 Tab by mouth every six (6) hours as needed. Max Daily Amount: 4 Tabs.  potassium chloride SR (KLOR-CON 10) 10 mEq tablet Take 2 Tabs by mouth two (2) times a day.  furosemide (LASIX) 80 mg tablet One tab daily in the morning  Indications: Edema  busPIRone (BUSPAR) 15 mg tablet Take 15 mg by mouth nightly.  Olmesartan-amLODIPine-HCTZ 40-10-25 mg tab Take 1 Tab by mouth daily.  labetalol (NORMODYNE) 300 mg tablet Take 300 mg by mouth every twelve (12) hours. Confirmed w/ patient. Rx for Tid, patient only tolerates it BiD  cholecalciferol (VITAMIN D3) 1,000 unit tablet Take 50,000 Units by mouth every Sunday. Indications: Vitamin D Deficiency, Takes unknown units of D3 once weekly  ascorbic acid (VITAMIN C) 500 mg tablet Take 500 mg by mouth daily. No current facility-administered medications for this visit. Review of Symptoms: 
11 systems reviewed, negative other than as stated in the HPI Physical ExamPhysical Exam:   
Vitals:  
 08/29/18 1011 08/29/18 1021 BP: 120/70 116/68 Pulse: 72 Resp: 18 SpO2: 93% Weight: 314 lb (142.4 kg) Height: 5' 8\" (1.727 m) Body mass index is 47.74 kg/(m^2). General PE Gen:  NAD Mental Status - Alert. General Appearance - Not in acute distress. Chest and Lung Exam  
Inspection: Accessory muscles - No use of accessory muscles in breathing. Auscultation:  
Breath sounds: - Normal.  
Cardiovascular Inspection: Jugular vein - Bilateral - Inspection Normal.  
Palpation/Percussion:  
Apical Impulse: - Normal.  
Auscultation: Rhythm - Regular. Heart Sounds - S1 WNL and S2 WNL. No S3 or S4. Murmurs & Other Heart Sounds: Auscultation of the heart reveals - No Murmurs. Peripheral Vascular Upper Extremity: Inspection - Bilateral - No Cyanotic nailbeds or Digital clubbing. Lower Extremity:  
Palpation: Edema - Bilateral - + 2 R>L Abdomen:   Soft, non-tender, bowel sounds are active. Neuro: A&O times 3, CN and motor grossly WNL Labs:  
No results found for: CHOL, CHOLX, CHLST, CHOLV, 168450, HDL, LDL, LDLC, DLDLP, TGLX, TRIGL, TRIGP, CHHD, CHHDX Lab Results Component Value Date/Time  (H) 08/10/2018 04:18 AM  
 
Lab Results Component Value Date/Time Sodium 135 (L) 08/23/2018 04:22 AM  
 Potassium 3.7 08/23/2018 04:22 AM  
 Chloride 97 08/23/2018 04:22 AM  
 CO2 34 (H) 08/23/2018 04:22 AM  
 Anion gap 4 (L) 08/23/2018 04:22 AM  
 Glucose 97 08/23/2018 04:22 AM  
 BUN 10 08/23/2018 04:22 AM  
 Creatinine 0.74 08/23/2018 04:22 AM  
 BUN/Creatinine ratio 14 08/23/2018 04:22 AM  
 GFR est AA >60 08/23/2018 04:22 AM  
 GFR est non-AA >60 08/23/2018 04:22 AM  
 Calcium 10.3 (H) 08/23/2018 04:22 AM  
 Bilirubin, total 0.5 08/14/2018 05:18 AM  
 AST (SGOT) 30 08/14/2018 05:18 AM  
 Alk. phosphatase 99 08/14/2018 05:18 AM  
 Protein, total 8.4 (H) 08/14/2018 05:18 AM  
 Albumin 2.3 (L) 08/14/2018 05:18 AM  
 Globulin 6.1 (H) 08/14/2018 05:18 AM  
 A-G Ratio 0.4 (L) 08/14/2018 05:18 AM  
 ALT (SGPT) 20 08/14/2018 05:18 AM  
 
 
EKG: 
NSR Assessment: 
 
 Assessment: 1. Chronic diastolic congestive heart failure ([facility] 2. Essential hypertension 3. Obesity, morbid ([facility] 4. FLORENCE (dyspnea on exertion) 5. Leg swelling Orders Placed This Encounter  AMB POC EKG ROUTINE W/ 12 LEADS, INTER & REP Order Specific Question:   Reason for Exam: Answer:   routine  LEXISCAN/CARDIOLITE, Clinic Performed Standing Status:   Future Standing Expiration Date:   2/28/2019 Order Specific Question:   Reason for Exam: Answer:   [place]  naproxen sodium (ALEVE) 220 mg cap Sig: Take  by mouth.  OXYGEN-AIR DELIVERY SYSTEMS Sig: by Does Not Apply route. Plan:  
 
Pt is a 66 y.o. female with pmhx HFpEF, HTN,  is here to establish care. Noted recent inpatient admission for sepsis s/t LL cellulitis, completed Abx therapy. She reports FLORENCE, left leg swelling.   Denies cp, orthopnea, palpitations, syncope, or presyncope. FLORENCE Cardiac risk factors: HTN, age, post menopausal F, elevated BMI, sedentary lifestyle, family hx stroke/carotid artery disease Evaluate with Meagan Left leg swelling Improving with Furosemide Has f/u with PCP today for wound care orders HFpEF Normal EF, no significant valvular pathology per echo in 08/18 Stable. Responded well to Furosemide as her left leg swelling has improved per pt HTN Controlled with current therapy Obesity Hypoventilation Syndrome Followed by Dr Ean Mejia. Would be set up for OP sleep study On 1-2 LPM of oxygen currently post hospitalization Lipids and labs followed by PCP. Continue current care and f/u in 6 months.  
 
Robles Mcgowan MD

## 2018-08-29 NOTE — PROGRESS NOTES
Chief Complaint Patient presents with  New Patient [facility] Follow Up Establish care. Hospital follow up. Needs orders for Home Health

## 2018-08-30 ENCOUNTER — TELEPHONE (OUTPATIENT)
Dept: FAMILY MEDICINE CLINIC | Age: 63
End: 2018-08-30

## 2018-08-30 DIAGNOSIS — Z99.81 O2 DEPENDENT: ICD-10-CM

## 2018-08-30 DIAGNOSIS — A41.9 UNSPECIFIED SEPTICEMIA(038.9) (HCC): ICD-10-CM

## 2018-08-30 DIAGNOSIS — D64.9 ANEMIA, UNSPECIFIED TYPE: ICD-10-CM

## 2018-08-30 DIAGNOSIS — I50.9 CHRONIC HEART FAILURE, UNSPECIFIED HEART FAILURE TYPE (HCC): Primary | ICD-10-CM

## 2018-08-30 LAB
BASOPHILS # BLD AUTO: 0.1 X10E3/UL (ref 0–0.2)
BASOPHILS NFR BLD AUTO: 1 %
EOSINOPHIL # BLD AUTO: 0.4 X10E3/UL (ref 0–0.4)
EOSINOPHIL NFR BLD AUTO: 4 %
ERYTHROCYTE [DISTWIDTH] IN BLOOD BY AUTOMATED COUNT: 15.4 % (ref 12.3–15.4)
FOLATE SERPL-MCNC: 7 NG/ML
HCT VFR BLD AUTO: 32.4 % (ref 34–46.6)
HGB BLD-MCNC: 10.6 G/DL (ref 11.1–15.9)
IMM GRANULOCYTES # BLD: 0 X10E3/UL (ref 0–0.1)
IMM GRANULOCYTES NFR BLD: 0 %
IRON SATN MFR SERPL: 9 % (ref 15–55)
IRON SERPL-MCNC: 21 UG/DL (ref 27–139)
LYMPHOCYTES # BLD AUTO: 1.5 X10E3/UL (ref 0.7–3.1)
LYMPHOCYTES NFR BLD AUTO: 17 %
MCH RBC QN AUTO: 28.6 PG (ref 26.6–33)
MCHC RBC AUTO-ENTMCNC: 32.7 G/DL (ref 31.5–35.7)
MCV RBC AUTO: 87 FL (ref 79–97)
MONOCYTES # BLD AUTO: 0.7 X10E3/UL (ref 0.1–0.9)
MONOCYTES NFR BLD AUTO: 8 %
NEUTROPHILS # BLD AUTO: 6.4 X10E3/UL (ref 1.4–7)
NEUTROPHILS NFR BLD AUTO: 70 %
PLATELET # BLD AUTO: 361 X10E3/UL (ref 150–379)
RBC # BLD AUTO: 3.71 X10E6/UL (ref 3.77–5.28)
TIBC SERPL-MCNC: 226 UG/DL (ref 250–450)
TSH SERPL DL<=0.005 MIU/L-ACNC: 5.67 UIU/ML (ref 0.45–4.5)
UIBC SERPL-MCNC: 205 UG/DL (ref 118–369)
VIT B12 SERPL-MCNC: >2000 PG/ML (ref 232–1245)
WBC # BLD AUTO: 9 X10E3/UL (ref 3.4–10.8)

## 2018-08-30 NOTE — TELEPHONE ENCOUNTER
Pt's daughter Polina Rang called, stating that an order for home health needs to be sent  For patient to have an assessment for wound care   And patient needs orders for   PT & OT    Please call daughter   [phone]

## 2018-08-30 NOTE — TELEPHONE ENCOUNTER
Pls call Morro Rizo w/[facility]     Referral needed for Conor López number to reach her is [phone]

## 2018-09-04 ENCOUNTER — HOME HEALTH ADMISSION (OUTPATIENT)
Dept: HOME HEALTH SERVICES | Facility: HOME HEALTH | Age: 63
End: 2018-09-04
Payer: COMMERCIAL

## 2018-09-04 ENCOUNTER — TELEPHONE (OUTPATIENT)
Dept: FAMILY MEDICINE CLINIC | Age: 63
End: 2018-09-04

## 2018-09-04 DIAGNOSIS — A41.9 SEPSIS, DUE TO UNSPECIFIED ORGANISM: ICD-10-CM

## 2018-09-04 DIAGNOSIS — Z99.81 OXYGEN DEPENDENT: ICD-10-CM

## 2018-09-04 DIAGNOSIS — I50.9 CHRONIC HEART FAILURE, UNSPECIFIED HEART FAILURE TYPE (HCC): Primary | ICD-10-CM

## 2018-09-04 NOTE — TELEPHONE ENCOUNTER
Spoke with Siri Daniel. She said that patient's daughter wants [facility] to see her. Siri Daniel requested new order be placed.

## 2018-09-04 NOTE — TELEPHONE ENCOUNTER
----- Message from Frantz Hess sent at 9/4/2018 12:09 PM EDT -----  Regarding: Dr. Carmela Ramos telephone  : Natalio Heard from [facility] is requesting a call back in regards to confirming home health agency for pt. best contact [phone]

## 2018-09-05 ENCOUNTER — HOME CARE VISIT (OUTPATIENT)
Dept: SCHEDULING | Facility: HOME HEALTH | Age: 63
End: 2018-09-05
Payer: COMMERCIAL

## 2018-09-05 PROCEDURE — G0151 HHCP-SERV OF PT,EA 15 MIN: HCPCS

## 2018-09-06 VITALS
HEIGHT: 68 IN | OXYGEN SATURATION: 95 % | BODY MASS INDEX: 44.41 KG/M2 | HEART RATE: 102 BPM | DIASTOLIC BLOOD PRESSURE: 82 MMHG | WEIGHT: 293 LBS | RESPIRATION RATE: 20 BRPM | SYSTOLIC BLOOD PRESSURE: 148 MMHG | TEMPERATURE: 98.4 F

## 2018-09-07 ENCOUNTER — HOME CARE VISIT (OUTPATIENT)
Dept: HOME HEALTH SERVICES | Facility: HOME HEALTH | Age: 63
End: 2018-09-07
Payer: COMMERCIAL

## 2018-09-07 ENCOUNTER — HOME CARE VISIT (OUTPATIENT)
Dept: SCHEDULING | Facility: HOME HEALTH | Age: 63
End: 2018-09-07
Payer: COMMERCIAL

## 2018-09-07 VITALS
WEIGHT: 293 LBS | HEIGHT: 68 IN | TEMPERATURE: 98.2 F | SYSTOLIC BLOOD PRESSURE: 142 MMHG | BODY MASS INDEX: 44.41 KG/M2 | OXYGEN SATURATION: 98 % | HEART RATE: 76 BPM | RESPIRATION RATE: 22 BRPM | DIASTOLIC BLOOD PRESSURE: 82 MMHG

## 2018-09-07 PROCEDURE — A4452 WATERPROOF TAPE: HCPCS

## 2018-09-07 PROCEDURE — A6252 ABSORPT DRG >16 <=48 W/O BDR: HCPCS

## 2018-09-07 PROCEDURE — A6454 SELF-ADHER BAND W>=3" <5"/YD: HCPCS

## 2018-09-07 PROCEDURE — G0299 HHS/HOSPICE OF RN EA 15 MIN: HCPCS

## 2018-09-07 PROCEDURE — A4216 STERILE WATER/SALINE, 10 ML: HCPCS

## 2018-09-07 PROCEDURE — A6216 NON-STERILE GAUZE<=16 SQ IN: HCPCS

## 2018-09-07 PROCEDURE — G0157 HHC PT ASSISTANT EA 15: HCPCS

## 2018-09-07 PROCEDURE — A6223 GAUZE >16<=48 NO W/SAL W/O B: HCPCS

## 2018-09-07 PROCEDURE — A4649 SURGICAL SUPPLIES: HCPCS

## 2018-09-07 PROCEDURE — A6446 CONFORM BAND S W>=3" <5"/YD: HCPCS

## 2018-09-08 ENCOUNTER — HOME CARE VISIT (OUTPATIENT)
Dept: SCHEDULING | Facility: HOME HEALTH | Age: 63
End: 2018-09-08
Payer: COMMERCIAL

## 2018-09-08 PROCEDURE — G0300 HHS/HOSPICE OF LPN EA 15 MIN: HCPCS

## 2018-09-09 ENCOUNTER — HOME CARE VISIT (OUTPATIENT)
Dept: SCHEDULING | Facility: HOME HEALTH | Age: 63
End: 2018-09-09
Payer: COMMERCIAL

## 2018-09-10 VITALS
RESPIRATION RATE: 12 BRPM | TEMPERATURE: 97.4 F | HEART RATE: 86 BPM | OXYGEN SATURATION: 99 % | WEIGHT: 293 LBS | SYSTOLIC BLOOD PRESSURE: 128 MMHG | DIASTOLIC BLOOD PRESSURE: 86 MMHG | BODY MASS INDEX: 45.92 KG/M2

## 2018-09-11 ENCOUNTER — HOME CARE VISIT (OUTPATIENT)
Dept: SCHEDULING | Facility: HOME HEALTH | Age: 63
End: 2018-09-11
Payer: COMMERCIAL

## 2018-09-11 VITALS
DIASTOLIC BLOOD PRESSURE: 73 MMHG | RESPIRATION RATE: 16 BRPM | HEART RATE: 62 BPM | SYSTOLIC BLOOD PRESSURE: 142 MMHG | TEMPERATURE: 98.6 F | OXYGEN SATURATION: 97 %

## 2018-09-11 VITALS
TEMPERATURE: 98.1 F | HEART RATE: 89 BPM | OXYGEN SATURATION: 95 % | SYSTOLIC BLOOD PRESSURE: 128 MMHG | RESPIRATION RATE: 18 BRPM | DIASTOLIC BLOOD PRESSURE: 80 MMHG

## 2018-09-11 PROCEDURE — G0300 HHS/HOSPICE OF LPN EA 15 MIN: HCPCS

## 2018-09-12 ENCOUNTER — HOME CARE VISIT (OUTPATIENT)
Dept: HOME HEALTH SERVICES | Facility: HOME HEALTH | Age: 63
End: 2018-09-12
Payer: COMMERCIAL

## 2018-09-12 ENCOUNTER — HOME CARE VISIT (OUTPATIENT)
Dept: SCHEDULING | Facility: HOME HEALTH | Age: 63
End: 2018-09-12
Payer: COMMERCIAL

## 2018-09-12 ENCOUNTER — CLINICAL SUPPORT (OUTPATIENT)
Dept: CARDIOLOGY CLINIC | Age: 63
End: 2018-09-12

## 2018-09-12 DIAGNOSIS — E66.01 OBESITY, MORBID (HCC): ICD-10-CM

## 2018-09-12 DIAGNOSIS — R06.09 DOE (DYSPNEA ON EXERTION): ICD-10-CM

## 2018-09-12 DIAGNOSIS — M79.89 LEG SWELLING: ICD-10-CM

## 2018-09-12 DIAGNOSIS — I10 ESSENTIAL HYPERTENSION: ICD-10-CM

## 2018-09-12 DIAGNOSIS — L03.116 CELLULITIS OF LEFT LOWER EXTREMITY: ICD-10-CM

## 2018-09-12 DIAGNOSIS — I50.32 CHRONIC DIASTOLIC CONGESTIVE HEART FAILURE (HCC): ICD-10-CM

## 2018-09-12 PROCEDURE — G0300 HHS/HOSPICE OF LPN EA 15 MIN: HCPCS

## 2018-09-12 PROCEDURE — G0157 HHC PT ASSISTANT EA 15: HCPCS

## 2018-09-12 RX ORDER — OXYCODONE AND ACETAMINOPHEN 5; 325 MG/1; MG/1
1 TABLET ORAL
Qty: 24 TAB | Refills: 0 | OUTPATIENT
Start: 2018-09-12

## 2018-09-12 NOTE — TELEPHONE ENCOUNTER
Pt would like a refill for: oxyCODONE-acetaminophen (PERCOCET) 5-325 mg per tablet     Due to wound care       Best number to reach her is [phone]

## 2018-09-12 NOTE — TELEPHONE ENCOUNTER
We don't do chronic pain meds.       Needs appointment to discuss    thx    Blanquita Dhaliwal MD  9/12/2018

## 2018-09-14 ENCOUNTER — HOME CARE VISIT (OUTPATIENT)
Dept: HOME HEALTH SERVICES | Facility: HOME HEALTH | Age: 63
End: 2018-09-14
Payer: COMMERCIAL

## 2018-09-14 ENCOUNTER — HOME CARE VISIT (OUTPATIENT)
Dept: SCHEDULING | Facility: HOME HEALTH | Age: 63
End: 2018-09-14
Payer: COMMERCIAL

## 2018-09-14 PROCEDURE — G0300 HHS/HOSPICE OF LPN EA 15 MIN: HCPCS

## 2018-09-16 VITALS
OXYGEN SATURATION: 97 % | RESPIRATION RATE: 18 BRPM | TEMPERATURE: 98.5 F | HEART RATE: 77 BPM | SYSTOLIC BLOOD PRESSURE: 128 MMHG | DIASTOLIC BLOOD PRESSURE: 80 MMHG

## 2018-09-16 VITALS
RESPIRATION RATE: 18 BRPM | HEART RATE: 88 BPM | OXYGEN SATURATION: 97 % | TEMPERATURE: 98.1 F | SYSTOLIC BLOOD PRESSURE: 130 MMHG | DIASTOLIC BLOOD PRESSURE: 76 MMHG

## 2018-09-16 VITALS
TEMPERATURE: 98.7 F | HEART RATE: 77 BPM | OXYGEN SATURATION: 97 % | RESPIRATION RATE: 16 BRPM | DIASTOLIC BLOOD PRESSURE: 89 MMHG | SYSTOLIC BLOOD PRESSURE: 144 MMHG

## 2018-09-17 ENCOUNTER — HOME CARE VISIT (OUTPATIENT)
Dept: SCHEDULING | Facility: HOME HEALTH | Age: 63
End: 2018-09-17
Payer: COMMERCIAL

## 2018-09-17 ENCOUNTER — HOME CARE VISIT (OUTPATIENT)
Dept: HOME HEALTH SERVICES | Facility: HOME HEALTH | Age: 63
End: 2018-09-17
Payer: COMMERCIAL

## 2018-09-17 VITALS
RESPIRATION RATE: 18 BRPM | SYSTOLIC BLOOD PRESSURE: 130 MMHG | TEMPERATURE: 99.4 F | DIASTOLIC BLOOD PRESSURE: 80 MMHG | HEART RATE: 80 BPM | OXYGEN SATURATION: 97 %

## 2018-09-17 PROCEDURE — G0157 HHC PT ASSISTANT EA 15: HCPCS

## 2018-09-17 PROCEDURE — G0300 HHS/HOSPICE OF LPN EA 15 MIN: HCPCS

## 2018-09-18 ENCOUNTER — CLINICAL SUPPORT (OUTPATIENT)
Dept: CARDIOLOGY CLINIC | Age: 63
End: 2018-09-18

## 2018-09-18 VITALS
TEMPERATURE: 98.8 F | DIASTOLIC BLOOD PRESSURE: 77 MMHG | OXYGEN SATURATION: 97 % | RESPIRATION RATE: 16 BRPM | HEART RATE: 77 BPM | SYSTOLIC BLOOD PRESSURE: 132 MMHG

## 2018-09-18 DIAGNOSIS — R06.02 SHORTNESS OF BREATH: Primary | ICD-10-CM

## 2018-09-18 NOTE — TELEPHONE ENCOUNTER
Spoke with patient. She it is too much for her to come in to discuss pain meds. She will discuss with wound care doctor.

## 2018-09-19 ENCOUNTER — HOME CARE VISIT (OUTPATIENT)
Dept: SCHEDULING | Facility: HOME HEALTH | Age: 63
End: 2018-09-19
Payer: COMMERCIAL

## 2018-09-19 PROCEDURE — G0300 HHS/HOSPICE OF LPN EA 15 MIN: HCPCS

## 2018-09-21 ENCOUNTER — HOME CARE VISIT (OUTPATIENT)
Dept: SCHEDULING | Facility: HOME HEALTH | Age: 63
End: 2018-09-21
Payer: COMMERCIAL

## 2018-09-21 PROCEDURE — G0300 HHS/HOSPICE OF LPN EA 15 MIN: HCPCS

## 2018-09-21 PROCEDURE — G0151 HHCP-SERV OF PT,EA 15 MIN: HCPCS

## 2018-09-23 VITALS
HEART RATE: 81 BPM | TEMPERATURE: 97.9 F | DIASTOLIC BLOOD PRESSURE: 74 MMHG | OXYGEN SATURATION: 99 % | RESPIRATION RATE: 18 BRPM | SYSTOLIC BLOOD PRESSURE: 138 MMHG

## 2018-09-24 ENCOUNTER — HOSPITAL ENCOUNTER (OUTPATIENT)
Dept: WOUND CARE | Age: 63
Discharge: HOME | End: 2018-09-24
Payer: COMMERCIAL

## 2018-09-24 VITALS
DIASTOLIC BLOOD PRESSURE: 74 MMHG | TEMPERATURE: 98.5 F | OXYGEN SATURATION: 98 % | SYSTOLIC BLOOD PRESSURE: 132 MMHG | HEART RATE: 76 BPM

## 2018-09-24 VITALS
SYSTOLIC BLOOD PRESSURE: 117 MMHG | DIASTOLIC BLOOD PRESSURE: 68 MMHG | TEMPERATURE: 97.5 F | RESPIRATION RATE: 16 BRPM | HEART RATE: 66 BPM

## 2018-09-24 PROBLEM — L97.922 NON-PRESSURE CHRONIC ULCER OF LEFT LOWER LEG WITH FAT LAYER EXPOSED (HCC): Status: ACTIVE | Noted: 2018-09-24

## 2018-09-24 PROCEDURE — 99215 OFFICE O/P EST HI 40 MIN: CPT

## 2018-09-24 PROCEDURE — 74011000250 HC RX REV CODE- 250: Performed by: SURGERY

## 2018-09-24 RX ORDER — LIDOCAINE 40 MG/G
CREAM TOPICAL AS NEEDED
Status: DISCONTINUED | OUTPATIENT
Start: 2018-09-24 | End: 2018-09-28 | Stop reason: HOSPADM

## 2018-09-24 RX ADMIN — LIDOCAINE: 40 CREAM TOPICAL at 08:46

## 2018-09-24 NOTE — WOUND CARE
OUTPATIENT WOUND CARE DISCHARGE NOTE 
 
 
 09/24/18 1130 Wound Leg Lower Medial;Left Date First Assessed/Time First Assessed: 09/24/18 0855   POA: Yes  Wound Type: Moisture Assoc. Skin Damage;Venous  Location: Leg Lower  Orientation: Medial;Left Cleansing and Cleansing Agents  Soap and water;Normal saline Dressing Type Applied 4 x 4;Compression Wrap/Venous Stasis; Xeroform; Other (Comment) 
(A&D oint. to dry,intact LE skin. Xeroform,4x4s,3 layer wrap.) Procedure Tolerated Well Wound Leg Lateral  
Date First Assessed/Time First Assessed: 09/07/18 0900   POA: Yes  Wound Type: Moisture Assoc. Skin Damage  Location: Leg  Wound Description (Optional): lateral   Orientation: Lateral  
Cleansing and Cleansing Agents  Soap and water;Normal saline Dressing Type Applied 4 x 4;Compression Wrap/Venous Stasis; Xeroform; Other (Comment) 
(A&D oint to dry,intact LE skin. Xeroform,4x4s,3 layer wrap.) Procedure Tolerated Well Patient returned for application of compression dressing after completion of ABIs at [facility] this morning. Wound Dressing Applied - Per order, as noted above. Pain -  Denies pain Ambulatory Status/Aid- Felipe Ny Accompanied by Chace Pedro. Follow Up Appointments - F/U in 1 week w/Dr. Luzmaria Pitts Discharge Instructions Reviewed. Instructed to call The [address] [place] [address] Road with any questions or concerns. Patient and sister Verbalize understanding.

## 2018-09-24 NOTE — PROGRESS NOTES
[address] 
WOUND CARE PROGRESS NOTE Nubia Murphy 
MR#: [id number] : 1955 ACCOUNT #: [de-identified] DATE OF SERVICE: 2018 HISTORY OF PRESENT ILLNESS:  Patient is a 82-year-old woman referred to the [address] [place] [address] for a wound on left lower leg. The patient reports that 15 years ago she had a witnessed bite of a spider on the leg and developed a wound. She says that since that time, she has had problems with wound healing on the left lower leg. The patient was admitted to the hospital on 2018 and discharged on 2018 when she presented with left leg cellulitis with sepsis. She describes having a large blistering and large ulcer at that time. She had diuresis in the hospital and lost about 23 pounds in weight. She was treated with IV antibiotics. She had evaluation by the pulmonary service and was felt to have both acute and chronic respiratory failure and felt likely to have obesity hypoventilation syndrome versus sleep apnea. The patient had been followed at home by the [address] [place] [address] and has had triple-layer compression dressings applied. The patient reports that the upper part of the dressing tends to roll down so that only the lower half of the lower leg has a dressing in place. The patient denies history of diabetes mellitus. PAST MEDICAL HISTORY:  Includes congestive heart failure, felt by Dr. Conor aguilera, her cardiologist, to be diastolic heart failure. She has history of morbid obesity and history of chronic oxygen dependence, currently 1 liter per minute. She has no history of MI or coronary intervention. She did not have anginal chest pain. She is not short of breath at rest but does report that she uses 2 pillows at night to avoid orthopnea some symptoms. She is going to be evaluated next month at the sleep center for possible sleep apnea. She is not presently on any CPAP. The patient does take 80 mg of Lasix daily and does report it produces substantial diuresis. She also reports that she has been pushing fluids by mouth recently as she was told that some of her medicines can dry her out. The patient's history also includes hypertension and morbid obesity. SOCIAL HISTORY:  The patient has never smoked. She rarely uses alcohol. PHYSICAL EXAMINATION: 
GENERAL:  Patient is alert, in no acute distress. VITAL SIGNS:  Blood pressure 117/68, pulse 66, respirations 16, temperature 97.4. The patient is noted to be on nasal cannula oxygen. HEAD AND NECK:  Shows no jaundice, mass or thyromegaly. LUNGS:  Clear but somewhat distant bilaterally without rales, rhonchi or wheezes. HEART:  Regular, without murmur, gallop or rub. NEUROLOGIC:  The patient is alert. She moves all extremities equally. Facial movement is symmetric. Speech is normal.   
EXTREMITIES:  Lower extremity examination revealed palpable 2+ dorsalis pedis pulses bilaterally. Posterior tibial pulses are palpable, but more difficult to feel secondary to edema. On the right side no ulcers are present. There is trace edema of the right lower extremity. On the left side, there is liposclerosis in the lower half of the lower leg on the left. There is some hyperpigmentation of the skin and some scaling of the skin. On the medial and posterior aspect of the lower half left lower leg, there are shallow ulcerations clustered with epithelial islands in between. The total wound size is around 8 cm across, but there are some epithelial islands within that. The wound was noted to weep small droplets of clear fluid. The patient has a non-pressure wound, left lower extremity into the subcutaneous tissue. This is likely related to leg edema. It will require some further investigation to fully delineate the source of the edema.  
 
ASSESSMENT AND PLAN:  The patient will have today venous ultrasound to assess for venous valvular insufficiency. She had a recent ultrasound which showed no evidence of deep venous thrombosis but did not evaluate the valves. Her unilateral leg edema raises potential for venous insufficiency. The patient likely has some systemic edema given her history of substantial weight loss following diuresis in the hospital and her history of congestive heart failure. Presently, her right leg, however, has only trace edema. The patient will have today Xeroform applied to the ulcer and to have A and D ointment applied over the dried skin elsewhere. She will have a triple-layer compression dressing for control of edema. I recommended the patient limit fluid intake between meals to allow better diuresis with her current Lasix dose. I agree with assessing for sleep apnea with a sleep study as untreated sleep apnea can contribute to edema. The patient will also have hemoglobin A1c if this has not been recently done. She was advised regarding elevation of her leg to help control edema also. Patient will follow up in 1 week. She will have dressing changes by home health nurses 3 times per week in the meantime. FINAL DIAGNOSIS:   
1.  Non-pressure wound, left lower extremity, bilateral leg edema, greater left than right. 2.  Morbid obesity, oxygen dependence. Addendum:  Venous ultrasound today showed reflux only in the left saphenous vein trunk below the knee level. MD YNES Yeh / [place] 
D: 09/24/2018 09:49 T: 09/24/2018 10:19 
JOB #: [id number]

## 2018-09-24 NOTE — WOUND CARE
Patient admitted to [address] [facility],[address]. Wound Site: Left lower leg Date Acquired: Aug/9/18 Length of time wound has been present: one and half month Wound condition at initial assessment based on date acquired:  
chronic >30days How was the wound acquired: Blister Who referred to wound clinic: ED [address] nurse Antibiotic use current or past r/t wound: Yes Which antibiotic:Certriaxone and Vancomycin Any cultures done: No 
How has the wound been treated: compression wrap, xeroform Results for Reg Wright (MRN [id number]) as of 9/24/2018 09:19 Ref. Range 8/29/2018 12:56 HGB Latest Ref Range: 11.1 - 15.9 g/dL 10.6 (L) Results for Reg Wright (MRN [id number]) as of 9/24/2018 09:19 Ref. Range 8/23/2018 04:22 Glucose Latest Ref Range: 65 - 100 mg/dL 97  
8/9/18 Negative on DVT on left leg. Unable to do JAMES as patient unable to lay flat for short time. Dr Miguel So aware. 09/24/18 5539 Wound Leg Lower Medial;Left Date First Assessed/Time First Assessed: 09/24/18 0855   POA: Yes  Wound Type: Moisture Assoc. Skin Damage;Venous  Location: Leg Lower  Orientation: Medial;Left DRESSING STATUS Clean, dry, and intact DRESSING TYPE Compression Wrap/Venous Stasis Wound Length (cm) 8.3 cm 
(cluster wound) Wound Width (cm) 11.7 cm Wound Depth (cm) 0.1 Wound Surface area (cm^2) 97.11 cm^2 Condition of Base Granulation;Pink;Slough (cluster wound) Condition of Edges Open Drainage Amount  Moderate Drainage Color Serous Wound Odor None Cleansing and Cleansing Agents  Normal saline; Soap and water Wound Leg Lateral  
Date First Assessed/Time First Assessed: 09/07/18 0900   POA: Yes  Wound Type: Moisture Assoc. Skin Damage  Location: Leg  Wound Description (Optional): lateral   Orientation: Lateral  
DRESSING STATUS Clean, dry, and intact DRESSING TYPE Compression Wrap/Venous Stasis Wound Length (cm) 0.1 cm Wound Width (cm) 0.1 cm Wound Depth (cm) 0.1 Wound Surface area (cm^2) 0.01 cm^2 Condition of Base Pink Condition of Edges Closed Drainage Amount  Scant Drainage Color Serous Wound Odor None Cleansing and Cleansing Agents  Soap and water;Normal saline Visit Vitals  /68  Pulse 66  Temp 97.5 °F (36.4 °C)  Resp 16  Breastfeeding No  
 
LLE Peripheral Vascular Capillary Refill: Less than/equal to 3 seconds (09/24/18 0849) Color: Appropriate for race (hemosiderin staining) (09/24/18 0849) Temperature: Warm (09/24/18 0849) Sensation: Present (09/24/18 0849) Pedal Pulse: Doppler (09/24/18 0849) Circumference of Calf (cm): 56 cm (09/24/18 0849) Location of Measurement (Calf): Mid  (09/24/18 0849) Circumference of Ankle (cm): 26.5 cm (09/24/18 0849) Location of Measurement (Ankle): Upper  (09/24/18 0849) RLE Peripheral Vascular Capillary Refill: Less than/equal to 3 seconds (09/24/18 0849) Color: Appropriate for race (09/24/18 0849) Temperature: Warm (09/24/18 0849) Sensation: Present (09/24/18 0849) Pedal Pulse: Present (09/24/18 0849) Circumference of Calf (cm): 50.5 cm (09/24/18 0849) Location of Measurement (Calf): Mid  (09/24/18 0849) Circumference of Ankle (cm): 24.5 cm (09/24/18 0849) Location of Measurement (Ankle): Upper  (09/24/18 0849)

## 2018-09-24 NOTE — PROGRESS NOTES
Problem: Lower Extremity Wound Care Goal: Interventions Outcome: Progressing Towards Goal 
3 layer compression wrap applied today. Education provided on compression wrap therapy. Instructed to call [address] w/any questions or concerns.

## 2018-09-26 ENCOUNTER — OFFICE VISIT (OUTPATIENT)
Dept: FAMILY MEDICINE CLINIC | Age: 63
End: 2018-09-26

## 2018-09-26 ENCOUNTER — HOSPITAL ENCOUNTER (OUTPATIENT)
Dept: MAMMOGRAPHY | Age: 63
Discharge: HOME | End: 2018-09-26
Payer: COMMERCIAL

## 2018-09-26 VITALS
SYSTOLIC BLOOD PRESSURE: 122 MMHG | WEIGHT: 293 LBS | TEMPERATURE: 97.6 F | DIASTOLIC BLOOD PRESSURE: 57 MMHG | HEART RATE: 74 BPM | RESPIRATION RATE: 18 BRPM | BODY MASS INDEX: 44.41 KG/M2 | HEIGHT: 68 IN | OXYGEN SATURATION: 95 %

## 2018-09-26 DIAGNOSIS — E78.2 MIXED HYPERLIPIDEMIA: ICD-10-CM

## 2018-09-26 DIAGNOSIS — Z13.1 SCREENING FOR DIABETES MELLITUS: ICD-10-CM

## 2018-09-26 DIAGNOSIS — Z00.00 ANNUAL PHYSICAL EXAM: ICD-10-CM

## 2018-09-26 DIAGNOSIS — Z23 ENCOUNTER FOR IMMUNIZATION: ICD-10-CM

## 2018-09-26 DIAGNOSIS — Z00.00 ANNUAL PHYSICAL EXAM: Primary | ICD-10-CM

## 2018-09-26 DIAGNOSIS — F41.9 ANXIETY AND DEPRESSION: ICD-10-CM

## 2018-09-26 DIAGNOSIS — Z11.59 ENCOUNTER FOR HEPATITIS C SCREENING TEST FOR LOW RISK PATIENT: ICD-10-CM

## 2018-09-26 DIAGNOSIS — Z12.39 BREAST CANCER SCREENING: ICD-10-CM

## 2018-09-26 DIAGNOSIS — G25.0 ESSENTIAL TREMOR: ICD-10-CM

## 2018-09-26 DIAGNOSIS — D50.8 IRON DEFICIENCY ANEMIA SECONDARY TO INADEQUATE DIETARY IRON INTAKE: ICD-10-CM

## 2018-09-26 DIAGNOSIS — F32.A ANXIETY AND DEPRESSION: ICD-10-CM

## 2018-09-26 DIAGNOSIS — G56.92 NEUROPATHY OF HAND, LEFT: ICD-10-CM

## 2018-09-26 PROCEDURE — 77067 SCR MAMMO BI INCL CAD: CPT

## 2018-09-26 RX ORDER — FERROUS SULFATE 325(65) MG
325 TABLET, DELAYED RELEASE (ENTERIC COATED) ORAL
Qty: 90 TAB | Refills: 1 | Status: SHIPPED | OUTPATIENT
Start: 2018-09-26 | End: 2018-11-27 | Stop reason: SDUPTHER

## 2018-09-26 RX ORDER — ATORVASTATIN CALCIUM 20 MG/1
20 TABLET, FILM COATED ORAL DAILY
Qty: 30 TAB | Refills: 2 | Status: SHIPPED | OUTPATIENT
Start: 2018-09-26 | End: 2018-12-17 | Stop reason: SDUPTHER

## 2018-09-26 NOTE — LETTER
10/31/2018 8:55 AM 
 
Ms. Robert Torrez [address] [facility] [id number] Dear Robert Torrez: Please find your most recent results below. Resulted Orders IRON PROFILE Result Value Ref Range TIBC 226 (L) 250 - 450 ug/dL UIBC 205 118 - 369 ug/dL Iron 21 (L) 27 - 139 ug/dL Iron % saturation 9 (LL) 15 - 55 % Narrative Performed at:  [address]  [id number] : Leyda Drew MD, Phone:  [phone] TSH 3RD GENERATION Result Value Ref Range TSH 5.670 (H) 0.450 - 4.500 uIU/mL Narrative Performed at:  [address]  [id number] : Leyda Drew MD, Phone:  [phone] VITAMIN B12 & FOLATE Result Value Ref Range Vitamin B12 >2000 (H) 232 - 1245 pg/mL Folate 7.0 >3.0 ng/mL Comment: A serum folate concentration of less than 3.1 ng/mL is 
considered to represent clinical deficiency. Narrative Performed at:  [address]  [id number] : Leyda Drew MD, Phone:  [phone] CBC WITH AUTOMATED DIFF Result Value Ref Range WBC 9.0 3.4 - 10.8 x10E3/uL  
 RBC 3.71 (L) 3.77 - 5.28 x10E6/uL HGB 10.6 (L) 11.1 - 15.9 g/dL HCT 32.4 (L) 34.0 - 46.6 % MCV 87 79 - 97 fL  
 MCH 28.6 26.6 - 33.0 pg  
 MCHC 32.7 31.5 - 35.7 g/dL  
 RDW 15.4 12.3 - 15.4 % PLATELET 347 103 - 181 x10E3/uL NEUTROPHILS 70 Not Estab. % Lymphocytes 17 Not Estab. % MONOCYTES 8 Not Estab. % EOSINOPHILS 4 Not Estab. % BASOPHILS 1 Not Estab. %  
 ABS. NEUTROPHILS 6.4 1.4 - 7.0 x10E3/uL Abs Lymphocytes 1.5 0.7 - 3.1 x10E3/uL  
 ABS. MONOCYTES 0.7 0.1 - 0.9 x10E3/uL  
 ABS. EOSINOPHILS 0.4 0.0 - 0.4 x10E3/uL  
 ABS. BASOPHILS 0.1 0.0 - 0.2 x10E3/uL IMMATURE GRANULOCYTES 0 Not Estab. %  
 ABS. IMM. GRANS. 0.0 0.0 - 0.1 x10E3/uL Narrative Performed at:  [address]  [id number] : Gwendolyn Mercado MD, Phone:  [phone] HEPATITIS C AB Result Value Ref Range Hep C Virus Ab <0.1 0.0 - 0.9 s/co ratio Comment:  
                                     Negative:     < 0.8 Indeterminate: 0.8 - 0.9 Positive:     > 0.9 The CDC recommends that a positive HCV antibody result 
 be followed up with a HCV Nucleic Acid Amplification 
 test ([id number]). Narrative Performed at:  [address]  [id number] : Gwendolyn Mercado MD, Phone:  [phone] METABOLIC PANEL, COMPREHENSIVE Result Value Ref Range Glucose 82 65 - 99 mg/dL BUN 24 8 - 27 mg/dL Creatinine 1.19 (H) 0.57 - 1.00 mg/dL GFR est non-AA 49 (L) >59 mL/min/1.73 GFR est AA 57 (L) >59 mL/min/1.73  
 BUN/Creatinine ratio 20 12 - 28 Sodium 139 134 - 144 mmol/L Potassium 4.1 3.5 - 5.2 mmol/L Chloride 99 96 - 106 mmol/L  
 CO2 26 20 - 29 mmol/L Calcium 10.9 (H) 8.7 - 10.3 mg/dL Protein, total 8.5 6.0 - 8.5 g/dL Albumin 3.7 3.6 - 4.8 g/dL GLOBULIN, TOTAL 4.8 (H) 1.5 - 4.5 g/dL A-G Ratio 0.8 (L) 1.2 - 2.2 Bilirubin, total 0.4 0.0 - 1.2 mg/dL Alk. phosphatase 106 39 - 117 IU/L  
 AST (SGOT) 20 0 - 40 IU/L  
 ALT (SGPT) 12 0 - 32 IU/L Narrative Performed at:  [address]  [id number] : Gwendolyn Mercado MD, Phone:  [phone] HEMOGLOBIN A1C W/O EAG Result Value Ref Range Hemoglobin A1c 6.1 (H) 4.8 - 5.6 % Comment:  
            Prediabetes: 5.7 - 6.4 Diabetes: >6.4 Glycemic control for adults with diabetes: <7.0 Narrative Performed at:  [facility] [address] [place] [address]  [id number] : Gwendolyn Mercado MD, Phone:  [phone] [address]  
 Result Value Ref Range TSH 2.430 0.450 - 4.500 uIU/mL Narrative Performed at:  [address]  [id number] : Ismael Langford MD, Phone:  [phone] LIPID PANEL Result Value Ref Range Cholesterol, total 144 100 - 199 mg/dL Triglyceride 105 0 - 149 mg/dL HDL Cholesterol 47 >39 mg/dL VLDL, calculated 21 5 - 40 mg/dL LDL, calculated 76 0 - 99 mg/dL Narrative Performed at:  [address]  [id number] : Ismael Langford MD, Phone:  [phone] OCCULT BLOOD IMMUNOASSAY,DIAGNOSTIC Result Value Ref Range Occult blood fecal, by [place] Negative Negative Narrative Performed at:  [address]  [id number] : Ismael Langford MD, Phone:  [phone] Narrative Performed at:  [address] [place] [address]  [id number] : Lexie Albright MD, Phone:  [phone] SPECIMEN STATUS REPORT Result Value Ref Range SPECIMEN STATUS REPORT COMMENT Comment:  
   Please note Please note The date and/or time of collection was not indicated on the 
requisition as required by state and federal law. The date of 
receipt of the specimen was used as the collection date if not 
supplied. Narrative Performed at:  [address]  [id number] : Ismael Langford MD, Phone:  [phone] Please call me if you have any questions: [phone] Sincerely, Louis Beckett MD

## 2018-09-26 NOTE — PROGRESS NOTES
Dorita Harp is a 66 y.o. female, for annual exam as well as multiple medical issues. 2nd visit with this physician, 
 
 has a past medical history of Anxiety and depression (9/26/2018); Arthritis; BMI 45.0-49.9, adult ([facility] (9/26/2018); Essential tremor (9/26/2018); Hypertension; Iron deficiency anemia secondary to inadequate dietary iron intake (9/26/2018); Morbid obesity ([facility] and O2 dependent (8/29/2018). Essential tremors. Hands shaking: about 6 months, can write, no issue with soup, spoon, fine movement. No resting tremors. Left hand only 2nd, 3rd, 4th tip of finger numbness. HTN: BP at goal 
  
Anxiety depression: on buspar PRN for years. Have had it since her college days. Family hx of anxiety from mother and sister. Denies SI or HI. Celexa have been doing well for her, \"i feel calmer\". Continue course. Anemia stable, hgb up 10.6. But severe iron deficiency. She denies abnormal vaginal bleeding. Menopausal.  
 Denies BRBPR and or melena. Last colonoscopy 5 years ago, report was all normal. She report anemia her whole life. Check FOBT Constipation: send in miralax to use with iron supplement. CHF diastolic: lasix was up to 80mg every day. Just saw Dr. Nayeli Kee had a Cardiac stress test.  Advise to call for f/u appointment. Start on Lipitor. For CAD according to stress test.  
 
Needs mammogram.  
 
She'll see her OBGYN for papsmear.  
  
Spo2 at 93% on 1L O2 
  
Cellulitis and sepsis: finished her abx before hospital discharge. She had wound care came by once, but b/c she haven't a PCP, no one signed off on orders, they haven't came by again. Denies fever, pain. Wound care Dr. Ronn Colse, Reviewed: active problem list, medication list, allergies, notes from last encounter, lab results A comprehensive review of systems was negative except for that written in the HPI. No Known Allergies Current Outpatient Prescriptions on File Prior to Visit Medication Sig Dispense Refill  naproxen sodium 220 mg cap Take 2 Caps by mouth every twelve (12) hours as needed (pain).  OXYGEN-AIR DELIVERY SYSTEMS 1 L by Nasal route continuous.  ergocalciferol (VITAMIN D2) 50,000 unit capsule Take 50,000 Units by mouth every seven (7) days.  citalopram (CELEXA) 20 mg tablet Take 1 Tab by mouth daily. 30 Tab 2  potassium chloride SR (KLOR-CON 10) 10 mEq tablet Take 2 Tabs by mouth two (2) times a day. 60 Tab 1  
 furosemide (LASIX) 80 mg tablet One tab daily in the morning  Indications: Edema 30 Tab 1  
 busPIRone (BUSPAR) 15 mg tablet Take 15 mg by mouth nightly.  labetalol (NORMODYNE) 300 mg tablet Take 300 mg by mouth every twelve (12) hours. Confirmed w/ patient. Rx for Tid, patient only tolerates it BiD  ascorbic acid (VITAMIN C) 500 mg tablet Take 500 mg by mouth daily. Current Facility-Administered Medications on File Prior to Visit Medication Dose Route Frequency Provider Last Rate Last Dose  lidocaine (XYLOCAINE) 4 % cream   Topical PRN Ethel Dakins, MD      
 
Patient Active Problem List  
Diagnosis Code  Sepsis ([facility] A41.9  CHF (congestive heart failure) ([facility]) I50.9  Obesity, morbid ([facility]) E66.01  
 O2 dependent Z99.81  
 Non-pressure chronic ulcer of left lower leg with fat layer exposed ([facility]) L97.922  
 Iron deficiency anemia secondary to inadequate dietary iron intake D50.8  Essential tremor G25.0  BMI 45.0-49.9, adult ([facility]) Z68.42  
 Anxiety and depression F41.9, F32.9 Visit Vitals  /57 (BP 1 Location: Left arm, BP Patient Position: Sitting)  Pulse 74  Temp 97.6 °F (36.4 °C) (Oral)  Resp 18  Ht 5' 8\" (1.727 m)  Wt 303 lb 12.8 oz (137.8 kg)  SpO2 95%  BMI 46.19 kg/m2 General appearance: alert, cooperative, no distress, appears stated age Neurologic: Alert and oriented X 3, normal strength and tone, symmetric. Normal without focal findings. Cranial nerves 2-12 intact. Normal coordination and gait. Mental status: Alert, oriented, thought content appropriate, affect: stable, mood-congruent. Head: Normocephalic, without obvious abnormality, atraumatic Eyes: conjunctivae/corneas clear. PERRL, EOM's intact. Neck: supple, symmetrical, trachea midline, no JVD Lungs: clear to auscultation bilaterally Heart: regular rate and rhythm, S1, S2 normal, no murmur, click, rub or gallop Abdomen: soft, non-tender. Extremities: extremities normal, atraumatic, no cyanosis or edema Essential tremors with intentional activites, but it's not bothering her in daily fine movement. And she's already on a beta blocker. Assessment/Plans: 
 
Diagnoses and all orders for this visit: 
 
1. Annual physical exam 
-     [facility] MAMMO BI SCREENING INCL CAD; Future 
-     HEPATITIS C AB 
-     METABOLIC PANEL, COMPREHENSIVE 
-     HEMOGLOBIN A1C W/O EAG 
-     TSH 3RD GENERATION 
-     LIPID PANEL 2. Iron deficiency anemia secondary to inadequate dietary iron intake 
-     ferrous sulfate (IRON) 325 mg (65 mg iron) EC tablet; Take 1 Tab by mouth three (3) times daily (with meals). -     TSH 3RD GENERATION 
-     OCCULT BLOOD IMMUNOASSAY,DIAGNOSTIC 3. Neuropathy of hand, left 4. Anxiety and depression 5. Encounter for hepatitis C screening test for low risk patient 
-     HEPATITIS C AB 6. Essential tremor 
-     TSH 3RD GENERATION 
 
7. Mixed hyperlipidemia 
-     atorvastatin (LIPITOR) 20 mg tablet; Take 1 Tab by mouth daily. 8. Breast cancer screening 9. Screening for diabetes mellitus 10. BMI 45.0-49.9, adult ([facility] 11. Encounter for immunization -     Influenza virus vaccine (QUADRIVALENT PRES FREE SYRINGE) IM (58052) -     [place] IMMUNIZ ADMIN,1 SINGLE/COMB VAC/TOXOID Discussed plans, risk/benefits of treatments/observations. Through the use of shared decision making, above plans were agreed upon. Medication compliance advised. Patient verbalized understanding. Follow-up Disposition: 
Return in about 5 weeks (around 10/31/2018) for HTN, HLD, labs . Diamond Muñoz MD 
9/26/2018

## 2018-09-26 NOTE — PROGRESS NOTES
Chief Complaint Patient presents with  Hypertension  Depression 4 week check 1. Have you been to the ER, urgent care clinic since your last visit? Hospitalized since your last visit? no 
 
2. Have you seen or consulted any other health care providers outside of the [address] [place] [address] since your last visit? Include any pap smears or colon screening. no 
 
 
Order placed for Flulaval, per Verbal Order from Dr. Hipolito Gregorio on 9/26/2018 due to need for immunization. Yina Baker is a 66 y.o. female who presents for routine immunizations. She denies any symptoms , reactions or allergies that would exclude them from being immunized today. Risks and adverse reactions were discussed and the VIS was given to them. All questions were addressed. She was observed for 15 min post injection. There were no reactions observed.  
 
Jose Garzon LPN

## 2018-09-26 NOTE — LETTER
9/26/2018 9:22 AM 
 
Ms. Dorita Harp [address] [place] [phone] Dear Dorita Harp: Please find your most recent results below. Anemia with low iron. Resulted Orders IRON PROFILE Result Value Ref Range TIBC 226 (L) 250 - 450 ug/dL UIBC 205 118 - 369 ug/dL Iron 21 (L) 27 - 139 ug/dL Iron % saturation 9 (LL) 15 - 55 % Narrative Performed at:  [address]  [id number] : Young Alexander MD, Phone:  [phone] TSH 3RD GENERATION Result Value Ref Range TSH 5.670 (H) 0.450 - 4.500 uIU/mL Narrative Performed at:  [address]  [id number] : Young Alexander MD, Phone:  [phone] VITAMIN B12 & FOLATE Result Value Ref Range Vitamin B12 >2000 (H) 232 - 1245 pg/mL Folate 7.0 >3.0 ng/mL Comment: A serum folate concentration of less than 3.1 ng/mL is 
considered to represent clinical deficiency. Narrative Performed at:  [address]  [id number] : Young Alexander MD, Phone:  [phone] CBC WITH AUTOMATED DIFF Result Value Ref Range WBC 9.0 3.4 - 10.8 x10E3/uL  
 RBC 3.71 (L) 3.77 - 5.28 x10E6/uL HGB 10.6 (L) 11.1 - 15.9 g/dL HCT 32.4 (L) 34.0 - 46.6 % MCV 87 79 - 97 fL  
 MCH 28.6 26.6 - 33.0 pg  
 MCHC 32.7 31.5 - 35.7 g/dL  
 RDW 15.4 12.3 - 15.4 % PLATELET 485 317 - 007 x10E3/uL NEUTROPHILS 70 Not Estab. % Lymphocytes 17 Not Estab. % MONOCYTES 8 Not Estab. % EOSINOPHILS 4 Not Estab. % BASOPHILS 1 Not Estab. %  
 ABS. NEUTROPHILS 6.4 1.4 - 7.0 x10E3/uL Abs Lymphocytes 1.5 0.7 - 3.1 x10E3/uL  
 ABS. MONOCYTES 0.7 0.1 - 0.9 x10E3/uL  
 ABS. EOSINOPHILS 0.4 0.0 - 0.4 x10E3/uL  
 ABS. BASOPHILS 0.1 0.0 - 0.2 x10E3/uL IMMATURE GRANULOCYTES 0 Not Estab. %  
 ABS. IMM. GRANS. 0.0 0.0 - 0.1 x10E3/uL Narrative Performed at:  [address]  [id number] : Olinda Yousif MD, Phone:  [phone] Please call me if you have any questions: [phone] Sincerely, Harmony Loyd MD

## 2018-09-26 NOTE — MR AVS SNAPSHOT
[facility] [address] [facility] 83. 
[phone] Patient: Emy Holder MRN: [id number] TST:3/87/0160 Visit Information Date & Time Provider Department Dept. Phone Encounter #  
 9/26/2018  9:00 AM Michelle Henderson MD [facility] at [address] [id number] Follow-up Instructions Return in about 5 weeks (around 10/31/2018). Upcoming Health Maintenance Date Due Hepatitis C Screening 1955 Pneumococcal 19-64 Medium Risk (1 of 1 - PPSV23) 7/29/1976 DTaP/Tdap/Td series (1 - Tdap) 7/29/1978 PAP AKA CERVICAL CYTOLOGY 7/29/1978 Shingrix Vaccine Age 50> (1 of 2) 7/29/2007 FOBT Q 1 YEAR AGE 50-75 7/29/2007 BREAST CANCER SCRN MAMMOGRAM 1/3/2015 Influenza Age 5 to Adult 8/1/2018 Allergies as of 9/26/2018  Review Complete On: 9/26/2018 By: Linh López LPN No Known Allergies Current Immunizations  Never Reviewed No immunizations on file. Not reviewed this visit You Were Diagnosed With   
  
 Codes Comments Annual physical exam    -  Primary ICD-10-CM: Z00.00 ICD-9-CM: V70.0 Iron deficiency anemia secondary to inadequate dietary iron intake     ICD-10-CM: D50.8 ICD-9-CM: 280.1 Neuropathy of hand, left     ICD-10-CM: G56.92 
ICD-9-CM: 354.9 Encounter for hepatitis C screening test for low risk patient     ICD-10-CM: Z11.59 
ICD-9-CM: V73.89 Essential tremor     ICD-10-CM: G25.0 ICD-9-CM: 333.1 Mixed hyperlipidemia     ICD-10-CM: E78.2 ICD-9-CM: 272.2 Vitals BP Pulse Temp Resp Height(growth percentile) Weight(growth percentile) 122/57 (BP 1 Location: Left arm, BP Patient Position: Sitting) 74 97.6 °F (36.4 °C) (Oral) 18 5' 8\" (1.727 m) 303 lb 12.8 oz (137.8 kg) SpO2 BMI OB Status Smoking Status 95% 46.19 kg/m2 Postmenopausal Never Smoker BMI and BSA Data Body Mass Index Body Surface Area 46.19 kg/m 2 2.57 m 2 Preferred Pharmacy Pharmacy Name Phone [id number] Samir Harper, [facility] [phone] Your Updated Medication List  
  
   
This list is accurate as of 9/26/18  9:39 AM.  Always use your most recent med list.  
  
  
  
  
 atorvastatin 20 mg tablet Commonly known as:  LIPITOR Take 1 Tab by mouth daily. busPIRone 15 mg tablet Commonly known as:  BUSPAR Take 15 mg by mouth nightly. citalopram 20 mg tablet Commonly known as:  Carol Honey Take 1 Tab by mouth daily. ferrous sulfate 325 mg (65 mg iron) EC tablet Commonly known as:  IRON Take 1 Tab by mouth three (3) times daily (with meals). furosemide 80 mg tablet Commonly known as:  LASIX One tab daily in the morning  Indications: Edema  
  
 labetalol 300 mg tablet Commonly known as:  Arcadia Keegan Take 300 mg by mouth every twelve (12) hours. Confirmed w/ patient. Rx for Tid, patient only tolerates it BiD  
  
 naproxen sodium 220 mg Cap Take 2 Caps by mouth every twelve (12) hours as needed (pain). OXYGEN-AIR DELIVERY SYSTEMS  
1 L by Nasal route continuous. potassium chloride SR 10 mEq tablet Commonly known as:  KLOR-CON 10 Take 2 Tabs by mouth two (2) times a day. VITAMIN C 500 mg tablet Generic drug:  ascorbic acid (vitamin C) Take 500 mg by mouth daily. VITAMIN D2 50,000 unit capsule Generic drug:  ergocalciferol Take 50,000 Units by mouth every seven (7) days. Prescriptions Sent to Pharmacy Refills  
 ferrous sulfate (IRON) 325 mg (65 mg iron) EC tablet 1 Sig: Take 1 Tab by mouth three (3) times daily (with meals). Class: Normal  
 Pharmacy: [address] [place] [address] Ph #: [phone] Route: Oral  
 atorvastatin (LIPITOR) 20 mg tablet 2 Sig: Take 1 Tab by mouth daily.   
 Class: Normal  
 Pharmacy: [address] [place] [address] #: [phone] Route: Oral  
  
We Performed the Following HEMOGLOBIN A1C W/O EAG [27026 CPT(R)] HEPATITIS C AB [79607 CPT(R)] LIPID PANEL [33609 CPT(R)] METABOLIC PANEL, COMPREHENSIVE [85270 CPT(R)] OCCULT BLOOD IMMUNOASSAY,DIAGNOSTIC [82808 CPT(R)] TSH 3RD GENERATION [23708 CPT(R)] Follow-up Instructions Return in about 5 weeks (around 10/31/2018). To-Do List   
 09/26/2018 Imaging:  [facility] SCREENING INCL CAD   
  
 09/28/2018 To Be Determined Appointment with Kaitlyn Yee LPN at [place] [address]  
  
 10/01/2018 8:30 AM  
  Appointment with Veronika Orr MD; [facility] WOUND CARE 1 at [address] [place] [address]. ([phone]) 10/02/2018 To Be Determined Appointment with Kaitlyn Yee LPN at [place] [address]  
  
 10/05/2018 To Be Determined Appointment with France Greene RN at [place] [address]  
  
 10/05/2018 To Be Determined Appointment with France Greene RN at [place] [address]  
  
 10/09/2018 To Be Determined Appointment with Kaitlyn Yee LPN at [place] [address]  
  
 10/12/2018 To Be Determined Appointment with Kaitlyn Yee LPN at [place] [address]  
  
 10/16/2018 To Be Determined Appointment with Kaitlyn Yee LPN at [place] [address]  
  
 10/19/2018 To Be Determined Appointment with France Greene RN at [address] [facility]! Dear Mar Dumont: Thank you for requesting a [employer] account. Our records indicate that you already have an active [employer] account. You can access your account anytime at [email or web address] Did you know that you can access your hospital and ER discharge instructions at any time in e-Zassi? You can also review all of your test results from your hospital stay or ER visit. Additional Information If you have questions, please visit the Frequently Asked Questions section of the e-Zassi website at [email or web address] [email or web address]/. Remember, e-Zassi is NOT to be used for urgent needs. For medical emergencies, dial 911. Now available from your iPhone and Android! Please provide this summary of care documentation to your next provider. Your primary care clinician is listed as Steven Ross. If you have any questions after today's visit, please call [phone].

## 2018-09-26 NOTE — PATIENT INSTRUCTIONS
Vaccine Information Statement Influenza (Flu) Vaccine (Inactivated or Recombinant): What you need to know Many Vaccine Information Statements are available in Latvian and other languages. See www.immunize.org/vis Hojas de Información Sobre Vacunas están disponibles en Español y en muchos otros idiomas. Visite www.immunize.org/vis 1. Why get vaccinated? Influenza (flu) is a contagious disease that spreads around the [place] every year, usually between October and May. Flu is caused by influenza viruses, and is spread mainly by coughing, sneezing, and close contact. Anyone can get flu. Flu strikes suddenly and can last several days. Symptoms vary by age, but can include: 
 fever/chills  sore throat  muscle aches  fatigue  cough  headache  runny or stuffy nose Flu can also lead to pneumonia and blood infections, and cause diarrhea and seizures in children. If you have a medical condition, such as heart or lung disease, flu can make it worse. Flu is more dangerous for some people. Infants and young children, people 72years of age and older, pregnant women, and people with certain health conditions or a weakened immune system are at greatest risk. Each year thousands of people in the [facility] die from flu, and many more are hospitalized. Flu vaccine can: 
 keep you from getting flu, 
 make flu less severe if you do get it, and 
 keep you from spreading flu to your family and other people. 2. Inactivated and recombinant flu vaccines A dose of flu vaccine is recommended every flu season. Children 6 months through 6years of age may need two doses during the same flu season. Everyone else needs only one dose each flu season.   
 
 
Some inactivated flu vaccines contain a very small amount of a mercury-based preservative called thimerosal. Studies have not shown thimerosal in vaccines to be harmful, but flu vaccines that do not contain thimerosal are available. There is no live flu virus in flu shots. They cannot cause the flu. There are many flu viruses, and they are always changing. Each year a new flu vaccine is made to protect against three or four viruses that are likely to cause disease in the upcoming flu season. But even when the vaccine doesnt exactly match these viruses, it may still provide some protection Flu vaccine cannot prevent: 
 flu that is caused by a virus not covered by the vaccine, or 
 illnesses that look like flu but are not. It takes about 2 weeks for protection to develop after vaccination, and protection lasts through the flu season. 3. Some people should not get this vaccine Tell the person who is giving you the vaccine:  If you have any severe, life-threatening allergies. If you ever had a life-threatening allergic reaction after a dose of flu vaccine, or have a severe allergy to any part of this vaccine, you may be advised not to get vaccinated. Most, but not all, types of flu vaccine contain a small amount of egg protein.  If you ever had Guillain-Barré Syndrome (also called GBS). Some people with a history of GBS should not get this vaccine. This should be discussed with your doctor.  If you are not feeling well. It is usually okay to get flu vaccine when you have a mild illness, but you might be asked to come back when you feel better. 4. Risks of a vaccine reaction With any medicine, including vaccines, there is a chance of reactions. These are usually mild and go away on their own, but serious reactions are also possible. Most people who get a flu shot do not have any problems with it. Minor problems following a flu shot include:  
 soreness, redness, or swelling where the shot was given  hoarseness  sore, red or itchy eyes  cough  fever  aches  headache  itching  fatigue If these problems occur, they usually begin soon after the shot and last 1 or 2 days. More serious problems following a flu shot can include the following:  There may be a small increased risk of Guillain-Barré Syndrome (GBS) after inactivated flu vaccine. This risk has been estimated at 1 or 2 additional cases per million people vaccinated. This is much lower than the risk of severe complications from flu, which can be prevented by flu vaccine.  Young children who get the flu shot along with pneumococcal vaccine (PCV13) and/or DTaP vaccine at the same time might be slightly more likely to have a seizure caused by fever. Ask your doctor for more information. Tell your doctor if a child who is getting flu vaccine has ever had a seizure. Problems that could happen after any injected vaccine:  People sometimes faint after a medical procedure, including vaccination. Sitting or lying down for about 15 minutes can help prevent fainting, and injuries caused by a fall. Tell your doctor if you feel dizzy, or have vision changes or ringing in the ears.  Some people get severe pain in the shoulder and have difficulty moving the arm where a shot was given. This happens very rarely.  Any medication can cause a severe allergic reaction. Such reactions from a vaccine are very rare, estimated at about 1 in a million doses, and would happen within a few minutes to a few hours after the vaccination. As with any medicine, there is a very remote chance of a vaccine causing a serious injury or death. The safety of vaccines is always being monitored. For more information, visit: www.cdc.gov/vaccinesafety/ 
 
5. What if there is a serious reaction? What should I look for?  Look for anything that concerns you, such as signs of a severe allergic reaction, very high fever, or unusual behavior.  
 
Signs of a severe allergic reaction can include hives, swelling of the face and throat, difficulty breathing, a fast heartbeat, dizziness, and weakness  usually within a few minutes to a few hours after the vaccination. What should I do?  If you think it is a severe allergic reaction or other emergency that cant wait, call 9-1-1 and get the person to the nearest hospital. Otherwise, call your doctor.  Reactions should be reported to the Vaccine Adverse Event Reporting System (VAERS). Your doctor should file this report, or you can do it yourself through  the VAERS web site at www.vaers. [facility].gov, or by calling 7-437.399.7588. VAERS does not give medical advice. 6. The National Vaccine Injury Compensation Program 
 
The [facility] Vaccine Injury Compensation Program (VICP) is a federal program that was created to compensate people who may have been injured by certain vaccines. Persons who believe they may have been injured by a vaccine can learn about the program and about filing a claim by calling 8-300.619.8620 or visiting the [address] website at www.[facility].gov/vaccinecompensation. There is a time limit to file a claim for compensation. 7. How can I learn more?  Ask your healthcare provider. He or she can give you the vaccine package insert or suggest other sources of information.  Call your local or state health department.  Contact the Centers for Disease Control and Prevention (CDC): 
- Call 9-565.381.1729 (1-800-CDC-INFO) or 
- Visit CDCs website at www.cdc.gov/flu Vaccine Information Statement Inactivated Influenza Vaccine 8/7/2015 
42 SAPNA Bernardo [id number] Department of [facility] and [place] Centers for Disease Control and Prevention Office Use Only

## 2018-09-27 ENCOUNTER — HOME CARE VISIT (OUTPATIENT)
Dept: SCHEDULING | Facility: HOME HEALTH | Age: 63
End: 2018-09-27
Payer: COMMERCIAL

## 2018-09-27 LAB
ALBUMIN SERPL-MCNC: 3.7 G/DL (ref 3.6–4.8)
ALBUMIN/GLOB SERPL: 0.8 {RATIO} (ref 1.2–2.2)
ALP SERPL-CCNC: 106 IU/L (ref 39–117)
ALT SERPL-CCNC: 12 IU/L (ref 0–32)
AST SERPL-CCNC: 20 IU/L (ref 0–40)
BILIRUB SERPL-MCNC: 0.4 MG/DL (ref 0–1.2)
BUN SERPL-MCNC: 24 MG/DL (ref 8–27)
BUN/CREAT SERPL: 20 (ref 12–28)
CALCIUM SERPL-MCNC: 10.9 MG/DL (ref 8.7–10.3)
CHLORIDE SERPL-SCNC: 99 MMOL/L (ref 96–106)
CHOLEST SERPL-MCNC: 144 MG/DL (ref 100–199)
CO2 SERPL-SCNC: 26 MMOL/L (ref 20–29)
CREAT SERPL-MCNC: 1.19 MG/DL (ref 0.57–1)
GLOBULIN SER CALC-MCNC: 4.8 G/DL (ref 1.5–4.5)
GLUCOSE SERPL-MCNC: 82 MG/DL (ref 65–99)
HBA1C MFR BLD: 6.1 % (ref 4.8–5.6)
HCV AB S/CO SERPL IA: <0.1 S/CO RATIO (ref 0–0.9)
HDLC SERPL-MCNC: 47 MG/DL
INTERPRETATION: NORMAL
LDLC SERPL CALC-MCNC: 76 MG/DL (ref 0–99)
POTASSIUM SERPL-SCNC: 4.1 MMOL/L (ref 3.5–5.2)
PROT SERPL-MCNC: 8.5 G/DL (ref 6–8.5)
SODIUM SERPL-SCNC: 139 MMOL/L (ref 134–144)
TRIGL SERPL-MCNC: 105 MG/DL (ref 0–149)
TSH SERPL DL<=0.005 MIU/L-ACNC: 2.43 UIU/ML (ref 0.45–4.5)
VLDLC SERPL CALC-MCNC: 21 MG/DL (ref 5–40)

## 2018-09-27 PROCEDURE — G0300 HHS/HOSPICE OF LPN EA 15 MIN: HCPCS

## 2018-10-01 ENCOUNTER — HOSPITAL ENCOUNTER (OUTPATIENT)
Dept: WOUND CARE | Age: 63
Discharge: HOME | End: 2018-10-01
Payer: COMMERCIAL

## 2018-10-01 VITALS
HEART RATE: 69 BPM | TEMPERATURE: 98.8 F | RESPIRATION RATE: 18 BRPM | SYSTOLIC BLOOD PRESSURE: 133 MMHG | DIASTOLIC BLOOD PRESSURE: 69 MMHG

## 2018-10-01 VITALS
DIASTOLIC BLOOD PRESSURE: 72 MMHG | SYSTOLIC BLOOD PRESSURE: 140 MMHG | DIASTOLIC BLOOD PRESSURE: 78 MMHG | RESPIRATION RATE: 18 BRPM | TEMPERATURE: 97.9 F | HEART RATE: 78 BPM | OXYGEN SATURATION: 99 % | HEART RATE: 69 BPM | TEMPERATURE: 98 F | RESPIRATION RATE: 18 BRPM | OXYGEN SATURATION: 99 % | SYSTOLIC BLOOD PRESSURE: 126 MMHG

## 2018-10-01 PROBLEM — R60.0 BILATERAL LEG EDEMA: Status: ACTIVE | Noted: 2018-10-01

## 2018-10-01 PROCEDURE — 99215 OFFICE O/P EST HI 40 MIN: CPT

## 2018-10-01 NOTE — WOUND CARE
10/01/18 2931 [REMOVED] Wound Leg Lower Medial;Left Final Assessment Date/Final Assessment Time: 10/01/18 0927  Date First Assessed/Time First Assessed: 09/24/18 0855   POA: Yes  Wound Type: Moisture Assoc. Skin Damage;Venous  Location: Leg Lower  Orientation: Medial;Left Dressing Type Applied (Double Tubu  G) Wound Location: Left leg Wound Dressing Placed: Double Tubi  with A&d to leg Tolerated:well Pain: well Discharged to: home Discharged with: self Assistive device: none Discharged!!!

## 2018-10-01 NOTE — PROGRESS NOTES
HISTORY OF PRESENT ILLNESS:  Patient is a 62-year-old woman referred to the [address] [place] [address] for a wound on left lower leg. The patient reports that 15 years ago she had a witnessed bite of a spider on the leg and developed a wound. She says that since that time, she has had problems with wound healing on the left lower leg. 
  
The patient was admitted to the hospital on 08/09/2018 and discharged on 08/23/2018 when she presented with left leg cellulitis with sepsis. She describes having a large blistering and large ulcer at that time. She had diuresis in the hospital and lost about 23 pounds in weight. She was treated with IV antibiotics. She had evaluation by the pulmonary service and was felt to have both acute and chronic respiratory failure and felt likely to have obesity hypoventilation syndrome versus sleep apnea. 
  
The patient has been followed at home by the [address] [place] [address] and has had triple-layer compression dressings applied. The patient reports that the upper part of the dressing tends to roll down so that only the lower half of the lower leg has a dressing in place. 
  
The patient denies history of diabetes mellitus. 
  
PAST MEDICAL HISTORY:  Includes congestive heart failure, felt by Dr. Anjle Mendez, her cardiologist, to be diastolic heart failure. She has history of morbid obesity and history of chronic oxygen dependence, currently 1 liter per minute. She has no history of MI or coronary intervention. She did not have anginal chest pain. She is not short of breath at rest but does report that she uses 2 pillows at night to avoid orthopnea some symptoms. She is going to be evaluated next month at the sleep center for possible sleep apnea. She is not presently on any CPAP. 
  
The patient does take 80 mg of Lasix daily and does report it produces substantial diuresis.   She reports decreasing fluid intake as instructed last visit. 
  
 The patient's history also includes hypertension and morbid obesity. 
  
SOCIAL HISTORY:  The patient has never smoked. She rarely uses alcohol. 
  
PHYSICAL EXAMINATION: 
GENERAL:  Patient is alert, in no acute distress. EXTREMITIES:  Lower extremity examination revealed palpable 2+ dorsalis pedis pulses bilaterally. On the right side no ulcers are present. There is trace edema of the right lower extremity. On the left side, there is liposclerosis in the lower half of the lower leg on the left. There is some hyperpigmentation of the skin and some scaling of the skin. There are no open active ulcer any longer on the left. There is some scaly skin. 
  
ASSESSMENT AND PLAN:  The patient's ulcer are healed, mainly I think because of reduced fluid intake and resulting reduction in edema. 
  
Tubigrip double layer was applied today for compression. She was advised to obtain compression stockings for long term use. 
  
She can follow up prn 
  
FINAL DIAGNOSIS:   
1.  Non-pressure wound, left lower extremity  -  Now healed. 2. Bilateral leg edema, improved.  
 
3.  Morbid obesity, oxygen dependence. 
  
  
  
Monika Velez MD

## 2018-10-01 NOTE — WOUND CARE
10/01/18 4463 Wound Leg Lower Medial;Left Date First Assessed/Time First Assessed: 09/24/18 0855   POA: Yes  Wound Type: Moisture Assoc. Skin Damage;Venous  Location: Leg Lower  Orientation: Medial;Left DRESSING STATUS Clean, dry, and intact DRESSING TYPE Xeroform; Compression Wrap/Venous Stasis 
(3 layer) Wound Length (cm) 0 cm Wound Width (cm) 0 cm Wound Depth (cm) 0 Wound Surface area (cm^2) 0 cm^2 Change in Wound Size % 100 Condition of Edges Closed Drainage Amount  None Wound Odor None Cleansing and Cleansing Agents  Soap and water Wound Leg Lateral  
Date First Assessed/Time First Assessed: 09/07/18 0900   POA: Yes  Wound Type: Moisture Assoc. Skin Damage  Location: Leg  Wound Description (Optional): lateral   Orientation: Lateral  
DRESSING STATUS Clean, dry, and intact DRESSING TYPE (same as above) Wound Length (cm) 0 cm Wound Width (cm) 0 cm Wound Depth (cm) 0 Wound Surface area (cm^2) 0 cm^2 Change in Wound Size % 100 Drainage Amount  None Wound Odor None Cleansing and Cleansing Agents  Soap and water Visit Vitals  /69  Pulse 69  Temp 98.8 °F (37.1 °C)  Resp 18 LLE Peripheral Vascular Capillary Refill: Less than/equal to 3 seconds (10/01/18 0903) Color: Appropriate for race (hemosiderin) (10/01/18 8352) Temperature: Warm (10/01/18 0903) Pedal Pulse: Present (10/01/18 0903) Circumference of Calf (cm): 41 cm (10/01/18 0903) Location of Measurement (Calf): Mid  (10/01/18 4478) Circumference of Ankle (cm): 27 cm (10/01/18 0903) Location of Measurement (Ankle): Upper  (10/01/18 0903)

## 2018-10-03 ENCOUNTER — HOME CARE VISIT (OUTPATIENT)
Dept: SCHEDULING | Facility: HOME HEALTH | Age: 63
End: 2018-10-03
Payer: COMMERCIAL

## 2018-10-03 PROCEDURE — G0300 HHS/HOSPICE OF LPN EA 15 MIN: HCPCS

## 2018-10-05 ENCOUNTER — HOME CARE VISIT (OUTPATIENT)
Dept: HOME HEALTH SERVICES | Facility: HOME HEALTH | Age: 63
End: 2018-10-05
Payer: COMMERCIAL

## 2018-10-07 VITALS
SYSTOLIC BLOOD PRESSURE: 130 MMHG | TEMPERATURE: 98.7 F | RESPIRATION RATE: 18 BRPM | OXYGEN SATURATION: 97 % | DIASTOLIC BLOOD PRESSURE: 78 MMHG | HEART RATE: 89 BPM

## 2018-10-07 LAB
HEMOCCULT STL QL IA: NEGATIVE
SPECIMEN STATUS REPORT, ROLRST: NORMAL

## 2018-10-09 ENCOUNTER — HOME CARE VISIT (OUTPATIENT)
Dept: SCHEDULING | Facility: HOME HEALTH | Age: 63
End: 2018-10-09
Payer: COMMERCIAL

## 2018-10-09 PROCEDURE — G0300 HHS/HOSPICE OF LPN EA 15 MIN: HCPCS

## 2018-10-10 VITALS
TEMPERATURE: 98 F | DIASTOLIC BLOOD PRESSURE: 78 MMHG | OXYGEN SATURATION: 97 % | HEART RATE: 74 BPM | SYSTOLIC BLOOD PRESSURE: 130 MMHG | RESPIRATION RATE: 17 BRPM

## 2018-10-11 PROCEDURE — A6457 TUBULAR DRESSING: HCPCS

## 2018-10-12 ENCOUNTER — HOME CARE VISIT (OUTPATIENT)
Dept: SCHEDULING | Facility: HOME HEALTH | Age: 63
End: 2018-10-12
Payer: COMMERCIAL

## 2018-10-12 PROCEDURE — G0300 HHS/HOSPICE OF LPN EA 15 MIN: HCPCS

## 2018-10-14 VITALS
OXYGEN SATURATION: 99 % | SYSTOLIC BLOOD PRESSURE: 130 MMHG | RESPIRATION RATE: 18 BRPM | HEART RATE: 78 BPM | DIASTOLIC BLOOD PRESSURE: 70 MMHG | TEMPERATURE: 98 F

## 2018-10-16 ENCOUNTER — HOME CARE VISIT (OUTPATIENT)
Dept: SCHEDULING | Facility: HOME HEALTH | Age: 63
End: 2018-10-16
Payer: COMMERCIAL

## 2018-10-16 VITALS
TEMPERATURE: 98 F | RESPIRATION RATE: 18 BRPM | SYSTOLIC BLOOD PRESSURE: 120 MMHG | OXYGEN SATURATION: 97 % | DIASTOLIC BLOOD PRESSURE: 80 MMHG | HEART RATE: 78 BPM

## 2018-10-16 PROCEDURE — G0300 HHS/HOSPICE OF LPN EA 15 MIN: HCPCS

## 2018-10-16 RX ORDER — BUSPIRONE HYDROCHLORIDE 15 MG/1
15 TABLET ORAL
Qty: 30 TAB | Refills: 3 | Status: SHIPPED | OUTPATIENT
Start: 2018-10-16 | End: 2018-12-13 | Stop reason: SDUPTHER

## 2018-10-16 NOTE — TELEPHONE ENCOUNTER
----- Message from [facility] sent at 10/16/2018  9:48 AM EDT -----  Regarding: Dr. Joanne Patel  Pt [phone] says that she needs a refill on buspirone(anxiety meds) called into [facility] [phone]

## 2018-10-18 ENCOUNTER — TELEPHONE (OUTPATIENT)
Dept: CARDIOLOGY CLINIC | Age: 63
End: 2018-10-18

## 2018-10-18 ENCOUNTER — TELEPHONE (OUTPATIENT)
Dept: FAMILY MEDICINE CLINIC | Age: 63
End: 2018-10-18

## 2018-10-18 DIAGNOSIS — F32.A ANXIETY AND DEPRESSION: Primary | ICD-10-CM

## 2018-10-18 DIAGNOSIS — F41.9 ANXIETY AND DEPRESSION: Primary | ICD-10-CM

## 2018-10-18 RX ORDER — CLONAZEPAM 0.5 MG/1
0.5 TABLET ORAL
Qty: 15 TAB | Refills: 0 | Status: SHIPPED | OUTPATIENT
Start: 2018-10-18 | End: 2018-10-31 | Stop reason: SDUPTHER

## 2018-10-18 NOTE — TELEPHONE ENCOUNTER
MD Linda Lipscomb LPN   Caller: Unspecified (Today, 12:55 PM)             2 weeks of clonazepam.  She's to try and refill buspar after or go to another pharmacy     Thanks     Addie Marcos MD   10/18/2018     Patient notified of above message

## 2018-10-18 NOTE — TELEPHONE ENCOUNTER
Pt has Nuc stress test done 9/18/18 and called to get the results she stated she has never received them.

## 2018-10-19 ENCOUNTER — HOME CARE VISIT (OUTPATIENT)
Dept: SCHEDULING | Facility: HOME HEALTH | Age: 63
End: 2018-10-19
Payer: COMMERCIAL

## 2018-10-19 PROCEDURE — G0299 HHS/HOSPICE OF RN EA 15 MIN: HCPCS

## 2018-10-19 NOTE — TELEPHONE ENCOUNTER
Verified patient with two identifiers. Spoke with patient regarding test results. Myriam, please call pt to schedule a f/u with Dr. Erlinda Petty. Thanks!!      Marco Marcano NP   You 7 minutes ago (4:12 PM)      Reviewed stress test, will have Dr Boris Fernández correct final interpretation. The stress test is abnormal and patient needs follow up with Dr Erlinda Petty to discuss.

## 2018-10-22 VITALS
SYSTOLIC BLOOD PRESSURE: 130 MMHG | RESPIRATION RATE: 18 BRPM | DIASTOLIC BLOOD PRESSURE: 82 MMHG | WEIGHT: 293 LBS | TEMPERATURE: 98 F | HEART RATE: 56 BPM | BODY MASS INDEX: 45.98 KG/M2 | OXYGEN SATURATION: 95 %

## 2018-10-29 DIAGNOSIS — F41.9 ANXIETY AND DEPRESSION: ICD-10-CM

## 2018-10-29 DIAGNOSIS — F32.A ANXIETY AND DEPRESSION: ICD-10-CM

## 2018-10-29 RX ORDER — CLONAZEPAM 0.5 MG/1
0.5 TABLET ORAL
Qty: 30 TAB | Refills: 0 | OUTPATIENT
Start: 2018-10-29

## 2018-10-29 RX ORDER — POTASSIUM CHLORIDE 750 MG/1
20 TABLET, FILM COATED, EXTENDED RELEASE ORAL 2 TIMES DAILY
Qty: 60 TAB | Refills: 1 | Status: SHIPPED | OUTPATIENT
Start: 2018-10-29 | End: 2018-11-27 | Stop reason: SDUPTHER

## 2018-10-29 NOTE — TELEPHONE ENCOUNTER
Med just refilled for her 10/18/2018, request too soon.  Also controlled substance, needs aptmt    thx    Hoda Ellis MD  10/29/2018

## 2018-10-29 NOTE — TELEPHONE ENCOUNTER
----- Message from Peggy Corey sent at 10/29/2018 11:35 AM EDT -----  Regarding: Dr. Garry Manley  Contact: [phone]  Pt requesting refill of \"Chloricon\" and Pt stated her other medication \"Busiprone\" is out of stock so she is requesting an alternative until the other one comes in. The pharmacy stated it will be longer than 2 wks before the Busiprone will be in stock so she needs about 30days script for the alternative. (Pt does not have the name of the 2 wk supply you've already sent)    [facility] uses [facility] ([facility]) on file.

## 2018-10-31 ENCOUNTER — OFFICE VISIT (OUTPATIENT)
Dept: FAMILY MEDICINE CLINIC | Age: 63
End: 2018-10-31

## 2018-10-31 VITALS
SYSTOLIC BLOOD PRESSURE: 129 MMHG | TEMPERATURE: 97 F | HEART RATE: 63 BPM | WEIGHT: 293 LBS | BODY MASS INDEX: 44.41 KG/M2 | OXYGEN SATURATION: 96 % | HEIGHT: 68 IN | RESPIRATION RATE: 18 BRPM | DIASTOLIC BLOOD PRESSURE: 74 MMHG

## 2018-10-31 DIAGNOSIS — F41.9 ANXIETY AND DEPRESSION: Primary | ICD-10-CM

## 2018-10-31 DIAGNOSIS — I10 ESSENTIAL HYPERTENSION: ICD-10-CM

## 2018-10-31 DIAGNOSIS — R09.81 CHRONIC NASAL CONGESTION: ICD-10-CM

## 2018-10-31 DIAGNOSIS — I25.10 CORONARY ARTERY DISEASE INVOLVING NATIVE HEART WITHOUT ANGINA PECTORIS, UNSPECIFIED VESSEL OR LESION TYPE: ICD-10-CM

## 2018-10-31 DIAGNOSIS — R73.03 PREDIABETES: ICD-10-CM

## 2018-10-31 DIAGNOSIS — Z23 ENCOUNTER FOR IMMUNIZATION: ICD-10-CM

## 2018-10-31 DIAGNOSIS — Z99.81 O2 DEPENDENT: ICD-10-CM

## 2018-10-31 DIAGNOSIS — E78.2 MIXED HYPERLIPIDEMIA: ICD-10-CM

## 2018-10-31 DIAGNOSIS — F32.A ANXIETY AND DEPRESSION: Primary | ICD-10-CM

## 2018-10-31 PROBLEM — Z99.89 OSA ON CPAP: Status: ACTIVE | Noted: 2018-10-31

## 2018-10-31 PROBLEM — A41.9 SEPSIS (HCC): Status: RESOLVED | Noted: 2018-08-09 | Resolved: 2018-10-31

## 2018-10-31 PROBLEM — E55.9 VITAMIN D DEFICIENCY: Chronic | Status: ACTIVE | Noted: 2018-10-31

## 2018-10-31 PROBLEM — G47.33 OSA ON CPAP: Status: ACTIVE | Noted: 2018-10-31

## 2018-10-31 RX ORDER — CLONAZEPAM 0.5 MG/1
0.25 TABLET ORAL
Qty: 30 TAB | Refills: 0 | Status: SHIPPED | OUTPATIENT
Start: 2018-10-31 | End: 2019-04-16

## 2018-10-31 RX ORDER — FLUTICASONE PROPIONATE 50 MCG
2 SPRAY, SUSPENSION (ML) NASAL DAILY
Qty: 1 BOTTLE | Refills: 2 | Status: SHIPPED | OUTPATIENT
Start: 2018-10-31 | End: 2019-10-01 | Stop reason: SDUPTHER

## 2018-10-31 RX ORDER — OLMESARTAN MEDOXOMIL, AMLODIPINE AND HYDROCHLOROTHIAZIDE TABLET 40/10/25 MG 40; 10; 25 MG/1; MG/1; MG/1
TABLET ORAL
Refills: 4 | COMMUNITY
Start: 2018-09-27 | End: 2018-12-17 | Stop reason: SDUPTHER

## 2018-10-31 NOTE — PROGRESS NOTES
Chief Complaint Patient presents with  Hypertension  Cholesterol Problem 5 week check. Wound healed. 1. Have you been to the ER, urgent care clinic since your last visit? Hospitalized since your last visit? no 
 
2. Have you seen or consulted any other health care providers outside of the [address] [place] [address] since your last visit? Include any pap smears or colon screening. no 
 
 
Order placed for Tdap & PneumoVax-23, per Verbal Order from Dr. Domitila Holbrook on 10/31/2018 due to need for immunization. Quoc Robison is a 66 y.o. female who presents for routine immunizations. She denies any symptoms , reactions or allergies that would exclude them from being immunized today. Risks and adverse reactions were discussed and the VIS was given to them. All questions were addressed. She was observed for 15 min post injection. There were no reactions observed.  
 
Heather Alford LPN

## 2018-10-31 NOTE — PROGRESS NOTES
Emerald Diaz is a 66 y.o. female, for.  
 
3rd visit with this physician, 
 
 has a past medical history of Anxiety and depression, Arthritis, BMI 45.0-49.9, adult ([address] Essential tremor, Hypertension, Iron deficiency anemia secondary to inadequate dietary iron intake, Morbid obesity ([address] O2 dependent, Obesity, morbid ([address] and Vitamin D deficiency. HTN: BP at goal 
  
Prediabetes: discussed diet, exercise, avoid carbs. BMI 46 Anxiety depression: on buspar PRN for years. Have had it since her college days. Family hx of anxiety from mother and sister. Denies SI or HI. Celexa have been doing well for her, \"i feel calmer\". Continue course. Buspar have been on back order. We used clonazapem prn #15. She's using it every night instead of PRN. Discussed concern for addiction, we'll use hydroxyzine instead until Buspar is available. Chronic nasal congestion: start flonase Anemia stable, hgb up 10.6. But severe iron deficiency. She denies abnormal vaginal bleeding. Menopausal.  
 Denies BRBPR and or melena. Last colonoscopy 5 years ago, report was all normal. She report anemia her whole life. FOBT Negative Constipation: send in miralax to use with iron supplement. CHF diastolic: lasix was up to 80mg every day. Dr. Thalia Ramirez had a Cardiac stress test.  She now have apt for 11/16/2018 CAD according to stress test.  We started her on Lipitor and she's tolerating without side effect. O2 Dependent. Spo2 at 96% on 1L O2. She have seen Pulm, have sleep study. MONSERRAT is fitting for mask. Essential tremors. Hands shaking: about 6 months, can write, no issue with soup, spoon, fine movement. No resting tremors. Left hand only 2nd, 3rd, 4th tip of finger numbness. She didn't want to have further testing and it's mild she didn't want any more meds at this time.   
 
Needs mammogram.  
 
She'll see her OBGYN for papsmear.  
  
 
 Reviewed: active problem list, medication list, allergies, notes from last encounter, lab results A comprehensive review of systems was negative except for that written in the HPI. No Known Allergies Current Outpatient Medications on File Prior to Visit Medication Sig Dispense Refill  Olmesartan-amLODIPine-HCTZ 40-10-25 mg tab TAKE 1 TABLET BY MOUTH EVERY DAY  4  
 potassium chloride SR (KLOR-CON 10) 10 mEq tablet Take 2 Tabs by mouth two (2) times a day. 60 Tab 1  
 ferrous sulfate (IRON) 325 mg (65 mg iron) EC tablet Take 1 Tab by mouth three (3) times daily (with meals). 90 Tab 1  
 atorvastatin (LIPITOR) 20 mg tablet Take 1 Tab by mouth daily. 30 Tab 2  
 naproxen sodium 220 mg cap Take 2 Caps by mouth every twelve (12) hours as needed (pain).  OXYGEN-AIR DELIVERY SYSTEMS 1 L by Nasal route continuous.  ergocalciferol (VITAMIN D2) 50,000 unit capsule Take 50,000 Units by mouth every seven (7) days.  citalopram (CELEXA) 20 mg tablet Take 1 Tab by mouth daily. 30 Tab 2  
 furosemide (LASIX) 80 mg tablet One tab daily in the morning  Indications: Edema 30 Tab 1  
 labetalol (NORMODYNE) 300 mg tablet Take 300 mg by mouth every twelve (12) hours. Confirmed w/ patient. Rx for Tid, patient only tolerates it BiD  ascorbic acid (VITAMIN C) 500 mg tablet Take 500 mg by mouth daily.  busPIRone (BUSPAR) 15 mg tablet Take 1 Tab by mouth nightly. 30 Tab 3 No current facility-administered medications on file prior to visit. Patient Active Problem List  
Diagnosis Code  CHF (congestive heart failure) ([facility]) I50.9  Obesity, morbid ([facility]) E66.01  
 O2 dependent Z99.81  
 Non-pressure chronic ulcer of left lower leg with fat layer exposed ([facility]) L97.922  
 Iron deficiency anemia secondary to inadequate dietary iron intake D50.8  Essential tremor G25.0  BMI 45.0-49.9, adult ([facility]) Z68.42  
 Anxiety and depression F41.9, F32.9  Bilateral leg edema R60.0  Vitamin D deficiency E55.9  Essential hypertension I10  
 Coronary artery disease involving native heart without angina pectoris I25.10  Chronic nasal congestion R09.81  
 MONSERRAT on CPAP G47.33, Z99.89 Visit Vitals /74 (BP 1 Location: Left arm, BP Patient Position: Sitting) Pulse 63 Temp 97 °F (36.1 °C) (Oral) Resp 18 Ht 5' 8\" (1.727 m) Wt 306 lb 3.2 oz (138.9 kg) SpO2 96% BMI 46.56 kg/m² General appearance: alert, cooperative, no distress, appears stated age Neurologic: Alert and oriented X 3, normal strength and tone, symmetric. Normal without focal findings. Cranial nerves 2-12 intact. Normal coordination and gait. Mental status: Alert, oriented, thought content appropriate, affect: stable, mood-congruent. Head: Normocephalic, without obvious abnormality, atraumatic Eyes: conjunctivae/corneas clear. PERRL, EOM's intact. Neck: supple, symmetrical, trachea midline, no JVD Lungs: clear to auscultation bilaterally Heart: regular rate and rhythm, S1, S2 normal, no murmur, click, rub or gallop Abdomen: soft, non-tender. Extremities: extremities normal, atraumatic, no cyanosis or edema Essential tremors with intentional activites, but it's not bothering her in daily fine movement. And she's already on a beta blocker. Assessment/Plans: 
 
Diagnoses and all orders for this visit: 
 
1. Anxiety and depression 
-     clonazePAM (KLONOPIN) 0.5 mg tablet; Take 0.5 Tabs by mouth nightly as needed. Max Daily Amount: 0.25 mg. 
 
2. Essential hypertension -     METABOLIC PANEL, COMPREHENSIVE 3. Coronary artery disease involving native heart without angina pectoris, unspecified vessel or lesion type 4. Chronic nasal congestion 
-     fluticasone (FLONASE) 50 mcg/actuation nasal spray; 2 Sprays by Both Nostrils route daily. 5. Mixed hyperlipidemia -     METABOLIC PANEL, COMPREHENSIVE 6.  Prediabetes 
-     HEMOGLOBIN A1C W/O EAG 
 -     METABOLIC PANEL, COMPREHENSIVE 7. BMI 45.0-49.9, adult ([facility] 8. O2 dependent 9. Encounter for immunization -     TETANUS, DIPHTHERIA TOXOIDS AND ACELLULAR PERTUSSIS VACCINE (TDAP), IN INDIVIDS. >=7, IM 
-     PNEUMOCOCCAL POLYSACCHARIDE VACCINE, 23-VALENT, ADULT OR IMMUNOSUPPRESSED PT DOSE, Discussed plans, risk/benefits of treatments/observations. Through the use of shared decision making, above plans were agreed upon. Medication compliance advised. Patient verbalized understanding. Follow-up Disposition: 
Return in about 3 months (around 1/31/2019) for anxiety, meds, labs. Krysten Antunez MD 
11/5/2018

## 2018-10-31 NOTE — PATIENT INSTRUCTIONS
Vaccine Information Statement Pneumococcal Polysaccharide Vaccine: What You Need to Know Many Vaccine Information Statements are available in [place] and other languages. See www.immunize.org/vis. Hojas de información Sobre Vacunas están disponibles en español y en muchos otros idiomas. Visite [place].si. 1. Why get vaccinated? Vaccination can protect older adults (and some children and younger adults) from pneumococcal disease. Pneumococcal disease is caused by bacteria that can spread from person to person through close contact. It can cause ear infections, and it can also lead to more serious infections of the: 
 Lungs (pneumonia),  Blood (bacteremia), and 
 Covering of the brain and spinal cord (meningitis). Meningitis can cause deafness and brain damage, and it can be fatal.   
 
Anyone can get pneumococcal disease, but children under 3years of age, people with certain medical conditions, adults over 72years of age, and cigarette smokers are at the highest risk. About 18,000 older adults die each year from pneumococcal disease in the [place]. Treatment of pneumococcal infections with penicillin and other drugs used to be more effective. But some strains of the disease have become resistant to these drugs. This makes prevention of the disease, through vaccination, even more important. 2. Pneumococcal polysaccharide vaccine (PPSV23) Pneumococcal polysaccharide vaccine (PPSV23) protects against 23 types of pneumococcal bacteria. It will not prevent all pneumococcal disease. PPSV23 is recommended for:  All adults 72years of age and older,  Anyone 2 through 59years of age with certain long-term health problems, 
 Anyone 2 through 59years of age with a weakened immune system, 
 Adults 23 through 59years of age who smoke cigarettes or have asthma. Most people need only one dose of PPSV.   A second dose is recommended for certain high-risk groups. People 72 and older should get a dose even if they have gotten one or more doses of the vaccine before they turned 65. Your healthcare provider can give you more information about these recommendations. Most healthy adults develop protection within 2 to 3 weeks of getting the shot. 3. Some people should not get this vaccine  Anyone who has had a life-threatening allergic reaction to PPSV should not get another dose.  Anyone who has a severe allergy to any component of PPSV should not receive it. Tell your provider if you have any severe allergies.  Anyone who is moderately or severely ill when the shot is scheduled may be asked to wait until they recover before getting the vaccine. Someone with a mild illness can usually be vaccinated.  Children less than 3years of age should not receive this vaccine.  There is no evidence that PPSV is harmful to either a pregnant woman or to her fetus. However, as a precaution, women who need the vaccine should be vaccinated before becoming pregnant, if possible. 4. Risks of a vaccine reaction With any medicine, including vaccines, there is a chance of side effects. These are usually mild and go away on their own, but serious reactions are also possible. About half of people who get PPSV have mild side effects, such as redness or pain where the shot is given, which go away within about two days. Less than 1 out of 100 people develop a fever, muscle aches, or more severe local reactions. Problems that could happen after any vaccine:  People sometimes faint after a medical procedure, including vaccination. Sitting or lying down for about 15 minutes can help prevent fainting, and injuries caused by a fall. Tell your doctor if you feel dizzy, or have vision changes or ringing in the ears.  
 
 Some people get severe pain in the shoulder and have difficulty moving the arm where a shot was given. This happens very rarely.  Any medication can cause a severe allergic reaction. Such reactions from a vaccine are very rare, estimated at about 1 in a million doses, and would happen within a few minutes to a few hours after the vaccination. As with any medicine, there is a very remote chance of a vaccine causing a serious injury or death. The safety of vaccines is always being monitored. For more information, visit: www.cdc.gov/vaccinesafety/  
 
5. What if there is a serious reaction? What should I look for? Look for anything that concerns you, such as signs of a severe allergic reaction, very high fever, or unusual behavior. Signs of a severe allergic reaction can include hives, swelling of the face and throat, difficulty breathing, a fast heartbeat, dizziness, and weakness. These would usually start a few minutes to a few hours after the vaccination. What should I do? If you think it is a severe allergic reaction or other emergency that cant wait, call 9-1-1 or get to the nearest hospital. Otherwise, call your doctor. Afterward, the reaction should be reported to the Vaccine Adverse Event Reporting System (VAERS). Your doctor might file this report, or you can do it yourself through the VAERS web site at www.vaers. hhs.gov, or by calling 2-533.137.3925. VAERS does not give medical advice. 6. How can I learn more?  Ask your doctor. He or she can give you the vaccine package insert or suggest other sources of information.  Call your local or state health department.  Contact the Centers for Disease Control and Prevention (CDC): 
- Call 3-866.480.9057 (1-800-CDC-INFO) or 
- Visit CDCs website at www.cdc.gov/vaccines Vaccine Information Statement PPSV  
04/24/2015 Department of [facility] and [employer] Centers for Disease Control and Prevention Office Use Only Vaccine Information Statement Tdap (Tetanus, Diphtheria, Pertussis) Vaccine: What You Need to Know Many Vaccine Information Statements are available in [place] and other languages. See www.immunize.org/vis. Hojas de Información Sobre Vacunas están disponibles en español y en muchos otros idiomas. Visite [place].si 1. Why get vaccinated? Tetanus, diphtheria, and pertussis are very serious diseases. Tdap vaccine can protect us from these diseases. And, Tdap vaccine given to pregnant women can protect  babies against pertussis. TETANUS (Lockjaw) is rare in the [facility] today. It causes painful muscle tightening and stiffness, usually all over the body. ? It can lead to tightening of muscles in the head and neck so you cant open your mouth, swallow, or sometimes even breathe. Tetanus kills about 1 out of 10 people who are infected even after receiving the best medical care. DIPHTHERIA is also rare in the [facility] today. It can cause a thick coating to form in the back of the throat. ? It can lead to breathing problems, heart failure, paralysis, and death. PERTUSSIS (Whooping Cough) causes severe coughing spells, which can cause difficulty breathing, vomiting, and disturbed sleep. ? It can also lead to weight loss, incontinence, and rib fractures. Up to 2 in 100 adolescents and 5 in 100 adults with pertussis are hospitalized or have complications, which could include pneumonia or death. These diseases are caused by bacteria. Diphtheria and pertussis are spread from person to person through secretions from coughing or sneezing. Tetanus enters the body through cuts, scratches, or wounds. Before vaccines, as many as 200,000 cases of diphtheria, 200,000 cases of pertussis, and hundreds of cases of tetanus, were reported in the [place] each year. Since vaccination began, reports of cases for tetanus and diphtheria have dropped by about 99% and for pertussis by about 80%. 2. Tdap vaccine Tdap vaccine can protect adolescents and adults from tetanus, diphtheria, and pertussis. One dose of Tdap is routinely given at age 6 or 15. People who did not get Tdap at that age should get it as soon as possible. Tdap is especially important for health care professionals and anyone having close contact with a baby younger than 12 months. Pregnant women should get a dose of Tdap during every pregnancy, to protect the  from pertussis. Infants are most at risk for severe, life-threatening complications from pertussis. Another vaccine, called Td, protects against tetanus and diphtheria, but not pertussis. A Td booster should be given every 10 years. Tdap may be given as one of these boosters if you have never gotten Tdap before. Tdap may also be given after a severe cut or burn to prevent tetanus infection. Your doctor or the person giving you the vaccine can give you more information. Tdap may safely be given at the same time as other vaccines. 3. Some people should not get this vaccine  A person who has ever had a life-threatening allergic reaction after a previous dose of any diphtheria, tetanus or pertussis containing vaccine, OR has a severe allergy to any part of this vaccine, should not get Tdap vaccine. Tell the person giving the vaccine about any severe allergies.  Anyone who had coma or long repeated seizures within 7 days after a childhood dose of DTP or DTaP, or a previous dose of Tdap, should not get Tdap, unless a cause other than the vaccine was found. They can still get Td.  Talk to your doctor if you: 
- have seizures or another nervous system problem, 
- had severe pain or swelling after any vaccine containing diphtheria, tetanus or pertussis,  
- ever had a condition called Guillain Barré Syndrome (GBS), 
- arent feeling well on the day the shot is scheduled. 4. Risks With any medicine, including vaccines, there is a chance of side effects. These are usually mild and go away on their own. Serious reactions are also possible but are rare. Most people who get Tdap vaccine do not have any problems with it. Mild Problems following Tdap 
(Did not interfere with activities)  Pain where the shot was given (about 3 in 4 adolescents or 2 in 3 adults)  Redness or swelling where the shot was given (about 1 person in 5)  Mild fever of at least 100.4°F (up to about 1 in 25 adolescents or 1 in 100 adults)  Headache (about 3 or 4 people in 10)  Tiredness (about 1 person in 3 or 4)  Nausea, vomiting, diarrhea, stomach ache (up to 1 in 4 adolescents or 1 in 10 adults)  Chills,  sore joints (about 1 person in 10)  Body aches (about 1 person in 3 or 4)  Rash, swollen glands (uncommon) Moderate Problems following Tdap (Interfered with activities, but did not require medical attention)  Pain where the shot was given (up to 1 in 5 or 6)  Redness or swelling where the shot was given (up to about 1 in 16 adolescents or 1 in 12 adults)  Fever over 102°F (about 1 in 100 adolescents or 1 in 250 adults)  Headache (about 1 in 7 adolescents or 1 in 10 adults)  Nausea, vomiting, diarrhea, stomach ache (up to 1 or 3 people in 100)  Swelling of the entire arm where the shot was given (up to about 1 in 500). Severe Problems following Tdap 
(Unable to perform usual activities; required medical attention)  Swelling, severe pain, bleeding, and redness in the arm where the shot was given (rare). Problems that could happen after any vaccine:  People sometimes faint after a medical procedure, including vaccination. Sitting or lying down for about 15 minutes can help prevent fainting, and injuries caused by a fall. Tell your doctor if you feel dizzy, or have vision changes or ringing in the ears.  Some people get severe pain in the shoulder and have difficulty moving the arm where a shot was given. This happens very rarely.  Any medication can cause a severe allergic reaction. Such reactions from a vaccine are very rare, estimated at fewer than 1 in a million doses, and would happen within a few minutes to a few hours after the vaccination. As with any medicine, there is a very remote chance of a vaccine causing a serious injury or death. The safety of vaccines is always being monitored. For more information, visit: www.cdc.gov/vaccinesafety/ 
 
 
The [facility] Vaccine Injury Compensation Program (VICP) is a federal program that was created to compensate people who may have been injured by certain vaccines. Persons who believe they may have been injured by a vaccine can learn about the program and about filing a claim by calling 9-772.459.1388 or visiting the [employer] website at www.[facility].gov/vaccinecompensation.  There is a time limit to file a claim for compensation. 7. How can I learn more?  Ask your doctor. He or she can give you the vaccine package insert or suggest other sources of information.  Call your local or state health department.  Contact the Centers for Disease Control and Prevention (CDC): 
- Call 3-384.426.2944 (1-800-CDC-INFO) or 
- Visit CDCs website at www.cdc.gov/vaccines Vaccine Information Statement Tdap Vaccine 
(2/24/2015) 42 [address] [id number] Department of Health and [employer] Centers for Disease Control and Prevention Office Use Only

## 2018-11-16 ENCOUNTER — OFFICE VISIT (OUTPATIENT)
Dept: CARDIOLOGY CLINIC | Age: 63
End: 2018-11-16

## 2018-11-16 VITALS
SYSTOLIC BLOOD PRESSURE: 134 MMHG | HEART RATE: 68 BPM | WEIGHT: 293 LBS | RESPIRATION RATE: 18 BRPM | DIASTOLIC BLOOD PRESSURE: 80 MMHG | BODY MASS INDEX: 44.41 KG/M2 | OXYGEN SATURATION: 95 % | HEIGHT: 68 IN

## 2018-11-16 DIAGNOSIS — I50.32 CHRONIC DIASTOLIC CONGESTIVE HEART FAILURE (HCC): ICD-10-CM

## 2018-11-16 DIAGNOSIS — I10 ESSENTIAL HYPERTENSION: ICD-10-CM

## 2018-11-16 DIAGNOSIS — I25.10 ASHD (ARTERIOSCLEROTIC HEART DISEASE): Primary | ICD-10-CM

## 2018-11-16 NOTE — PROGRESS NOTES
Lisseth Roberto DNP, ANP-BC  Subjective/HPI:     Samuel Clancy is a 66 y.o. female is here for routine f/u. The patient denies exertional chest pain/resting shortness of breath, orthopnea, PND,palpitations, syncope, presyncope or fatigue. Ms. Dhara Mckeon reports intermittent dyspnea on exertion, she is now on CPAP device at night no longer requiring oxygen. Intermittently will use 1 L of oxygen during the day. Patient has persistent left lower extremity edema from previous insect bite treated by dermatology and vascular surgery. NST 9/2018  Findings: The overall quality of the study is excellent. Attenuation artifact was Present . Left ventricular cavity is noted to be normal on the rest and stress studies. Additionally, the right ventricle is normal.     SPECT images demonstrate a medium, reversible abnormality of moderate degree in the inferior and lateral region   on the stress and rest images. Gated SPECT images demonstrates hypokinesis of the inferior, lateral, septal and apical region. The left ventricular ejection fraction was calculated to be 57 %. Impression:   Myocardial perfusion imaging is abnormal. Overall left ventricular systolic function was normal: with regional wall motion abnormalities (as noted above). These test results indicate high likelihood for the presence of angiographically significant coronary artery disease.           ECHO:  LEFT VENTRICLE: Size was normal. Ejection fraction was estimated to be 65  %. No obvious wall motion abnormalities identified in the views obtained. RIGHT VENTRICLE: The size was normal. Systolic function was normal.    LEFT ATRIUM: Size was normal.    RIGHT ATRIUM: Size was normal.    MITRAL VALVE: There was mild thickening. DOPPLER: There was trivial  regurgitation. AORTIC VALVE: The valve was probably trileaflet. DOPPLER: There was no  significant regurgitation. TRICUSPID VALVE: Normal valve structure.  DOPPLER: There was mild  regurgitation. PULMONIC VALVE: Not well visualized. AORTA: The root exhibited normal size. PERICARDIUM: There was no pericardial effusion. LDL: 76       PCP Provider  Earl Cameron MD  Past Medical History:   Diagnosis Date    Anxiety and depression 9/26/2018    Arthritis     knees, lower back.  BMI 45.0-49.9, adult ([facility] [address] 9/26/2018    Essential tremor 9/26/2018    Hypertension     Iron deficiency anemia secondary to inadequate dietary iron intake 9/26/2018    Morbid obesity (HCC)     O2 dependent 8/29/2018    Obesity, morbid ([address] 8/29/2018    Vitamin D deficiency 10/31/2018      Past Surgical History:   Procedure Laterality Date    HX BREAST BIOPSY Left     HX GYN  2009    D&C     No Known Allergies   Family History   Problem Relation Age of Onset    Stroke Father 36    Heart Disease Father     Arthritis-osteo Mother     Cancer Mother [de-identified]        breast cancer    Breast Cancer Mother 78      Current Outpatient Medications   Medication Sig    cpap machine kit by Does Not Apply route.  Olmesartan-amLODIPine-HCTZ 40-10-25 mg tab TAKE 1 TABLET BY MOUTH EVERY DAY    fluticasone (FLONASE) 50 mcg/actuation nasal spray 2 Sprays by Both Nostrils route daily.  clonazePAM (KLONOPIN) 0.5 mg tablet Take 0.5 Tabs by mouth nightly as needed. Max Daily Amount: 0.25 mg.  potassium chloride SR (KLOR-CON 10) 10 mEq tablet Take 2 Tabs by mouth two (2) times a day.  ferrous sulfate (IRON) 325 mg (65 mg iron) EC tablet Take 1 Tab by mouth three (3) times daily (with meals).  atorvastatin (LIPITOR) 20 mg tablet Take 1 Tab by mouth daily.  OXYGEN-AIR DELIVERY SYSTEMS 1 L by Nasal route continuous.  ergocalciferol (VITAMIN D2) 50,000 unit capsule Take 50,000 Units by mouth every seven (7) days.  citalopram (CELEXA) 20 mg tablet Take 1 Tab by mouth daily.     furosemide (LASIX) 80 mg tablet One tab daily in the morning  Indications: Edema    labetalol (NORMODYNE) 300 mg tablet Take 300 mg by mouth every twelve (12) hours. Confirmed w/ patient. Rx for Tid, patient only tolerates it BiD    ascorbic acid (VITAMIN C) 500 mg tablet Take 500 mg by mouth daily.  busPIRone (BUSPAR) 15 mg tablet Take 1 Tab by mouth nightly.  naproxen sodium 220 mg cap Take 2 Caps by mouth every twelve (12) hours as needed (pain). No current facility-administered medications for this visit. Vitals:    11/16/18 0921 11/16/18 0932   BP: 136/78 134/80   Pulse: 68    Resp: 18    SpO2: 95%    Weight: 302 lb 11.2 oz (137.3 kg)    Height: 5' 8\" (1.727 m)      Social History     Socioeconomic History    Marital status: UNKNOWN     Spouse name: Not on file    Number of children: Not on file    Years of education: Not on file    Highest education level: Not on file   Social Needs    Financial resource strain: Not on file    Food insecurity - worry: Not on file    Food insecurity - inability: Not on file   [employer] needs - medical: Not on file   [employer] needs - non-medical: Not on file   Occupational History    Not on file   Tobacco Use    Smoking status: Never Smoker    Smokeless tobacco: Never Used   Substance and Sexual Activity    Alcohol use: Yes     Comment: rare    Drug use: No    Sexual activity: Not on file   Other Topics Concern    Not on file   Social History Narrative    Not on file       I have reviewed the nurses notes, vitals, problem list, allergy list, medical history, family, social history and medications. Review of Symptoms:    General: Pt denies excessive weight gain or loss. Pt is able to conduct ADL's  HEENT: Denies blurred vision, headaches, epistaxis and difficulty swallowing. Respiratory: Denies shortness of breath, + FLORENCE, no wheezing or stridor.   Cardiovascular: Denies precordial pain, palpitations, edema or PND  Gastrointestinal: Denies poor appetite, indigestion, abdominal pain or blood in stool  Musculoskeletal: Denies pain or swelling from muscles or joints  Neurologic: Denies tremor, paresthesias, or sensory motor disturbance  Skin: Denies rash, itching or texture change. Physical Exam:      General: Well developed, in no acute distress, cooperative and alert  HEENT: No carotid bruits, no JVD, trach is midline. Neck Supple, PEERL, EOM intact. Heart:  Normal S1/S2 negative S3 or S4. Regular, no murmur, gallop or rub.   Respiratory: Clear bilaterally x 4, no wheezing or rales  Abdomen:   Soft, non-tender, no masses, bowel sounds are active.   Extremities: +3 bilateral  Neuro: A&Ox3, speech clear, gait stable. Skin: Skin color is normal. No rashes or lesions.  Non diaphoretic  Vascular: 2+ pulses symmetric in all extremities    Cardiographics    ECG: NSR, normal  Results for orders placed or performed during the hospital encounter of 08/09/18   EKG, 12 LEAD, INITIAL   Result Value Ref Range    Ventricular Rate 113 BPM    Atrial Rate 113 BPM    P-R Interval 170 ms    QRS Duration 74 ms    Q-T Interval 306 ms    QTC Calculation (Bezet) 419 ms    Calculated P Axis 31 degrees    Calculated R Axis -19 degrees    Calculated T Axis 50 degrees    Diagnosis       Sinus tachycardia with occasional premature ventricular complexes  Minimal voltage criteria for LVH, may be normal variant  No previous ECGs available  Confirmed by VERONICA Ch ([id number]) on 8/10/2018 7:54:04 AM           Cardiology Labs:  Lab Results   Component Value Date/Time    Cholesterol, total 144 09/26/2018 11:11 AM    HDL Cholesterol 47 09/26/2018 11:11 AM    LDL, calculated 76 09/26/2018 11:11 AM    Triglyceride 105 09/26/2018 11:11 AM       Lab Results   Component Value Date/Time    Sodium 139 09/26/2018 11:11 AM    Potassium 4.1 09/26/2018 11:11 AM    Chloride 99 09/26/2018 11:11 AM    CO2 26 09/26/2018 11:11 AM    Anion gap 4 (L) 08/23/2018 04:22 AM    Glucose 82 09/26/2018 11:11 AM    BUN 24 09/26/2018 11:11 AM    Creatinine 1.19 (H) 09/26/2018 11:11 AM    BUN/Creatinine ratio 20 09/26/2018 11:11 AM    GFR est AA 57 (L) 09/26/2018 11:11 AM    GFR est non-AA 49 (L) 09/26/2018 11:11 AM    Calcium 10.9 (H) 09/26/2018 11:11 AM    Bilirubin, total 0.4 09/26/2018 11:11 AM    AST (SGOT) 20 09/26/2018 11:11 AM    Alk. phosphatase 106 09/26/2018 11:11 AM    Protein, total 8.5 09/26/2018 11:11 AM    Albumin 3.7 09/26/2018 11:11 AM    Globulin 6.1 (H) 08/14/2018 05:18 AM    A-G Ratio 0.8 (L) 09/26/2018 11:11 AM    ALT (SGPT) 12 09/26/2018 11:11 AM           Assessment:     Assessment:     Diagnoses and all orders for this visit:    1. ASHD (arteriosclerotic heart disease)  -     AMB POC EKG ROUTINE W/ 12 LEADS, INTER & REP    2. Chronic diastolic congestive heart failure ([address]    3. Essential hypertension        ICD-10-CM ICD-9-CM    1. ASHD (arteriosclerotic heart disease) I25.10 414.00 AMB POC EKG ROUTINE W/ 12 LEADS, INTER & REP   2. Chronic diastolic congestive heart failure (HCC) I50.32 428.32      428.0    3. Essential hypertension I10 401.9      Orders Placed This Encounter    AMB POC EKG ROUTINE W/ 12 LEADS, INTER & REP     Order Specific Question:   Reason for Exam:     Answer:   routine    cpap machine kit     Sig: by Does Not Apply route. Plan:     1. Diastolic heart failure: Clinically compensated, euvolemic continue current dose of diuretics. 2.  Hypertension: Controlled 134/80 continue current medications  3   Dyspnea on exertion: Multifactorial, nuclear stress test shows inferior and lateral defects on rest and stress images. Currently on dual antianginals without exertional chest pain, her dyspnea has minimal limitations. Will maintain a surveillance approach and have her follow-up in 3 months for reassessment of symptoms. Follow-up in 3 months    Meme Mccartney MD    This note was created using voice recognition software. Despite editing, there may be syntax errors.

## 2018-11-16 NOTE — PROGRESS NOTES
1. Have you been to the ER, urgent care clinic since your last visit? Hospitalized since your last visit? No    2. Have you seen or consulted any other health care providers outside of the [address] [place] [address] since your last visit? Include any pap smears or colon screening. No    Chief Complaint   Patient presents with    Coronary Artery Disease     3 mo appt. Denied cardiac symptoms.

## 2018-12-12 DIAGNOSIS — F41.9 ANXIETY AND DEPRESSION: ICD-10-CM

## 2018-12-12 DIAGNOSIS — F32.A ANXIETY AND DEPRESSION: ICD-10-CM

## 2018-12-13 DIAGNOSIS — F41.9 ANXIETY AND DEPRESSION: Primary | ICD-10-CM

## 2018-12-13 DIAGNOSIS — F32.A ANXIETY AND DEPRESSION: Primary | ICD-10-CM

## 2018-12-13 RX ORDER — BUSPIRONE HYDROCHLORIDE 15 MG/1
15 TABLET ORAL
Qty: 30 TAB | Refills: 3 | Status: SHIPPED | OUTPATIENT
Start: 2018-12-13 | End: 2018-12-17 | Stop reason: SDUPTHER

## 2018-12-13 RX ORDER — CLONAZEPAM 0.5 MG/1
0.25 TABLET ORAL
Qty: 30 TAB | Refills: 0 | OUTPATIENT
Start: 2018-12-13

## 2018-12-17 DIAGNOSIS — E78.2 MIXED HYPERLIPIDEMIA: ICD-10-CM

## 2018-12-17 DIAGNOSIS — F32.A ANXIETY AND DEPRESSION: ICD-10-CM

## 2018-12-17 DIAGNOSIS — F41.9 ANXIETY AND DEPRESSION: ICD-10-CM

## 2018-12-18 RX ORDER — ATORVASTATIN CALCIUM 20 MG/1
20 TABLET, FILM COATED ORAL DAILY
Qty: 90 TAB | Refills: 1 | Status: SHIPPED | OUTPATIENT
Start: 2018-12-18 | End: 2019-06-24 | Stop reason: SDUPTHER

## 2018-12-18 RX ORDER — LABETALOL 300 MG/1
300 TABLET, FILM COATED ORAL EVERY 12 HOURS
Qty: 180 TAB | Refills: 1 | Status: SHIPPED | OUTPATIENT
Start: 2018-12-18 | End: 2019-06-24 | Stop reason: SDUPTHER

## 2018-12-18 RX ORDER — OLMESARTAN MEDOXOMIL, AMLODIPINE AND HYDROCHLOROTHIAZIDE TABLET 40/10/25 MG 40; 10; 25 MG/1; MG/1; MG/1
TABLET ORAL
Qty: 90 TAB | Refills: 1 | Status: SHIPPED | OUTPATIENT
Start: 2018-12-18 | End: 2019-06-06 | Stop reason: SDUPTHER

## 2018-12-18 RX ORDER — CITALOPRAM 20 MG/1
TABLET, FILM COATED ORAL
Qty: 90 TAB | Refills: 1 | Status: SHIPPED | OUTPATIENT
Start: 2018-12-18 | End: 2019-04-16 | Stop reason: SDUPTHER

## 2018-12-18 RX ORDER — BUSPIRONE HYDROCHLORIDE 15 MG/1
15 TABLET ORAL
Qty: 90 TAB | Refills: 1 | Status: SHIPPED | OUTPATIENT
Start: 2018-12-18 | End: 2019-04-19 | Stop reason: SDUPTHER

## 2018-12-18 RX ORDER — POTASSIUM CHLORIDE 750 MG/1
TABLET, FILM COATED, EXTENDED RELEASE ORAL
Qty: 120 TAB | Refills: 3 | Status: SHIPPED | OUTPATIENT
Start: 2018-12-18 | End: 2019-07-14 | Stop reason: SDUPTHER

## 2018-12-18 RX ORDER — FUROSEMIDE 80 MG/1
TABLET ORAL
Qty: 90 TAB | Refills: 0 | Status: SHIPPED | OUTPATIENT
Start: 2018-12-18 | End: 2019-03-25 | Stop reason: SDUPTHER

## 2018-12-20 DIAGNOSIS — E78.2 MIXED HYPERLIPIDEMIA: ICD-10-CM

## 2018-12-20 RX ORDER — ATORVASTATIN CALCIUM 20 MG/1
TABLET, FILM COATED ORAL
Qty: 30 TAB | Refills: 2 | Status: SHIPPED | OUTPATIENT
Start: 2018-12-20 | End: 2019-04-16 | Stop reason: SDUPTHER

## 2019-01-18 ENCOUNTER — TELEPHONE (OUTPATIENT)
Dept: FAMILY MEDICINE CLINIC | Age: 64
End: 2019-01-18

## 2019-01-18 NOTE — TELEPHONE ENCOUNTER
Pt wants to come in Monday 1/21/19 for a TB test for her employer (states she has a home )     [phone]

## 2019-01-18 NOTE — TELEPHONE ENCOUNTER
Spoke with patient she would need to schedule appt to be seen per Dr. Jadyn Govea. She said that she would call back.

## 2019-03-12 DIAGNOSIS — F32.A ANXIETY AND DEPRESSION: ICD-10-CM

## 2019-03-12 DIAGNOSIS — F41.9 ANXIETY AND DEPRESSION: ICD-10-CM

## 2019-03-12 RX ORDER — CLONAZEPAM 0.5 MG/1
0.25 TABLET ORAL
Qty: 30 TAB | Refills: 0 | OUTPATIENT
Start: 2019-03-12

## 2019-03-20 DIAGNOSIS — F32.A ANXIETY AND DEPRESSION: ICD-10-CM

## 2019-03-20 DIAGNOSIS — F41.9 ANXIETY AND DEPRESSION: ICD-10-CM

## 2019-03-27 RX ORDER — FUROSEMIDE 80 MG/1
TABLET ORAL
Qty: 30 TAB | Refills: 0 | Status: SHIPPED | OUTPATIENT
Start: 2019-03-27 | End: 2019-04-11 | Stop reason: SDUPTHER

## 2019-04-08 RX ORDER — CLONAZEPAM 0.5 MG/1
0.25 TABLET ORAL
Qty: 15 TAB | Refills: 0 | OUTPATIENT
Start: 2019-04-08

## 2019-04-12 DIAGNOSIS — F41.9 ANXIETY AND DEPRESSION: ICD-10-CM

## 2019-04-12 DIAGNOSIS — F32.A ANXIETY AND DEPRESSION: ICD-10-CM

## 2019-04-12 RX ORDER — CLONAZEPAM 0.5 MG/1
0.25 TABLET ORAL
Qty: 30 TAB | Refills: 0 | OUTPATIENT
Start: 2019-04-12

## 2019-04-12 RX ORDER — FUROSEMIDE 80 MG/1
TABLET ORAL
Qty: 30 TAB | Refills: 0 | Status: SHIPPED | OUTPATIENT
Start: 2019-04-12 | End: 2019-04-16 | Stop reason: SDUPTHER

## 2019-04-12 NOTE — TELEPHONE ENCOUNTER
Last Visit: 10/31/2018 with MD Black  Next Appointment: 04/16/2019 with MD Black  Previous Refill Encounter(s): 10/31/2018 per Wayne #30    Requested Prescriptions     Pending Prescriptions Disp Refills    clonazePAM (KLONOPIN) 0.5 mg tablet 30 Tab 0     Sig: Take 0.5 Tabs by mouth nightly as needed (as needed). Max Daily Amount: 0.25 mg.

## 2019-04-16 ENCOUNTER — OFFICE VISIT (OUTPATIENT)
Dept: FAMILY MEDICINE CLINIC | Age: 64
End: 2019-04-16

## 2019-04-16 VITALS
WEIGHT: 293 LBS | HEIGHT: 68 IN | SYSTOLIC BLOOD PRESSURE: 123 MMHG | TEMPERATURE: 96.7 F | DIASTOLIC BLOOD PRESSURE: 64 MMHG | BODY MASS INDEX: 44.41 KG/M2 | HEART RATE: 63 BPM | OXYGEN SATURATION: 97 % | RESPIRATION RATE: 18 BRPM

## 2019-04-16 DIAGNOSIS — I25.10 CORONARY ARTERY DISEASE INVOLVING NATIVE HEART WITHOUT ANGINA PECTORIS, UNSPECIFIED VESSEL OR LESION TYPE: ICD-10-CM

## 2019-04-16 DIAGNOSIS — I10 ESSENTIAL HYPERTENSION: ICD-10-CM

## 2019-04-16 DIAGNOSIS — F32.A ANXIETY AND DEPRESSION: Primary | ICD-10-CM

## 2019-04-16 DIAGNOSIS — F41.9 ANXIETY AND DEPRESSION: Primary | ICD-10-CM

## 2019-04-16 DIAGNOSIS — E78.2 MIXED HYPERLIPIDEMIA: ICD-10-CM

## 2019-04-16 DIAGNOSIS — J06.9 ACUTE URI: ICD-10-CM

## 2019-04-16 DIAGNOSIS — R73.03 PREDIABETES: ICD-10-CM

## 2019-04-16 DIAGNOSIS — Z79.899 ENCOUNTER FOR LONG-TERM (CURRENT) USE OF MEDICATIONS: ICD-10-CM

## 2019-04-16 DIAGNOSIS — D50.8 IRON DEFICIENCY ANEMIA SECONDARY TO INADEQUATE DIETARY IRON INTAKE: ICD-10-CM

## 2019-04-16 RX ORDER — FUROSEMIDE 80 MG/1
TABLET ORAL
Qty: 90 TAB | Refills: 1 | Status: SHIPPED | OUTPATIENT
Start: 2019-04-16 | End: 2019-10-22 | Stop reason: SDUPTHER

## 2019-04-16 RX ORDER — GUAIFENESIN 100 MG/5ML
81 LIQUID (ML) ORAL DAILY
Qty: 90 TAB | Refills: 1 | Status: SHIPPED | OUTPATIENT
Start: 2019-04-16 | End: 2019-10-22 | Stop reason: SDUPTHER

## 2019-04-16 RX ORDER — CITALOPRAM 20 MG/1
TABLET, FILM COATED ORAL
Qty: 90 TAB | Refills: 1 | Status: SHIPPED | OUTPATIENT
Start: 2019-04-16 | End: 2020-01-01 | Stop reason: SDUPTHER

## 2019-04-16 RX ORDER — AMOXICILLIN AND CLAVULANATE POTASSIUM 875; 125 MG/1; MG/1
1 TABLET, FILM COATED ORAL 2 TIMES DAILY
Qty: 10 TAB | Refills: 0 | Status: SHIPPED | OUTPATIENT
Start: 2019-04-16 | End: 2019-04-21

## 2019-04-16 NOTE — PROGRESS NOTES
Chief Complaint   Patient presents with    Anxiety     Check meds & labs    1. Have you been to the ER, urgent care clinic since your last visit? Hospitalized since your last visit? no    2. Have you seen or consulted any other health care providers outside of the [address] [place] [address] since your last visit? Include any pap smears or colon screening.  yes

## 2019-04-16 NOTE — PROGRESS NOTES
Daryle Blade is a 66 y.o. female, for. 4th visit with this physician,    Pt was supposed to f/u in 3 motnhs, it's now 6 months. has a past medical history of Anxiety and depression (9/26/2018), Arthritis, BMI 45.0-49.9, adult ([address] (9/26/2018), Essential tremor (9/26/2018), Hypertension, Iron deficiency anemia secondary to inadequate dietary iron intake (9/26/2018), Morbid obesity ([address] O2 dependent (8/29/2018), Obesity, morbid ([address] (8/29/2018), and Vitamin D deficiency (10/31/2018). Sore throat, nasal congestion, X2 days. Denies fever, chills. No improvement. Haven't done any OTC. But  in hospital so have been visiting him. HTN: BP at goal.    Continue labetalol, olmesartan-amlodipine     Prediabetes: discussed diet, exercise, avoid carbs. BMI 46  Recheck labs    Anxiety depression: on buspar PRN for years. Have had it since her college days. Family hx of anxiety from mother and sister. Denies SI or HI. Celexa have been doing well for her, \"i feel calmer\". Continue course. Chronic nasal congestion: start flonase     Anemia stable, hgb up 10.6. But severe iron deficiency. She denies abnormal vaginal bleeding. Menopausal.    Denies BRBPR and or melena. Last colonoscopy 5 years ago, report was all normal. She report anemia her whole life. FOBT Negative    Constipation: send in miralax to use with iron supplement. CHF diastolic: lasix was up to 80mg every day. Dr. aJlen Davis had a Cardiac stress test.    Weight today stable 304 vs 302 5 months ago with cardiology    CAD according to stress test.  We started her on Lipitor and she's tolerating without side effect. We'll add on Aspirin 81mg    MONSERRAT compliant cpap    Essential tremors. Hands shaking: about 6 months, can write, no issue with soup, spoon, fine movement. No resting tremors. Left hand only 2nd, 3rd, 4th tip of finger numbness.     She didn't want to have further testing and it's mild she didn't want any more meds at this time. Needs mammogram.     She'll see her OBGYN for papsmear.        Reviewed: active problem list, medication list, allergies, notes from last encounter, lab results    A comprehensive review of systems was negative except for that written in the HPI. No Known Allergies  Current Outpatient Medications on File Prior to Visit   Medication Sig Dispense Refill    ferrous sulfate 325 mg (65 mg iron) tablet TAKE 1 TAB BY MOUTH THREE (3) TIMES DAILY (WITH MEALS). 90 Tab 1    busPIRone (BUSPAR) 15 mg tablet Take 1 Tab by mouth nightly. 90 Tab 1    Olmesartan-amLODIPine-HCTZ 40-10-25 mg tab TAKE 1 TABLET BY MOUTH EVERY DAY 90 Tab 1    labetalol (NORMODYNE) 300 mg tablet Take 1 Tab by mouth every twelve (12) hours. Confirmed w/ patient. Rx for Tid, patient only tolerates it BiD 180 Tab 1    atorvastatin (LIPITOR) 20 mg tablet Take 1 Tab by mouth daily. 90 Tab 1    potassium chloride SR (KLOR-CON 10) 10 mEq tablet TAKE 2 TABS BY MOUTH TWO (2) TIMES A DAY. 120 Tab 3    cpap machine kit by Does Not Apply route.  naproxen sodium 220 mg cap Take 2 Caps by mouth every twelve (12) hours as needed (pain).  ergocalciferol (VITAMIN D2) 50,000 unit capsule Take 50,000 Units by mouth every seven (7) days.  ascorbic acid (VITAMIN C) 500 mg tablet Take 500 mg by mouth daily.  fluticasone (FLONASE) 50 mcg/actuation nasal spray 2 Sprays by Both Nostrils route daily. 1 Bottle 2     No current facility-administered medications on file prior to visit.       Patient Active Problem List   Diagnosis Code    CHF (congestive heart failure) ([facility]) I50.9    Obesity, morbid ([facility]) E66.01    O2 dependent Z99.81    Non-pressure chronic ulcer of left lower leg with fat layer exposed ([facility] [address] L97.922    Iron deficiency anemia secondary to inadequate dietary iron intake D50.8    Essential tremor G25.0    BMI 45.0-49.9, adult ([facility]) Z68.42    Anxiety and depression F41.9, F32.9    Bilateral leg edema R60.0    Vitamin D deficiency E55.9    Essential hypertension I10    Coronary artery disease involving native heart without angina pectoris I25.10    Chronic nasal congestion R09.81    MONSERRAT on CPAP G47.33, Z99.89       Visit Vitals  /64   Pulse 63   Temp 96.7 °F (35.9 °C) (Oral)   Resp 18   Ht 5' 8\" (1.727 m)   Wt 304 lb (137.9 kg)   SpO2 97%   BMI 46.22 kg/m²     General appearance: alert, cooperative, no distress, appears stated age  Neurologic: Alert and oriented X 3, normal strength and tone, symmetric. Normal without focal findings. Cranial nerves 2-12 intact. Normal coordination and gait. Mental status: Alert, oriented, thought content appropriate, affect: stable, mood-congruent. Head: Normocephalic, without obvious abnormality, atraumatic  Eyes: conjunctivae/corneas clear. PERRL, EOM's intact. Neck: supple, symmetrical, trachea midline, no JVD  Lungs: clear to auscultation bilaterally  Heart: regular rate and rhythm, S1, S2 normal, no murmur, click, rub or gallop  Abdomen: soft, non-tender. Extremities: extremities normal, atraumatic, no cyanosis or edema      Assessment/Plans:    Diagnoses and all orders for this visit:    1. Anxiety and depression  -     citalopram (CELEXA) 20 mg tablet; TAKE 1 TABLET BY MOUTH EVERY DAY    2. Acute URI  -     amoxicillin-clavulanate (AUGMENTIN) 875-125 mg per tablet; Take 1 Tab by mouth two (2) times a day for 5 days. 3. Mixed hyperlipidemia  -     LIPID PANEL  -     METABOLIC PANEL, COMPREHENSIVE  -     TSH RFX ON ABNORMAL TO FREE T4    4. Essential hypertension  -     METABOLIC PANEL, COMPREHENSIVE  -     TSH RFX ON ABNORMAL TO FREE T4    5. Coronary artery disease involving native heart without angina pectoris, unspecified vessel or lesion type  -     METABOLIC PANEL, COMPREHENSIVE  -     aspirin 81 mg chewable tablet; Take 1 Tab by mouth daily. 6. Iron deficiency anemia secondary to inadequate dietary iron intake  -     CBC W/O DIFF    7. Prediabetes  -     HEMOGLOBIN A1C W/O EAG  -     METABOLIC PANEL, COMPREHENSIVE    8. Encounter for long-term (current) use of medications  -     HEMOGLOBIN A1C W/O EAG  -     LIPID PANEL  -     METABOLIC PANEL, COMPREHENSIVE  -     TSH RFX ON ABNORMAL TO FREE T4  -     CBC W/O DIFF    Other orders  -     furosemide (LASIX) 80 mg tablet; TAKE 1 TABLET BY MOUTH IN THE MORNING      Discussed plans, risk/benefits of treatments/observations. Through the use of shared decision making, above plans were agreed upon. Medication compliance advised. Patient verbalized understanding.      Follow-up and Dispositions    · Return in about 4 months (around 8/16/2019) for HTN, prediabetes, meds, sooner if labs abnormal.           Benjamín Waldrop MD  4/16/2019

## 2019-04-19 DIAGNOSIS — F32.A ANXIETY AND DEPRESSION: Primary | ICD-10-CM

## 2019-04-19 DIAGNOSIS — F41.9 ANXIETY AND DEPRESSION: ICD-10-CM

## 2019-04-19 DIAGNOSIS — F32.A ANXIETY AND DEPRESSION: ICD-10-CM

## 2019-04-19 DIAGNOSIS — F41.9 ANXIETY AND DEPRESSION: Primary | ICD-10-CM

## 2019-04-19 RX ORDER — BUSPIRONE HYDROCHLORIDE 15 MG/1
15 TABLET ORAL
Qty: 90 TAB | Refills: 1 | Status: SHIPPED | OUTPATIENT
Start: 2019-04-19 | End: 2019-05-22 | Stop reason: SDUPTHER

## 2019-04-19 RX ORDER — CLONAZEPAM 0.5 MG/1
0.25 TABLET ORAL
Qty: 15 TAB | Refills: 0 | Status: SHIPPED | OUTPATIENT
Start: 2019-04-19 | End: 2019-10-17 | Stop reason: ALTCHOICE

## 2019-04-19 NOTE — TELEPHONE ENCOUNTER
I just saw her. We talked about anxiety depression, she didn't mentioned any need for klonopin and it was given b/c buspar was out. But I refilled buspar, will just do this once more.      Sindy Concepcion MD  4/19/2019

## 2019-05-22 DIAGNOSIS — F32.A ANXIETY AND DEPRESSION: ICD-10-CM

## 2019-05-22 DIAGNOSIS — F41.9 ANXIETY AND DEPRESSION: ICD-10-CM

## 2019-05-22 RX ORDER — BUSPIRONE HYDROCHLORIDE 15 MG/1
15 TABLET ORAL
Qty: 90 TAB | Refills: 1 | Status: SHIPPED | OUTPATIENT
Start: 2019-05-22 | End: 2019-06-18 | Stop reason: SDUPTHER

## 2019-05-22 RX ORDER — BUSPIRONE HYDROCHLORIDE 15 MG/1
15 TABLET ORAL
Qty: 30 TAB | Refills: 0 | Status: SHIPPED | OUTPATIENT
Start: 2019-05-22 | End: 2019-06-18 | Stop reason: SDUPTHER

## 2019-05-22 NOTE — TELEPHONE ENCOUNTER
----- Message from Cuco Kulkarni sent at 5/22/2019 11:09 AM EDT -----  Regarding: Dr. Flora Reeder [phone]    Pt is requesting 30-day Rx refill for \"Bupropion 15mg)  to be called in [facility], [address] [place] [address] [phone] and a 90-day supply to be called into [facility] (mail order) [phone]. Please call when processed.

## 2019-06-06 RX ORDER — OLMESARTAN MEDOXOMIL, AMLODIPINE AND HYDROCHLOROTHIAZIDE TABLET 40/10/25 MG 40; 10; 25 MG/1; MG/1; MG/1
TABLET ORAL
Qty: 90 TAB | Refills: 1 | Status: SHIPPED | OUTPATIENT
Start: 2019-06-06 | End: 2019-12-03 | Stop reason: SDUPTHER

## 2019-06-07 ENCOUNTER — TELEPHONE (OUTPATIENT)
Dept: FAMILY MEDICINE CLINIC | Age: 64
End: 2019-06-07

## 2019-06-07 NOTE — TELEPHONE ENCOUNTER
----- Message from Chuck Alcantara sent at 6/7/2019  9:58 AM EDT -----  Regarding: Dr. Rory Jack  Pt stated that she would like the doctor to request for annual GI results to be forward form Dr. Jamil Hernandez office. [phone].  Best contact: [phone]

## 2019-06-17 ENCOUNTER — TELEPHONE (OUTPATIENT)
Dept: FAMILY MEDICINE CLINIC | Age: 64
End: 2019-06-17

## 2019-06-17 NOTE — TELEPHONE ENCOUNTER
Pt wants refill for amoxicillin - no apptmnts open this week     States she uses a CPAP and having URI       [phone]

## 2019-06-18 ENCOUNTER — OFFICE VISIT (OUTPATIENT)
Dept: FAMILY MEDICINE CLINIC | Age: 64
End: 2019-06-18

## 2019-06-18 VITALS
BODY MASS INDEX: 44.41 KG/M2 | DIASTOLIC BLOOD PRESSURE: 70 MMHG | OXYGEN SATURATION: 94 % | WEIGHT: 293 LBS | HEIGHT: 68 IN | RESPIRATION RATE: 18 BRPM | HEART RATE: 78 BPM | SYSTOLIC BLOOD PRESSURE: 139 MMHG | TEMPERATURE: 98.1 F

## 2019-06-18 DIAGNOSIS — F32.A ANXIETY AND DEPRESSION: ICD-10-CM

## 2019-06-18 DIAGNOSIS — I50.9 CHRONIC HEART FAILURE, UNSPECIFIED HEART FAILURE TYPE (HCC): ICD-10-CM

## 2019-06-18 DIAGNOSIS — T36.95XA ANTIBIOTIC-INDUCED YEAST INFECTION: ICD-10-CM

## 2019-06-18 DIAGNOSIS — F41.9 ANXIETY AND DEPRESSION: ICD-10-CM

## 2019-06-18 DIAGNOSIS — J06.9 ACUTE URI: Primary | ICD-10-CM

## 2019-06-18 DIAGNOSIS — B37.9 ANTIBIOTIC-INDUCED YEAST INFECTION: ICD-10-CM

## 2019-06-18 RX ORDER — AMOXICILLIN AND CLAVULANATE POTASSIUM 875; 125 MG/1; MG/1
1 TABLET, FILM COATED ORAL 2 TIMES DAILY
Qty: 10 TAB | Refills: 0 | Status: SHIPPED | OUTPATIENT
Start: 2019-06-18 | End: 2019-06-23

## 2019-06-18 RX ORDER — FLUCONAZOLE 150 MG/1
150 TABLET ORAL DAILY
Qty: 1 TAB | Refills: 0 | Status: SHIPPED | OUTPATIENT
Start: 2019-06-18 | End: 2019-06-24 | Stop reason: SDUPTHER

## 2019-06-18 RX ORDER — BUSPIRONE HYDROCHLORIDE 15 MG/1
15 TABLET ORAL
Qty: 180 TAB | Refills: 1 | Status: SHIPPED | OUTPATIENT
Start: 2019-06-18 | End: 2020-01-01

## 2019-06-18 NOTE — PROGRESS NOTES
Chief Complaint   Patient presents with    URI     Has sore throat, nasal congestion, feeling sluggish. 1. Have you been to the ER, urgent care clinic since your last visit? Hospitalized since your last visit? no    2. Have you seen or consulted any other health care providers outside of the [address] [place] [address] since your last visit? Include any pap smears or colon screening.  yes

## 2019-06-18 NOTE — PROGRESS NOTES
Isaías Jung is a 66 y.o. female. has a past medical history of Anxiety and depression (9/26/2018), Arthritis, BMI 45.0-49.9, adult ([address] (9/26/2018), Essential tremor (9/26/2018), Hypertension, Iron deficiency anemia secondary to inadequate dietary iron intake (9/26/2018), Morbid obesity ([address] Obesity, morbid ([address] (8/29/2018), and Vitamin D deficiency (10/31/2018). She didn't do our labs. More anxiety and stress recently, her  just had a stroke. Patient complains of sore throat, little dry coughing, nasal congestion, swollen glands. Symptoms chills. Onset of symptoms was 6 days ago, gradually improving since that time. She denies a history of shortness of breath and wheezing. is drinking moderate amounts of fluids. Past history is significant for O2 dependent. Patient is non-smoker    CHF: weight is stable at 304lbs. Lasix 80mg BID too much, makes her urinate too often and have accidents. We'll cut down to 40mg BID. If gain >4lbs then take 80mg again until loose weight back to baseline (today's weight). Past Medical History:   Diagnosis Date    Anxiety and depression 9/26/2018    Arthritis     knees, lower back.  BMI 45.0-49.9, adult ([facility] 9/26/2018    Essential tremor 9/26/2018    Hypertension     Iron deficiency anemia secondary to inadequate dietary iron intake 9/26/2018    Morbid obesity (HCC)     Obesity, morbid ([facility] 8/29/2018    Vitamin D deficiency 10/31/2018     Past Surgical History:   Procedure Laterality Date    HX BREAST BIOPSY Left     HX GYN  2009    D&C     No Known Allergies  Current Outpatient Medications on File Prior to Visit   Medication Sig Dispense Refill    Olmesartan-amLODIPine-HCTZ 40-10-25 mg tab TAKE 1 TABLET DAILY 90 Tab 1    clonazePAM (KLONOPIN) 0.5 mg tablet Take 0.5 Tabs by mouth nightly as needed (anxiety).  Max Daily Amount: 0.25 mg. 15 Tab 0    citalopram (CELEXA) 20 mg tablet TAKE 1 TABLET BY MOUTH EVERY DAY 90 Tab 1    aspirin 81 mg chewable tablet Take 1 Tab by mouth daily. 90 Tab 1    furosemide (LASIX) 80 mg tablet TAKE 1 TABLET BY MOUTH IN THE MORNING 90 Tab 1    ferrous sulfate 325 mg (65 mg iron) tablet TAKE 1 TAB BY MOUTH THREE (3) TIMES DAILY (WITH MEALS). 90 Tab 1    labetalol (NORMODYNE) 300 mg tablet Take 1 Tab by mouth every twelve (12) hours. Confirmed w/ patient. Rx for Tid, patient only tolerates it BiD 180 Tab 1    atorvastatin (LIPITOR) 20 mg tablet Take 1 Tab by mouth daily. 90 Tab 1    potassium chloride SR (KLOR-CON 10) 10 mEq tablet TAKE 2 TABS BY MOUTH TWO (2) TIMES A DAY. 120 Tab 3    cpap machine kit by Does Not Apply route.  naproxen sodium 220 mg cap Take 2 Caps by mouth every twelve (12) hours as needed (pain).  ergocalciferol (VITAMIN D2) 50,000 unit capsule Take 50,000 Units by mouth every seven (7) days.  ascorbic acid (VITAMIN C) 500 mg tablet Take 500 mg by mouth daily.  fluticasone (FLONASE) 50 mcg/actuation nasal spray 2 Sprays by Both Nostrils route daily. 1 Bottle 2     No current facility-administered medications on file prior to visit. Objective:      Visit Vitals  /70   Pulse 78   Temp 98.1 °F (36.7 °C) (Oral)   Resp 18   Ht 5' 8\" (1.727 m)   Wt 304 lb (137.9 kg)   SpO2 94%   BMI 46.22 kg/m²      Appears alert, well appearing, and in no distress and in mild to moderate distress. Ears: bilateral TM's and external ear canals normal  Oropharynx: erythematous and exudate noted  Neck: bilateral symmetric anterior adenopathy  Lungs: clear to auscultation, no wheezes, rales or rhonchi, symmetric air entry  CVS: regular rate and rhythm, S1, S2 normal, no murmur, click, rub or gallop  The abdomen is soft without tenderness or hepatosplenomegaly. Rapid Strep test is not done      Assessment/Plan:     Per orders. Gargle, use acetaminophen or other OTC analgesic, and take Rx fully as prescribed. Call if other family members develop similar symptoms.  See prn.    Diagnoses and all orders for this visit:    1. Acute URI  -     amoxicillin-clavulanate (AUGMENTIN) 875-125 mg per tablet; Take 1 Tab by mouth two (2) times a day for 5 days. 2. Antibiotic-induced yeast infection  -     fluconazole (DIFLUCAN) 150 mg tablet; Take 1 Tab by mouth daily for 1 day. FDA advises cautious prescribing of oral fluconazole in pregnancy. 3. Chronic heart failure, unspecified heart failure type ([facility] [address]    4. Anxiety and depression  -     busPIRone (BUSPAR) 15 mg tablet; Take 1 Tab by mouth two (2) times daily as needed (anxiety). Follow-up and Dispositions    · Return in about 3 days (around 6/21/2019), or if symptoms worsen or fail to improve.            Brayan Terry MD  6/18/2019

## 2019-06-24 DIAGNOSIS — E78.2 MIXED HYPERLIPIDEMIA: ICD-10-CM

## 2019-06-24 DIAGNOSIS — T36.95XA ANTIBIOTIC-INDUCED YEAST INFECTION: ICD-10-CM

## 2019-06-24 DIAGNOSIS — B37.9 ANTIBIOTIC-INDUCED YEAST INFECTION: ICD-10-CM

## 2019-06-24 RX ORDER — LABETALOL 300 MG/1
TABLET, FILM COATED ORAL
Qty: 30 TAB | Refills: 0 | Status: SHIPPED | OUTPATIENT
Start: 2019-06-24 | End: 2019-07-11 | Stop reason: SDUPTHER

## 2019-06-24 RX ORDER — FLUCONAZOLE 150 MG/1
TABLET ORAL
Qty: 1 TAB | Refills: 0 | Status: SHIPPED | OUTPATIENT
Start: 2019-06-24 | End: 2019-08-14 | Stop reason: ALTCHOICE

## 2019-06-24 RX ORDER — ATORVASTATIN CALCIUM 20 MG/1
TABLET, FILM COATED ORAL
Qty: 30 TAB | Refills: 0 | Status: SHIPPED | OUTPATIENT
Start: 2019-06-24 | End: 2019-07-11 | Stop reason: SDUPTHER

## 2019-07-11 DIAGNOSIS — E78.2 MIXED HYPERLIPIDEMIA: ICD-10-CM

## 2019-07-11 RX ORDER — ATORVASTATIN CALCIUM 20 MG/1
TABLET, FILM COATED ORAL
Qty: 90 TAB | Refills: 1 | Status: SHIPPED | OUTPATIENT
Start: 2019-07-11 | End: 2019-11-26 | Stop reason: SDUPTHER

## 2019-07-11 RX ORDER — LABETALOL 300 MG/1
TABLET, FILM COATED ORAL
Qty: 180 TAB | Refills: 1 | Status: SHIPPED | OUTPATIENT
Start: 2019-07-11 | End: 2019-11-26 | Stop reason: SDUPTHER

## 2019-07-15 NOTE — TELEPHONE ENCOUNTER
Request for potassium 10mEq 2 tabs twice a day. Last office visit 6/18/19, next office visit 10/17/19.  Refill pending for provider approval.

## 2019-07-16 RX ORDER — POTASSIUM CHLORIDE 750 MG/1
TABLET, FILM COATED, EXTENDED RELEASE ORAL
Qty: 120 TAB | Refills: 6 | Status: SHIPPED | OUTPATIENT
Start: 2019-07-16 | End: 2020-01-01 | Stop reason: SDUPTHER

## 2019-08-14 ENCOUNTER — OFFICE VISIT (OUTPATIENT)
Dept: CARDIOLOGY CLINIC | Age: 64
End: 2019-08-14

## 2019-08-14 VITALS
OXYGEN SATURATION: 95 % | HEART RATE: 78 BPM | RESPIRATION RATE: 16 BRPM | SYSTOLIC BLOOD PRESSURE: 118 MMHG | WEIGHT: 293 LBS | BODY MASS INDEX: 44.41 KG/M2 | HEIGHT: 68 IN | DIASTOLIC BLOOD PRESSURE: 80 MMHG

## 2019-08-14 DIAGNOSIS — I50.32 CHRONIC DIASTOLIC CONGESTIVE HEART FAILURE (HCC): ICD-10-CM

## 2019-08-14 DIAGNOSIS — I10 ESSENTIAL HYPERTENSION: ICD-10-CM

## 2019-08-14 DIAGNOSIS — E78.00 HIGH CHOLESTEROL: ICD-10-CM

## 2019-08-14 DIAGNOSIS — I25.10 CORONARY ARTERY DISEASE INVOLVING NATIVE HEART WITHOUT ANGINA PECTORIS, UNSPECIFIED VESSEL OR LESION TYPE: Primary | ICD-10-CM

## 2019-08-14 NOTE — PROGRESS NOTES
Sanjana Holden DNP, ANP-BC  Subjective/HPI:     Mina Mccall is a 60 y.o. female is here for routine f/u. The patient denies chest pain/ shortness of breath, orthopnea, PND, LE edema, palpitations, syncope, presyncope or fatigue. Patient reports feeling well in her usual state of health, weight stable no orthopnea or symptoms suggestive of decompensated diastolic heart failure. She reports left knee pain she is wearing a compression stocking. PCP Provider  Beatris Roy MD  Past Medical History:   Diagnosis Date    Anxiety and depression 9/26/2018    Arthritis     knees, lower back.  BMI 45.0-49.9, adult ([facility] 9/26/2018    Essential tremor 9/26/2018    High cholesterol 8/14/2019    Hypertension     Iron deficiency anemia secondary to inadequate dietary iron intake 9/26/2018    Morbid obesity (HCC)     Obesity, morbid ([facility] 8/29/2018    Vitamin D deficiency 10/31/2018      Past Surgical History:   Procedure Laterality Date    HX BREAST BIOPSY Left     HX GYN  2009    D&C     No Known Allergies   Family History   Problem Relation Age of Onset    Stroke Father 36    Heart Disease Father     Arthritis-osteo Mother     Cancer Mother [de-identified]        breast cancer    Breast Cancer Mother 78      Current Outpatient Medications   Medication Sig    potassium chloride SR (KLOR-CON 10) 10 mEq tablet TAKE 2 TABLETS BY MOUTH TWICE A DAY    labetalol (NORMODYNE) 300 mg tablet TAKE 1 TABLET EVERY 12     HOURS ( ONLY TOLERATES IT  TWO TIMES A DAY)    atorvastatin (LIPITOR) 20 mg tablet TAKE 1 TABLET DAILY    busPIRone (BUSPAR) 15 mg tablet Take 1 Tab by mouth two (2) times daily as needed (anxiety).  Olmesartan-amLODIPine-HCTZ 40-10-25 mg tab TAKE 1 TABLET DAILY    clonazePAM (KLONOPIN) 0.5 mg tablet Take 0.5 Tabs by mouth nightly as needed (anxiety).  Max Daily Amount: 0.25 mg.    citalopram (CELEXA) 20 mg tablet TAKE 1 TABLET BY MOUTH EVERY DAY    aspirin 81 mg chewable tablet Take 1 Tab by mouth daily.  furosemide (LASIX) 80 mg tablet TAKE 1 TABLET BY MOUTH IN THE MORNING (Patient taking differently: Take 80 mg by mouth two (2) times a day. TAKE 1 TABLET BY MOUTH IN THE MORNING)    ferrous sulfate 325 mg (65 mg iron) tablet TAKE 1 TAB BY MOUTH THREE (3) TIMES DAILY (WITH MEALS).  cpap machine kit by Does Not Apply route.  naproxen sodium 220 mg cap Take 2 Caps by mouth every twelve (12) hours as needed (pain).  ergocalciferol (VITAMIN D2) 50,000 unit capsule Take 50,000 Units by mouth every seven (7) days.  ascorbic acid (VITAMIN C) 500 mg tablet Take 500 mg by mouth daily.  fluticasone (FLONASE) 50 mcg/actuation nasal spray 2 Sprays by Both Nostrils route daily. No current facility-administered medications for this visit.        Vitals:    08/14/19 0904 08/14/19 0912   BP: 110/72 118/80   Pulse: 78    Resp: 16    SpO2: 95%    Weight: 305 lb 3.2 oz (138.4 kg)    Height: 5' 8\" (1.727 m)      Social History     Socioeconomic History    Marital status: UNKNOWN     Spouse name: Not on file    Number of children: Not on file    Years of education: Not on file    Highest education level: Not on file   Occupational History    Not on file   Social Needs    Financial resource strain: Not on file    Food insecurity:     Worry: Not on file     Inability: Not on file    Transportation needs:     Medical: Not on file     Non-medical: Not on file   Tobacco Use    Smoking status: Never Smoker    Smokeless tobacco: Never Used   Substance and Sexual Activity    Alcohol use: Yes     Comment: rare    Drug use: No    Sexual activity: Not on file   Lifestyle    Physical activity:     Days per week: Not on file     Minutes per session: Not on file    Stress: Not on file   Relationships    Social connections:     Talks on phone: Not on file     Gets together: Not on file     Attends [employer] service: Not on file     Active member of club or organization: Not on file     Attends meetings of clubs or organizations: Not on file     Relationship status: Not on file    Intimate partner violence:     Fear of current or ex partner: Not on file     Emotionally abused: Not on file     Physically abused: Not on file     Forced sexual activity: Not on file   Other Topics Concern    Not on file   Social History Narrative    Not on file       I have reviewed the nurses notes, vitals, problem list, allergy list, medical history, family, social history and medications. Review of Symptoms:    General: Pt denies excessive weight gain or loss. Pt is able to conduct ADL's  HEENT: Denies blurred vision, headaches, epistaxis and difficulty swallowing. Respiratory: Denies shortness of breath, FLORENCE, wheezing or stridor. Cardiovascular: Denies precordial pain, palpitations, edema or PND  Gastrointestinal: Denies poor appetite, indigestion, abdominal pain or blood in stool  Musculoskeletal: + Left knee pain  Neurologic: Denies tremor, paresthesias, or sensory motor disturbance  Skin: Denies rash, itching or texture change. Physical Exam:      General: Well developed, in no acute distress, cooperative and alert  HEENT: No carotid bruits, no JVD, trach is midline. Neck Supple, PEERL, EOM intact. Heart:  Normal S1/S2 negative S3 or S4. Regular, no murmur, gallop or rub.   Respiratory: Clear bilaterally x 4, no wheezing or rales  Abdomen:   Soft, non-tender, no masses, bowel sounds are active.   Extremities: No pitting bilateral trace ankle edema, normal cap refill, no cyanosis, atraumatic. Neuro: A&Ox3, speech clear, gait stable. Skin: Skin color is normal. No rashes or lesions.  Non diaphoretic  Vascular: 2+ pulses symmetric in all extremities    Cardiographics    ECG: Sinus rhythm  Results for orders placed or performed during the hospital encounter of 08/09/18   EKG, 12 LEAD, INITIAL   Result Value Ref Range    Ventricular Rate 113 BPM    Atrial Rate 113 BPM    P-R Interval 170 ms    QRS Duration 74 ms    Q-T Interval 306 ms    QTC Calculation (Bezet) 419 ms    Calculated P Axis 31 degrees    Calculated R Axis -19 degrees    Calculated T Axis 50 degrees    Diagnosis       Sinus tachycardia with occasional premature ventricular complexes  Minimal voltage criteria for LVH, may be normal variant  No previous ECGs available  Confirmed by VERONICA Turner ([id number]) on 8/10/2018 7:54:04 AM           Cardiology Labs:  Lab Results   Component Value Date/Time    Cholesterol, total 144 09/26/2018 11:11 AM    HDL Cholesterol 47 09/26/2018 11:11 AM    LDL, calculated 76 09/26/2018 11:11 AM    Triglyceride 105 09/26/2018 11:11 AM       Lab Results   Component Value Date/Time    Sodium 139 09/26/2018 11:11 AM    Potassium 4.1 09/26/2018 11:11 AM    Chloride 99 09/26/2018 11:11 AM    CO2 26 09/26/2018 11:11 AM    Anion gap 4 (L) 08/23/2018 04:22 AM    Glucose 82 09/26/2018 11:11 AM    BUN 24 09/26/2018 11:11 AM    Creatinine 1.19 (H) 09/26/2018 11:11 AM    BUN/Creatinine ratio 20 09/26/2018 11:11 AM    GFR est AA 57 (L) 09/26/2018 11:11 AM    GFR est non-AA 49 (L) 09/26/2018 11:11 AM    Calcium 10.9 (H) 09/26/2018 11:11 AM    Bilirubin, total 0.4 09/26/2018 11:11 AM    AST (SGOT) 20 09/26/2018 11:11 AM    Alk. phosphatase 106 09/26/2018 11:11 AM    Protein, total 8.5 09/26/2018 11:11 AM    Albumin 3.7 09/26/2018 11:11 AM    Globulin 6.1 (H) 08/14/2018 05:18 AM    A-G Ratio 0.8 (L) 09/26/2018 11:11 AM    ALT (SGPT) 12 09/26/2018 11:11 AM           Assessment:     Assessment:     Diagnoses and all orders for this visit:    1. Coronary artery disease involving native heart without angina pectoris, unspecified vessel or lesion type  -     METABOLIC PANEL, COMPREHENSIVE  -     CK  -     LIPID PANEL    2. Essential hypertension  -     AMB POC EKG ROUTINE W/ 12 LEADS, INTER & REP  -     METABOLIC PANEL, COMPREHENSIVE  -     CK  -     LIPID PANEL    3. Chronic diastolic congestive heart failure ([address]    4.  High cholesterol  - METABOLIC PANEL, COMPREHENSIVE  -     CK  -     LIPID PANEL        ICD-10-CM ICD-9-CM    1. Coronary artery disease involving native heart without angina pectoris, unspecified vessel or lesion type X23.82 565.00 METABOLIC PANEL, COMPREHENSIVE      CK      LIPID PANEL   2. Essential hypertension I10 401.9 AMB POC EKG ROUTINE W/ 12 LEADS, INTER & REP      METABOLIC PANEL, COMPREHENSIVE      CK      LIPID PANEL   3. Chronic diastolic congestive heart failure (HCC) I50.32 428.32      428.0    4. High cholesterol E42.84 319.6 METABOLIC PANEL, COMPREHENSIVE      CK      LIPID PANEL     Orders Placed This Encounter    METABOLIC PANEL, COMPREHENSIVE    CK    LIPID PANEL    AMB POC EKG ROUTINE W/ 12 LEADS, INTER & REP     Order Specific Question:   Reason for Exam:     Answer:   routine     Quinton Melendez MD         Plan:     1. Diastolic heart failure: Clinically compensated, euvolemic continue current dose of diuretics.   2.  Hypertension: Controlled 118/80 continue current medications  3   Hyperlipidemia: Due for labs, slip provided  Stable from cardiac perspective follow-up in 6 months

## 2019-08-14 NOTE — PROGRESS NOTES
Chief Complaint   Patient presents with    Hypertension     6 month follow up     1. Have you been to the ER, urgent care clinic since your last visit? Hospitalized since your last visit? No    2. Have you seen or consulted any other health care providers outside of the [address] [place] [address] since your last visit? Include any pap smears or colon screening.  No

## 2019-09-27 DIAGNOSIS — I25.10 CORONARY ARTERY DISEASE INVOLVING NATIVE HEART WITHOUT ANGINA PECTORIS, UNSPECIFIED VESSEL OR LESION TYPE: ICD-10-CM

## 2019-10-01 DIAGNOSIS — R09.81 CHRONIC NASAL CONGESTION: ICD-10-CM

## 2019-10-01 RX ORDER — GUAIFENESIN 100 MG/5ML
LIQUID (ML) ORAL
Qty: 90 TAB | Refills: 1 | OUTPATIENT
Start: 2019-10-01

## 2019-10-01 RX ORDER — FUROSEMIDE 80 MG/1
TABLET ORAL
Qty: 90 TAB | Refills: 1 | OUTPATIENT
Start: 2019-10-01

## 2019-10-01 NOTE — TELEPHONE ENCOUNTER
Refuse all meds b/c she never did our labs. Last labs were 2018  Lasix 80mg BID a lot of electrolytes disturbances.

## 2019-10-03 RX ORDER — FLUTICASONE PROPIONATE 50 MCG
SPRAY, SUSPENSION (ML) NASAL
Qty: 1 BOTTLE | Refills: 2 | Status: SHIPPED | OUTPATIENT
Start: 2019-10-03 | End: 2019-11-24 | Stop reason: SDUPTHER

## 2019-10-04 DIAGNOSIS — I25.10 CORONARY ARTERY DISEASE INVOLVING NATIVE HEART WITHOUT ANGINA PECTORIS, UNSPECIFIED VESSEL OR LESION TYPE: ICD-10-CM

## 2019-10-04 RX ORDER — GUAIFENESIN 100 MG/5ML
LIQUID (ML) ORAL
Qty: 90 TAB | Refills: 1 | OUTPATIENT
Start: 2019-10-04

## 2019-10-04 RX ORDER — FUROSEMIDE 80 MG/1
TABLET ORAL
Qty: 90 TAB | Refills: 1 | OUTPATIENT
Start: 2019-10-04

## 2019-10-04 NOTE — TELEPHONE ENCOUNTER
Pt needs routine f/u. Also she takes meds that have high risk. She didn't do her labs > 1 year.   She needs to do labs and f/u    Meds refuse    thx    Tyrel Estrada MD  10/4/2019

## 2019-10-08 RX ORDER — FUROSEMIDE 80 MG/1
TABLET ORAL
Qty: 30 TAB | Refills: 0 | OUTPATIENT
Start: 2019-10-08

## 2019-10-17 ENCOUNTER — OFFICE VISIT (OUTPATIENT)
Dept: FAMILY MEDICINE CLINIC | Age: 64
End: 2019-10-17

## 2019-10-17 VITALS
RESPIRATION RATE: 18 BRPM | OXYGEN SATURATION: 96 % | WEIGHT: 293 LBS | TEMPERATURE: 97.6 F | SYSTOLIC BLOOD PRESSURE: 129 MMHG | HEIGHT: 68 IN | HEART RATE: 66 BPM | DIASTOLIC BLOOD PRESSURE: 64 MMHG | BODY MASS INDEX: 44.41 KG/M2

## 2019-10-17 DIAGNOSIS — E78.2 MIXED HYPERLIPIDEMIA: ICD-10-CM

## 2019-10-17 DIAGNOSIS — K21.9 GASTROESOPHAGEAL REFLUX DISEASE WITHOUT ESOPHAGITIS: ICD-10-CM

## 2019-10-17 DIAGNOSIS — Z91.199 NONCOMPLIANCE: ICD-10-CM

## 2019-10-17 DIAGNOSIS — I25.10 CORONARY ARTERY DISEASE INVOLVING NATIVE HEART WITHOUT ANGINA PECTORIS, UNSPECIFIED VESSEL OR LESION TYPE: ICD-10-CM

## 2019-10-17 DIAGNOSIS — F41.9 ANXIETY AND DEPRESSION: Primary | ICD-10-CM

## 2019-10-17 DIAGNOSIS — D50.8 IRON DEFICIENCY ANEMIA SECONDARY TO INADEQUATE DIETARY IRON INTAKE: ICD-10-CM

## 2019-10-17 DIAGNOSIS — I10 ESSENTIAL HYPERTENSION: ICD-10-CM

## 2019-10-17 DIAGNOSIS — R73.03 PREDIABETES: ICD-10-CM

## 2019-10-17 DIAGNOSIS — F32.A ANXIETY AND DEPRESSION: Primary | ICD-10-CM

## 2019-10-17 DIAGNOSIS — Z79.899 ENCOUNTER FOR LONG-TERM (CURRENT) USE OF MEDICATIONS: ICD-10-CM

## 2019-10-17 DIAGNOSIS — Z23 ENCOUNTER FOR IMMUNIZATION: ICD-10-CM

## 2019-10-17 DIAGNOSIS — I50.9 CHRONIC HEART FAILURE, UNSPECIFIED HEART FAILURE TYPE (HCC): ICD-10-CM

## 2019-10-17 PROBLEM — L97.922 NON-PRESSURE CHRONIC ULCER OF LEFT LOWER LEG WITH FAT LAYER EXPOSED (HCC): Status: RESOLVED | Noted: 2018-09-24 | Resolved: 2019-10-17

## 2019-10-17 RX ORDER — HYDROGEN PEROXIDE 3 %
20 SOLUTION, NON-ORAL MISCELLANEOUS DAILY
Qty: 30 CAP | Refills: 5 | Status: SHIPPED | OUTPATIENT
Start: 2019-10-17 | End: 2019-10-22

## 2019-10-17 NOTE — PATIENT INSTRUCTIONS
Vaccine Information Statement    Influenza (Flu) Vaccine (Inactivated or Recombinant): What You Need to Know    Many Vaccine Information Statements are available in [place]ic and other languages. See www.immunize.org/vis  Hojas de información sobre vacunas están disponibles en español y en muchos otros idiomas. Visite www.immunize.org/vis    1. Why get vaccinated? Influenza vaccine can prevent influenza (flu). Flu is a contagious disease that spreads around the [facility] every year, usually between October and May. Anyone can get the flu, but it is more dangerous for some people. Infants and young children, people 72years of age and older, pregnant women, and people with certain health conditions or a weakened immune system are at greatest risk of flu complications. Pneumonia, bronchitis, sinus infections and ear infections are examples of flu-related complications. If you have a medical condition, such as heart disease, cancer or diabetes, flu can make it worse. Flu can cause fever and chills, sore throat, muscle aches, fatigue, cough, headache, and runny or stuffy nose. Some people may have vomiting and diarrhea, though this is more common in children than adults. Each year thousands of people in the [facility] die from flu, and many more are hospitalized. Flu vaccine prevents millions of illnesses and flu-related visits to the doctor each year. 2. Influenza vaccines     CDC recommends everyone 10months of age and older get vaccinated every flu season. Children 6 months through 6years of age may need 2 doses during a single flu season. Everyone else needs only 1 dose each flu season. It takes about 2 weeks for protection to develop after vaccination. There are many flu viruses, and they are always changing. Each year a new flu vaccine is made to protect against three or four viruses that are likely to cause disease in the upcoming flu season.  Even when the vaccine doesnt exactly match these viruses, it may still provide some protection. Influenza vaccine does not cause flu. Influenza vaccine may be given at the same time as other vaccines. 3. Talk with your health care provider    Tell your vaccine provider if the person getting the vaccine:   Has had an allergic reaction after a previous dose of influenza vaccine, or has any severe, life-threatening allergies.  Has ever had Guillain-Barré Syndrome (also called GBS). In some cases, your health care provider may decide to postpone influenza vaccination to a future visit. People with minor illnesses, such as a cold, may be vaccinated. People who are moderately or severely ill should usually wait until they recover before getting influenza vaccine. Your health care provider can give you more information. 4. Risks of a reaction     Soreness, redness, and swelling where shot is given, fever, muscle aches, and headache can happen after influenza vaccine.  There may be a very small increased risk of Guillain-Barré Syndrome (GBS) after inactivated influenza vaccine (the flu shot). [facility] children who get the flu shot along with pneumococcal vaccine (PCV13), and/or DTaP vaccine at the same time might be slightly more likely to have a seizure caused by fever. Tell your health care provider if a child who is getting flu vaccine has ever had a seizure. People sometimes faint after medical procedures, including vaccination. Tell your provider if you feel dizzy or have vision changes or ringing in the ears. As with any medicine, there is a very remote chance of a vaccine causing a severe allergic reaction, other serious injury, or death. 5. What if there is a serious problem? An allergic reaction could occur after the vaccinated person leaves the clinic.  If you see signs of a severe allergic reaction (hives, swelling of the face and throat, difficulty breathing, a fast heartbeat, dizziness, or weakness), call 9-1-1 and get the person to the nearest hospital.    For other signs that concern you, call your health care provider. Adverse reactions should be reported to the Vaccine Adverse Event Reporting System (VAERS). Your health care provider will usually file this report, or you can do it yourself. Visit the VAERS website at www.vaers. [facility].gov or call 6-880.611.1175. VAERS is only for reporting reactions, and VAERS staff do not give medical advice. 6. The National Vaccine Injury Compensation Program    The [facility] Vaccine Injury Compensation Program (VICP) is a federal program that was created to compensate people who may have been injured by certain vaccines. Visit the VICP website at www.[facility].gov/vaccinecompensation or call 1-124.978.3613 to learn about the program and about filing a claim. There is a time limit to file a claim for compensation. 7. How can I learn more?  Ask your health care provider.  Call your local or state health department.  Contact the Centers for Disease Control and Prevention (CDC):  - Call 4-898.817.6535 (7-131-QQK-INFO) or  - Visit CDCs influenza website at www.cdc.gov/flu    Vaccine Information Statement (Interim)  Inactivated Influenza Vaccine   8/15/2019  42 [address] [place] [id number]   Department of Health and Human Services  Centers for Disease Control and Prevention    Office Use Only

## 2019-10-17 NOTE — PROGRESS NOTES
Deya Stein is a 60 y.o. female. She never did any of my labs X2 this year, the last lab I have 09/2018. Pt noncompliant to treatment. has a past medical history of Anxiety and depression (9/26/2018), Arthritis, BMI 45.0-49.9, adult ([address] (9/26/2018), Essential tremor (9/26/2018), High cholesterol (8/14/2019), Hypertension, Iron deficiency anemia secondary to inadequate dietary iron intake (9/26/2018), Noncompliance (10/17/2019), Obesity, morbid ([address] (8/29/2018), and Vitamin D deficiency (10/31/2018). More anxiety and stress recently, her  just had a stroke. Anxiety depression: on buspar PRN for years. Luzma Eligio had it since her college days.  Family hx of anxiety from mother and sister. Denies SI or HI. Celexa have been doing well for her, \"i feel calmer\". Continue course. HTN: BP at goal.               Continue labetalol, olmesartan-amlodipine     Prediabetes: discussed diet, exercise, avoid carbs. BMI 46  Recheck labs     Anemia stable, hgb up 10.6. But severe iron deficiency. She denies abnormal vaginal bleeding. Menopausal.               Denies BRBPR and or melena. Last colonoscopy 5 years ago, report was all normal. She report anemia her whole life. FOBT Negative     CAD: Dr. Nina Benitez    lipitor and ASA 10AU  CHF diastolic:    weight is stable at 304lbs. Lasix 80mg BID too much, makes her urinate too often and have accidents. We'll cut down to 40mg BID. If gain >4lbs then take 80mg again until loose weight back to baseline (today's weight). MONSERRAT compliant cpap     Essential tremors. Hands shaking: about 6 months, can write, no issue with soup, spoon, fine movement. No resting tremors. Left hand only 2nd, 3rd, 4th tip of finger numbness. She didn't want to have further testing and it's mild she didn't want any more meds at this time. Constipation: send in miralax to use with iron supplement.      She'll see her OBGYN for papsmear. Past Medical History:   Diagnosis Date    Anxiety and depression 9/26/2018    Arthritis     knees, lower back.  BMI 45.0-49.9, adult ([facility] 9/26/2018    Essential tremor 9/26/2018    High cholesterol 8/14/2019    Hypertension     Iron deficiency anemia secondary to inadequate dietary iron intake 9/26/2018    Noncompliance 10/17/2019    Obesity, morbid ([facility] 8/29/2018    Vitamin D deficiency 10/31/2018     Past Surgical History:   Procedure Laterality Date    HX BREAST BIOPSY Left     HX GYN  2009    D&C     No Known Allergies  Current Outpatient Medications on File Prior to Visit   Medication Sig Dispense Refill    potassium chloride SR (KLOR-CON 10) 10 mEq tablet TAKE 2 TABLETS BY MOUTH TWICE A  Tab 6    labetalol (NORMODYNE) 300 mg tablet TAKE 1 TABLET EVERY 12     HOURS ( ONLY TOLERATES IT  TWO TIMES A DAY) 180 Tab 1    atorvastatin (LIPITOR) 20 mg tablet TAKE 1 TABLET DAILY 90 Tab 1    busPIRone (BUSPAR) 15 mg tablet Take 1 Tab by mouth two (2) times daily as needed (anxiety). 180 Tab 1    Olmesartan-amLODIPine-HCTZ 40-10-25 mg tab TAKE 1 TABLET DAILY 90 Tab 1    citalopram (CELEXA) 20 mg tablet TAKE 1 TABLET BY MOUTH EVERY DAY 90 Tab 1    aspirin 81 mg chewable tablet Take 1 Tab by mouth daily. 90 Tab 1    furosemide (LASIX) 80 mg tablet TAKE 1 TABLET BY MOUTH IN THE MORNING (Patient taking differently: Take 80 mg by mouth two (2) times a day. TAKE 1 TABLET BY MOUTH IN THE MORNING) 90 Tab 1    ferrous sulfate 325 mg (65 mg iron) tablet TAKE 1 TAB BY MOUTH THREE (3) TIMES DAILY (WITH MEALS). 90 Tab 1    cpap machine kit by Does Not Apply route.  naproxen sodium 220 mg cap Take 2 Caps by mouth every twelve (12) hours as needed (pain).  ergocalciferol (VITAMIN D2) 50,000 unit capsule Take 50,000 Units by mouth every seven (7) days.  ascorbic acid (VITAMIN C) 500 mg tablet Take 500 mg by mouth daily.       fluticasone propionate (FLONASE) 50 mcg/actuation nasal spray SPRAY 2 SPRAYS INTO EACH NOSTRIL EVERY DAY 1 Bottle 2     No current facility-administered medications on file prior to visit. Objective:      Visit Vitals  /64   Pulse 66   Temp 97.6 °F (36.4 °C) (Oral)   Resp 18   Ht 5' 8\" (1.727 m)   Wt 311 lb 9.6 oz (141.3 kg)   SpO2 96%   BMI 47.38 kg/m²      General appearance: alert, cooperative, no distress, appears stated age  Neurologic: Alert and oriented X 3, normal strength and tone, symmetric. Normal without focal findings. Cranial nerves 2-12 intact. Normal coordination and gait. Mental status: Alert, oriented, thought content appropriate, affect: stable, mood-congruent. Head: Normocephalic, without obvious abnormality, atraumatic  Eyes: conjunctivae/corneas clear. PERRL, EOM's intact. Neck: supple, symmetrical, trachea midline, no JVD  Lungs: clear to auscultation bilaterally  Heart: regular rate and rhythm, S1, S2 normal, no murmur, click, rub or gallop  Abdomen: soft, non-tender. Extremities: extremities normal, atraumatic, no cyanosis or edema      Assessment/Plan:     Diagnoses and all orders for this visit:    1. Anxiety and depression  -     TSH 3RD GENERATION    2. Prediabetes  -     HEMOGLOBIN A1C W/O EAG    3. Iron deficiency anemia secondary to inadequate dietary iron intake  -     CBC W/O DIFF    4. Essential hypertension  -     TSH 3RD GENERATION    5. Mixed hyperlipidemia  -     TSH 3RD GENERATION    6. Coronary artery disease involving native heart without angina pectoris, unspecified vessel or lesion type    7. Chronic heart failure, unspecified heart failure type ([address]    8. Gastroesophageal reflux disease without esophagitis  -     esomeprazole (NEXIUM) 20 mg capsule; Take 1 Cap by mouth daily. 9. Noncompliance    10. Encounter for long-term (current) use of medications  -     HEMOGLOBIN A1C W/O EAG  -     TSH 3RD GENERATION  -     CBC W/O DIFF    11.  Encounter for immunization  -     INFLUENZA VIRUS VAC QUAD,SPLIT,PRESV FREE SYRINGE IM  -     NC IMMUNIZ ADMIN,1 SINGLE/COMB VAC/TOXOID      Follow-up and Dispositions    · Return in about 3 months (around 1/17/2020) for HTN, HLD, depression, meds, labs.            Batsheva Juarez MD  10/17/2019

## 2019-10-17 NOTE — PROGRESS NOTES
Chief Complaint   Patient presents with    Hypertension       1. Have you been to the ER, urgent care clinic since your last visit? Hospitalized since your last visit? NO    2. Have you seen or consulted any other health care providers outside of the [address] [place] [address] since your last visit? Include any pap smears or colon screening. yes      Order placed for Flulaval, per Verbal Order from Dr. Sunday Zamorano on 10/17/2019 due to need for immunization. Steve Baum is a 60 y.o. female who presents for routine immunizations. She denies any symptoms , reactions or allergies that would exclude them from being immunized today. Risks and adverse reactions were discussed and the VIS was given to them. All questions were addressed. She was observed for 15 min post injection. There were no reactions observed.     Dominic Deras LPN

## 2019-10-17 NOTE — PROGRESS NOTES
Donis Huber is a 60 y.o. female. She never did any of my labs X2 this year, the last lab I have 09/2018. Pt noncompliant to treatment. has a past medical history of Anxiety and depression (9/26/2018), Arthritis, BMI 45.0-49.9, adult ([address] (9/26/2018), Essential tremor (9/26/2018), High cholesterol (8/14/2019), Hypertension, Iron deficiency anemia secondary to inadequate dietary iron intake (9/26/2018), Noncompliance (10/17/2019), Obesity, morbid ([address] (8/29/2018), and Vitamin D deficiency (10/31/2018). GERD: discussed lifestyle. Send in PPI    Chronic allergies with nasal congestion but she doesn't like flonase. Advise for otc antihistamines    Anxiety depression: on buspar PRN for years. Lakhwinder Fontanez had it since her college days.  Family hx of anxiety from mother and sister. Denies SI or HI. Celexa have been doing well for her, \"i feel calmer\". buspar prn  Continue course. HTN: BP at goal.               Continue labetalol, olmesartan-amlodipine     Prediabetes: A1C 6.1% 09/2018, discussed diet, exercise, avoid carbs. BMI 46  Recheck labs     Anemia haven't done labs >1 year. hgb last 10.6. But severe iron deficiency. She denies abnormal vaginal bleeding. Menopausal.               Denies BRBPR and or melena. Last colonoscopy 5 years ago, report was all normal. She report anemia her whole life. FOBT Negative     CAD: Dr. Romana Mcgowan     lipitor and ASA 42GI  CHF diastolic:    Lasix 53OT BID    MONSERRAT compliant cpap     Constipation: send in miralax to use with iron supplement. She'll see her OBGYN for papsmear. Past Medical History:   Diagnosis Date    Anxiety and depression 9/26/2018    Arthritis     knees, lower back.     BMI 45.0-49.9, adult ([facility] [address] 9/26/2018    Essential tremor 9/26/2018    High cholesterol 8/14/2019    Hypertension     Iron deficiency anemia secondary to inadequate dietary iron intake 9/26/2018    Noncompliance 10/17/2019    Obesity, morbid ([facility] 8/29/2018    Vitamin D deficiency 10/31/2018     Past Surgical History:   Procedure Laterality Date    HX BREAST BIOPSY Left     HX GYN  2009    D&C     No Known Allergies  Current Outpatient Medications on File Prior to Visit   Medication Sig Dispense Refill    potassium chloride SR (KLOR-CON 10) 10 mEq tablet TAKE 2 TABLETS BY MOUTH TWICE A  Tab 6    labetalol (NORMODYNE) 300 mg tablet TAKE 1 TABLET EVERY 12     HOURS ( ONLY TOLERATES IT  TWO TIMES A DAY) 180 Tab 1    atorvastatin (LIPITOR) 20 mg tablet TAKE 1 TABLET DAILY 90 Tab 1    busPIRone (BUSPAR) 15 mg tablet Take 1 Tab by mouth two (2) times daily as needed (anxiety). 180 Tab 1    Olmesartan-amLODIPine-HCTZ 40-10-25 mg tab TAKE 1 TABLET DAILY 90 Tab 1    citalopram (CELEXA) 20 mg tablet TAKE 1 TABLET BY MOUTH EVERY DAY 90 Tab 1    aspirin 81 mg chewable tablet Take 1 Tab by mouth daily. 90 Tab 1    furosemide (LASIX) 80 mg tablet TAKE 1 TABLET BY MOUTH IN THE MORNING (Patient taking differently: Take 80 mg by mouth two (2) times a day. TAKE 1 TABLET BY MOUTH IN THE MORNING) 90 Tab 1    ferrous sulfate 325 mg (65 mg iron) tablet TAKE 1 TAB BY MOUTH THREE (3) TIMES DAILY (WITH MEALS). 90 Tab 1    cpap machine kit by Does Not Apply route.  naproxen sodium 220 mg cap Take 2 Caps by mouth every twelve (12) hours as needed (pain).  ergocalciferol (VITAMIN D2) 50,000 unit capsule Take 50,000 Units by mouth every seven (7) days.  ascorbic acid (VITAMIN C) 500 mg tablet Take 500 mg by mouth daily.  fluticasone propionate (FLONASE) 50 mcg/actuation nasal spray SPRAY 2 SPRAYS INTO EACH NOSTRIL EVERY DAY 1 Bottle 2     No current facility-administered medications on file prior to visit.         Objective:      Visit Vitals  /64   Pulse 66   Temp 97.6 °F (36.4 °C) (Oral)   Resp 18   Ht 5' 8\" (1.727 m)   Wt 311 lb 9.6 oz (141.3 kg)   SpO2 96%   BMI 47.38 kg/m²      General appearance: alert, cooperative, no distress, appears stated age. Morbidly obese  Neurologic: Alert and oriented X 3, normal strength and tone, symmetric. Normal without focal findings. Cranial nerves 2-12 intact. Normal coordination and gait. Mental status: Alert, oriented, thought content appropriate, affect: stable, mood-congruent. Head: Normocephalic, without obvious abnormality, atraumatic  Eyes: conjunctivae/corneas clear. PERRL, EOM's intact. Neck: supple, symmetrical, trachea midline, no JVD  Lungs: clear to auscultation bilaterally  Heart: regular rate and rhythm, S1, S2 normal, no murmur, click, rub or gallop  Abdomen: soft, non-tender. Extremities: extremities normal, atraumatic, no cyanosis or edema      Assessment/Plan:     Per orders. Gargle, use acetaminophen or other OTC analgesic, and take Rx fully as prescribed. Call if other family members develop similar symptoms. See prn. Diagnoses and all orders for this visit:    1. Anxiety and depression  -     TSH 3RD GENERATION    2. Prediabetes  -     HEMOGLOBIN A1C W/O EAG    3. Iron deficiency anemia secondary to inadequate dietary iron intake  -     CBC W/O DIFF    4. Essential hypertension  -     TSH 3RD GENERATION    5. Mixed hyperlipidemia  -     TSH 3RD GENERATION    6. Coronary artery disease involving native heart without angina pectoris, unspecified vessel or lesion type    7. Chronic heart failure, unspecified heart failure type ([facility] [address]    8. Gastroesophageal reflux disease without esophagitis  -     esomeprazole (NEXIUM) 20 mg capsule; Take 1 Cap by mouth daily. 9. Noncompliance    10. Encounter for long-term (current) use of medications  -     HEMOGLOBIN A1C W/O EAG  -     TSH 3RD GENERATION  -     CBC W/O DIFF    11.  Encounter for immunization  -     INFLUENZA VIRUS VAC QUAD,SPLIT,PRESV FREE SYRINGE IM  -     IN IMMUNIZ ADMIN,1 SINGLE/COMB VAC/TOXOID      Follow-up and Dispositions    · Return in about 3 months (around 1/17/2020) for HTN, HLD, depression, meds, labs.           Bonnie Salcdio MD  10/17/2019

## 2019-10-18 LAB
ALBUMIN SERPL-MCNC: 4.1 G/DL (ref 3.6–4.8)
ALBUMIN/GLOB SERPL: 1 {RATIO} (ref 1.2–2.2)
ALP SERPL-CCNC: 109 IU/L (ref 39–117)
ALT SERPL-CCNC: 15 IU/L (ref 0–32)
AST SERPL-CCNC: 18 IU/L (ref 0–40)
BILIRUB SERPL-MCNC: 0.5 MG/DL (ref 0–1.2)
BUN SERPL-MCNC: 17 MG/DL (ref 8–27)
BUN/CREAT SERPL: 17 (ref 12–28)
CALCIUM SERPL-MCNC: 10.9 MG/DL (ref 8.7–10.3)
CHLORIDE SERPL-SCNC: 101 MMOL/L (ref 96–106)
CHOLEST SERPL-MCNC: 119 MG/DL (ref 100–199)
CK SERPL-CCNC: 166 U/L (ref 24–173)
CO2 SERPL-SCNC: 24 MMOL/L (ref 20–29)
CREAT SERPL-MCNC: 0.99 MG/DL (ref 0.57–1)
ERYTHROCYTE [DISTWIDTH] IN BLOOD BY AUTOMATED COUNT: 13.3 % (ref 12.3–15.4)
GLOBULIN SER CALC-MCNC: 4 G/DL (ref 1.5–4.5)
GLUCOSE SERPL-MCNC: 98 MG/DL (ref 65–99)
HBA1C MFR BLD: 6 % (ref 4.8–5.6)
HCT VFR BLD AUTO: 36.1 % (ref 34–46.6)
HDLC SERPL-MCNC: 53 MG/DL
HGB BLD-MCNC: 11.9 G/DL (ref 11.1–15.9)
INTERPRETATION, 910389: NORMAL
LDLC SERPL CALC-MCNC: 51 MG/DL (ref 0–99)
MCH RBC QN AUTO: 29.1 PG (ref 26.6–33)
MCHC RBC AUTO-ENTMCNC: 33 G/DL (ref 31.5–35.7)
MCV RBC AUTO: 88 FL (ref 79–97)
PLATELET # BLD AUTO: 217 X10E3/UL (ref 150–450)
POTASSIUM SERPL-SCNC: 3.7 MMOL/L (ref 3.5–5.2)
PROT SERPL-MCNC: 8.1 G/DL (ref 6–8.5)
RBC # BLD AUTO: 4.09 X10E6/UL (ref 3.77–5.28)
SODIUM SERPL-SCNC: 141 MMOL/L (ref 134–144)
TRIGL SERPL-MCNC: 75 MG/DL (ref 0–149)
TSH SERPL DL<=0.005 MIU/L-ACNC: 2.08 UIU/ML (ref 0.45–4.5)
VLDLC SERPL CALC-MCNC: 15 MG/DL (ref 5–40)
WBC # BLD AUTO: 7.4 X10E3/UL (ref 3.4–10.8)

## 2019-10-18 NOTE — PROGRESS NOTES
Good morning Ms. Viktor Azael, lab work looks good continue current medications.   Kristin Wallace NP

## 2019-10-22 DIAGNOSIS — K21.9 GASTROESOPHAGEAL REFLUX DISEASE WITHOUT ESOPHAGITIS: Primary | ICD-10-CM

## 2019-10-22 RX ORDER — PANTOPRAZOLE SODIUM 40 MG/1
40 TABLET, DELAYED RELEASE ORAL DAILY
Qty: 30 TAB | Refills: 5 | Status: SHIPPED | OUTPATIENT
Start: 2019-10-22 | End: 2019-12-24 | Stop reason: SDUPTHER

## 2019-11-24 DIAGNOSIS — R09.81 CHRONIC NASAL CONGESTION: ICD-10-CM

## 2019-11-25 DIAGNOSIS — J06.9 ACUTE URI: ICD-10-CM

## 2019-11-25 RX ORDER — FLUTICASONE PROPIONATE 50 MCG
SPRAY, SUSPENSION (ML) NASAL
Qty: 1 BOTTLE | Refills: 2 | Status: SHIPPED | OUTPATIENT
Start: 2019-11-25 | End: 2020-01-01

## 2019-11-26 DIAGNOSIS — E78.2 MIXED HYPERLIPIDEMIA: ICD-10-CM

## 2019-11-27 RX ORDER — ATORVASTATIN CALCIUM 20 MG/1
TABLET, FILM COATED ORAL
Qty: 90 TAB | Refills: 1 | Status: SHIPPED | OUTPATIENT
Start: 2019-11-27 | End: 2020-01-01 | Stop reason: SDUPTHER

## 2019-11-27 RX ORDER — LABETALOL 300 MG/1
TABLET, FILM COATED ORAL
Qty: 180 TAB | Refills: 1 | Status: SHIPPED | OUTPATIENT
Start: 2019-11-27 | End: 2020-01-01 | Stop reason: SDUPTHER

## 2019-11-27 RX ORDER — AMOXICILLIN AND CLAVULANATE POTASSIUM 875; 125 MG/1; MG/1
1 TABLET, FILM COATED ORAL 2 TIMES DAILY
Qty: 10 TAB | Refills: 0 | OUTPATIENT
Start: 2019-11-27 | End: 2019-12-02

## 2019-12-03 DIAGNOSIS — F32.A ANXIETY AND DEPRESSION: ICD-10-CM

## 2019-12-03 DIAGNOSIS — F41.9 ANXIETY AND DEPRESSION: ICD-10-CM

## 2019-12-03 RX ORDER — OLMESARTAN MEDOXOMIL, AMLODIPINE AND HYDROCHLOROTHIAZIDE TABLET 40/10/25 MG 40; 10; 25 MG/1; MG/1; MG/1
TABLET ORAL
Qty: 90 TAB | Refills: 1 | Status: SHIPPED | OUTPATIENT
Start: 2019-12-03 | End: 2020-01-01 | Stop reason: SDUPTHER

## 2019-12-03 RX ORDER — BUSPIRONE HYDROCHLORIDE 15 MG/1
TABLET ORAL
Qty: 90 TAB | Refills: 1 | Status: SHIPPED | OUTPATIENT
Start: 2019-12-03 | End: 2020-01-01

## 2019-12-24 DIAGNOSIS — K21.9 GASTROESOPHAGEAL REFLUX DISEASE WITHOUT ESOPHAGITIS: ICD-10-CM

## 2019-12-24 RX ORDER — PANTOPRAZOLE SODIUM 40 MG/1
40 TABLET, DELAYED RELEASE ORAL DAILY
Qty: 90 TAB | Refills: 1 | Status: SHIPPED | OUTPATIENT
Start: 2019-12-24 | End: 2020-01-01 | Stop reason: SDUPTHER

## 2020-01-01 ENCOUNTER — OFFICE VISIT (OUTPATIENT)
Dept: FAMILY MEDICINE CLINIC | Age: 65
End: 2020-01-01

## 2020-01-01 ENCOUNTER — APPOINTMENT (OUTPATIENT)
Dept: GENERAL RADIOLOGY | Age: 65
DRG: 870 | End: 2020-01-01
Attending: NURSE PRACTITIONER
Payer: COMMERCIAL

## 2020-01-01 ENCOUNTER — APPOINTMENT (OUTPATIENT)
Dept: GENERAL RADIOLOGY | Age: 65
DRG: 870 | End: 2020-01-01
Attending: EMERGENCY MEDICINE
Payer: COMMERCIAL

## 2020-01-01 ENCOUNTER — APPOINTMENT (OUTPATIENT)
Dept: ULTRASOUND IMAGING | Age: 65
DRG: 870 | End: 2020-01-01
Attending: INTERNAL MEDICINE
Payer: COMMERCIAL

## 2020-01-01 ENCOUNTER — HOSPITAL ENCOUNTER (INPATIENT)
Age: 65
LOS: 14 days | DRG: 870 | End: 2020-12-31
Attending: EMERGENCY MEDICINE | Admitting: STUDENT IN AN ORGANIZED HEALTH CARE EDUCATION/TRAINING PROGRAM
Payer: COMMERCIAL

## 2020-01-01 ENCOUNTER — VIRTUAL VISIT (OUTPATIENT)
Dept: FAMILY MEDICINE CLINIC | Age: 65
End: 2020-01-01

## 2020-01-01 ENCOUNTER — APPOINTMENT (OUTPATIENT)
Dept: INTERVENTIONAL RADIOLOGY/VASCULAR | Age: 65
DRG: 870 | End: 2020-01-01
Attending: INTERNAL MEDICINE
Payer: COMMERCIAL

## 2020-01-01 ENCOUNTER — OFFICE VISIT (OUTPATIENT)
Dept: CARDIOLOGY CLINIC | Age: 65
End: 2020-01-01

## 2020-01-01 VITALS
WEIGHT: 293 LBS | DIASTOLIC BLOOD PRESSURE: 61 MMHG | TEMPERATURE: 97.7 F | RESPIRATION RATE: 18 BRPM | SYSTOLIC BLOOD PRESSURE: 123 MMHG | HEART RATE: 69 BPM | OXYGEN SATURATION: 98 % | HEIGHT: 68 IN | BODY MASS INDEX: 44.41 KG/M2

## 2020-01-01 VITALS
DIASTOLIC BLOOD PRESSURE: 70 MMHG | WEIGHT: 293 LBS | HEART RATE: 72 BPM | SYSTOLIC BLOOD PRESSURE: 120 MMHG | HEIGHT: 68 IN | BODY MASS INDEX: 44.41 KG/M2 | OXYGEN SATURATION: 95 % | RESPIRATION RATE: 18 BRPM

## 2020-01-01 VITALS
WEIGHT: 293 LBS | DIASTOLIC BLOOD PRESSURE: 36 MMHG | SYSTOLIC BLOOD PRESSURE: 104 MMHG | BODY MASS INDEX: 44.41 KG/M2 | TEMPERATURE: 99.1 F | OXYGEN SATURATION: 36 % | RESPIRATION RATE: 30 BRPM | HEIGHT: 68 IN

## 2020-01-01 DIAGNOSIS — N17.9 AKI (ACUTE KIDNEY INJURY) (HCC): ICD-10-CM

## 2020-01-01 DIAGNOSIS — Z20.822 SUSPECTED COVID-19 VIRUS INFECTION: ICD-10-CM

## 2020-01-01 DIAGNOSIS — I25.10 CORONARY ARTERY DISEASE INVOLVING NATIVE HEART WITHOUT ANGINA PECTORIS, UNSPECIFIED VESSEL OR LESION TYPE: ICD-10-CM

## 2020-01-01 DIAGNOSIS — Z79.899 ENCOUNTER FOR LONG-TERM (CURRENT) USE OF MEDICATIONS: ICD-10-CM

## 2020-01-01 DIAGNOSIS — I50.32 CHRONIC DIASTOLIC CONGESTIVE HEART FAILURE (HCC): ICD-10-CM

## 2020-01-01 DIAGNOSIS — R60.0 LEG EDEMA, LEFT: ICD-10-CM

## 2020-01-01 DIAGNOSIS — F32.A ANXIETY AND DEPRESSION: Primary | ICD-10-CM

## 2020-01-01 DIAGNOSIS — F41.9 ANXIETY AND DEPRESSION: Primary | ICD-10-CM

## 2020-01-01 DIAGNOSIS — I10 ESSENTIAL HYPERTENSION: ICD-10-CM

## 2020-01-01 DIAGNOSIS — F32.A ANXIETY AND DEPRESSION: ICD-10-CM

## 2020-01-01 DIAGNOSIS — U07.1 PNEUMONIA DUE TO COVID-19 VIRUS: ICD-10-CM

## 2020-01-01 DIAGNOSIS — F41.9 ANXIETY AND DEPRESSION: ICD-10-CM

## 2020-01-01 DIAGNOSIS — J18.9 PNEUMONIA OF BOTH LUNGS DUE TO INFECTIOUS ORGANISM, UNSPECIFIED PART OF LUNG: ICD-10-CM

## 2020-01-01 DIAGNOSIS — U07.1 COVID-19: ICD-10-CM

## 2020-01-01 DIAGNOSIS — E55.9 VITAMIN D DEFICIENCY: ICD-10-CM

## 2020-01-01 DIAGNOSIS — R09.02 HYPOXIA: ICD-10-CM

## 2020-01-01 DIAGNOSIS — Z71.89 GOALS OF CARE, COUNSELING/DISCUSSION: ICD-10-CM

## 2020-01-01 DIAGNOSIS — J12.82 PNEUMONIA DUE TO COVID-19 VIRUS: ICD-10-CM

## 2020-01-01 DIAGNOSIS — J84.9 BILATERAL INTERSTITIAL PNEUMONIA (HCC): ICD-10-CM

## 2020-01-01 DIAGNOSIS — I10 ESSENTIAL HYPERTENSION: Primary | ICD-10-CM

## 2020-01-01 DIAGNOSIS — R73.03 PREDIABETES: ICD-10-CM

## 2020-01-01 DIAGNOSIS — E78.00 HIGH CHOLESTEROL: ICD-10-CM

## 2020-01-01 DIAGNOSIS — R09.81 CHRONIC NASAL CONGESTION: ICD-10-CM

## 2020-01-01 DIAGNOSIS — E87.6 HYPOKALEMIA: ICD-10-CM

## 2020-01-01 DIAGNOSIS — K21.9 GASTROESOPHAGEAL REFLUX DISEASE WITHOUT ESOPHAGITIS: ICD-10-CM

## 2020-01-01 DIAGNOSIS — J96.01 ACUTE RESPIRATORY FAILURE WITH HYPOXIA (HCC): Primary | ICD-10-CM

## 2020-01-01 DIAGNOSIS — Z12.39 SCREENING FOR BREAST CANCER: ICD-10-CM

## 2020-01-01 DIAGNOSIS — I50.32 CHRONIC DIASTOLIC CONGESTIVE HEART FAILURE (HCC): Primary | ICD-10-CM

## 2020-01-01 DIAGNOSIS — E78.2 MIXED HYPERLIPIDEMIA: ICD-10-CM

## 2020-01-01 DIAGNOSIS — N18.31 STAGE 3A CHRONIC KIDNEY DISEASE (HCC): ICD-10-CM

## 2020-01-01 LAB
ALBUMIN SERPL-MCNC: 1.6 G/DL (ref 3.5–5)
ALBUMIN SERPL-MCNC: 2.9 G/DL (ref 3.5–5)
ALBUMIN SERPL-MCNC: 3 G/DL (ref 3.5–5)
ALBUMIN SERPL-MCNC: 3 G/DL (ref 3.5–5)
ALBUMIN SERPL-MCNC: 3.1 G/DL (ref 3.5–5)
ALBUMIN SERPL-MCNC: 3.2 G/DL (ref 3.5–5)
ALBUMIN SERPL-MCNC: 4.2 G/DL (ref 3.8–4.8)
ALBUMIN/GLOB SERPL: 0.5 {RATIO} (ref 1.1–2.2)
ALBUMIN/GLOB SERPL: 0.6 {RATIO} (ref 1.1–2.2)
ALBUMIN/GLOB SERPL: 0.6 {RATIO} (ref 1.1–2.2)
ALP SERPL-CCNC: 113 U/L (ref 45–117)
ALP SERPL-CCNC: 114 U/L (ref 45–117)
ALP SERPL-CCNC: 117 U/L (ref 45–117)
ALP SERPL-CCNC: 119 IU/L (ref 39–117)
ALP SERPL-CCNC: 122 U/L (ref 45–117)
ALP SERPL-CCNC: 126 U/L (ref 45–117)
ALT SERPL-CCNC: 14 IU/L (ref 0–32)
ALT SERPL-CCNC: 24 U/L (ref 12–78)
ALT SERPL-CCNC: 25 U/L (ref 12–78)
ALT SERPL-CCNC: 25 U/L (ref 12–78)
ANION GAP SERPL CALC-SCNC: 11 MMOL/L (ref 5–15)
ANION GAP SERPL CALC-SCNC: 12 MMOL/L (ref 5–15)
ANION GAP SERPL CALC-SCNC: 12 MMOL/L (ref 5–15)
ANION GAP SERPL CALC-SCNC: 2 MMOL/L (ref 5–15)
ANION GAP SERPL CALC-SCNC: 2 MMOL/L (ref 5–15)
ANION GAP SERPL CALC-SCNC: 3 MMOL/L (ref 5–15)
ANION GAP SERPL CALC-SCNC: 4 MMOL/L (ref 5–15)
ANION GAP SERPL CALC-SCNC: 5 MMOL/L (ref 5–15)
ANION GAP SERPL CALC-SCNC: 5 MMOL/L (ref 5–15)
ANION GAP SERPL CALC-SCNC: 6 MMOL/L (ref 5–15)
ANION GAP SERPL CALC-SCNC: 8 MMOL/L (ref 5–15)
ANION GAP SERPL CALC-SCNC: 9 MMOL/L (ref 5–15)
APPEARANCE UR: ABNORMAL
APTT PPP: 23.3 SEC (ref 22.1–31)
APTT PPP: 24 SEC (ref 22.1–31)
APTT PPP: 24.8 SEC (ref 22.1–31)
APTT PPP: 26.7 SEC (ref 22.1–31)
APTT PPP: 26.8 SEC (ref 22.1–31)
APTT PPP: 28.5 SEC (ref 22.1–31)
APTT PPP: 28.8 SEC (ref 22.1–31)
APTT PPP: 29 SEC (ref 22.1–31)
APTT PPP: 29.1 SEC (ref 22.1–31)
APTT PPP: 30.4 SEC (ref 22.1–31)
APTT PPP: 31.6 SEC (ref 22.1–31)
APTT PPP: 33.3 SEC (ref 22.1–31)
APTT PPP: 34.3 SEC (ref 22.1–31)
APTT PPP: 34.8 SEC (ref 22.1–31)
APTT PPP: 37.9 SEC (ref 22.1–31)
ARTERIAL PATENCY WRIST A: YES
AST SERPL-CCNC: 18 IU/L (ref 0–40)
AST SERPL-CCNC: 32 U/L (ref 15–37)
AST SERPL-CCNC: 32 U/L (ref 15–37)
AST SERPL-CCNC: 35 U/L (ref 15–37)
AST SERPL-CCNC: 36 U/L (ref 15–37)
AST SERPL-CCNC: 38 U/L (ref 15–37)
ATRIAL RATE: 110 BPM
ATRIAL RATE: 131 BPM
ATRIAL RATE: 71 BPM
BACTERIA SPEC CULT: ABNORMAL
BACTERIA SPEC CULT: ABNORMAL
BACTERIA SPEC CULT: NORMAL
BACTERIA SPEC CULT: NORMAL
BACTERIA URNS QL MICRO: NEGATIVE /HPF
BASE DEFICIT BLD-SCNC: 1 MMOL/L
BASE DEFICIT BLD-SCNC: 2 MMOL/L
BASE DEFICIT BLD-SCNC: 3 MMOL/L
BASE EXCESS BLD CALC-SCNC: 4 MMOL/L
BASE EXCESS BLDA CALC-SCNC: 1.2 MMOL/L
BASE EXCESS BLDA CALC-SCNC: 1.7 MMOL/L
BASOPHILS # BLD: 0 K/UL (ref 0–0.1)
BASOPHILS # BLD: 0 K/UL (ref 0–0.1)
BASOPHILS NFR BLD: 0 % (ref 0–1)
BASOPHILS NFR BLD: 0 % (ref 0–1)
BDY SITE: ABNORMAL
BILIRUB DIRECT SERPL-MCNC: 0.16 MG/DL (ref 0–0.4)
BILIRUB SERPL-MCNC: 0.4 MG/DL (ref 0.2–1)
BILIRUB SERPL-MCNC: 0.5 MG/DL (ref 0.2–1)
BILIRUB SERPL-MCNC: 0.6 MG/DL (ref 0.2–1)
BILIRUB SERPL-MCNC: 0.6 MG/DL (ref 0–1.2)
BILIRUB UR QL: NEGATIVE
BNP SERPL-MCNC: 253 PG/ML
BUN SERPL-MCNC: 106 MG/DL (ref 6–20)
BUN SERPL-MCNC: 118 MG/DL (ref 6–20)
BUN SERPL-MCNC: 122 MG/DL (ref 6–20)
BUN SERPL-MCNC: 131 MG/DL (ref 6–20)
BUN SERPL-MCNC: 22 MG/DL (ref 8–27)
BUN SERPL-MCNC: 34 MG/DL (ref 6–20)
BUN SERPL-MCNC: 34 MG/DL (ref 6–20)
BUN SERPL-MCNC: 37 MG/DL (ref 6–20)
BUN SERPL-MCNC: 38 MG/DL (ref 6–20)
BUN SERPL-MCNC: 41 MG/DL (ref 6–20)
BUN SERPL-MCNC: 44 MG/DL (ref 6–20)
BUN SERPL-MCNC: 50 MG/DL (ref 6–20)
BUN SERPL-MCNC: 50 MG/DL (ref 6–20)
BUN SERPL-MCNC: 52 MG/DL (ref 6–20)
BUN SERPL-MCNC: 61 MG/DL (ref 6–20)
BUN SERPL-MCNC: 63 MG/DL (ref 6–20)
BUN SERPL-MCNC: 72 MG/DL (ref 6–20)
BUN SERPL-MCNC: 73 MG/DL (ref 6–20)
BUN SERPL-MCNC: 89 MG/DL (ref 6–20)
BUN/CREAT SERPL: 18 (ref 12–20)
BUN/CREAT SERPL: 18 (ref 12–28)
BUN/CREAT SERPL: 19 (ref 12–20)
BUN/CREAT SERPL: 19 (ref 12–20)
BUN/CREAT SERPL: 23 (ref 12–20)
BUN/CREAT SERPL: 26 (ref 12–20)
BUN/CREAT SERPL: 27 (ref 12–20)
BUN/CREAT SERPL: 29 (ref 12–20)
BUN/CREAT SERPL: 30 (ref 12–20)
BUN/CREAT SERPL: 30 (ref 12–20)
BUN/CREAT SERPL: 32 (ref 12–20)
BUN/CREAT SERPL: 33 (ref 12–20)
BUN/CREAT SERPL: 35 (ref 12–20)
BUN/CREAT SERPL: 37 (ref 12–20)
BUN/CREAT SERPL: 38 (ref 12–20)
BUN/CREAT SERPL: 38 (ref 12–20)
BUN/CREAT SERPL: 43 (ref 12–20)
CA-I BLD-SCNC: 1.36 MMOL/L (ref 1.12–1.32)
CA-I BLD-SCNC: 1.4 MMOL/L (ref 1.12–1.32)
CA-I BLD-SCNC: 1.58 MMOL/L (ref 1.12–1.32)
CA-I BLD-SCNC: 1.61 MMOL/L (ref 1.12–1.32)
CALCIUM SERPL-MCNC: 10 MG/DL (ref 8.5–10.1)
CALCIUM SERPL-MCNC: 10.1 MG/DL (ref 8.5–10.1)
CALCIUM SERPL-MCNC: 10.1 MG/DL (ref 8.5–10.1)
CALCIUM SERPL-MCNC: 10.3 MG/DL (ref 8.5–10.1)
CALCIUM SERPL-MCNC: 10.5 MG/DL (ref 8.5–10.1)
CALCIUM SERPL-MCNC: 10.7 MG/DL (ref 8.5–10.1)
CALCIUM SERPL-MCNC: 10.8 MG/DL (ref 8.7–10.3)
CALCIUM SERPL-MCNC: 11 MG/DL (ref 8.5–10.1)
CALCIUM SERPL-MCNC: 11.1 MG/DL (ref 8.5–10.1)
CALCIUM SERPL-MCNC: 11.3 MG/DL (ref 8.5–10.1)
CALCIUM SERPL-MCNC: 11.5 MG/DL (ref 8.5–10.1)
CALCIUM SERPL-MCNC: 11.6 MG/DL (ref 8.5–10.1)
CALCIUM SERPL-MCNC: 11.6 MG/DL (ref 8.5–10.1)
CALCIUM SERPL-MCNC: 11.7 MG/DL (ref 8.5–10.1)
CALCIUM SERPL-MCNC: 11.8 MG/DL (ref 8.5–10.1)
CALCIUM SERPL-MCNC: 9 MG/DL (ref 8.5–10.1)
CALCIUM SERPL-MCNC: 9.4 MG/DL (ref 8.5–10.1)
CALCIUM SERPL-MCNC: 9.6 MG/DL (ref 8.5–10.1)
CALCIUM SERPL-MCNC: 9.8 MG/DL (ref 8.5–10.1)
CALCULATED P AXIS, ECG09: 23 DEGREES
CALCULATED P AXIS, ECG09: 27 DEGREES
CALCULATED R AXIS, ECG10: -19 DEGREES
CALCULATED R AXIS, ECG10: -21 DEGREES
CALCULATED R AXIS, ECG10: 32 DEGREES
CALCULATED T AXIS, ECG11: 123 DEGREES
CALCULATED T AXIS, ECG11: 29 DEGREES
CALCULATED T AXIS, ECG11: 41 DEGREES
CHLORIDE SERPL-SCNC: 102 MMOL/L (ref 97–108)
CHLORIDE SERPL-SCNC: 102 MMOL/L (ref 97–108)
CHLORIDE SERPL-SCNC: 103 MMOL/L (ref 97–108)
CHLORIDE SERPL-SCNC: 104 MMOL/L (ref 97–108)
CHLORIDE SERPL-SCNC: 104 MMOL/L (ref 97–108)
CHLORIDE SERPL-SCNC: 105 MMOL/L (ref 97–108)
CHLORIDE SERPL-SCNC: 106 MMOL/L (ref 97–108)
CHLORIDE SERPL-SCNC: 108 MMOL/L (ref 97–108)
CHLORIDE SERPL-SCNC: 108 MMOL/L (ref 97–108)
CHLORIDE SERPL-SCNC: 109 MMOL/L (ref 97–108)
CHLORIDE SERPL-SCNC: 111 MMOL/L (ref 97–108)
CHLORIDE SERPL-SCNC: 112 MMOL/L (ref 97–108)
CHLORIDE SERPL-SCNC: 114 MMOL/L (ref 97–108)
CHLORIDE SERPL-SCNC: 116 MMOL/L (ref 97–108)
CHLORIDE SERPL-SCNC: 118 MMOL/L (ref 97–108)
CHLORIDE SERPL-SCNC: 98 MMOL/L (ref 96–106)
CHLORIDE SERPL-SCNC: 99 MMOL/L (ref 97–108)
CO2 SERPL-SCNC: 18 MMOL/L (ref 21–32)
CO2 SERPL-SCNC: 20 MMOL/L (ref 21–32)
CO2 SERPL-SCNC: 22 MMOL/L (ref 21–32)
CO2 SERPL-SCNC: 24 MMOL/L (ref 21–32)
CO2 SERPL-SCNC: 26 MMOL/L (ref 21–32)
CO2 SERPL-SCNC: 27 MMOL/L (ref 20–29)
CO2 SERPL-SCNC: 28 MMOL/L (ref 21–32)
CO2 SERPL-SCNC: 29 MMOL/L (ref 21–32)
CO2 SERPL-SCNC: 30 MMOL/L (ref 21–32)
CO2 SERPL-SCNC: 32 MMOL/L (ref 21–32)
CO2 SERPL-SCNC: 33 MMOL/L (ref 21–32)
CO2 SERPL-SCNC: 34 MMOL/L (ref 21–32)
COLOR UR: ABNORMAL
COVID-19 RAPID TEST, COVR: DETECTED
CREAT SERPL-MCNC: 1.16 MG/DL (ref 0.55–1.02)
CREAT SERPL-MCNC: 1.18 MG/DL (ref 0.55–1.02)
CREAT SERPL-MCNC: 1.2 MG/DL (ref 0.57–1)
CREAT SERPL-MCNC: 1.25 MG/DL (ref 0.55–1.02)
CREAT SERPL-MCNC: 1.27 MG/DL (ref 0.55–1.02)
CREAT SERPL-MCNC: 1.28 MG/DL (ref 0.55–1.02)
CREAT SERPL-MCNC: 1.37 MG/DL (ref 0.55–1.02)
CREAT SERPL-MCNC: 1.37 MG/DL (ref 0.55–1.02)
CREAT SERPL-MCNC: 1.43 MG/DL (ref 0.55–1.02)
CREAT SERPL-MCNC: 1.52 MG/DL (ref 0.55–1.02)
CREAT SERPL-MCNC: 1.91 MG/DL (ref 0.55–1.02)
CREAT SERPL-MCNC: 1.94 MG/DL (ref 0.55–1.02)
CREAT SERPL-MCNC: 1.98 MG/DL (ref 0.55–1.02)
CREAT SERPL-MCNC: 2.21 MG/DL (ref 0.55–1.02)
CREAT SERPL-MCNC: 3.11 MG/DL (ref 0.55–1.02)
CREAT SERPL-MCNC: 4.52 MG/DL (ref 0.55–1.02)
CREAT SERPL-MCNC: 6.11 MG/DL (ref 0.55–1.02)
CREAT SERPL-MCNC: 6.66 MG/DL (ref 0.55–1.02)
CREAT SERPL-MCNC: 7.08 MG/DL (ref 0.55–1.02)
CRP SERPL-MCNC: 5.13 MG/DL (ref 0–0.6)
D DIMER PPP FEU-MCNC: 0.72 MG/L FEU (ref 0–0.65)
D DIMER PPP FEU-MCNC: 0.82 MG/L FEU (ref 0–0.65)
D DIMER PPP FEU-MCNC: 1.19 MG/L FEU (ref 0–0.65)
D DIMER PPP FEU-MCNC: 1.46 MG/L FEU (ref 0–0.65)
D DIMER PPP FEU-MCNC: 13.24 MG/L FEU (ref 0–0.65)
D DIMER PPP FEU-MCNC: 3.07 MG/L FEU (ref 0–0.65)
D DIMER PPP FEU-MCNC: 3.68 MG/L FEU (ref 0–0.65)
D DIMER PPP FEU-MCNC: 3.75 MG/L FEU (ref 0–0.65)
D DIMER PPP FEU-MCNC: 4.36 MG/L FEU (ref 0–0.65)
D DIMER PPP FEU-MCNC: 4.39 MG/L FEU (ref 0–0.65)
D DIMER PPP FEU-MCNC: 4.46 MG/L FEU (ref 0–0.65)
D DIMER PPP FEU-MCNC: 5.46 MG/L FEU (ref 0–0.65)
D DIMER PPP FEU-MCNC: 6.4 MG/L FEU (ref 0–0.65)
D DIMER PPP FEU-MCNC: 8.48 MG/L FEU (ref 0–0.65)
D DIMER PPP FEU-MCNC: 9.1 MG/L FEU (ref 0–0.65)
DATE LAST DOSE: NOT DETECTED
DIAGNOSIS, 93000: NORMAL
DIFFERENTIAL METHOD BLD: ABNORMAL
DIFFERENTIAL METHOD BLD: ABNORMAL
EOSINOPHIL # BLD: 0 K/UL (ref 0–0.4)
EOSINOPHIL # BLD: 0 K/UL (ref 0–0.4)
EOSINOPHIL NFR BLD: 0 % (ref 0–7)
EOSINOPHIL NFR BLD: 0 % (ref 0–7)
EPAP/CPAP/PEEP, PAPEEP: 16
EPAP/CPAP/PEEP, PAPEEP: 18
EPITH CASTS URNS QL MICRO: ABNORMAL /LPF
ERYTHROCYTE [DISTWIDTH] IN BLOOD BY AUTOMATED COUNT: 14.7 % (ref 11.5–14.5)
ERYTHROCYTE [DISTWIDTH] IN BLOOD BY AUTOMATED COUNT: 14.7 % (ref 11.5–14.5)
ERYTHROCYTE [DISTWIDTH] IN BLOOD BY AUTOMATED COUNT: 14.8 % (ref 11.5–14.5)
ERYTHROCYTE [DISTWIDTH] IN BLOOD BY AUTOMATED COUNT: 14.9 % (ref 11.5–14.5)
ERYTHROCYTE [DISTWIDTH] IN BLOOD BY AUTOMATED COUNT: 14.9 % (ref 11.5–14.5)
ERYTHROCYTE [DISTWIDTH] IN BLOOD BY AUTOMATED COUNT: 15 % (ref 11.5–14.5)
ERYTHROCYTE [DISTWIDTH] IN BLOOD BY AUTOMATED COUNT: 15.1 % (ref 11.5–14.5)
ERYTHROCYTE [DISTWIDTH] IN BLOOD BY AUTOMATED COUNT: 15.2 % (ref 11.5–14.5)
ERYTHROCYTE [DISTWIDTH] IN BLOOD BY AUTOMATED COUNT: 15.3 % (ref 11.5–14.5)
ERYTHROCYTE [DISTWIDTH] IN BLOOD BY AUTOMATED COUNT: 16.1 % (ref 11.5–14.5)
ERYTHROCYTE [DISTWIDTH] IN BLOOD BY AUTOMATED COUNT: 16.5 % (ref 11.5–14.5)
ERYTHROCYTE [DISTWIDTH] IN BLOOD BY AUTOMATED COUNT: 17 % (ref 11.5–14.5)
ERYTHROCYTE [DISTWIDTH] IN BLOOD BY AUTOMATED COUNT: 17.2 % (ref 11.5–14.5)
FERRITIN SERPL-MCNC: 481 NG/ML (ref 8–252)
FIBRINOGEN PPP-MCNC: 376 MG/DL (ref 200–475)
FIBRINOGEN PPP-MCNC: 439 MG/DL (ref 200–475)
FIBRINOGEN PPP-MCNC: 469 MG/DL (ref 200–475)
FIBRINOGEN PPP-MCNC: 513 MG/DL (ref 200–475)
FIBRINOGEN PPP-MCNC: 553 MG/DL (ref 200–475)
FIBRINOGEN PPP-MCNC: 562 MG/DL (ref 200–475)
FIBRINOGEN PPP-MCNC: 571 MG/DL (ref 200–475)
FIBRINOGEN PPP-MCNC: 583 MG/DL (ref 200–475)
FIBRINOGEN PPP-MCNC: 606 MG/DL (ref 200–475)
FIBRINOGEN PPP-MCNC: 611 MG/DL (ref 200–475)
FIBRINOGEN PPP-MCNC: 611 MG/DL (ref 200–475)
FIBRINOGEN PPP-MCNC: 621 MG/DL (ref 200–475)
FIBRINOGEN PPP-MCNC: 670 MG/DL (ref 200–475)
FIBRINOGEN PPP-MCNC: 731 MG/DL (ref 200–475)
FIBRINOGEN PPP-MCNC: 784 MG/DL (ref 200–475)
FIO2 ON VENT: 100 %
FIO2 ON VENT: 90 %
GAS FLOW.O2 O2 DELIVERY SYS: ABNORMAL L/MIN
GAS FLOW.O2 SETTING OXYMISER: 20 BPM
GAS FLOW.O2 SETTING OXYMISER: 30 L/MIN
GLOBULIN SER CALC-MCNC: 5.2 G/DL (ref 2–4)
GLOBULIN SER CALC-MCNC: 5.6 G/DL (ref 2–4)
GLOBULIN SER CALC-MCNC: 5.6 G/DL (ref 2–4)
GLOBULIN SER CALC-MCNC: 5.8 G/DL (ref 2–4)
GLOBULIN SER CALC-MCNC: 6 G/DL (ref 2–4)
GLUCOSE BLD STRIP.AUTO-MCNC: 100 MG/DL (ref 65–100)
GLUCOSE BLD STRIP.AUTO-MCNC: 101 MG/DL (ref 65–100)
GLUCOSE BLD STRIP.AUTO-MCNC: 101 MG/DL (ref 65–100)
GLUCOSE BLD STRIP.AUTO-MCNC: 105 MG/DL (ref 65–100)
GLUCOSE BLD STRIP.AUTO-MCNC: 105 MG/DL (ref 65–100)
GLUCOSE BLD STRIP.AUTO-MCNC: 106 MG/DL (ref 65–100)
GLUCOSE BLD STRIP.AUTO-MCNC: 106 MG/DL (ref 65–100)
GLUCOSE BLD STRIP.AUTO-MCNC: 107 MG/DL (ref 65–100)
GLUCOSE BLD STRIP.AUTO-MCNC: 107 MG/DL (ref 65–100)
GLUCOSE BLD STRIP.AUTO-MCNC: 110 MG/DL (ref 65–100)
GLUCOSE BLD STRIP.AUTO-MCNC: 113 MG/DL (ref 65–100)
GLUCOSE BLD STRIP.AUTO-MCNC: 113 MG/DL (ref 65–100)
GLUCOSE BLD STRIP.AUTO-MCNC: 114 MG/DL (ref 65–100)
GLUCOSE BLD STRIP.AUTO-MCNC: 118 MG/DL (ref 65–100)
GLUCOSE BLD STRIP.AUTO-MCNC: 120 MG/DL (ref 65–100)
GLUCOSE BLD STRIP.AUTO-MCNC: 121 MG/DL (ref 65–100)
GLUCOSE BLD STRIP.AUTO-MCNC: 123 MG/DL (ref 65–100)
GLUCOSE BLD STRIP.AUTO-MCNC: 123 MG/DL (ref 65–100)
GLUCOSE BLD STRIP.AUTO-MCNC: 124 MG/DL (ref 65–100)
GLUCOSE BLD STRIP.AUTO-MCNC: 124 MG/DL (ref 65–100)
GLUCOSE BLD STRIP.AUTO-MCNC: 126 MG/DL (ref 65–100)
GLUCOSE BLD STRIP.AUTO-MCNC: 128 MG/DL (ref 65–100)
GLUCOSE BLD STRIP.AUTO-MCNC: 130 MG/DL (ref 65–100)
GLUCOSE BLD STRIP.AUTO-MCNC: 131 MG/DL (ref 65–100)
GLUCOSE BLD STRIP.AUTO-MCNC: 132 MG/DL (ref 65–100)
GLUCOSE BLD STRIP.AUTO-MCNC: 132 MG/DL (ref 65–100)
GLUCOSE BLD STRIP.AUTO-MCNC: 135 MG/DL (ref 65–100)
GLUCOSE BLD STRIP.AUTO-MCNC: 136 MG/DL (ref 65–100)
GLUCOSE BLD STRIP.AUTO-MCNC: 142 MG/DL (ref 65–100)
GLUCOSE BLD STRIP.AUTO-MCNC: 143 MG/DL (ref 65–100)
GLUCOSE BLD STRIP.AUTO-MCNC: 146 MG/DL (ref 65–100)
GLUCOSE BLD STRIP.AUTO-MCNC: 150 MG/DL (ref 65–100)
GLUCOSE BLD STRIP.AUTO-MCNC: 150 MG/DL (ref 65–100)
GLUCOSE BLD STRIP.AUTO-MCNC: 151 MG/DL (ref 65–100)
GLUCOSE BLD STRIP.AUTO-MCNC: 157 MG/DL (ref 65–100)
GLUCOSE BLD STRIP.AUTO-MCNC: 157 MG/DL (ref 65–100)
GLUCOSE BLD STRIP.AUTO-MCNC: 175 MG/DL (ref 65–100)
GLUCOSE BLD STRIP.AUTO-MCNC: 189 MG/DL (ref 65–100)
GLUCOSE BLD STRIP.AUTO-MCNC: 82 MG/DL (ref 65–100)
GLUCOSE BLD STRIP.AUTO-MCNC: 82 MG/DL (ref 65–100)
GLUCOSE BLD STRIP.AUTO-MCNC: 86 MG/DL (ref 65–100)
GLUCOSE BLD STRIP.AUTO-MCNC: 88 MG/DL (ref 65–100)
GLUCOSE BLD STRIP.AUTO-MCNC: 90 MG/DL (ref 65–100)
GLUCOSE BLD STRIP.AUTO-MCNC: 91 MG/DL (ref 65–100)
GLUCOSE BLD STRIP.AUTO-MCNC: 91 MG/DL (ref 65–100)
GLUCOSE BLD STRIP.AUTO-MCNC: 94 MG/DL (ref 65–100)
GLUCOSE BLD STRIP.AUTO-MCNC: 98 MG/DL (ref 65–100)
GLUCOSE SERPL-MCNC: 100 MG/DL (ref 65–100)
GLUCOSE SERPL-MCNC: 101 MG/DL (ref 65–100)
GLUCOSE SERPL-MCNC: 103 MG/DL (ref 65–100)
GLUCOSE SERPL-MCNC: 104 MG/DL (ref 65–100)
GLUCOSE SERPL-MCNC: 105 MG/DL (ref 65–100)
GLUCOSE SERPL-MCNC: 106 MG/DL (ref 65–100)
GLUCOSE SERPL-MCNC: 109 MG/DL (ref 65–100)
GLUCOSE SERPL-MCNC: 112 MG/DL (ref 65–100)
GLUCOSE SERPL-MCNC: 119 MG/DL (ref 65–100)
GLUCOSE SERPL-MCNC: 119 MG/DL (ref 65–100)
GLUCOSE SERPL-MCNC: 124 MG/DL (ref 65–100)
GLUCOSE SERPL-MCNC: 126 MG/DL (ref 65–100)
GLUCOSE SERPL-MCNC: 127 MG/DL (ref 65–100)
GLUCOSE SERPL-MCNC: 146 MG/DL (ref 65–100)
GLUCOSE SERPL-MCNC: 161 MG/DL (ref 65–100)
GLUCOSE SERPL-MCNC: 83 MG/DL (ref 65–100)
GLUCOSE SERPL-MCNC: 85 MG/DL (ref 65–100)
GLUCOSE SERPL-MCNC: 85 MG/DL (ref 65–99)
GLUCOSE SERPL-MCNC: 87 MG/DL (ref 65–100)
GLUCOSE UR STRIP.AUTO-MCNC: NEGATIVE MG/DL
GRAM STN SPEC: ABNORMAL
HBA1C MFR BLD: 6 % (ref 4.8–5.6)
HCO3 BLD-SCNC: 23.3 MMOL/L (ref 22–26)
HCO3 BLD-SCNC: 24.5 MMOL/L (ref 22–26)
HCO3 BLD-SCNC: 25 MMOL/L (ref 22–26)
HCO3 BLD-SCNC: 30.1 MMOL/L (ref 22–26)
HCO3 BLDA-SCNC: 27 MMOL/L (ref 22–26)
HCO3 BLDA-SCNC: 27 MMOL/L (ref 22–26)
HCT VFR BLD AUTO: 32.3 % (ref 35–47)
HCT VFR BLD AUTO: 34.9 % (ref 35–47)
HCT VFR BLD AUTO: 35 % (ref 35–47)
HCT VFR BLD AUTO: 35.1 % (ref 35–47)
HCT VFR BLD AUTO: 35.1 % (ref 35–47)
HCT VFR BLD AUTO: 36.2 % (ref 35–47)
HCT VFR BLD AUTO: 36.4 % (ref 35–47)
HCT VFR BLD AUTO: 37.5 % (ref 35–47)
HCT VFR BLD AUTO: 38.8 % (ref 35–47)
HCT VFR BLD AUTO: 40.4 % (ref 35–47)
HCT VFR BLD AUTO: 41.1 % (ref 35–47)
HCT VFR BLD AUTO: 42.1 % (ref 35–47)
HCT VFR BLD AUTO: 42.3 % (ref 35–47)
HCT VFR BLD AUTO: 42.4 % (ref 35–47)
HCT VFR BLD AUTO: 42.9 % (ref 35–47)
HEALTH STATUS, XMCV2T: ABNORMAL
HGB BLD-MCNC: 10.2 G/DL (ref 11.5–16)
HGB BLD-MCNC: 10.3 G/DL (ref 11.5–16)
HGB BLD-MCNC: 10.5 G/DL (ref 11.5–16)
HGB BLD-MCNC: 11.3 G/DL (ref 11.5–16)
HGB BLD-MCNC: 11.4 G/DL (ref 11.5–16)
HGB BLD-MCNC: 11.7 G/DL (ref 11.5–16)
HGB BLD-MCNC: 11.7 G/DL (ref 11.5–16)
HGB BLD-MCNC: 12.4 G/DL (ref 11.5–16)
HGB BLD-MCNC: 12.4 G/DL (ref 11.5–16)
HGB BLD-MCNC: 12.8 G/DL (ref 11.5–16)
HGB BLD-MCNC: 13 G/DL (ref 11.5–16)
HGB BLD-MCNC: 13 G/DL (ref 11.5–16)
HGB BLD-MCNC: 13.1 G/DL (ref 11.5–16)
HGB BLD-MCNC: 13.3 G/DL (ref 11.5–16)
HGB BLD-MCNC: 9.8 G/DL (ref 11.5–16)
HGB UR QL STRIP: NEGATIVE
IMM GRANULOCYTES # BLD AUTO: 0 K/UL (ref 0–0.04)
IMM GRANULOCYTES # BLD AUTO: 0.1 K/UL (ref 0–0.04)
IMM GRANULOCYTES NFR BLD AUTO: 0 % (ref 0–0.5)
IMM GRANULOCYTES NFR BLD AUTO: 0 % (ref 0–0.5)
INR PPP: 1 (ref 0.9–1.1)
INR PPP: 1 (ref 0.9–1.1)
INR PPP: 1.1 (ref 0.9–1.1)
INR PPP: 1.2 (ref 0.9–1.1)
INTERPRETATION: NORMAL
IPAP/PIP, IPAPIP: 22
KETONES UR QL STRIP.AUTO: NEGATIVE MG/DL
LACTATE BLD-SCNC: 0.85 MMOL/L (ref 0.4–2)
LACTATE SERPL-SCNC: 1.9 MMOL/L (ref 0.4–2)
LDH SERPL L TO P-CCNC: 274 U/L (ref 81–246)
LEUKOCYTE ESTERASE UR QL STRIP.AUTO: ABNORMAL
LYMPHOCYTES # BLD: 1 K/UL (ref 0.8–3.5)
LYMPHOCYTES # BLD: 1 K/UL (ref 0.8–3.5)
LYMPHOCYTES NFR BLD: 6 % (ref 12–49)
LYMPHOCYTES NFR BLD: 9 % (ref 12–49)
MAGNESIUM SERPL-MCNC: 2.2 MG/DL (ref 1.6–2.4)
MAGNESIUM SERPL-MCNC: 2.4 MG/DL (ref 1.6–2.4)
MAGNESIUM SERPL-MCNC: 2.6 MG/DL (ref 1.6–2.4)
MAGNESIUM SERPL-MCNC: 2.6 MG/DL (ref 1.6–2.4)
MAGNESIUM SERPL-MCNC: 2.8 MG/DL (ref 1.6–2.4)
MCH RBC QN AUTO: 28.3 PG (ref 26–34)
MCH RBC QN AUTO: 28.4 PG (ref 26–34)
MCH RBC QN AUTO: 28.4 PG (ref 26–34)
MCH RBC QN AUTO: 28.8 PG (ref 26–34)
MCH RBC QN AUTO: 28.8 PG (ref 26–34)
MCH RBC QN AUTO: 29.1 PG (ref 26–34)
MCH RBC QN AUTO: 29.2 PG (ref 26–34)
MCH RBC QN AUTO: 29.2 PG (ref 26–34)
MCH RBC QN AUTO: 29.4 PG (ref 26–34)
MCH RBC QN AUTO: 29.5 PG (ref 26–34)
MCH RBC QN AUTO: 29.6 PG (ref 26–34)
MCH RBC QN AUTO: 29.7 PG (ref 26–34)
MCH RBC QN AUTO: 30 PG (ref 26–34)
MCHC RBC AUTO-ENTMCNC: 29.1 G/DL (ref 30–36.5)
MCHC RBC AUTO-ENTMCNC: 29.3 G/DL (ref 30–36.5)
MCHC RBC AUTO-ENTMCNC: 30.1 G/DL (ref 30–36.5)
MCHC RBC AUTO-ENTMCNC: 30.2 G/DL (ref 30–36.5)
MCHC RBC AUTO-ENTMCNC: 30.3 G/DL (ref 30–36.5)
MCHC RBC AUTO-ENTMCNC: 30.3 G/DL (ref 30–36.5)
MCHC RBC AUTO-ENTMCNC: 30.4 G/DL (ref 30–36.5)
MCHC RBC AUTO-ENTMCNC: 30.7 G/DL (ref 30–36.5)
MCHC RBC AUTO-ENTMCNC: 31.1 G/DL (ref 30–36.5)
MCHC RBC AUTO-ENTMCNC: 31.4 G/DL (ref 30–36.5)
MCHC RBC AUTO-ENTMCNC: 31.6 G/DL (ref 30–36.5)
MCHC RBC AUTO-ENTMCNC: 32 G/DL (ref 30–36.5)
MCHC RBC AUTO-ENTMCNC: 32.1 G/DL (ref 30–36.5)
MCHC RBC AUTO-ENTMCNC: 32.3 G/DL (ref 30–36.5)
MCHC RBC AUTO-ENTMCNC: 32.3 G/DL (ref 30–36.5)
MCV RBC AUTO: 91.6 FL (ref 80–99)
MCV RBC AUTO: 91.9 FL (ref 80–99)
MCV RBC AUTO: 92.4 FL (ref 80–99)
MCV RBC AUTO: 93.7 FL (ref 80–99)
MCV RBC AUTO: 93.8 FL (ref 80–99)
MCV RBC AUTO: 94.7 FL (ref 80–99)
MCV RBC AUTO: 94.8 FL (ref 80–99)
MCV RBC AUTO: 95 FL (ref 80–99)
MCV RBC AUTO: 95.5 FL (ref 80–99)
MCV RBC AUTO: 96.4 FL (ref 80–99)
MCV RBC AUTO: 96.7 FL (ref 80–99)
MCV RBC AUTO: 96.7 FL (ref 80–99)
MCV RBC AUTO: 97.5 FL (ref 80–99)
MONOCYTES # BLD: 0.5 K/UL (ref 0–1)
MONOCYTES # BLD: 0.6 K/UL (ref 0–1)
MONOCYTES NFR BLD: 4 % (ref 5–13)
MONOCYTES NFR BLD: 5 % (ref 5–13)
NEUTS SEG # BLD: 13.5 K/UL (ref 1.8–8)
NEUTS SEG # BLD: 8.9 K/UL (ref 1.8–8)
NEUTS SEG NFR BLD: 86 % (ref 32–75)
NEUTS SEG NFR BLD: 90 % (ref 32–75)
NITRITE UR QL STRIP.AUTO: NEGATIVE
NRBC # BLD: 0 K/UL (ref 0–0.01)
NRBC # BLD: 0.02 K/UL (ref 0–0.01)
NRBC # BLD: 0.02 K/UL (ref 0–0.01)
NRBC # BLD: 0.03 K/UL (ref 0–0.01)
NRBC # BLD: 0.04 K/UL (ref 0–0.01)
NRBC # BLD: 0.05 K/UL (ref 0–0.01)
NRBC # BLD: 0.05 K/UL (ref 0–0.01)
NRBC BLD-RTO: 0 PER 100 WBC
NRBC BLD-RTO: 0.1 PER 100 WBC
NRBC BLD-RTO: 0.2 PER 100 WBC
O2/TOTAL GAS SETTING VFR VENT: 0.9 %
O2/TOTAL GAS SETTING VFR VENT: 100 %
P-R INTERVAL, ECG05: 118 MS
P-R INTERVAL, ECG05: 170 MS
PCO2 BLD: 44.3 MMHG (ref 35–45)
PCO2 BLD: 47.2 MMHG (ref 35–45)
PCO2 BLD: 49.7 MMHG (ref 35–45)
PCO2 BLD: 60.2 MMHG (ref 35–45)
PCO2 BLDA: 44 MMHG (ref 35–45)
PCO2 BLDA: 45 MMHG (ref 35–45)
PEEP RESPIRATORY: 10 CMH2O
PEEP RESPIRATORY: 12 CMH2O
PEEP RESPIRATORY: 14 CMH2O
PH BLD: 7.31 [PH] (ref 7.35–7.45)
PH BLD: 7.31 [PH] (ref 7.35–7.45)
PH BLD: 7.32 [PH] (ref 7.35–7.45)
PH BLD: 7.33 [PH] (ref 7.35–7.45)
PH BLDA: 7.39 [PH] (ref 7.35–7.45)
PH BLDA: 7.4 [PH] (ref 7.35–7.45)
PH UR STRIP: 5 [PH] (ref 5–8)
PHOSPHATE SERPL-MCNC: 11 MG/DL (ref 2.6–4.7)
PHOSPHATE SERPL-MCNC: 2.4 MG/DL (ref 2.6–4.7)
PHOSPHATE SERPL-MCNC: 2.9 MG/DL (ref 2.6–4.7)
PHOSPHATE SERPL-MCNC: 3.1 MG/DL (ref 2.6–4.7)
PHOSPHATE SERPL-MCNC: 3.3 MG/DL (ref 2.6–4.7)
PHOSPHATE SERPL-MCNC: 3.5 MG/DL (ref 2.6–4.7)
PHOSPHATE SERPL-MCNC: 4.3 MG/DL (ref 2.6–4.7)
PIP ISTAT,IPIP: 16
PIP ISTAT,IPIP: 24
PLATELET # BLD AUTO: 165 K/UL (ref 150–400)
PLATELET # BLD AUTO: 179 K/UL (ref 150–400)
PLATELET # BLD AUTO: 188 K/UL (ref 150–400)
PLATELET # BLD AUTO: 236 K/UL (ref 150–400)
PLATELET # BLD AUTO: 248 K/UL (ref 150–400)
PLATELET # BLD AUTO: 255 K/UL (ref 150–400)
PLATELET # BLD AUTO: 260 K/UL (ref 150–400)
PLATELET # BLD AUTO: 261 K/UL (ref 150–400)
PLATELET # BLD AUTO: 266 K/UL (ref 150–400)
PLATELET # BLD AUTO: 276 K/UL (ref 150–400)
PLATELET # BLD AUTO: 282 K/UL (ref 150–400)
PLATELET # BLD AUTO: 284 K/UL (ref 150–400)
PLATELET # BLD AUTO: 284 K/UL (ref 150–400)
PLATELET # BLD AUTO: 286 K/UL (ref 150–400)
PLATELET # BLD AUTO: 289 K/UL (ref 150–400)
PMV BLD AUTO: 11 FL (ref 8.9–12.9)
PMV BLD AUTO: 11.1 FL (ref 8.9–12.9)
PMV BLD AUTO: 11.2 FL (ref 8.9–12.9)
PMV BLD AUTO: 11.2 FL (ref 8.9–12.9)
PMV BLD AUTO: 11.3 FL (ref 8.9–12.9)
PMV BLD AUTO: 11.5 FL (ref 8.9–12.9)
PMV BLD AUTO: 11.7 FL (ref 8.9–12.9)
PMV BLD AUTO: 11.7 FL (ref 8.9–12.9)
PMV BLD AUTO: 11.8 FL (ref 8.9–12.9)
PMV BLD AUTO: 11.8 FL (ref 8.9–12.9)
PMV BLD AUTO: 11.9 FL (ref 8.9–12.9)
PMV BLD AUTO: 12.3 FL (ref 8.9–12.9)
PMV BLD AUTO: 12.4 FL (ref 8.9–12.9)
PO2 BLD: 116 MMHG (ref 80–100)
PO2 BLD: 37 MMHG (ref 80–100)
PO2 BLD: 53 MMHG (ref 80–100)
PO2 BLD: 59 MMHG (ref 80–100)
PO2 BLDA: 52 MMHG (ref 80–100)
PO2 BLDA: 62 MMHG (ref 80–100)
POTASSIUM SERPL-SCNC: 3.4 MMOL/L (ref 3.5–5.1)
POTASSIUM SERPL-SCNC: 3.8 MMOL/L (ref 3.5–5.1)
POTASSIUM SERPL-SCNC: 3.9 MMOL/L (ref 3.5–5.1)
POTASSIUM SERPL-SCNC: 3.9 MMOL/L (ref 3.5–5.2)
POTASSIUM SERPL-SCNC: 4 MMOL/L (ref 3.5–5.1)
POTASSIUM SERPL-SCNC: 4 MMOL/L (ref 3.5–5.1)
POTASSIUM SERPL-SCNC: 4.1 MMOL/L (ref 3.5–5.1)
POTASSIUM SERPL-SCNC: 4.1 MMOL/L (ref 3.5–5.1)
POTASSIUM SERPL-SCNC: 4.2 MMOL/L (ref 3.5–5.1)
POTASSIUM SERPL-SCNC: 4.2 MMOL/L (ref 3.5–5.1)
POTASSIUM SERPL-SCNC: 4.3 MMOL/L (ref 3.5–5.1)
POTASSIUM SERPL-SCNC: 4.5 MMOL/L (ref 3.5–5.1)
POTASSIUM SERPL-SCNC: 4.5 MMOL/L (ref 3.5–5.1)
POTASSIUM SERPL-SCNC: 4.6 MMOL/L (ref 3.5–5.1)
POTASSIUM SERPL-SCNC: 5.7 MMOL/L (ref 3.5–5.1)
POTASSIUM SERPL-SCNC: 6.2 MMOL/L (ref 3.5–5.1)
POTASSIUM SERPL-SCNC: 6.4 MMOL/L (ref 3.5–5.1)
PROCALCITONIN SERPL-MCNC: 0.13 NG/ML
PROCALCITONIN SERPL-MCNC: 0.46 NG/ML
PROCALCITONIN SERPL-MCNC: 0.88 NG/ML
PROCALCITONIN SERPL-MCNC: 2.77 NG/ML
PROCALCITONIN SERPL-MCNC: <0.05 NG/ML
PROT SERPL-MCNC: 7.9 G/DL (ref 6–8.5)
PROT SERPL-MCNC: 8.4 G/DL (ref 6.4–8.2)
PROT SERPL-MCNC: 8.6 G/DL (ref 6.4–8.2)
PROT SERPL-MCNC: 8.7 G/DL (ref 6.4–8.2)
PROT SERPL-MCNC: 8.8 G/DL (ref 6.4–8.2)
PROT SERPL-MCNC: 8.9 G/DL (ref 6.4–8.2)
PROT UR STRIP-MCNC: NEGATIVE MG/DL
PROTHROMBIN TIME: 10.9 SEC (ref 9–11.1)
PROTHROMBIN TIME: 10.9 SEC (ref 9–11.1)
PROTHROMBIN TIME: 11.2 SEC (ref 9–11.1)
PROTHROMBIN TIME: 11.3 SEC (ref 9–11.1)
PROTHROMBIN TIME: 11.4 SEC (ref 9–11.1)
PROTHROMBIN TIME: 11.8 SEC (ref 9–11.1)
PROTHROMBIN TIME: 12.2 SEC (ref 9–11.1)
PROTHROMBIN TIME: 12.3 SEC (ref 9–11.1)
PROTHROMBIN TIME: 12.4 SEC (ref 9–11.1)
PROTHROMBIN TIME: 12.5 SEC (ref 9–11.1)
PROTHROMBIN TIME: 12.8 SEC (ref 9–11.1)
Q-T INTERVAL, ECG07: 288 MS
Q-T INTERVAL, ECG07: 304 MS
Q-T INTERVAL, ECG07: 392 MS
QRS DURATION, ECG06: 60 MS
QRS DURATION, ECG06: 64 MS
QRS DURATION, ECG06: 82 MS
QTC CALCULATION (BEZET), ECG08: 411 MS
QTC CALCULATION (BEZET), ECG08: 425 MS
QTC CALCULATION (BEZET), ECG08: 468 MS
RBC # BLD AUTO: 3.4 M/UL (ref 3.8–5.2)
RBC # BLD AUTO: 3.6 M/UL (ref 3.8–5.2)
RBC # BLD AUTO: 3.61 M/UL (ref 3.8–5.2)
RBC # BLD AUTO: 3.63 M/UL (ref 3.8–5.2)
RBC # BLD AUTO: 3.82 M/UL (ref 3.8–5.2)
RBC # BLD AUTO: 3.94 M/UL (ref 3.8–5.2)
RBC # BLD AUTO: 3.96 M/UL (ref 3.8–5.2)
RBC # BLD AUTO: 3.96 M/UL (ref 3.8–5.2)
RBC # BLD AUTO: 4.22 M/UL (ref 3.8–5.2)
RBC # BLD AUTO: 4.26 M/UL (ref 3.8–5.2)
RBC # BLD AUTO: 4.4 M/UL (ref 3.8–5.2)
RBC # BLD AUTO: 4.43 M/UL (ref 3.8–5.2)
RBC # BLD AUTO: 4.45 M/UL (ref 3.8–5.2)
RBC # BLD AUTO: 4.49 M/UL (ref 3.8–5.2)
RBC # BLD AUTO: 4.58 M/UL (ref 3.8–5.2)
RBC #/AREA URNS HPF: ABNORMAL /HPF (ref 0–5)
REPORTED DOSE,DOSE: NOT DETECTED UNITS
REPORTED DOSE/TIME,TMG: NOT DETECTED
SAO2 % BLD: 63 % (ref 92–97)
SAO2 % BLD: 84 % (ref 92–97)
SAO2 % BLD: 87 % (ref 92–97)
SAO2 % BLD: 88 % (ref 92–97)
SAO2 % BLD: 91 % (ref 92–97)
SAO2 % BLD: 98 % (ref 92–97)
SAO2% DEVICE SAO2% SENSOR NAME: ABNORMAL
SAO2% DEVICE SAO2% SENSOR NAME: ABNORMAL
SERVICE CMNT-IMP: ABNORMAL
SERVICE CMNT-IMP: NO
SERVICE CMNT-IMP: NORMAL
SODIUM SERPL-SCNC: 129 MMOL/L (ref 136–145)
SODIUM SERPL-SCNC: 132 MMOL/L (ref 136–145)
SODIUM SERPL-SCNC: 133 MMOL/L (ref 136–145)
SODIUM SERPL-SCNC: 134 MMOL/L (ref 136–145)
SODIUM SERPL-SCNC: 135 MMOL/L (ref 136–145)
SODIUM SERPL-SCNC: 136 MMOL/L (ref 136–145)
SODIUM SERPL-SCNC: 139 MMOL/L (ref 134–144)
SODIUM SERPL-SCNC: 139 MMOL/L (ref 136–145)
SODIUM SERPL-SCNC: 139 MMOL/L (ref 136–145)
SODIUM SERPL-SCNC: 140 MMOL/L (ref 136–145)
SODIUM SERPL-SCNC: 141 MMOL/L (ref 136–145)
SODIUM SERPL-SCNC: 141 MMOL/L (ref 136–145)
SODIUM SERPL-SCNC: 143 MMOL/L (ref 136–145)
SODIUM SERPL-SCNC: 144 MMOL/L (ref 136–145)
SODIUM SERPL-SCNC: 144 MMOL/L (ref 136–145)
SODIUM SERPL-SCNC: 146 MMOL/L (ref 136–145)
SODIUM SERPL-SCNC: 146 MMOL/L (ref 136–145)
SOURCE, COVRS: ABNORMAL
SP GR UR REFRACTOMETRY: 1.02 (ref 1–1.03)
SPECIMEN SITE: ABNORMAL
SPECIMEN SITE: ABNORMAL
SPECIMEN SOURCE, FCOV2M: ABNORMAL
SPECIMEN TYPE, XMCV1T: ABNORMAL
SPECIMEN TYPE: ABNORMAL
THERAPEUTIC RANGE,PTTT: ABNORMAL SECS (ref 58–77)
THERAPEUTIC RANGE,PTTT: NORMAL SECS (ref 58–77)
TOTAL RESP. RATE, ITRR: 30
TOTAL RESP. RATE, ITRR: 34
TRIGL SERPL-MCNC: 220 MG/DL (ref ?–150)
TROPONIN I SERPL-MCNC: <0.05 NG/ML
UA: UC IF INDICATED,UAUC: ABNORMAL
UROBILINOGEN UR QL STRIP.AUTO: 1 EU/DL (ref 0.2–1)
VANCOMYCIN SERPL-MCNC: 19.5 UG/ML
VANCOMYCIN SERPL-MCNC: 22.7 UG/ML
VANCOMYCIN SERPL-MCNC: 23.5 UG/ML
VANCOMYCIN TROUGH SERPL-MCNC: 18.8 UG/ML (ref 5–10)
VENTILATION MODE VENT: ABNORMAL
VENTRICULAR RATE, ECG03: 110 BPM
VENTRICULAR RATE, ECG03: 159 BPM
VENTRICULAR RATE, ECG03: 71 BPM
VT SETTING VENT: 400 ML
VT SETTING VENT: 500 ML
WBC # BLD AUTO: 10.4 K/UL (ref 3.6–11)
WBC # BLD AUTO: 15.2 K/UL (ref 3.6–11)
WBC # BLD AUTO: 15.5 K/UL (ref 3.6–11)
WBC # BLD AUTO: 15.5 K/UL (ref 3.6–11)
WBC # BLD AUTO: 16.6 K/UL (ref 3.6–11)
WBC # BLD AUTO: 19.4 K/UL (ref 3.6–11)
WBC # BLD AUTO: 21.3 K/UL (ref 3.6–11)
WBC # BLD AUTO: 21.7 K/UL (ref 3.6–11)
WBC # BLD AUTO: 25.2 K/UL (ref 3.6–11)
WBC # BLD AUTO: 25.2 K/UL (ref 3.6–11)
WBC # BLD AUTO: 27.1 K/UL (ref 3.6–11)
WBC # BLD AUTO: 27.7 K/UL (ref 3.6–11)
WBC # BLD AUTO: 27.7 K/UL (ref 3.6–11)
WBC # BLD AUTO: 30.2 K/UL (ref 3.6–11)
WBC # BLD AUTO: 31.5 K/UL (ref 3.6–11)
WBC URNS QL MICRO: ABNORMAL /HPF (ref 0–4)

## 2020-01-01 PROCEDURE — 84145 PROCALCITONIN (PCT): CPT

## 2020-01-01 PROCEDURE — 74011250636 HC RX REV CODE- 250/636: Performed by: SURGERY

## 2020-01-01 PROCEDURE — 31500 INSERT EMERGENCY AIRWAY: CPT

## 2020-01-01 PROCEDURE — 74011000250 HC RX REV CODE- 250: Performed by: INTERNAL MEDICINE

## 2020-01-01 PROCEDURE — 85730 THROMBOPLASTIN TIME PARTIAL: CPT

## 2020-01-01 PROCEDURE — 85027 COMPLETE CBC AUTOMATED: CPT

## 2020-01-01 PROCEDURE — 02HV33Z INSERTION OF INFUSION DEVICE INTO SUPERIOR VENA CAVA, PERCUTANEOUS APPROACH: ICD-10-PCS | Performed by: INTERNAL MEDICINE

## 2020-01-01 PROCEDURE — 74011250637 HC RX REV CODE- 250/637: Performed by: STUDENT IN AN ORGANIZED HEALTH CARE EDUCATION/TRAINING PROGRAM

## 2020-01-01 PROCEDURE — 85025 COMPLETE CBC W/AUTO DIFF WBC: CPT

## 2020-01-01 PROCEDURE — 85384 FIBRINOGEN ACTIVITY: CPT

## 2020-01-01 PROCEDURE — 36415 COLL VENOUS BLD VENIPUNCTURE: CPT

## 2020-01-01 PROCEDURE — 74011250636 HC RX REV CODE- 250/636: Performed by: INTERNAL MEDICINE

## 2020-01-01 PROCEDURE — 74011000258 HC RX REV CODE- 258: Performed by: NURSE PRACTITIONER

## 2020-01-01 PROCEDURE — 74011000250 HC RX REV CODE- 250: Performed by: NURSE PRACTITIONER

## 2020-01-01 PROCEDURE — 85610 PROTHROMBIN TIME: CPT

## 2020-01-01 PROCEDURE — 74011000258 HC RX REV CODE- 258: Performed by: INTERNAL MEDICINE

## 2020-01-01 PROCEDURE — 77030038269 HC DRN EXT URIN PURWCK BARD -A

## 2020-01-01 PROCEDURE — 94640 AIRWAY INHALATION TREATMENT: CPT

## 2020-01-01 PROCEDURE — 5A09557 ASSISTANCE WITH RESPIRATORY VENTILATION, GREATER THAN 96 CONSECUTIVE HOURS, CONTINUOUS POSITIVE AIRWAY PRESSURE: ICD-10-PCS | Performed by: EMERGENCY MEDICINE

## 2020-01-01 PROCEDURE — 94003 VENT MGMT INPAT SUBQ DAY: CPT

## 2020-01-01 PROCEDURE — C9113 INJ PANTOPRAZOLE SODIUM, VIA: HCPCS | Performed by: SURGERY

## 2020-01-01 PROCEDURE — 80202 ASSAY OF VANCOMYCIN: CPT

## 2020-01-01 PROCEDURE — 71045 X-RAY EXAM CHEST 1 VIEW: CPT

## 2020-01-01 PROCEDURE — 77030013038 HC MSK CPAP FISP -A

## 2020-01-01 PROCEDURE — 51798 US URINE CAPACITY MEASURE: CPT

## 2020-01-01 PROCEDURE — 84100 ASSAY OF PHOSPHORUS: CPT

## 2020-01-01 PROCEDURE — 74011250636 HC RX REV CODE- 250/636: Performed by: HOSPITALIST

## 2020-01-01 PROCEDURE — 83735 ASSAY OF MAGNESIUM: CPT

## 2020-01-01 PROCEDURE — 74011250636 HC RX REV CODE- 250/636: Performed by: NURSE PRACTITIONER

## 2020-01-01 PROCEDURE — 84484 ASSAY OF TROPONIN QUANT: CPT

## 2020-01-01 PROCEDURE — 74011636637 HC RX REV CODE- 636/637: Performed by: STUDENT IN AN ORGANIZED HEALTH CARE EDUCATION/TRAINING PROGRAM

## 2020-01-01 PROCEDURE — 85379 FIBRIN DEGRADATION QUANT: CPT

## 2020-01-01 PROCEDURE — 82803 BLOOD GASES ANY COMBINATION: CPT

## 2020-01-01 PROCEDURE — 74011250637 HC RX REV CODE- 250/637: Performed by: SURGERY

## 2020-01-01 PROCEDURE — 74011000250 HC RX REV CODE- 250: Performed by: SURGERY

## 2020-01-01 PROCEDURE — 82962 GLUCOSE BLOOD TEST: CPT

## 2020-01-01 PROCEDURE — 74011250636 HC RX REV CODE- 250/636: Performed by: STUDENT IN AN ORGANIZED HEALTH CARE EDUCATION/TRAINING PROGRAM

## 2020-01-01 PROCEDURE — 74011636637 HC RX REV CODE- 636/637: Performed by: NURSE PRACTITIONER

## 2020-01-01 PROCEDURE — 65660000000 HC RM CCU STEPDOWN

## 2020-01-01 PROCEDURE — XW033E5 INTRODUCTION OF REMDESIVIR ANTI-INFECTIVE INTO PERIPHERAL VEIN, PERCUTANEOUS APPROACH, NEW TECHNOLOGY GROUP 5: ICD-10-PCS | Performed by: INTERNAL MEDICINE

## 2020-01-01 PROCEDURE — 99223 1ST HOSP IP/OBS HIGH 75: CPT | Performed by: NURSE PRACTITIONER

## 2020-01-01 PROCEDURE — 80048 BASIC METABOLIC PNL TOTAL CA: CPT

## 2020-01-01 PROCEDURE — C1751 CATH, INF, PER/CENT/MIDLINE: HCPCS

## 2020-01-01 PROCEDURE — 80053 COMPREHEN METABOLIC PANEL: CPT

## 2020-01-01 PROCEDURE — 94660 CPAP INITIATION&MGMT: CPT

## 2020-01-01 PROCEDURE — 74011000258 HC RX REV CODE- 258: Performed by: EMERGENCY MEDICINE

## 2020-01-01 PROCEDURE — 74011000258 HC RX REV CODE- 258: Performed by: STUDENT IN AN ORGANIZED HEALTH CARE EDUCATION/TRAINING PROGRAM

## 2020-01-01 PROCEDURE — 77030018786 HC NDL GD F/USND BARD -B

## 2020-01-01 PROCEDURE — 96365 THER/PROPH/DIAG IV INF INIT: CPT

## 2020-01-01 PROCEDURE — 36600 WITHDRAWAL OF ARTERIAL BLOOD: CPT

## 2020-01-01 PROCEDURE — 65610000006 HC RM INTENSIVE CARE

## 2020-01-01 PROCEDURE — 93005 ELECTROCARDIOGRAM TRACING: CPT

## 2020-01-01 PROCEDURE — 83615 LACTATE (LD) (LDH) ENZYME: CPT

## 2020-01-01 PROCEDURE — 2709999900 HC NON-CHARGEABLE SUPPLY

## 2020-01-01 PROCEDURE — 77010033678 HC OXYGEN DAILY

## 2020-01-01 PROCEDURE — 74011250636 HC RX REV CODE- 250/636

## 2020-01-01 PROCEDURE — 77030008683 HC TU ET CUF COVD -A

## 2020-01-01 PROCEDURE — 74011636637 HC RX REV CODE- 636/637: Performed by: INTERNAL MEDICINE

## 2020-01-01 PROCEDURE — 74011250637 HC RX REV CODE- 250/637: Performed by: INTERNAL MEDICINE

## 2020-01-01 PROCEDURE — 77030018831 HC SOL IRR H20 BAXT -A

## 2020-01-01 PROCEDURE — 83605 ASSAY OF LACTIC ACID: CPT

## 2020-01-01 PROCEDURE — 87070 CULTURE OTHR SPECIMN AEROBIC: CPT

## 2020-01-01 PROCEDURE — 74011000258 HC RX REV CODE- 258: Performed by: HOSPITALIST

## 2020-01-01 PROCEDURE — 96375 TX/PRO/DX INJ NEW DRUG ADDON: CPT

## 2020-01-01 PROCEDURE — 74018 RADEX ABDOMEN 1 VIEW: CPT

## 2020-01-01 PROCEDURE — 74011000250 HC RX REV CODE- 250

## 2020-01-01 PROCEDURE — 94002 VENT MGMT INPAT INIT DAY: CPT

## 2020-01-01 PROCEDURE — 5A1955Z RESPIRATORY VENTILATION, GREATER THAN 96 CONSECUTIVE HOURS: ICD-10-PCS | Performed by: ANESTHESIOLOGY

## 2020-01-01 PROCEDURE — 87635 SARS-COV-2 COVID-19 AMP PRB: CPT

## 2020-01-01 PROCEDURE — 84478 ASSAY OF TRIGLYCERIDES: CPT

## 2020-01-01 PROCEDURE — 87040 BLOOD CULTURE FOR BACTERIA: CPT

## 2020-01-01 PROCEDURE — 86140 C-REACTIVE PROTEIN: CPT

## 2020-01-01 PROCEDURE — 99233 SBSQ HOSP IP/OBS HIGH 50: CPT | Performed by: NURSE PRACTITIONER

## 2020-01-01 PROCEDURE — 74011250637 HC RX REV CODE- 250/637: Performed by: NURSE PRACTITIONER

## 2020-01-01 PROCEDURE — 74011250636 HC RX REV CODE- 250/636: Performed by: EMERGENCY MEDICINE

## 2020-01-01 PROCEDURE — 76937 US GUIDE VASCULAR ACCESS: CPT

## 2020-01-01 PROCEDURE — 80069 RENAL FUNCTION PANEL: CPT

## 2020-01-01 PROCEDURE — 77030007904 HC MANFLD 2 PRT MEDX -A

## 2020-01-01 PROCEDURE — 77030012793 HC CIRC VNTLTR FISP -B

## 2020-01-01 PROCEDURE — 77030020291 HC FLEXSEAL FMS BMS -C

## 2020-01-01 PROCEDURE — 82728 ASSAY OF FERRITIN: CPT

## 2020-01-01 PROCEDURE — 74011000250 HC RX REV CODE- 250: Performed by: STUDENT IN AN ORGANIZED HEALTH CARE EDUCATION/TRAINING PROGRAM

## 2020-01-01 PROCEDURE — 99285 EMERGENCY DEPT VISIT HI MDM: CPT

## 2020-01-01 PROCEDURE — 96374 THER/PROPH/DIAG INJ IV PUSH: CPT

## 2020-01-01 PROCEDURE — 83880 ASSAY OF NATRIURETIC PEPTIDE: CPT

## 2020-01-01 PROCEDURE — 36573 INSJ PICC RS&I 5 YR+: CPT | Performed by: INTERNAL MEDICINE

## 2020-01-01 PROCEDURE — 0BH18EZ INSERTION OF ENDOTRACHEAL AIRWAY INTO TRACHEA, VIA NATURAL OR ARTIFICIAL OPENING ENDOSCOPIC: ICD-10-PCS | Performed by: ANESTHESIOLOGY

## 2020-01-01 PROCEDURE — 74011250636 HC RX REV CODE- 250/636: Performed by: ANESTHESIOLOGY

## 2020-01-01 PROCEDURE — 99222 1ST HOSP IP/OBS MODERATE 55: CPT | Performed by: INTERNAL MEDICINE

## 2020-01-01 PROCEDURE — 3E0436Z INTRODUCTION OF NUTRITIONAL SUBSTANCE INTO CENTRAL VEIN, PERCUTANEOUS APPROACH: ICD-10-PCS | Performed by: INTERNAL MEDICINE

## 2020-01-01 PROCEDURE — 81001 URINALYSIS AUTO W/SCOPE: CPT

## 2020-01-01 RX ORDER — CITALOPRAM 20 MG/1
TABLET, FILM COATED ORAL
Qty: 90 TAB | Refills: 1 | Status: SHIPPED | OUTPATIENT
Start: 2020-01-01

## 2020-01-01 RX ORDER — SODIUM BICARBONATE IN D5W 150/1000ML
PLASTIC BAG, INJECTION (ML) INTRAVENOUS CONTINUOUS
Status: DISPENSED | OUTPATIENT
Start: 2020-01-01 | End: 2020-01-01

## 2020-01-01 RX ORDER — ENOXAPARIN SODIUM 150 MG/ML
150 INJECTION SUBCUTANEOUS EVERY 12 HOURS
Status: DISCONTINUED | OUTPATIENT
Start: 2020-01-01 | End: 2020-01-01

## 2020-01-01 RX ORDER — MORPHINE SULFATE 2 MG/ML
2 INJECTION, SOLUTION INTRAMUSCULAR; INTRAVENOUS
Status: DISCONTINUED | OUTPATIENT
Start: 2020-01-01 | End: 2020-01-01 | Stop reason: HOSPADM

## 2020-01-01 RX ORDER — MAGNESIUM SULFATE 100 %
4 CRYSTALS MISCELLANEOUS AS NEEDED
Status: DISCONTINUED | OUTPATIENT
Start: 2020-01-01 | End: 2020-01-01 | Stop reason: HOSPADM

## 2020-01-01 RX ORDER — IPRATROPIUM BROMIDE AND ALBUTEROL SULFATE 2.5; .5 MG/3ML; MG/3ML
3 SOLUTION RESPIRATORY (INHALATION)
Status: COMPLETED | OUTPATIENT
Start: 2020-01-01 | End: 2020-01-01

## 2020-01-01 RX ORDER — HEPARIN 100 UNIT/ML
300 SYRINGE INTRAVENOUS AS NEEDED
Status: DISCONTINUED | OUTPATIENT
Start: 2020-01-01 | End: 2020-01-01 | Stop reason: HOSPADM

## 2020-01-01 RX ORDER — DEXAMETHASONE SODIUM PHOSPHATE 4 MG/ML
10 INJECTION, SOLUTION INTRA-ARTICULAR; INTRALESIONAL; INTRAMUSCULAR; INTRAVENOUS; SOFT TISSUE
Status: COMPLETED | OUTPATIENT
Start: 2020-01-01 | End: 2020-01-01

## 2020-01-01 RX ORDER — CISATRACURIUM BESYLATE 2 MG/ML
0.2 INJECTION, SOLUTION INTRAVENOUS ONCE
Status: COMPLETED | OUTPATIENT
Start: 2020-01-01 | End: 2020-01-01

## 2020-01-01 RX ORDER — GUAIFENESIN 100 MG/5ML
81 LIQUID (ML) ORAL DAILY
Status: CANCELLED | OUTPATIENT
Start: 2020-01-01

## 2020-01-01 RX ORDER — FUROSEMIDE 80 MG/1
TABLET ORAL
Qty: 180 TAB | Refills: 1 | Status: SHIPPED | OUTPATIENT
Start: 2020-01-01 | End: 2020-01-01

## 2020-01-01 RX ORDER — DEXTROSE MONOHYDRATE AND SODIUM CHLORIDE 5; .45 G/100ML; G/100ML
100 INJECTION, SOLUTION INTRAVENOUS CONTINUOUS
Status: DISCONTINUED | OUTPATIENT
Start: 2020-01-01 | End: 2020-01-01

## 2020-01-01 RX ORDER — HYDRALAZINE HYDROCHLORIDE 20 MG/ML
10 INJECTION INTRAMUSCULAR; INTRAVENOUS
Status: DISCONTINUED | OUTPATIENT
Start: 2020-01-01 | End: 2020-01-01

## 2020-01-01 RX ORDER — INSULIN LISPRO 100 [IU]/ML
INJECTION, SOLUTION INTRAVENOUS; SUBCUTANEOUS
Status: DISCONTINUED | OUTPATIENT
Start: 2020-01-01 | End: 2020-01-01

## 2020-01-01 RX ORDER — INSULIN LISPRO 100 [IU]/ML
INJECTION, SOLUTION INTRAVENOUS; SUBCUTANEOUS EVERY 6 HOURS
Status: DISCONTINUED | OUTPATIENT
Start: 2020-01-01 | End: 2020-01-01 | Stop reason: HOSPADM

## 2020-01-01 RX ORDER — GUAIFENESIN 100 MG/5ML
LIQUID (ML) ORAL
Qty: 90 TAB | Refills: 1 | Status: SHIPPED | OUTPATIENT
Start: 2020-01-01 | End: 2020-01-01 | Stop reason: SDUPTHER

## 2020-01-01 RX ORDER — ENOXAPARIN SODIUM 150 MG/ML
135 INJECTION SUBCUTANEOUS EVERY 12 HOURS
Status: DISCONTINUED | OUTPATIENT
Start: 2020-01-01 | End: 2020-01-01

## 2020-01-01 RX ORDER — PROPOFOL 10 MG/ML
0-50 VIAL (ML) INTRAVENOUS
Status: DISCONTINUED | OUTPATIENT
Start: 2020-01-01 | End: 2020-01-01 | Stop reason: HOSPADM

## 2020-01-01 RX ORDER — ENOXAPARIN SODIUM 100 MG/ML
40 INJECTION SUBCUTANEOUS DAILY
Status: DISCONTINUED | OUTPATIENT
Start: 2020-01-01 | End: 2020-01-01

## 2020-01-01 RX ORDER — HALOPERIDOL 2 MG/ML
2 SOLUTION ORAL
Status: DISCONTINUED | OUTPATIENT
Start: 2020-01-01 | End: 2020-01-01 | Stop reason: HOSPADM

## 2020-01-01 RX ORDER — POLYETHYLENE GLYCOL 3350 17 G/17G
17 POWDER, FOR SOLUTION ORAL DAILY PRN
Status: DISCONTINUED | OUTPATIENT
Start: 2020-01-01 | End: 2020-01-01 | Stop reason: HOSPADM

## 2020-01-01 RX ORDER — OLMESARTAN MEDOXOMIL, AMLODIPINE AND HYDROCHLOROTHIAZIDE TABLET 40/10/25 MG 40; 10; 25 MG/1; MG/1; MG/1
TABLET ORAL
Qty: 90 TAB | Refills: 1 | Status: SHIPPED | OUTPATIENT
Start: 2020-01-01

## 2020-01-01 RX ORDER — MIDAZOLAM HYDROCHLORIDE 1 MG/ML
INJECTION, SOLUTION INTRAMUSCULAR; INTRAVENOUS
Status: COMPLETED
Start: 2020-01-01 | End: 2020-01-01

## 2020-01-01 RX ORDER — SODIUM CHLORIDE 0.9 % (FLUSH) 0.9 %
5-40 SYRINGE (ML) INJECTION EVERY 8 HOURS
Status: DISCONTINUED | OUTPATIENT
Start: 2020-01-01 | End: 2020-01-01 | Stop reason: SDUPTHER

## 2020-01-01 RX ORDER — FUROSEMIDE 10 MG/ML
20 INJECTION INTRAMUSCULAR; INTRAVENOUS ONCE
Status: COMPLETED | OUTPATIENT
Start: 2020-01-01 | End: 2020-01-01

## 2020-01-01 RX ORDER — FUROSEMIDE 10 MG/ML
40 INJECTION INTRAMUSCULAR; INTRAVENOUS ONCE
Status: COMPLETED | OUTPATIENT
Start: 2020-01-01 | End: 2020-01-01

## 2020-01-01 RX ORDER — DEXTROSE 50 % IN WATER (D50W) INTRAVENOUS SYRINGE
12.5-25 AS NEEDED
Status: DISCONTINUED | OUTPATIENT
Start: 2020-01-01 | End: 2020-01-01 | Stop reason: HOSPADM

## 2020-01-01 RX ORDER — FUROSEMIDE 10 MG/ML
80 INJECTION INTRAMUSCULAR; INTRAVENOUS ONCE
Status: COMPLETED | OUTPATIENT
Start: 2020-01-01 | End: 2020-01-01

## 2020-01-01 RX ORDER — ERGOCALCIFEROL 1.25 MG/1
50000 CAPSULE ORAL
Qty: 12 CAP | Refills: 1 | Status: SHIPPED | OUTPATIENT
Start: 2020-01-01

## 2020-01-01 RX ORDER — POTASSIUM CHLORIDE 750 MG/1
TABLET, FILM COATED, EXTENDED RELEASE ORAL
Qty: 120 TAB | Refills: 6 | Status: SHIPPED | OUTPATIENT
Start: 2020-01-01

## 2020-01-01 RX ORDER — LIDOCAINE HYDROCHLORIDE 20 MG/ML
18 INJECTION, SOLUTION INFILTRATION; PERINEURAL ONCE
Status: DISPENSED | OUTPATIENT
Start: 2020-01-01 | End: 2020-01-01

## 2020-01-01 RX ORDER — LANOLIN ALCOHOL/MO/W.PET/CERES
1 CREAM (GRAM) TOPICAL
Status: CANCELLED | OUTPATIENT
Start: 2020-01-01

## 2020-01-01 RX ORDER — SODIUM CHLORIDE 0.9 % (FLUSH) 0.9 %
5-40 SYRINGE (ML) INJECTION EVERY 8 HOURS
Status: DISCONTINUED | OUTPATIENT
Start: 2020-01-01 | End: 2020-01-01 | Stop reason: HOSPADM

## 2020-01-01 RX ORDER — BUSPIRONE HYDROCHLORIDE 15 MG/1
TABLET ORAL
Qty: 90 TAB | Refills: 1 | Status: SHIPPED | OUTPATIENT
Start: 2020-01-01 | End: 2020-01-01

## 2020-01-01 RX ORDER — DEXAMETHASONE SODIUM PHOSPHATE 4 MG/ML
6 INJECTION, SOLUTION INTRA-ARTICULAR; INTRALESIONAL; INTRAMUSCULAR; INTRAVENOUS; SOFT TISSUE EVERY 24 HOURS
Status: COMPLETED | OUTPATIENT
Start: 2020-01-01 | End: 2020-01-01

## 2020-01-01 RX ORDER — ACETAMINOPHEN 325 MG/1
650 TABLET ORAL
Status: DISCONTINUED | OUTPATIENT
Start: 2020-01-01 | End: 2020-01-01 | Stop reason: SDUPTHER

## 2020-01-01 RX ORDER — FUROSEMIDE 10 MG/ML
100 INJECTION INTRAMUSCULAR; INTRAVENOUS ONCE
Status: COMPLETED | OUTPATIENT
Start: 2020-01-01 | End: 2020-01-01

## 2020-01-01 RX ORDER — HEPARIN SODIUM 200 [USP'U]/100ML
400 INJECTION, SOLUTION INTRAVENOUS ONCE
Status: DISPENSED | OUTPATIENT
Start: 2020-01-01 | End: 2020-01-01

## 2020-01-01 RX ORDER — LORAZEPAM 2 MG/ML
1 INJECTION INTRAMUSCULAR
Status: DISCONTINUED | OUTPATIENT
Start: 2020-01-01 | End: 2020-01-01 | Stop reason: HOSPADM

## 2020-01-01 RX ORDER — OLMESARTAN MEDOXOMIL, AMLODIPINE AND HYDROCHLOROTHIAZIDE TABLET 40/10/25 MG 40; 10; 25 MG/1; MG/1; MG/1
TABLET ORAL
Qty: 90 TAB | Refills: 1 | Status: SHIPPED | OUTPATIENT
Start: 2020-01-01 | End: 2020-01-01

## 2020-01-01 RX ORDER — BACITRACIN 500 UNIT/G
1 PACKET (EA) TOPICAL AS NEEDED
Status: DISCONTINUED | OUTPATIENT
Start: 2020-01-01 | End: 2020-01-01 | Stop reason: HOSPADM

## 2020-01-01 RX ORDER — POTASSIUM CHLORIDE 7.45 MG/ML
10 INJECTION INTRAVENOUS
Status: COMPLETED | OUTPATIENT
Start: 2020-01-01 | End: 2020-01-01

## 2020-01-01 RX ORDER — ERGOCALCIFEROL 1.25 MG/1
50000 CAPSULE ORAL
Qty: 12 CAP | Refills: 1 | Status: SHIPPED | OUTPATIENT
Start: 2020-01-01 | End: 2020-01-01 | Stop reason: SDUPTHER

## 2020-01-01 RX ORDER — ATORVASTATIN CALCIUM 20 MG/1
20 TABLET, FILM COATED ORAL
Qty: 90 TAB | Refills: 1 | Status: SHIPPED | OUTPATIENT
Start: 2020-01-01

## 2020-01-01 RX ORDER — IPRATROPIUM BROMIDE AND ALBUTEROL SULFATE 2.5; .5 MG/3ML; MG/3ML
3 SOLUTION RESPIRATORY (INHALATION)
Status: DISCONTINUED | OUTPATIENT
Start: 2020-01-01 | End: 2020-01-01

## 2020-01-01 RX ORDER — MIDAZOLAM HYDROCHLORIDE 1 MG/ML
5 INJECTION, SOLUTION INTRAMUSCULAR; INTRAVENOUS ONCE
Status: COMPLETED | OUTPATIENT
Start: 2020-01-01 | End: 2020-01-01

## 2020-01-01 RX ORDER — FLUTICASONE PROPIONATE 50 MCG
2 SPRAY, SUSPENSION (ML) NASAL DAILY
Status: DISCONTINUED | OUTPATIENT
Start: 2020-01-01 | End: 2020-01-01 | Stop reason: HOSPADM

## 2020-01-01 RX ORDER — SUCCINYLCHOLINE CHLORIDE 20 MG/ML
200 INJECTION INTRAMUSCULAR; INTRAVENOUS
Status: COMPLETED | OUTPATIENT
Start: 2020-01-01 | End: 2020-01-01

## 2020-01-01 RX ORDER — SODIUM CHLORIDE 0.9 % (FLUSH) 0.9 %
5-40 SYRINGE (ML) INJECTION AS NEEDED
Status: DISCONTINUED | OUTPATIENT
Start: 2020-01-01 | End: 2020-01-01 | Stop reason: HOSPADM

## 2020-01-01 RX ORDER — FUROSEMIDE 80 MG/1
TABLET ORAL
Qty: 90 TAB | Refills: 1 | Status: SHIPPED | OUTPATIENT
Start: 2020-01-01

## 2020-01-01 RX ORDER — CLONAZEPAM 0.5 MG/1
TABLET ORAL
Qty: 15 TAB | Refills: 0 | Status: SHIPPED | OUTPATIENT
Start: 2020-01-01

## 2020-01-01 RX ORDER — ATORVASTATIN CALCIUM 20 MG/1
20 TABLET, FILM COATED ORAL
Status: CANCELLED | OUTPATIENT
Start: 2020-01-01

## 2020-01-01 RX ORDER — FLUTICASONE PROPIONATE 50 MCG
SPRAY, SUSPENSION (ML) NASAL
Qty: 3 BOTTLE | Refills: 0 | Status: SHIPPED | OUTPATIENT
Start: 2020-01-01

## 2020-01-01 RX ORDER — CITALOPRAM 20 MG/1
TABLET, FILM COATED ORAL
Qty: 90 TAB | Refills: 1 | Status: SHIPPED | OUTPATIENT
Start: 2020-01-01 | End: 2020-01-01 | Stop reason: SDUPTHER

## 2020-01-01 RX ORDER — METOPROLOL TARTRATE 5 MG/5ML
INJECTION INTRAVENOUS
Status: COMPLETED
Start: 2020-01-01 | End: 2020-01-01

## 2020-01-01 RX ORDER — DEXTROSE MONOHYDRATE AND SODIUM CHLORIDE 5; .225 G/100ML; G/100ML
100 INJECTION, SOLUTION INTRAVENOUS CONTINUOUS
Status: DISCONTINUED | OUTPATIENT
Start: 2020-01-01 | End: 2020-01-01

## 2020-01-01 RX ORDER — CLONAZEPAM 0.5 MG/1
TABLET ORAL
Qty: 15 TAB | Refills: 0 | Status: SHIPPED | OUTPATIENT
Start: 2020-01-01 | End: 2020-01-01

## 2020-01-01 RX ORDER — FUROSEMIDE 10 MG/ML
INJECTION INTRAMUSCULAR; INTRAVENOUS
Status: DISPENSED
Start: 2020-01-01 | End: 2020-01-01

## 2020-01-01 RX ORDER — LABETALOL 300 MG/1
300 TABLET, FILM COATED ORAL 2 TIMES DAILY
Qty: 60 TAB | Refills: 0 | Status: SHIPPED | OUTPATIENT
Start: 2020-01-01

## 2020-01-01 RX ORDER — HEPARIN SODIUM 10000 [USP'U]/ML
7500 INJECTION, SOLUTION INTRAVENOUS; SUBCUTANEOUS EVERY 12 HOURS
Status: DISCONTINUED | OUTPATIENT
Start: 2020-01-01 | End: 2020-01-01

## 2020-01-01 RX ORDER — LABETALOL HYDROCHLORIDE 5 MG/ML
20 INJECTION, SOLUTION INTRAVENOUS EVERY 12 HOURS
Status: DISCONTINUED | OUTPATIENT
Start: 2020-01-01 | End: 2020-01-01

## 2020-01-01 RX ORDER — ASCORBIC ACID 500 MG
500 TABLET ORAL DAILY
Status: COMPLETED | OUTPATIENT
Start: 2020-01-01 | End: 2020-01-01

## 2020-01-01 RX ORDER — HALOPERIDOL 5 MG/ML
2 INJECTION INTRAMUSCULAR ONCE
Status: COMPLETED | OUTPATIENT
Start: 2020-01-01 | End: 2020-01-01

## 2020-01-01 RX ORDER — LABETALOL 300 MG/1
300 TABLET, FILM COATED ORAL 2 TIMES DAILY
Qty: 180 TAB | Refills: 1 | Status: SHIPPED | OUTPATIENT
Start: 2020-01-01 | End: 2020-01-01 | Stop reason: SDUPTHER

## 2020-01-01 RX ORDER — ALBUTEROL SULFATE 2.5 MG/.5ML
20 SOLUTION RESPIRATORY (INHALATION) ONCE
Status: COMPLETED | OUTPATIENT
Start: 2020-01-01 | End: 2020-01-01

## 2020-01-01 RX ORDER — BUSPIRONE HYDROCHLORIDE 15 MG/1
15 TABLET ORAL
Qty: 180 TAB | Refills: 1 | Status: SHIPPED | OUTPATIENT
Start: 2020-01-01 | End: 2020-01-01

## 2020-01-01 RX ORDER — BUSPIRONE HYDROCHLORIDE 15 MG/1
TABLET ORAL
Qty: 90 TAB | Refills: 1 | Status: SHIPPED | OUTPATIENT
Start: 2020-01-01 | End: 2020-01-01 | Stop reason: SDUPTHER

## 2020-01-01 RX ORDER — ALBUTEROL SULFATE 90 UG/1
2 AEROSOL, METERED RESPIRATORY (INHALATION)
Status: DISCONTINUED | OUTPATIENT
Start: 2020-01-01 | End: 2020-01-01

## 2020-01-01 RX ORDER — PANTOPRAZOLE SODIUM 40 MG/1
40 TABLET, DELAYED RELEASE ORAL DAILY
Status: DISCONTINUED | OUTPATIENT
Start: 2020-01-01 | End: 2020-01-01

## 2020-01-01 RX ORDER — BUSPIRONE HYDROCHLORIDE 15 MG/1
TABLET ORAL
Qty: 180 TAB | Refills: 1 | Status: SHIPPED | OUTPATIENT
Start: 2020-01-01

## 2020-01-01 RX ORDER — LORAZEPAM 2 MG/ML
INJECTION INTRAMUSCULAR
Status: COMPLETED
Start: 2020-01-01 | End: 2020-01-01

## 2020-01-01 RX ORDER — HALOPERIDOL 5 MG/ML
2 INJECTION INTRAMUSCULAR
Status: DISCONTINUED | OUTPATIENT
Start: 2020-01-01 | End: 2020-01-01 | Stop reason: HOSPADM

## 2020-01-01 RX ORDER — GUAIFENESIN 100 MG/5ML
LIQUID (ML) ORAL
Qty: 90 TAB | Refills: 1 | Status: SHIPPED | OUTPATIENT
Start: 2020-01-01

## 2020-01-01 RX ORDER — HEPARIN SODIUM 5000 [USP'U]/ML
7500 INJECTION, SOLUTION INTRAVENOUS; SUBCUTANEOUS EVERY 12 HOURS
Status: DISCONTINUED | OUTPATIENT
Start: 2020-01-01 | End: 2020-01-01 | Stop reason: HOSPADM

## 2020-01-01 RX ORDER — ROCURONIUM BROMIDE 10 MG/ML
50 INJECTION, SOLUTION INTRAVENOUS
Status: COMPLETED | OUTPATIENT
Start: 2020-01-01 | End: 2020-01-01

## 2020-01-01 RX ORDER — ENOXAPARIN SODIUM 100 MG/ML
40 INJECTION SUBCUTANEOUS EVERY 12 HOURS
Status: DISCONTINUED | OUTPATIENT
Start: 2020-01-01 | End: 2020-01-01

## 2020-01-01 RX ORDER — SODIUM CHLORIDE 0.9 % (FLUSH) 0.9 %
5-40 SYRINGE (ML) INJECTION AS NEEDED
Status: DISCONTINUED | OUTPATIENT
Start: 2020-01-01 | End: 2020-01-01 | Stop reason: SDUPTHER

## 2020-01-01 RX ORDER — METOPROLOL TARTRATE 5 MG/5ML
5 INJECTION INTRAVENOUS ONCE
Status: COMPLETED | OUTPATIENT
Start: 2020-01-01 | End: 2020-01-01

## 2020-01-01 RX ORDER — ZINC SULFATE 50(220)MG
220 CAPSULE ORAL DAILY
Status: COMPLETED | OUTPATIENT
Start: 2020-01-01 | End: 2020-01-01

## 2020-01-01 RX ORDER — ACETAMINOPHEN 325 MG/1
650 TABLET ORAL
Status: DISCONTINUED | OUTPATIENT
Start: 2020-01-01 | End: 2020-01-01 | Stop reason: HOSPADM

## 2020-01-01 RX ORDER — HEPARIN 100 UNIT/ML
300 SYRINGE INTRAVENOUS ONCE
Status: DISPENSED | OUTPATIENT
Start: 2020-01-01 | End: 2020-01-01

## 2020-01-01 RX ORDER — VANCOMYCIN 2 GRAM/500 ML IN 0.9 % SODIUM CHLORIDE INTRAVENOUS
2000 ONCE
Status: COMPLETED | OUTPATIENT
Start: 2020-01-01 | End: 2020-01-01

## 2020-01-01 RX ORDER — ENOXAPARIN SODIUM 150 MG/ML
135 INJECTION SUBCUTANEOUS EVERY 24 HOURS
Status: DISCONTINUED | OUTPATIENT
Start: 2020-01-01 | End: 2020-01-01

## 2020-01-01 RX ORDER — CHLORHEXIDINE GLUCONATE 0.12 MG/ML
15 RINSE ORAL EVERY 12 HOURS
Status: CANCELLED | OUTPATIENT
Start: 2020-01-01

## 2020-01-01 RX ORDER — GUAIFENESIN 100 MG/5ML
81 LIQUID (ML) ORAL DAILY
Status: DISCONTINUED | OUTPATIENT
Start: 2020-01-01 | End: 2020-01-01 | Stop reason: HOSPADM

## 2020-01-01 RX ORDER — LANOLIN ALCOHOL/MO/W.PET/CERES
1 CREAM (GRAM) TOPICAL
Status: DISCONTINUED | OUTPATIENT
Start: 2020-01-01 | End: 2020-01-01 | Stop reason: HOSPADM

## 2020-01-01 RX ORDER — HYDROCORTISONE SODIUM SUCCINATE 100 MG/2ML
100 INJECTION, POWDER, FOR SOLUTION INTRAMUSCULAR; INTRAVENOUS ONCE
Status: COMPLETED | OUTPATIENT
Start: 2020-01-01 | End: 2020-01-01

## 2020-01-01 RX ORDER — LABETALOL HYDROCHLORIDE 5 MG/ML
20 INJECTION, SOLUTION INTRAVENOUS
Status: DISCONTINUED | OUTPATIENT
Start: 2020-01-01 | End: 2020-01-01

## 2020-01-01 RX ORDER — POTASSIUM CHLORIDE 750 MG/1
TABLET, FILM COATED, EXTENDED RELEASE ORAL
Qty: 120 TAB | Refills: 6 | Status: SHIPPED | OUTPATIENT
Start: 2020-01-01 | End: 2020-01-01 | Stop reason: SDUPTHER

## 2020-01-01 RX ORDER — PANTOPRAZOLE SODIUM 40 MG/1
40 TABLET, DELAYED RELEASE ORAL DAILY
Qty: 90 TAB | Refills: 1 | Status: SHIPPED | OUTPATIENT
Start: 2020-01-01

## 2020-01-01 RX ORDER — NOREPINEPHRINE BITARTRATE/D5W 8 MG/250ML
.5-5 PLASTIC BAG, INJECTION (ML) INTRAVENOUS
Status: DISCONTINUED | OUTPATIENT
Start: 2020-01-01 | End: 2020-01-01

## 2020-01-01 RX ORDER — LABETALOL 100 MG/1
300 TABLET, FILM COATED ORAL 2 TIMES DAILY
Status: DISCONTINUED | OUTPATIENT
Start: 2020-01-01 | End: 2020-01-01

## 2020-01-01 RX ORDER — BUSPIRONE HYDROCHLORIDE 5 MG/1
15 TABLET ORAL
Status: DISCONTINUED | OUTPATIENT
Start: 2020-01-01 | End: 2020-01-01 | Stop reason: HOSPADM

## 2020-01-01 RX ORDER — CISATRACURIUM BESYLATE 2 MG/ML
0.03 INJECTION, SOLUTION INTRAVENOUS ONCE
Status: COMPLETED | OUTPATIENT
Start: 2020-01-01 | End: 2020-01-01

## 2020-01-01 RX ORDER — CITALOPRAM 20 MG/1
20 TABLET, FILM COATED ORAL DAILY
Status: CANCELLED | OUTPATIENT
Start: 2020-01-01

## 2020-01-01 RX ORDER — POTASSIUM CHLORIDE 29.8 MG/ML
20 INJECTION INTRAVENOUS ONCE
Status: DISCONTINUED | OUTPATIENT
Start: 2020-01-01 | End: 2020-01-01

## 2020-01-01 RX ORDER — FUROSEMIDE 80 MG/1
80 TABLET ORAL DAILY
Qty: 90 TAB | Refills: 1 | Status: SHIPPED | OUTPATIENT
Start: 2020-01-01 | End: 2020-01-01

## 2020-01-01 RX ORDER — ONDANSETRON 2 MG/ML
4 INJECTION INTRAMUSCULAR; INTRAVENOUS
Status: DISCONTINUED | OUTPATIENT
Start: 2020-01-01 | End: 2020-01-01 | Stop reason: HOSPADM

## 2020-01-01 RX ORDER — HYDROCORTISONE SODIUM SUCCINATE 100 MG/2ML
50 INJECTION, POWDER, FOR SOLUTION INTRAMUSCULAR; INTRAVENOUS EVERY 6 HOURS
Status: DISCONTINUED | OUTPATIENT
Start: 2020-01-01 | End: 2020-01-01 | Stop reason: HOSPADM

## 2020-01-01 RX ORDER — IPRATROPIUM BROMIDE AND ALBUTEROL SULFATE 2.5; .5 MG/3ML; MG/3ML
3 SOLUTION RESPIRATORY (INHALATION)
Status: DISCONTINUED | OUTPATIENT
Start: 2020-01-01 | End: 2020-01-01 | Stop reason: HOSPADM

## 2020-01-01 RX ORDER — VANCOMYCIN/0.9 % SOD CHLORIDE 1.5G/250ML
1500 PLASTIC BAG, INJECTION (ML) INTRAVENOUS EVERY 24 HOURS
Status: DISCONTINUED | OUTPATIENT
Start: 2020-01-01 | End: 2020-01-01

## 2020-01-01 RX ORDER — BALSAM PERU/CASTOR OIL
OINTMENT (GRAM) TOPICAL 2 TIMES DAILY
Status: DISCONTINUED | OUTPATIENT
Start: 2020-01-01 | End: 2020-01-01 | Stop reason: HOSPADM

## 2020-01-01 RX ORDER — CITALOPRAM 20 MG/1
20 TABLET, FILM COATED ORAL DAILY
Status: DISCONTINUED | OUTPATIENT
Start: 2020-01-01 | End: 2020-01-01 | Stop reason: HOSPADM

## 2020-01-01 RX ORDER — ATORVASTATIN CALCIUM 20 MG/1
20 TABLET, FILM COATED ORAL
Status: DISCONTINUED | OUTPATIENT
Start: 2020-01-01 | End: 2020-01-01 | Stop reason: HOSPADM

## 2020-01-01 RX ORDER — PROPOFOL 10 MG/ML
120 INJECTION, EMULSION INTRAVENOUS
Status: COMPLETED | OUTPATIENT
Start: 2020-01-01 | End: 2020-01-01

## 2020-01-01 RX ORDER — ONDANSETRON 4 MG/1
4 TABLET, ORALLY DISINTEGRATING ORAL
Status: DISCONTINUED | OUTPATIENT
Start: 2020-01-01 | End: 2020-01-01 | Stop reason: HOSPADM

## 2020-01-01 RX ORDER — GUAIFENESIN 100 MG/5ML
LIQUID (ML) ORAL
Qty: 90 TAB | Refills: 1 | Status: SHIPPED | OUTPATIENT
Start: 2020-01-01 | End: 2020-01-01

## 2020-01-01 RX ORDER — CHLORHEXIDINE GLUCONATE 0.12 MG/ML
15 RINSE ORAL EVERY 12 HOURS
Status: DISCONTINUED | OUTPATIENT
Start: 2020-01-01 | End: 2020-01-01 | Stop reason: HOSPADM

## 2020-01-01 RX ORDER — ACETAMINOPHEN 650 MG/1
650 SUPPOSITORY RECTAL
Status: DISCONTINUED | OUTPATIENT
Start: 2020-01-01 | End: 2020-01-01 | Stop reason: HOSPADM

## 2020-01-01 RX ORDER — VANCOMYCIN/0.9 % SOD CHLORIDE 1.5G/250ML
1500 PLASTIC BAG, INJECTION (ML) INTRAVENOUS
Status: DISCONTINUED | OUTPATIENT
Start: 2020-01-01 | End: 2020-01-01

## 2020-01-01 RX ORDER — DEXTROSE 50 % IN WATER (D50W) INTRAVENOUS SYRINGE
25 ONCE
Status: COMPLETED | OUTPATIENT
Start: 2020-01-01 | End: 2020-01-01

## 2020-01-01 RX ADMIN — CEFEPIME HYDROCHLORIDE 2 G: 2 INJECTION, POWDER, FOR SOLUTION INTRAVENOUS at 17:18

## 2020-01-01 RX ADMIN — PROPOFOL 40 MCG/KG/MIN: 10 INJECTION, EMULSION INTRAVENOUS at 01:31

## 2020-01-01 RX ADMIN — LABETALOL HYDROCHLORIDE 300 MG: 200 TABLET, FILM COATED ORAL at 10:28

## 2020-01-01 RX ADMIN — Medication 1 AMPULE: at 08:02

## 2020-01-01 RX ADMIN — PROPOFOL 50 MCG/KG/MIN: 10 INJECTION, EMULSION INTRAVENOUS at 06:51

## 2020-01-01 RX ADMIN — IPRATROPIUM BROMIDE AND ALBUTEROL SULFATE 3 ML: .5; 3 SOLUTION RESPIRATORY (INHALATION) at 08:05

## 2020-01-01 RX ADMIN — PROPOFOL 45 MCG/KG/MIN: 10 INJECTION, EMULSION INTRAVENOUS at 10:40

## 2020-01-01 RX ADMIN — IPRATROPIUM BROMIDE AND ALBUTEROL SULFATE 3 ML: .5; 3 SOLUTION RESPIRATORY (INHALATION) at 14:36

## 2020-01-01 RX ADMIN — CEFTRIAXONE 1 G: 1 INJECTION, POWDER, FOR SOLUTION INTRAMUSCULAR; INTRAVENOUS at 16:47

## 2020-01-01 RX ADMIN — IPRATROPIUM BROMIDE AND ALBUTEROL SULFATE 3 ML: .5; 3 SOLUTION RESPIRATORY (INHALATION) at 15:12

## 2020-01-01 RX ADMIN — Medication 1 AMPULE: at 09:38

## 2020-01-01 RX ADMIN — CEFEPIME HYDROCHLORIDE 2 G: 2 INJECTION, POWDER, FOR SOLUTION INTRAVENOUS at 00:12

## 2020-01-01 RX ADMIN — PROPOFOL 50 MCG/KG/MIN: 10 INJECTION, EMULSION INTRAVENOUS at 15:08

## 2020-01-01 RX ADMIN — PROPOFOL 50 MCG/KG/MIN: 10 INJECTION, EMULSION INTRAVENOUS at 21:45

## 2020-01-01 RX ADMIN — SODIUM CHLORIDE 2 MCG/KG/MIN: 9 INJECTION, SOLUTION INTRAVENOUS at 05:04

## 2020-01-01 RX ADMIN — PROPOFOL 40 MCG/KG/MIN: 10 INJECTION, EMULSION INTRAVENOUS at 07:57

## 2020-01-01 RX ADMIN — CASTOR OIL AND BALSAM, PERU: 788; 87 OINTMENT TOPICAL at 09:22

## 2020-01-01 RX ADMIN — INSULIN LISPRO 2 UNITS: 100 INJECTION, SOLUTION INTRAVENOUS; SUBCUTANEOUS at 14:15

## 2020-01-01 RX ADMIN — ENOXAPARIN SODIUM 135 MG: 150 INJECTION SUBCUTANEOUS at 11:00

## 2020-01-01 RX ADMIN — ENOXAPARIN SODIUM 135 MG: 150 INJECTION SUBCUTANEOUS at 08:53

## 2020-01-01 RX ADMIN — CISATRACURIUM BESYLATE 3.72 MG: 2 INJECTION, SOLUTION INTRAVENOUS at 01:39

## 2020-01-01 RX ADMIN — DEXMEDETOMIDINE 1.5 MCG/KG/HR: 100 INJECTION, SOLUTION, CONCENTRATE INTRAVENOUS at 04:41

## 2020-01-01 RX ADMIN — SODIUM CHLORIDE 3 MCG/KG/MIN: 9 INJECTION, SOLUTION INTRAVENOUS at 03:54

## 2020-01-01 RX ADMIN — ASPIRIN 81 MG CHEWABLE TABLET 81 MG: 81 TABLET CHEWABLE at 10:16

## 2020-01-01 RX ADMIN — Medication 40 ML: at 05:38

## 2020-01-01 RX ADMIN — PANTOPRAZOLE SODIUM 40 MG: 40 INJECTION, POWDER, FOR SOLUTION INTRAVENOUS at 08:03

## 2020-01-01 RX ADMIN — FUROSEMIDE 80 MG: 10 INJECTION, SOLUTION INTRAMUSCULAR; INTRAVENOUS at 01:13

## 2020-01-01 RX ADMIN — Medication 30 ML: at 21:01

## 2020-01-01 RX ADMIN — ZINC SULFATE 220 MG (50 MG) CAPSULE 220 MG: CAPSULE at 09:51

## 2020-01-01 RX ADMIN — ENOXAPARIN SODIUM 135 MG: 150 INJECTION SUBCUTANEOUS at 07:11

## 2020-01-01 RX ADMIN — IPRATROPIUM BROMIDE AND ALBUTEROL SULFATE 3 ML: .5; 3 SOLUTION RESPIRATORY (INHALATION) at 15:10

## 2020-01-01 RX ADMIN — PROPOFOL 50 MCG/KG/MIN: 10 INJECTION, EMULSION INTRAVENOUS at 20:54

## 2020-01-01 RX ADMIN — LABETALOL HYDROCHLORIDE 20 MG: 5 INJECTION INTRAVENOUS at 21:22

## 2020-01-01 RX ADMIN — METOPROLOL TARTRATE 5 MG: 5 INJECTION INTRAVENOUS at 00:20

## 2020-01-01 RX ADMIN — ENOXAPARIN SODIUM 35 MG: 150 INJECTION SUBCUTANEOUS at 19:40

## 2020-01-01 RX ADMIN — PROPOFOL 40 MCG/KG/MIN: 10 INJECTION, EMULSION INTRAVENOUS at 07:51

## 2020-01-01 RX ADMIN — IPRATROPIUM BROMIDE AND ALBUTEROL SULFATE 3 ML: .5; 3 SOLUTION RESPIRATORY (INHALATION) at 13:31

## 2020-01-01 RX ADMIN — LABETALOL HYDROCHLORIDE 20 MG: 5 INJECTION INTRAVENOUS at 20:40

## 2020-01-01 RX ADMIN — IPRATROPIUM BROMIDE AND ALBUTEROL SULFATE 3 ML: .5; 3 SOLUTION RESPIRATORY (INHALATION) at 07:29

## 2020-01-01 RX ADMIN — LABETALOL HYDROCHLORIDE 20 MG: 5 INJECTION INTRAVENOUS at 20:23

## 2020-01-01 RX ADMIN — SODIUM CHLORIDE 3 MCG/KG/MIN: 9 INJECTION, SOLUTION INTRAVENOUS at 21:26

## 2020-01-01 RX ADMIN — SODIUM CHLORIDE 3 MCG/KG/MIN: 9 INJECTION, SOLUTION INTRAVENOUS at 18:33

## 2020-01-01 RX ADMIN — ZINC SULFATE 220 MG (50 MG) CAPSULE 220 MG: CAPSULE at 08:55

## 2020-01-01 RX ADMIN — Medication 1 AMPULE: at 09:20

## 2020-01-01 RX ADMIN — PIPERACILLIN AND TAZOBACTAM 3.38 G: 3; .375 INJECTION, POWDER, LYOPHILIZED, FOR SOLUTION INTRAVENOUS at 20:58

## 2020-01-01 RX ADMIN — INSULIN LISPRO 2 UNITS: 100 INJECTION, SOLUTION INTRAVENOUS; SUBCUTANEOUS at 00:36

## 2020-01-01 RX ADMIN — PROPOFOL 50 MCG/KG/MIN: 10 INJECTION, EMULSION INTRAVENOUS at 04:13

## 2020-01-01 RX ADMIN — CEFTRIAXONE 1 G: 1 INJECTION, POWDER, FOR SOLUTION INTRAMUSCULAR; INTRAVENOUS at 15:40

## 2020-01-01 RX ADMIN — CHLORHEXIDINE GLUCONATE 15 ML: 0.12 RINSE ORAL at 20:41

## 2020-01-01 RX ADMIN — PROPOFOL 50 MCG/KG/MIN: 10 INJECTION, EMULSION INTRAVENOUS at 13:25

## 2020-01-01 RX ADMIN — LABETALOL HYDROCHLORIDE 20 MG: 5 INJECTION INTRAVENOUS at 09:36

## 2020-01-01 RX ADMIN — DEXAMETHASONE SODIUM PHOSPHATE 6 MG: 4 INJECTION, SOLUTION INTRAMUSCULAR; INTRAVENOUS at 08:49

## 2020-01-01 RX ADMIN — CEFEPIME HYDROCHLORIDE 2 G: 2 INJECTION, POWDER, FOR SOLUTION INTRAVENOUS at 16:46

## 2020-01-01 RX ADMIN — DEXMEDETOMIDINE 0.4 MCG/KG/HR: 100 INJECTION, SOLUTION, CONCENTRATE INTRAVENOUS at 14:46

## 2020-01-01 RX ADMIN — CHLORHEXIDINE GLUCONATE 15 ML: 0.12 RINSE ORAL at 09:20

## 2020-01-01 RX ADMIN — AMIODARONE HYDROCHLORIDE 1 MG/MIN: 50 INJECTION, SOLUTION INTRAVENOUS at 00:39

## 2020-01-01 RX ADMIN — PROPOFOL 50 MCG/KG/MIN: 10 INJECTION, EMULSION INTRAVENOUS at 11:13

## 2020-01-01 RX ADMIN — PROPOFOL 40 MCG/KG/MIN: 10 INJECTION, EMULSION INTRAVENOUS at 07:05

## 2020-01-01 RX ADMIN — PROPOFOL 50 MCG/KG/MIN: 10 INJECTION, EMULSION INTRAVENOUS at 04:56

## 2020-01-01 RX ADMIN — Medication 1 AMPULE: at 21:22

## 2020-01-01 RX ADMIN — Medication 200 MCG/HR: at 03:54

## 2020-01-01 RX ADMIN — DEXAMETHASONE SODIUM PHOSPHATE 6 MG: 4 INJECTION, SOLUTION INTRAMUSCULAR; INTRAVENOUS at 08:21

## 2020-01-01 RX ADMIN — CEFEPIME HYDROCHLORIDE 2 G: 2 INJECTION, POWDER, FOR SOLUTION INTRAVENOUS at 01:00

## 2020-01-01 RX ADMIN — ZINC SULFATE 220 MG (50 MG) CAPSULE 220 MG: CAPSULE at 10:17

## 2020-01-01 RX ADMIN — VASOPRESSIN 0.01 UNITS/MIN: 20 INJECTION INTRAVENOUS at 00:37

## 2020-01-01 RX ADMIN — PROPOFOL 35 MCG/KG/MIN: 10 INJECTION, EMULSION INTRAVENOUS at 21:08

## 2020-01-01 RX ADMIN — PROPOFOL 40 MCG/KG/MIN: 10 INJECTION, EMULSION INTRAVENOUS at 14:31

## 2020-01-01 RX ADMIN — DEXTROSE MONOHYDRATE AND SODIUM CHLORIDE 100 ML/HR: 5; .45 INJECTION, SOLUTION INTRAVENOUS at 21:32

## 2020-01-01 RX ADMIN — EPOPROSTENOL 30 NG/KG/MIN: 1.5 INJECTION, POWDER, LYOPHILIZED, FOR SOLUTION INTRAVENOUS at 04:16

## 2020-01-01 RX ADMIN — CEFEPIME HYDROCHLORIDE 2 G: 2 INJECTION, POWDER, FOR SOLUTION INTRAVENOUS at 17:30

## 2020-01-01 RX ADMIN — SODIUM CHLORIDE 3 MCG/KG/MIN: 9 INJECTION, SOLUTION INTRAVENOUS at 06:51

## 2020-01-01 RX ADMIN — CEFEPIME HYDROCHLORIDE 2 G: 2 INJECTION, POWDER, FOR SOLUTION INTRAVENOUS at 00:27

## 2020-01-01 RX ADMIN — DEXAMETHASONE SODIUM PHOSPHATE 10 MG: 4 INJECTION, SOLUTION INTRAMUSCULAR; INTRAVENOUS at 16:45

## 2020-01-01 RX ADMIN — CEFEPIME HYDROCHLORIDE 2 G: 2 INJECTION, POWDER, FOR SOLUTION INTRAVENOUS at 08:53

## 2020-01-01 RX ADMIN — AZITHROMYCIN 500 MG: 500 INJECTION, POWDER, LYOPHILIZED, FOR SOLUTION INTRAVENOUS at 09:51

## 2020-01-01 RX ADMIN — IPRATROPIUM BROMIDE AND ALBUTEROL SULFATE 3 ML: .5; 3 SOLUTION RESPIRATORY (INHALATION) at 01:07

## 2020-01-01 RX ADMIN — CEFTRIAXONE 1 G: 1 INJECTION, POWDER, FOR SOLUTION INTRAMUSCULAR; INTRAVENOUS at 15:58

## 2020-01-01 RX ADMIN — ENOXAPARIN SODIUM 135 MG: 150 INJECTION SUBCUTANEOUS at 20:40

## 2020-01-01 RX ADMIN — INSULIN LISPRO 2 UNITS: 100 INJECTION, SOLUTION INTRAVENOUS; SUBCUTANEOUS at 12:20

## 2020-01-01 RX ADMIN — REMDESIVIR 100 MG: 100 INJECTION, POWDER, LYOPHILIZED, FOR SOLUTION INTRAVENOUS at 15:36

## 2020-01-01 RX ADMIN — INSULIN LISPRO 2 UNITS: 100 INJECTION, SOLUTION INTRAVENOUS; SUBCUTANEOUS at 18:17

## 2020-01-01 RX ADMIN — NOREPINEPHRINE BITARTRATE 35 MCG/MIN: 1 INJECTION, SOLUTION, CONCENTRATE INTRAVENOUS at 13:41

## 2020-01-01 RX ADMIN — DEXMEDETOMIDINE 1.5 MCG/KG/HR: 100 INJECTION, SOLUTION, CONCENTRATE INTRAVENOUS at 23:58

## 2020-01-01 RX ADMIN — MAGNESIUM SULFATE HEPTAHYDRATE: 500 INJECTION, SOLUTION INTRAMUSCULAR; INTRAVENOUS at 17:40

## 2020-01-01 RX ADMIN — Medication 1 AMPULE: at 20:12

## 2020-01-01 RX ADMIN — PROPOFOL 25 MCG/KG/MIN: 10 INJECTION, EMULSION INTRAVENOUS at 23:46

## 2020-01-01 RX ADMIN — CEFTRIAXONE 1 G: 1 INJECTION, POWDER, FOR SOLUTION INTRAMUSCULAR; INTRAVENOUS at 16:44

## 2020-01-01 RX ADMIN — LABETALOL HYDROCHLORIDE 20 MG: 5 INJECTION INTRAVENOUS at 09:51

## 2020-01-01 RX ADMIN — Medication 10 ML: at 15:29

## 2020-01-01 RX ADMIN — CEFEPIME HYDROCHLORIDE 2 G: 2 INJECTION, POWDER, FOR SOLUTION INTRAVENOUS at 09:35

## 2020-01-01 RX ADMIN — EPOPROSTENOL 30 NG/KG/MIN: 1.5 INJECTION, POWDER, LYOPHILIZED, FOR SOLUTION INTRAVENOUS at 01:48

## 2020-01-01 RX ADMIN — IPRATROPIUM BROMIDE AND ALBUTEROL SULFATE 3 ML: .5; 3 SOLUTION RESPIRATORY (INHALATION) at 13:54

## 2020-01-01 RX ADMIN — EPOPROSTENOL 30 NG/KG/MIN: 1.5 INJECTION, POWDER, LYOPHILIZED, FOR SOLUTION INTRAVENOUS at 16:53

## 2020-01-01 RX ADMIN — CEFEPIME HYDROCHLORIDE 2 G: 2 INJECTION, POWDER, FOR SOLUTION INTRAVENOUS at 16:21

## 2020-01-01 RX ADMIN — CEFEPIME HYDROCHLORIDE 2 G: 2 INJECTION, POWDER, FOR SOLUTION INTRAVENOUS at 09:42

## 2020-01-01 RX ADMIN — PROPOFOL 40 MCG/KG/MIN: 10 INJECTION, EMULSION INTRAVENOUS at 18:14

## 2020-01-01 RX ADMIN — Medication 10 ML: at 13:17

## 2020-01-01 RX ADMIN — Medication 1 AMPULE: at 20:37

## 2020-01-01 RX ADMIN — Medication 40 ML: at 22:13

## 2020-01-01 RX ADMIN — BUSPIRONE HYDROCHLORIDE 15 MG: 5 TABLET ORAL at 10:16

## 2020-01-01 RX ADMIN — PANTOPRAZOLE SODIUM 40 MG: 40 INJECTION, POWDER, FOR SOLUTION INTRAVENOUS at 09:21

## 2020-01-01 RX ADMIN — FUROSEMIDE 40 MG: 10 INJECTION, SOLUTION INTRAMUSCULAR; INTRAVENOUS at 20:09

## 2020-01-01 RX ADMIN — Medication 1 AMPULE: at 08:00

## 2020-01-01 RX ADMIN — SODIUM CHLORIDE 1.5 MCG/KG/MIN: 9 INJECTION, SOLUTION INTRAVENOUS at 05:33

## 2020-01-01 RX ADMIN — IPRATROPIUM BROMIDE AND ALBUTEROL SULFATE 3 ML: .5; 3 SOLUTION RESPIRATORY (INHALATION) at 14:47

## 2020-01-01 RX ADMIN — AZITHROMYCIN 500 MG: 500 INJECTION, POWDER, LYOPHILIZED, FOR SOLUTION INTRAVENOUS at 08:29

## 2020-01-01 RX ADMIN — Medication 1 AMPULE: at 21:09

## 2020-01-01 RX ADMIN — FUROSEMIDE 20 MG: 10 INJECTION, SOLUTION INTRAMUSCULAR; INTRAVENOUS at 08:06

## 2020-01-01 RX ADMIN — EPOPROSTENOL 30 NG/KG/MIN: 1.5 INJECTION, POWDER, LYOPHILIZED, FOR SOLUTION INTRAVENOUS at 17:31

## 2020-01-01 RX ADMIN — PROPOFOL 50 MCG/KG/MIN: 10 INJECTION, EMULSION INTRAVENOUS at 02:37

## 2020-01-01 RX ADMIN — AMIODARONE HYDROCHLORIDE 150 MG: 1.5 INJECTION, SOLUTION INTRAVENOUS at 00:26

## 2020-01-01 RX ADMIN — CASTOR OIL AND BALSAM, PERU: 788; 87 OINTMENT TOPICAL at 08:32

## 2020-01-01 RX ADMIN — AMIODARONE HYDROCHLORIDE 0.5 MG/MIN: 50 INJECTION, SOLUTION INTRAVENOUS at 20:46

## 2020-01-01 RX ADMIN — IPRATROPIUM BROMIDE AND ALBUTEROL SULFATE 3 ML: .5; 3 SOLUTION RESPIRATORY (INHALATION) at 07:50

## 2020-01-01 RX ADMIN — CASTOR OIL AND BALSAM, PERU: 788; 87 OINTMENT TOPICAL at 17:39

## 2020-01-01 RX ADMIN — ALBUTEROL SULFATE 20 MG: 2.5 SOLUTION RESPIRATORY (INHALATION) at 15:00

## 2020-01-01 RX ADMIN — IPRATROPIUM BROMIDE AND ALBUTEROL SULFATE 3 ML: .5; 3 SOLUTION RESPIRATORY (INHALATION) at 19:49

## 2020-01-01 RX ADMIN — LABETALOL HYDROCHLORIDE 20 MG: 5 INJECTION INTRAVENOUS at 08:21

## 2020-01-01 RX ADMIN — IPRATROPIUM BROMIDE AND ALBUTEROL SULFATE 3 ML: .5; 3 SOLUTION RESPIRATORY (INHALATION) at 09:25

## 2020-01-01 RX ADMIN — PIPERACILLIN AND TAZOBACTAM 3.38 G: 3; .375 INJECTION, POWDER, LYOPHILIZED, FOR SOLUTION INTRAVENOUS at 05:35

## 2020-01-01 RX ADMIN — CEFTRIAXONE 1 G: 1 INJECTION, POWDER, FOR SOLUTION INTRAMUSCULAR; INTRAVENOUS at 21:39

## 2020-01-01 RX ADMIN — PROPOFOL 40 MCG/KG/MIN: 10 INJECTION, EMULSION INTRAVENOUS at 00:20

## 2020-01-01 RX ADMIN — CISATRACURIUM BESYLATE 26.34 MG: 2 INJECTION, SOLUTION INTRAVENOUS at 23:02

## 2020-01-01 RX ADMIN — PROPOFOL 45 MCG/KG/MIN: 10 INJECTION, EMULSION INTRAVENOUS at 04:02

## 2020-01-01 RX ADMIN — PANTOPRAZOLE SODIUM 40 MG: 40 INJECTION, POWDER, FOR SOLUTION INTRAVENOUS at 08:21

## 2020-01-01 RX ADMIN — METOPROLOL TARTRATE: 5 INJECTION INTRAVENOUS at 01:00

## 2020-01-01 RX ADMIN — SODIUM CHLORIDE 3 MCG/KG/MIN: 9 INJECTION, SOLUTION INTRAVENOUS at 10:18

## 2020-01-01 RX ADMIN — FUROSEMIDE 20 MG: 10 INJECTION, SOLUTION INTRAMUSCULAR; INTRAVENOUS at 10:13

## 2020-01-01 RX ADMIN — CASTOR OIL AND BALSAM, PERU: 788; 87 OINTMENT TOPICAL at 18:27

## 2020-01-01 RX ADMIN — DEXAMETHASONE SODIUM PHOSPHATE 6 MG: 4 INJECTION, SOLUTION INTRAMUSCULAR; INTRAVENOUS at 07:37

## 2020-01-01 RX ADMIN — PANTOPRAZOLE SODIUM 40 MG: 40 INJECTION, POWDER, FOR SOLUTION INTRAVENOUS at 08:04

## 2020-01-01 RX ADMIN — IPRATROPIUM BROMIDE AND ALBUTEROL SULFATE 3 ML: .5; 3 SOLUTION RESPIRATORY (INHALATION) at 20:11

## 2020-01-01 RX ADMIN — IPRATROPIUM BROMIDE AND ALBUTEROL SULFATE 3 ML: .5; 3 SOLUTION RESPIRATORY (INHALATION) at 01:52

## 2020-01-01 RX ADMIN — PROPOFOL 50 MCG/KG/MIN: 10 INJECTION, EMULSION INTRAVENOUS at 18:31

## 2020-01-01 RX ADMIN — Medication 200 MCG/HR: at 12:16

## 2020-01-01 RX ADMIN — MIDAZOLAM HYDROCHLORIDE 4 MG: 1 INJECTION, SOLUTION INTRAMUSCULAR; INTRAVENOUS at 01:22

## 2020-01-01 RX ADMIN — PROPOFOL 45 MCG/KG/MIN: 10 INJECTION, EMULSION INTRAVENOUS at 00:30

## 2020-01-01 RX ADMIN — Medication 40 ML: at 22:00

## 2020-01-01 RX ADMIN — FUROSEMIDE 100 MG: 10 INJECTION, SOLUTION INTRAMUSCULAR; INTRAVENOUS at 17:41

## 2020-01-01 RX ADMIN — PROPOFOL 50 MCG/KG/MIN: 10 INJECTION, EMULSION INTRAVENOUS at 12:40

## 2020-01-01 RX ADMIN — CHLORHEXIDINE GLUCONATE 15 ML: 0.12 RINSE ORAL at 20:33

## 2020-01-01 RX ADMIN — DEXMEDETOMIDINE 1.5 MCG/KG/HR: 100 INJECTION, SOLUTION, CONCENTRATE INTRAVENOUS at 02:18

## 2020-01-01 RX ADMIN — PANTOPRAZOLE SODIUM 40 MG: 40 INJECTION, POWDER, FOR SOLUTION INTRAVENOUS at 08:35

## 2020-01-01 RX ADMIN — IPRATROPIUM BROMIDE AND ALBUTEROL SULFATE 3 ML: .5; 3 SOLUTION RESPIRATORY (INHALATION) at 08:30

## 2020-01-01 RX ADMIN — PROPOFOL 50 MCG/KG/MIN: 10 INJECTION, EMULSION INTRAVENOUS at 07:40

## 2020-01-01 RX ADMIN — Medication 10 ML: at 09:37

## 2020-01-01 RX ADMIN — Medication 10 ML: at 12:13

## 2020-01-01 RX ADMIN — PANTOPRAZOLE SODIUM 40 MG: 40 INJECTION, POWDER, FOR SOLUTION INTRAVENOUS at 08:29

## 2020-01-01 RX ADMIN — INSULIN LISPRO 2 UNITS: 100 INJECTION, SOLUTION INTRAVENOUS; SUBCUTANEOUS at 06:03

## 2020-01-01 RX ADMIN — EPOPROSTENOL 30 NG/KG/MIN: 1.5 INJECTION, POWDER, LYOPHILIZED, FOR SOLUTION INTRAVENOUS at 03:50

## 2020-01-01 RX ADMIN — Medication 40 ML: at 05:37

## 2020-01-01 RX ADMIN — CEFEPIME HYDROCHLORIDE 2 G: 2 INJECTION, POWDER, FOR SOLUTION INTRAVENOUS at 09:38

## 2020-01-01 RX ADMIN — DEXTROSE MONOHYDRATE AND SODIUM CHLORIDE 100 ML/HR: 5; .45 INJECTION, SOLUTION INTRAVENOUS at 11:51

## 2020-01-01 RX ADMIN — PROPOFOL 45 MCG/KG/MIN: 10 INJECTION, EMULSION INTRAVENOUS at 21:30

## 2020-01-01 RX ADMIN — SODIUM CHLORIDE 3 MCG/KG/MIN: 9 INJECTION, SOLUTION INTRAVENOUS at 20:52

## 2020-01-01 RX ADMIN — CHLORHEXIDINE GLUCONATE 15 ML: 0.12 RINSE ORAL at 20:50

## 2020-01-01 RX ADMIN — Medication 100 MCG/HR: at 22:26

## 2020-01-01 RX ADMIN — Medication 200 MCG/HR: at 11:49

## 2020-01-01 RX ADMIN — LORAZEPAM 1 MG: 2 INJECTION INTRAMUSCULAR; INTRAVENOUS at 14:11

## 2020-01-01 RX ADMIN — Medication 200 MCG/HR: at 20:00

## 2020-01-01 RX ADMIN — Medication 1 AMPULE: at 20:40

## 2020-01-01 RX ADMIN — CHLORHEXIDINE GLUCONATE 15 ML: 0.12 RINSE ORAL at 08:22

## 2020-01-01 RX ADMIN — HYDROCORTISONE SODIUM SUCCINATE 100 MG: 100 INJECTION, POWDER, FOR SOLUTION INTRAMUSCULAR; INTRAVENOUS at 00:50

## 2020-01-01 RX ADMIN — PANTOPRAZOLE SODIUM 40 MG: 40 INJECTION, POWDER, FOR SOLUTION INTRAVENOUS at 13:23

## 2020-01-01 RX ADMIN — CEFEPIME HYDROCHLORIDE 2 G: 2 INJECTION, POWDER, FOR SOLUTION INTRAVENOUS at 08:13

## 2020-01-01 RX ADMIN — HYDROCORTISONE SODIUM SUCCINATE 50 MG: 100 INJECTION, POWDER, FOR SOLUTION INTRAMUSCULAR; INTRAVENOUS at 23:57

## 2020-01-01 RX ADMIN — IPRATROPIUM BROMIDE AND ALBUTEROL SULFATE 3 ML: .5; 3 SOLUTION RESPIRATORY (INHALATION) at 14:46

## 2020-01-01 RX ADMIN — SODIUM BICARBONATE 150 MEQ/1,000 ML IN DEXTROSE 5 % INTRAVENOUS: SOLUTION at 05:35

## 2020-01-01 RX ADMIN — PROPOFOL 50 MCG/KG/MIN: 10 INJECTION, EMULSION INTRAVENOUS at 00:11

## 2020-01-01 RX ADMIN — PROPOFOL 50 MCG/KG/MIN: 10 INJECTION, EMULSION INTRAVENOUS at 02:07

## 2020-01-01 RX ADMIN — CHLORHEXIDINE GLUCONATE 15 ML: 0.12 RINSE ORAL at 21:12

## 2020-01-01 RX ADMIN — IPRATROPIUM BROMIDE AND ALBUTEROL SULFATE 3 ML: .5; 3 SOLUTION RESPIRATORY (INHALATION) at 02:30

## 2020-01-01 RX ADMIN — LABETALOL HYDROCHLORIDE 20 MG: 5 INJECTION INTRAVENOUS at 08:05

## 2020-01-01 RX ADMIN — Medication 1 AMPULE: at 20:50

## 2020-01-01 RX ADMIN — ENOXAPARIN SODIUM 135 MG: 150 INJECTION SUBCUTANEOUS at 09:39

## 2020-01-01 RX ADMIN — CHLORHEXIDINE GLUCONATE 15 ML: 0.12 RINSE ORAL at 08:50

## 2020-01-01 RX ADMIN — IPRATROPIUM BROMIDE AND ALBUTEROL SULFATE 3 ML: .5; 3 SOLUTION RESPIRATORY (INHALATION) at 19:38

## 2020-01-01 RX ADMIN — DEXTROSE MONOHYDRATE AND SODIUM CHLORIDE 100 ML/HR: 5; .45 INJECTION, SOLUTION INTRAVENOUS at 08:51

## 2020-01-01 RX ADMIN — PROPOFOL 50 MCG/KG/MIN: 10 INJECTION, EMULSION INTRAVENOUS at 13:44

## 2020-01-01 RX ADMIN — ENOXAPARIN SODIUM 135 MG: 150 INJECTION SUBCUTANEOUS at 21:21

## 2020-01-01 RX ADMIN — HEPARIN SODIUM 7500 UNITS: 5000 INJECTION INTRAVENOUS; SUBCUTANEOUS at 23:57

## 2020-01-01 RX ADMIN — CEFEPIME HYDROCHLORIDE 2 G: 2 INJECTION, POWDER, FOR SOLUTION INTRAVENOUS at 00:36

## 2020-01-01 RX ADMIN — Medication 10 ML: at 05:14

## 2020-01-01 RX ADMIN — PROPOFOL 40 MCG/KG/MIN: 10 INJECTION, EMULSION INTRAVENOUS at 03:42

## 2020-01-01 RX ADMIN — IPRATROPIUM BROMIDE AND ALBUTEROL SULFATE 3 ML: .5; 3 SOLUTION RESPIRATORY (INHALATION) at 14:39

## 2020-01-01 RX ADMIN — DEXAMETHASONE SODIUM PHOSPHATE 6 MG: 4 INJECTION, SOLUTION INTRAMUSCULAR; INTRAVENOUS at 11:02

## 2020-01-01 RX ADMIN — LABETALOL HYDROCHLORIDE 20 MG: 5 INJECTION INTRAVENOUS at 08:10

## 2020-01-01 RX ADMIN — BUSPIRONE HYDROCHLORIDE 15 MG: 5 TABLET ORAL at 08:54

## 2020-01-01 RX ADMIN — IPRATROPIUM BROMIDE AND ALBUTEROL SULFATE 3 ML: .5; 3 SOLUTION RESPIRATORY (INHALATION) at 21:31

## 2020-01-01 RX ADMIN — PROPOFOL 45 MCG/KG/MIN: 10 INJECTION, EMULSION INTRAVENOUS at 12:14

## 2020-01-01 RX ADMIN — EPOPROSTENOL 30 NG/KG/MIN: 1.5 INJECTION, POWDER, LYOPHILIZED, FOR SOLUTION INTRAVENOUS at 04:29

## 2020-01-01 RX ADMIN — IPRATROPIUM BROMIDE AND ALBUTEROL SULFATE 3 ML: .5; 3 SOLUTION RESPIRATORY (INHALATION) at 08:22

## 2020-01-01 RX ADMIN — AZITHROMYCIN 500 MG: 500 INJECTION, POWDER, LYOPHILIZED, FOR SOLUTION INTRAVENOUS at 08:21

## 2020-01-01 RX ADMIN — ENOXAPARIN SODIUM 135 MG: 150 INJECTION SUBCUTANEOUS at 08:00

## 2020-01-01 RX ADMIN — NOREPINEPHRINE BITARTRATE 2 MCG/MIN: 1 INJECTION, SOLUTION, CONCENTRATE INTRAVENOUS at 00:19

## 2020-01-01 RX ADMIN — PROPOFOL 50 MCG/KG/MIN: 10 INJECTION, EMULSION INTRAVENOUS at 18:48

## 2020-01-01 RX ADMIN — IPRATROPIUM BROMIDE AND ALBUTEROL SULFATE 3 ML: .5; 3 SOLUTION RESPIRATORY (INHALATION) at 02:19

## 2020-01-01 RX ADMIN — FUROSEMIDE 80 MG: 10 INJECTION, SOLUTION INTRAMUSCULAR; INTRAVENOUS at 10:17

## 2020-01-01 RX ADMIN — IPRATROPIUM BROMIDE AND ALBUTEROL SULFATE 3 ML: .5; 3 SOLUTION RESPIRATORY (INHALATION) at 07:14

## 2020-01-01 RX ADMIN — AMIODARONE HYDROCHLORIDE 0.5 MG/MIN: 50 INJECTION, SOLUTION INTRAVENOUS at 08:55

## 2020-01-01 RX ADMIN — PROPOFOL 50 MCG/KG/MIN: 10 INJECTION, EMULSION INTRAVENOUS at 02:06

## 2020-01-01 RX ADMIN — NOREPINEPHRINE BITARTRATE 16 MCG/MIN: 1 INJECTION, SOLUTION, CONCENTRATE INTRAVENOUS at 09:14

## 2020-01-01 RX ADMIN — OXYCODONE HYDROCHLORIDE AND ACETAMINOPHEN 500 MG: 500 TABLET ORAL at 09:51

## 2020-01-01 RX ADMIN — Medication 1 AMPULE: at 08:22

## 2020-01-01 RX ADMIN — OXYCODONE HYDROCHLORIDE AND ACETAMINOPHEN 500 MG: 500 TABLET ORAL at 09:53

## 2020-01-01 RX ADMIN — ENOXAPARIN SODIUM 40 MG: 40 INJECTION SUBCUTANEOUS at 10:14

## 2020-01-01 RX ADMIN — CASTOR OIL AND BALSAM, PERU: 788; 87 OINTMENT TOPICAL at 17:20

## 2020-01-01 RX ADMIN — Medication 1 AMPULE: at 08:18

## 2020-01-01 RX ADMIN — NOREPINEPHRINE BITARTRATE 35 MCG/MIN: 1 INJECTION, SOLUTION, CONCENTRATE INTRAVENOUS at 16:15

## 2020-01-01 RX ADMIN — Medication 1 AMPULE: at 21:04

## 2020-01-01 RX ADMIN — LORAZEPAM 1 MG: 2 INJECTION INTRAMUSCULAR at 14:11

## 2020-01-01 RX ADMIN — HYDROCORTISONE SODIUM SUCCINATE 50 MG: 100 INJECTION, POWDER, FOR SOLUTION INTRAMUSCULAR; INTRAVENOUS at 11:48

## 2020-01-01 RX ADMIN — PROPOFOL 45 MCG/KG/MIN: 10 INJECTION, EMULSION INTRAVENOUS at 07:10

## 2020-01-01 RX ADMIN — CASTOR OIL AND BALSAM, PERU: 788; 87 OINTMENT TOPICAL at 08:57

## 2020-01-01 RX ADMIN — IPRATROPIUM BROMIDE AND ALBUTEROL SULFATE 3 ML: .5; 3 SOLUTION RESPIRATORY (INHALATION) at 08:38

## 2020-01-01 RX ADMIN — Medication 10 ML: at 06:06

## 2020-01-01 RX ADMIN — PROPOFOL 50 MCG/KG/MIN: 10 INJECTION, EMULSION INTRAVENOUS at 01:43

## 2020-01-01 RX ADMIN — PROPOFOL 50 MCG/KG/MIN: 10 INJECTION, EMULSION INTRAVENOUS at 17:49

## 2020-01-01 RX ADMIN — DEXMEDETOMIDINE 0.6 MCG/KG/HR: 100 INJECTION, SOLUTION, CONCENTRATE INTRAVENOUS at 19:40

## 2020-01-01 RX ADMIN — LABETALOL HYDROCHLORIDE 300 MG: 200 TABLET, FILM COATED ORAL at 21:45

## 2020-01-01 RX ADMIN — PROPOFOL 45 MCG/KG/MIN: 10 INJECTION, EMULSION INTRAVENOUS at 13:44

## 2020-01-01 RX ADMIN — POTASSIUM CHLORIDE 10 MEQ: 10 INJECTION, SOLUTION INTRAVENOUS at 14:54

## 2020-01-01 RX ADMIN — IPRATROPIUM BROMIDE AND ALBUTEROL SULFATE 3 ML: .5; 3 SOLUTION RESPIRATORY (INHALATION) at 00:02

## 2020-01-01 RX ADMIN — CHLORHEXIDINE GLUCONATE 15 ML: 0.12 RINSE ORAL at 08:33

## 2020-01-01 RX ADMIN — AZITHROMYCIN 500 MG: 500 INJECTION, POWDER, LYOPHILIZED, FOR SOLUTION INTRAVENOUS at 10:16

## 2020-01-01 RX ADMIN — NOREPINEPHRINE BITARTRATE 20 MCG/MIN: 1 INJECTION, SOLUTION, CONCENTRATE INTRAVENOUS at 18:31

## 2020-01-01 RX ADMIN — ENOXAPARIN SODIUM 150 MG: 150 INJECTION SUBCUTANEOUS at 10:11

## 2020-01-01 RX ADMIN — IPRATROPIUM BROMIDE AND ALBUTEROL SULFATE 3 ML: .5; 3 SOLUTION RESPIRATORY (INHALATION) at 01:08

## 2020-01-01 RX ADMIN — PROPOFOL 50 MCG/KG/MIN: 10 INJECTION, EMULSION INTRAVENOUS at 05:35

## 2020-01-01 RX ADMIN — FUROSEMIDE 80 MG: 10 INJECTION, SOLUTION INTRAMUSCULAR; INTRAVENOUS at 15:01

## 2020-01-01 RX ADMIN — DEXMEDETOMIDINE 0.8 MCG/KG/HR: 100 INJECTION, SOLUTION, CONCENTRATE INTRAVENOUS at 10:04

## 2020-01-01 RX ADMIN — ENOXAPARIN SODIUM 135 MG: 150 INJECTION SUBCUTANEOUS at 08:54

## 2020-01-01 RX ADMIN — CASTOR OIL AND BALSAM, PERU: 788; 87 OINTMENT TOPICAL at 08:21

## 2020-01-01 RX ADMIN — Medication 100 MCG/HR: at 17:49

## 2020-01-01 RX ADMIN — AMIODARONE HYDROCHLORIDE 0.5 MG/MIN: 50 INJECTION, SOLUTION INTRAVENOUS at 06:42

## 2020-01-01 RX ADMIN — PANTOPRAZOLE SODIUM 40 MG: 40 INJECTION, POWDER, FOR SOLUTION INTRAVENOUS at 09:51

## 2020-01-01 RX ADMIN — Medication 20 ML: at 05:14

## 2020-01-01 RX ADMIN — Medication 10 ML: at 21:09

## 2020-01-01 RX ADMIN — CHLORHEXIDINE GLUCONATE 15 ML: 0.12 RINSE ORAL at 20:04

## 2020-01-01 RX ADMIN — LABETALOL HYDROCHLORIDE 20 MG: 5 INJECTION INTRAVENOUS at 08:29

## 2020-01-01 RX ADMIN — VANCOMYCIN HYDROCHLORIDE 1500 MG: 10 INJECTION, POWDER, LYOPHILIZED, FOR SOLUTION INTRAVENOUS at 19:41

## 2020-01-01 RX ADMIN — IPRATROPIUM BROMIDE AND ALBUTEROL SULFATE 3 ML: .5; 3 SOLUTION RESPIRATORY (INHALATION) at 02:44

## 2020-01-01 RX ADMIN — IPRATROPIUM BROMIDE AND ALBUTEROL SULFATE 3 ML: .5; 3 SOLUTION RESPIRATORY (INHALATION) at 19:32

## 2020-01-01 RX ADMIN — Medication 100 MCG/HR: at 14:31

## 2020-01-01 RX ADMIN — IPRATROPIUM BROMIDE AND ALBUTEROL SULFATE 3 ML: .5; 3 SOLUTION RESPIRATORY (INHALATION) at 08:17

## 2020-01-01 RX ADMIN — IPRATROPIUM BROMIDE AND ALBUTEROL SULFATE 3 ML: .5; 3 SOLUTION RESPIRATORY (INHALATION) at 07:39

## 2020-01-01 RX ADMIN — PANTOPRAZOLE SODIUM 40 MG: 40 INJECTION, POWDER, FOR SOLUTION INTRAVENOUS at 09:18

## 2020-01-01 RX ADMIN — Medication 1 AMPULE: at 08:56

## 2020-01-01 RX ADMIN — ENOXAPARIN SODIUM 150 MG: 150 INJECTION SUBCUTANEOUS at 21:04

## 2020-01-01 RX ADMIN — IPRATROPIUM BROMIDE AND ALBUTEROL SULFATE 3 ML: .5; 3 SOLUTION RESPIRATORY (INHALATION) at 19:30

## 2020-01-01 RX ADMIN — EPOPROSTENOL 30 NG/KG/MIN: 1.5 INJECTION, POWDER, LYOPHILIZED, FOR SOLUTION INTRAVENOUS at 05:51

## 2020-01-01 RX ADMIN — ENOXAPARIN SODIUM 135 MG: 150 INJECTION SUBCUTANEOUS at 19:49

## 2020-01-01 RX ADMIN — Medication 200 MCG/HR: at 03:42

## 2020-01-01 RX ADMIN — PROPOFOL 50 MCG/KG/MIN: 10 INJECTION, EMULSION INTRAVENOUS at 23:28

## 2020-01-01 RX ADMIN — EPOPROSTENOL 30 NG/KG/MIN: 1.5 INJECTION, POWDER, LYOPHILIZED, FOR SOLUTION INTRAVENOUS at 18:10

## 2020-01-01 RX ADMIN — PROPOFOL 50 MCG/KG/MIN: 10 INJECTION, EMULSION INTRAVENOUS at 10:17

## 2020-01-01 RX ADMIN — AZITHROMYCIN 500 MG: 500 INJECTION, POWDER, LYOPHILIZED, FOR SOLUTION INTRAVENOUS at 17:30

## 2020-01-01 RX ADMIN — EPOPROSTENOL 30 NG/KG/MIN: 1.5 INJECTION, POWDER, LYOPHILIZED, FOR SOLUTION INTRAVENOUS at 14:05

## 2020-01-01 RX ADMIN — Medication 1 AMPULE: at 08:28

## 2020-01-01 RX ADMIN — REMDESIVIR 200 MG: 100 INJECTION, POWDER, LYOPHILIZED, FOR SOLUTION INTRAVENOUS at 16:23

## 2020-01-01 RX ADMIN — PROPOFOL 40 MCG/KG/MIN: 10 INJECTION, EMULSION INTRAVENOUS at 21:48

## 2020-01-01 RX ADMIN — Medication 1 AMPULE: at 21:00

## 2020-01-01 RX ADMIN — FUROSEMIDE 40 MG: 10 INJECTION, SOLUTION INTRAMUSCULAR; INTRAVENOUS at 18:45

## 2020-01-01 RX ADMIN — Medication 10 ML: at 05:47

## 2020-01-01 RX ADMIN — CASTOR OIL AND BALSAM, PERU: 788; 87 OINTMENT TOPICAL at 17:56

## 2020-01-01 RX ADMIN — DEXAMETHASONE SODIUM PHOSPHATE 6 MG: 4 INJECTION, SOLUTION INTRAMUSCULAR; INTRAVENOUS at 08:01

## 2020-01-01 RX ADMIN — EPOPROSTENOL 30 NG/KG/MIN: 1.5 INJECTION, POWDER, LYOPHILIZED, FOR SOLUTION INTRAVENOUS at 04:18

## 2020-01-01 RX ADMIN — PROPOFOL 50 MCG/KG/MIN: 10 INJECTION, EMULSION INTRAVENOUS at 09:17

## 2020-01-01 RX ADMIN — OXYCODONE HYDROCHLORIDE AND ACETAMINOPHEN 500 MG: 500 TABLET ORAL at 08:00

## 2020-01-01 RX ADMIN — NOREPINEPHRINE BITARTRATE 4 MCG/MIN: 1 INJECTION, SOLUTION, CONCENTRATE INTRAVENOUS at 07:06

## 2020-01-01 RX ADMIN — INSULIN LISPRO 2 UNITS: 100 INJECTION, SOLUTION INTRAVENOUS; SUBCUTANEOUS at 09:12

## 2020-01-01 RX ADMIN — Medication 1 AMPULE: at 09:53

## 2020-01-01 RX ADMIN — EPOPROSTENOL 30 NG/KG/MIN: 1.5 INJECTION, POWDER, LYOPHILIZED, FOR SOLUTION INTRAVENOUS at 13:52

## 2020-01-01 RX ADMIN — Medication 40 ML: at 05:27

## 2020-01-01 RX ADMIN — CHLORHEXIDINE GLUCONATE 15 ML: 0.12 RINSE ORAL at 08:03

## 2020-01-01 RX ADMIN — CHLORHEXIDINE GLUCONATE 15 ML: 0.12 RINSE ORAL at 21:08

## 2020-01-01 RX ADMIN — Medication 10 ML: at 21:30

## 2020-01-01 RX ADMIN — IPRATROPIUM BROMIDE AND ALBUTEROL SULFATE 3 ML: .5; 3 SOLUTION RESPIRATORY (INHALATION) at 02:25

## 2020-01-01 RX ADMIN — EPOPROSTENOL 30 NG/KG/MIN: 1.5 INJECTION, POWDER, LYOPHILIZED, FOR SOLUTION INTRAVENOUS at 02:57

## 2020-01-01 RX ADMIN — PROPOFOL 45 MCG/KG/MIN: 10 INJECTION, EMULSION INTRAVENOUS at 09:43

## 2020-01-01 RX ADMIN — Medication 200 MCG/HR: at 12:57

## 2020-01-01 RX ADMIN — SODIUM CHLORIDE 2 MCG/KG/MIN: 9 INJECTION, SOLUTION INTRAVENOUS at 14:16

## 2020-01-01 RX ADMIN — VANCOMYCIN HYDROCHLORIDE 1500 MG: 10 INJECTION, POWDER, LYOPHILIZED, FOR SOLUTION INTRAVENOUS at 19:43

## 2020-01-01 RX ADMIN — MIDAZOLAM 4 MG: 1 INJECTION INTRAMUSCULAR; INTRAVENOUS at 01:22

## 2020-01-01 RX ADMIN — HYDROCORTISONE SODIUM SUCCINATE 50 MG: 100 INJECTION, POWDER, FOR SOLUTION INTRAMUSCULAR; INTRAVENOUS at 12:05

## 2020-01-01 RX ADMIN — Medication 1 AMPULE: at 20:46

## 2020-01-01 RX ADMIN — CEFEPIME HYDROCHLORIDE 2 G: 2 INJECTION, POWDER, FOR SOLUTION INTRAVENOUS at 21:09

## 2020-01-01 RX ADMIN — ONDANSETRON 4 MG: 2 INJECTION INTRAMUSCULAR; INTRAVENOUS at 11:09

## 2020-01-01 RX ADMIN — PROPOFOL 50 MCG/KG/MIN: 10 INJECTION, EMULSION INTRAVENOUS at 05:12

## 2020-01-01 RX ADMIN — ZINC SULFATE 220 MG (50 MG) CAPSULE 220 MG: CAPSULE at 08:00

## 2020-01-01 RX ADMIN — PANTOPRAZOLE SODIUM 40 MG: 40 INJECTION, POWDER, FOR SOLUTION INTRAVENOUS at 08:53

## 2020-01-01 RX ADMIN — HYDRALAZINE HYDROCHLORIDE 10 MG: 20 INJECTION INTRAMUSCULAR; INTRAVENOUS at 13:35

## 2020-01-01 RX ADMIN — ENOXAPARIN SODIUM 135 MG: 150 INJECTION SUBCUTANEOUS at 19:40

## 2020-01-01 RX ADMIN — DEXAMETHASONE SODIUM PHOSPHATE 6 MG: 4 INJECTION, SOLUTION INTRAMUSCULAR; INTRAVENOUS at 09:53

## 2020-01-01 RX ADMIN — IPRATROPIUM BROMIDE AND ALBUTEROL SULFATE 3 ML: .5; 3 SOLUTION RESPIRATORY (INHALATION) at 01:20

## 2020-01-01 RX ADMIN — Medication 10 ML: at 13:44

## 2020-01-01 RX ADMIN — SODIUM PHOSPHATE, MONOBASIC, MONOHYDRATE: 276; 142 INJECTION, SOLUTION INTRAVENOUS at 18:24

## 2020-01-01 RX ADMIN — Medication 1 AMPULE: at 20:04

## 2020-01-01 RX ADMIN — IPRATROPIUM BROMIDE AND ALBUTEROL SULFATE 3 ML: .5; 3 SOLUTION RESPIRATORY (INHALATION) at 21:09

## 2020-01-01 RX ADMIN — Medication 100 MCG/HR: at 00:00

## 2020-01-01 RX ADMIN — Medication 1 AMPULE: at 20:23

## 2020-01-01 RX ADMIN — Medication 10 ML: at 14:00

## 2020-01-01 RX ADMIN — PANTOPRAZOLE SODIUM 40 MG: 40 TABLET, DELAYED RELEASE ORAL at 10:17

## 2020-01-01 RX ADMIN — IPRATROPIUM BROMIDE AND ALBUTEROL SULFATE 3 ML: .5; 3 SOLUTION RESPIRATORY (INHALATION) at 13:28

## 2020-01-01 RX ADMIN — PROPOFOL 120 MG: 10 INJECTION, EMULSION INTRAVENOUS at 02:27

## 2020-01-01 RX ADMIN — DEXAMETHASONE SODIUM PHOSPHATE 6 MG: 4 INJECTION, SOLUTION INTRAMUSCULAR; INTRAVENOUS at 08:07

## 2020-01-01 RX ADMIN — NOREPINEPHRINE BITARTRATE 34 MCG/MIN: 1 INJECTION, SOLUTION, CONCENTRATE INTRAVENOUS at 05:47

## 2020-01-01 RX ADMIN — HEPARIN SODIUM 7500 UNITS: 5000 INJECTION INTRAVENOUS; SUBCUTANEOUS at 23:28

## 2020-01-01 RX ADMIN — LABETALOL HYDROCHLORIDE 20 MG: 5 INJECTION INTRAVENOUS at 20:43

## 2020-01-01 RX ADMIN — NOREPINEPHRINE BITARTRATE 14 MCG/MIN: 1 INJECTION, SOLUTION, CONCENTRATE INTRAVENOUS at 00:10

## 2020-01-01 RX ADMIN — PROPOFOL 45 MCG/KG/MIN: 10 INJECTION, EMULSION INTRAVENOUS at 16:11

## 2020-01-01 RX ADMIN — IPRATROPIUM BROMIDE AND ALBUTEROL SULFATE 3 ML: .5; 3 SOLUTION RESPIRATORY (INHALATION) at 13:22

## 2020-01-01 RX ADMIN — Medication 50 MG: at 02:28

## 2020-01-01 RX ADMIN — ENOXAPARIN SODIUM 135 MG: 150 INJECTION SUBCUTANEOUS at 09:17

## 2020-01-01 RX ADMIN — CASTOR OIL AND BALSAM, PERU: 788; 87 OINTMENT TOPICAL at 09:20

## 2020-01-01 RX ADMIN — ENOXAPARIN SODIUM 135 MG: 150 INJECTION SUBCUTANEOUS at 08:22

## 2020-01-01 RX ADMIN — HYDROCORTISONE SODIUM SUCCINATE 50 MG: 100 INJECTION, POWDER, FOR SOLUTION INTRAMUSCULAR; INTRAVENOUS at 05:36

## 2020-01-01 RX ADMIN — CALCIUM GLUCONATE 1 G: 98 INJECTION, SOLUTION INTRAVENOUS at 16:16

## 2020-01-01 RX ADMIN — REMDESIVIR 100 MG: 100 INJECTION, POWDER, LYOPHILIZED, FOR SOLUTION INTRAVENOUS at 15:26

## 2020-01-01 RX ADMIN — SODIUM CHLORIDE 0.5 MCG/KG/MIN: 9 INJECTION, SOLUTION INTRAVENOUS at 01:44

## 2020-01-01 RX ADMIN — PANTOPRAZOLE SODIUM 40 MG: 40 INJECTION, POWDER, FOR SOLUTION INTRAVENOUS at 08:22

## 2020-01-01 RX ADMIN — IPRATROPIUM BROMIDE AND ALBUTEROL SULFATE 3 ML: .5; 3 SOLUTION RESPIRATORY (INHALATION) at 20:08

## 2020-01-01 RX ADMIN — ENOXAPARIN SODIUM 135 MG: 150 INJECTION SUBCUTANEOUS at 20:41

## 2020-01-01 RX ADMIN — CEFEPIME HYDROCHLORIDE 2 G: 2 INJECTION, POWDER, FOR SOLUTION INTRAVENOUS at 10:16

## 2020-01-01 RX ADMIN — AZITHROMYCIN 500 MG: 500 INJECTION, POWDER, LYOPHILIZED, FOR SOLUTION INTRAVENOUS at 09:53

## 2020-01-01 RX ADMIN — PROPOFOL 40 MCG/KG/MIN: 10 INJECTION, EMULSION INTRAVENOUS at 10:38

## 2020-01-01 RX ADMIN — NOREPINEPHRINE BITARTRATE 35 MCG/MIN: 1 INJECTION, SOLUTION, CONCENTRATE INTRAVENOUS at 03:57

## 2020-01-01 RX ADMIN — OXYCODONE HYDROCHLORIDE AND ACETAMINOPHEN 500 MG: 500 TABLET ORAL at 10:17

## 2020-01-01 RX ADMIN — NOREPINEPHRINE BITARTRATE 35 MCG/MIN: 1 INJECTION, SOLUTION, CONCENTRATE INTRAVENOUS at 10:16

## 2020-01-01 RX ADMIN — OXYCODONE HYDROCHLORIDE AND ACETAMINOPHEN 500 MG: 500 TABLET ORAL at 08:55

## 2020-01-01 RX ADMIN — ZINC SULFATE 220 MG (50 MG) CAPSULE 220 MG: CAPSULE at 09:53

## 2020-01-01 RX ADMIN — ENOXAPARIN SODIUM 135 MG: 150 INJECTION SUBCUTANEOUS at 07:43

## 2020-01-01 RX ADMIN — Medication 10 ML: at 05:54

## 2020-01-01 RX ADMIN — REMDESIVIR 100 MG: 100 INJECTION, POWDER, LYOPHILIZED, FOR SOLUTION INTRAVENOUS at 16:02

## 2020-01-01 RX ADMIN — FERROUS SULFATE TAB 325 MG (65 MG ELEMENTAL FE) 325 MG: 325 (65 FE) TAB at 10:16

## 2020-01-01 RX ADMIN — Medication 10 ML: at 13:26

## 2020-01-01 RX ADMIN — IPRATROPIUM BROMIDE AND ALBUTEROL SULFATE 3 ML: .5; 3 SOLUTION RESPIRATORY (INHALATION) at 19:26

## 2020-01-01 RX ADMIN — Medication 1 AMPULE: at 08:52

## 2020-01-01 RX ADMIN — Medication 10 ML: at 05:03

## 2020-01-01 RX ADMIN — Medication 200 MCG/HR: at 20:12

## 2020-01-01 RX ADMIN — IPRATROPIUM BROMIDE AND ALBUTEROL SULFATE 3 ML: .5; 3 SOLUTION RESPIRATORY (INHALATION) at 19:55

## 2020-01-01 RX ADMIN — CHLORHEXIDINE GLUCONATE 15 ML: 0.12 RINSE ORAL at 20:38

## 2020-01-01 RX ADMIN — IPRATROPIUM BROMIDE AND ALBUTEROL SULFATE 3 ML: .5; 3 SOLUTION RESPIRATORY (INHALATION) at 07:28

## 2020-01-01 RX ADMIN — CEFTRIAXONE 1 G: 1 INJECTION, POWDER, FOR SOLUTION INTRAMUSCULAR; INTRAVENOUS at 17:10

## 2020-01-01 RX ADMIN — CHLORHEXIDINE GLUCONATE 15 ML: 0.12 RINSE ORAL at 09:22

## 2020-01-01 RX ADMIN — ENOXAPARIN SODIUM 135 MG: 150 INJECTION SUBCUTANEOUS at 20:37

## 2020-01-01 RX ADMIN — PANTOPRAZOLE SODIUM 40 MG: 40 INJECTION, POWDER, FOR SOLUTION INTRAVENOUS at 09:36

## 2020-01-01 RX ADMIN — ENOXAPARIN SODIUM 150 MG: 150 INJECTION SUBCUTANEOUS at 21:12

## 2020-01-01 RX ADMIN — LABETALOL HYDROCHLORIDE 20 MG: 5 INJECTION INTRAVENOUS at 20:37

## 2020-01-01 RX ADMIN — PROPOFOL 45 MCG/KG/MIN: 10 INJECTION, EMULSION INTRAVENOUS at 19:30

## 2020-01-01 RX ADMIN — CHLORHEXIDINE GLUCONATE 15 ML: 0.12 RINSE ORAL at 20:12

## 2020-01-01 RX ADMIN — AZITHROMYCIN 500 MG: 500 INJECTION, POWDER, LYOPHILIZED, FOR SOLUTION INTRAVENOUS at 08:07

## 2020-01-01 RX ADMIN — INSULIN LISPRO 2 UNITS: 100 INJECTION, SOLUTION INTRAVENOUS; SUBCUTANEOUS at 00:27

## 2020-01-01 RX ADMIN — IPRATROPIUM BROMIDE AND ALBUTEROL SULFATE 3 ML: .5; 3 SOLUTION RESPIRATORY (INHALATION) at 14:09

## 2020-01-01 RX ADMIN — DEXAMETHASONE SODIUM PHOSPHATE 6 MG: 4 INJECTION, SOLUTION INTRAMUSCULAR; INTRAVENOUS at 09:48

## 2020-01-01 RX ADMIN — PROPOFOL 40 MCG/KG/MIN: 10 INJECTION, EMULSION INTRAVENOUS at 04:14

## 2020-01-01 RX ADMIN — HUMAN INSULIN 10 UNITS: 100 INJECTION, SOLUTION SUBCUTANEOUS at 17:40

## 2020-01-01 RX ADMIN — Medication 100 MCG/HR: at 05:06

## 2020-01-01 RX ADMIN — Medication 10 ML: at 21:29

## 2020-01-01 RX ADMIN — HALOPERIDOL LACTATE 2 MG: 5 INJECTION, SOLUTION INTRAMUSCULAR at 03:36

## 2020-01-01 RX ADMIN — DEXAMETHASONE SODIUM PHOSPHATE 6 MG: 4 INJECTION, SOLUTION INTRAMUSCULAR; INTRAVENOUS at 08:29

## 2020-01-01 RX ADMIN — PROPOFOL 50 MCG/KG/MIN: 10 INJECTION, EMULSION INTRAVENOUS at 15:23

## 2020-01-01 RX ADMIN — EPOPROSTENOL 30 NG/KG/MIN: 1.5 INJECTION, POWDER, LYOPHILIZED, FOR SOLUTION INTRAVENOUS at 14:09

## 2020-01-01 RX ADMIN — Medication 200 MG: at 02:28

## 2020-01-01 RX ADMIN — Medication 1 AMPULE: at 09:50

## 2020-01-01 RX ADMIN — Medication 1 AMPULE: at 20:33

## 2020-01-01 RX ADMIN — HYDROCORTISONE SODIUM SUCCINATE 50 MG: 100 INJECTION, POWDER, FOR SOLUTION INTRAMUSCULAR; INTRAVENOUS at 17:41

## 2020-01-01 RX ADMIN — DEXTROSE MONOHYDRATE 25 G: 25 INJECTION, SOLUTION INTRAVENOUS at 17:40

## 2020-01-01 RX ADMIN — IPRATROPIUM BROMIDE AND ALBUTEROL SULFATE 3 ML: .5; 3 SOLUTION RESPIRATORY (INHALATION) at 21:20

## 2020-01-01 RX ADMIN — HYDROCORTISONE SODIUM SUCCINATE 50 MG: 100 INJECTION, POWDER, FOR SOLUTION INTRAMUSCULAR; INTRAVENOUS at 05:27

## 2020-01-01 RX ADMIN — PROPOFOL 40 MCG/KG/MIN: 10 INJECTION, EMULSION INTRAVENOUS at 13:27

## 2020-01-01 RX ADMIN — Medication 10 ML: at 21:13

## 2020-01-01 RX ADMIN — INSULIN LISPRO 2 UNITS: 100 INJECTION, SOLUTION INTRAVENOUS; SUBCUTANEOUS at 15:58

## 2020-01-01 RX ADMIN — CHLORHEXIDINE GLUCONATE 15 ML: 0.12 RINSE ORAL at 09:38

## 2020-01-01 RX ADMIN — CHLORHEXIDINE GLUCONATE 15 ML: 0.12 RINSE ORAL at 21:04

## 2020-01-01 RX ADMIN — SODIUM CHLORIDE 2 MCG/KG/MIN: 9 INJECTION, SOLUTION INTRAVENOUS at 22:31

## 2020-01-01 RX ADMIN — PROPOFOL 40 MCG/KG/MIN: 10 INJECTION, EMULSION INTRAVENOUS at 11:40

## 2020-01-01 RX ADMIN — DEXMEDETOMIDINE 1.5 MCG/KG/HR: 100 INJECTION, SOLUTION, CONCENTRATE INTRAVENOUS at 07:36

## 2020-01-01 RX ADMIN — PROPOFOL 50 MCG/KG/MIN: 10 INJECTION, EMULSION INTRAVENOUS at 08:18

## 2020-01-01 RX ADMIN — CEFEPIME HYDROCHLORIDE 2 G: 2 INJECTION, POWDER, FOR SOLUTION INTRAVENOUS at 09:21

## 2020-01-01 RX ADMIN — METOPROLOL TARTRATE: 5 INJECTION, SOLUTION INTRAVENOUS at 01:00

## 2020-01-01 RX ADMIN — Medication 10 ML: at 06:00

## 2020-01-01 RX ADMIN — Medication 10 ML: at 15:16

## 2020-01-01 RX ADMIN — IPRATROPIUM BROMIDE AND ALBUTEROL SULFATE 3 ML: .5; 3 SOLUTION RESPIRATORY (INHALATION) at 19:37

## 2020-01-01 RX ADMIN — IPRATROPIUM BROMIDE AND ALBUTEROL SULFATE 3 ML: .5; 3 SOLUTION RESPIRATORY (INHALATION) at 13:53

## 2020-01-01 RX ADMIN — ENOXAPARIN SODIUM 150 MG: 150 INJECTION SUBCUTANEOUS at 08:07

## 2020-01-01 RX ADMIN — Medication 100 MCG/HR: at 13:30

## 2020-01-01 RX ADMIN — CASTOR OIL AND BALSAM, PERU: 788; 87 OINTMENT TOPICAL at 09:38

## 2020-01-01 RX ADMIN — Medication 100 MCG/HR: at 06:31

## 2020-01-01 RX ADMIN — PROPOFOL 50 MCG/KG/MIN: 10 INJECTION, EMULSION INTRAVENOUS at 23:05

## 2020-01-01 RX ADMIN — POTASSIUM CHLORIDE 10 MEQ: 10 INJECTION, SOLUTION INTRAVENOUS at 11:39

## 2020-01-01 RX ADMIN — CASTOR OIL AND BALSAM, PERU: 788; 87 OINTMENT TOPICAL at 17:23

## 2020-01-01 RX ADMIN — SODIUM BICARBONATE 150 MEQ/1,000 ML IN DEXTROSE 5 % INTRAVENOUS: SOLUTION at 15:00

## 2020-01-01 RX ADMIN — CITALOPRAM HYDROBROMIDE 20 MG: 20 TABLET ORAL at 10:17

## 2020-01-01 RX ADMIN — NOREPINEPHRINE BITARTRATE 8 MCG/MIN: 1 INJECTION, SOLUTION, CONCENTRATE INTRAVENOUS at 10:46

## 2020-01-01 RX ADMIN — IPRATROPIUM BROMIDE AND ALBUTEROL SULFATE 3 ML: .5; 3 SOLUTION RESPIRATORY (INHALATION) at 08:49

## 2020-01-01 RX ADMIN — Medication 200 MCG/HR: at 04:11

## 2020-01-01 RX ADMIN — IPRATROPIUM BROMIDE AND ALBUTEROL SULFATE 3 ML: .5; 3 SOLUTION RESPIRATORY (INHALATION) at 08:06

## 2020-01-01 RX ADMIN — PANTOPRAZOLE SODIUM 40 MG: 40 INJECTION, POWDER, FOR SOLUTION INTRAVENOUS at 09:38

## 2020-01-01 RX ADMIN — Medication 1 AMPULE: at 09:22

## 2020-01-01 RX ADMIN — Medication 10 ML: at 17:31

## 2020-01-01 RX ADMIN — VANCOMYCIN HYDROCHLORIDE 2000 MG: 10 INJECTION, POWDER, LYOPHILIZED, FOR SOLUTION INTRAVENOUS at 01:52

## 2020-02-26 PROBLEM — I50.32 CHRONIC DIASTOLIC CONGESTIVE HEART FAILURE (HCC): Status: ACTIVE | Noted: 2018-08-29

## 2020-02-26 NOTE — PROGRESS NOTES
1. Have you been to the ER, urgent care clinic since your last visit? Hospitalized since your last visit? No    2. Have you seen or consulted any other health care providers outside of the [address] [place] [address] since your last visit? Include any pap smears or colon screening.  No    Chief Complaint   Patient presents with    Cholesterol Problem     6 mo f/u    Hypertension     6 mo f/u    Coronary Artery Disease     6 mo f/u    Medication Evaluation     for potassium

## 2020-02-26 NOTE — PROGRESS NOTES
Eloy Buck, [employer]    Subjective/HPI:     Ayaz Jaramillo is a 60 y.o. female is here for routine f/u. She has a PMHx of HFpEF, HTN, HLD, MONSERRAT on CPAP, morbid obesity. She is doing well. She denies complaints of chest pains, dizziness, orthopnea, PND or edema. She denies shortness of breath or palpitation symptoms. She has been walking more on her treadmill and cutting out fried foods and drinking more green tea. She has lost 3 lbs. PCP Provider  Jaclyn Lynch MD    Past Medical History:   Diagnosis Date    Anxiety and depression 9/26/2018    Arthritis     knees, lower back.  BMI 45.0-49.9, adult ([address] 9/26/2018    Essential tremor 9/26/2018    High cholesterol 8/14/2019    Hypertension     Iron deficiency anemia secondary to inadequate dietary iron intake 9/26/2018    Noncompliance 10/17/2019    Obesity, morbid ([facility] [address] 8/29/2018    Vitamin D deficiency 10/31/2018        No Known Allergies     Outpatient Encounter Medications as of 2/26/2020   Medication Sig Dispense Refill    busPIRone (BUSPAR) 15 mg tablet TAKE 1 TAB BY MOUTH TWO (2) TIMES DAILY AS NEEDED (ANXIETY). 180 Tab 1    pantoprazole (PROTONIX) 40 mg tablet Take 1 Tab by mouth daily. 90 Tab 1    Olmesartan-amLODIPine-HCTZ 40-10-25 mg tab TAKE 1 TABLET DAILY 90 Tab 1    labetalol (NORMODYNE) 300 mg tablet TAKE 1 TABLET EVERY 12     HOURS (ONLY TOLERATES IT   TWO TIMES A DAY) 180 Tab 1    atorvastatin (LIPITOR) 20 mg tablet TAKE 1 TABLET DAILY 90 Tab 1    fluticasone propionate (FLONASE) 50 mcg/actuation nasal spray SPRAY 2 SPRAYS INTO EACH NOSTRIL EVERY DAY (Patient taking differently: 2 Sprays by Both Nostrils route as needed.) 1 Bottle 2    aspirin 81 mg chewable tablet TAKE 1 TABLET BY MOUTH EVERY DAY 90 Tab 1    furosemide (LASIX) 80 mg tablet Take 1 Tab by mouth two (2) times a day.  180 Tab 1    potassium chloride SR (KLOR-CON 10) 10 mEq tablet TAKE 2 TABLETS BY MOUTH TWICE A  Tab 6    citalopram (CELEXA) 20 mg tablet TAKE 1 TABLET BY MOUTH EVERY DAY 90 Tab 1    ferrous sulfate 325 mg (65 mg iron) tablet TAKE 1 TAB BY MOUTH THREE (3) TIMES DAILY (WITH MEALS). 90 Tab 1    cpap machine kit by Does Not Apply route.  naproxen sodium 220 mg cap Take  by mouth every twelve (12) hours as needed (pain). 1-2 caps as needed      ergocalciferol (VITAMIN D2) 50,000 unit capsule Take 50,000 Units by mouth every seven (7) days.  ascorbic acid (VITAMIN C) 500 mg tablet Take 500 mg by mouth daily.  [DISCONTINUED] busPIRone (BUSPAR) 15 mg tablet TAKE 1 TABLET NIGHTLY 90 Tab 1     No facility-administered encounter medications on file as of 2/26/2020. Review of Symptoms:    Review of Systems   Constitutional: Negative for chills, fever and weight loss. HENT: Negative for nosebleeds. Eyes: Negative for blurred vision and double vision. Respiratory: Negative for cough, shortness of breath and wheezing. Cardiovascular: Negative for chest pain, palpitations, orthopnea, leg swelling and PND. Gastrointestinal: Negative for abdominal pain, blood in stool, diarrhea, nausea and vomiting. Musculoskeletal: Negative for joint pain. Skin: Negative for rash. Neurological: Negative for dizziness, tingling and loss of consciousness. Endo/Heme/Allergies: Does not bruise/bleed easily. Physical Exam:      General: Well developed, in no acute distress, cooperative and alert  HEENT: No carotid bruits, no JVD, trach is midline. Neck Supple, PEERL, EOM intact. Heart:  reg rate and rhythm; normal S1/S2; no murmurs, no gallops or rubs. Respiratory: Clear bilaterally x 4, no wheezing or rales  Abdomen:   Soft, non-tender, no distention, no masses. + BS. Extremities:  Normal cap refill, no cyanosis, atraumatic. No edema. Neuro: A&Ox3, speech clear, gait stable. Skin: Skin color is normal. No rashes or lesions.  Non diaphoretic  Vascular: 2+ pulses symmetric in all extremities    Vitals:    02/26/20 1022 02/26/20 1036   BP: 126/68 120/70   Pulse: 72    Resp: 18    SpO2: 95%    Weight: 308 lb 3.2 oz (139.8 kg)    Height: 5' 8\" (1.727 m)        ECG: sinus rhythm    Cardiology Labs:    Lab Results   Component Value Date/Time    Cholesterol, total 119 10/17/2019 09:47 AM    HDL Cholesterol 53 10/17/2019 09:47 AM    LDL, calculated 51 10/17/2019 09:47 AM    LDL, calculated 76 09/26/2018 11:11 AM    VLDL, calculated 15 10/17/2019 09:47 AM       Lab Results   Component Value Date/Time    Hemoglobin A1c 6.0 (H) 10/17/2019 11:11 AM       Lab Results   Component Value Date/Time    Sodium 141 10/17/2019 09:47 AM    Potassium 3.7 10/17/2019 09:47 AM    Chloride 101 10/17/2019 09:47 AM    CO2 24 10/17/2019 09:47 AM    Glucose 98 10/17/2019 09:47 AM    BUN 17 10/17/2019 09:47 AM    Creatinine 0.99 10/17/2019 09:47 AM    BUN/Creatinine ratio 17 10/17/2019 09:47 AM    GFR est AA 71 10/17/2019 09:47 AM    GFR est non-AA 61 10/17/2019 09:47 AM    Calcium 10.9 (H) 10/17/2019 09:47 AM    Anion gap 4 (L) 08/23/2018 04:22 AM    Bilirubin, total 0.5 10/17/2019 09:47 AM    ALT (SGPT) 15 10/17/2019 09:47 AM    AST (SGOT) 18 10/17/2019 09:47 AM    Alk. phosphatase 109 10/17/2019 09:47 AM    Protein, total 8.1 10/17/2019 09:47 AM    Albumin 4.1 10/17/2019 09:47 AM    Globulin 6.1 (H) 08/14/2018 05:18 AM    A-G Ratio 1.0 (L) 10/17/2019 09:47 AM          Assessment:       ICD-10-CM ICD-9-CM    1. Chronic diastolic congestive heart failure (HCC) I50.32 428.32      428.0    2. High cholesterol E78.00 272.0 AMB POC EKG ROUTINE W/ 12 LEADS, INTER & REP   3. Essential hypertension I10 401.9         Plan:     1. Chronic diastolic congestive heart failure (HCC)  Euvolemic on exam today  Continue present medical management, diuretics for fluid management  Encouraged exercise    2. High cholesterol  Continue atorvastatin therapy  LDL 51 in 10/2019  Lipids by PCP    3. Essential hypertension  BP controlled. Continue anti-hypertensive therapy and low sodium diet    F/u with Dr. Jm Ojeda in 1 year    Sameer Luo MD

## 2020-02-27 NOTE — PROGRESS NOTES
Chief Complaint   Patient presents with    Hypertension    Cholesterol Problem    Depression     Check meds & labs    1. Have you been to the ER, urgent care clinic since your last visit? Hospitalized since your last visit? no    2. Have you seen or consulted any other health care providers outside of the [address] [place] [address] since your last visit? Include any pap smears or colon screening.  no

## 2020-02-27 NOTE — PROGRESS NOTES
Jaclyn Diaz is a 60 y.o. female. Pt with a hx of noncompliant to treatment. has a past medical history of Anxiety and depression (9/26/2018), Arthritis, BMI 45.0-49.9, adult ([address] (9/26/2018), Essential tremor (9/26/2018), High cholesterol (8/14/2019), Hypertension, Iron deficiency anemia secondary to inadequate dietary iron intake (9/26/2018), Noncompliance (10/17/2019), Obesity, morbid ([address] (8/29/2018), and Vitamin D deficiency (10/31/2018). Anxiety depression: on buspar PRN for years. Anuj Bolivar had it since her college days. Family hx of anxiety from mother and sister. Denies SI or HI. Celexa have been doing well for her, \"i feel calmer\". buspar prn  Continue course. HTN: BP at goal.               Continue labetalol, olmesartan-amlodipine     Prediabetes: A1C 6.0% 10/2019, 6.1% 09/2018, discussed diet, exercise, avoid carbs. BMI 46  Recheck labs     No more anemia with last CBC 10/2019     CAD: Dr. Keen Sol     lipitor and ASA 31WD  CHF diastolic:    Lasix 92WR BID  She was admitted 08/2018. i've reviewed and the echo and stress test. She was put on lasix 80mg BID since then. No changes were made. She haven't had much issue since. Currently on days that she goes out she doesn't take lasix b/c \"running to the bathroom like crazy, sometime I can hardly make it there\". She's been doing this on and off. We discussed trying to lower lasix 80mg to just once a day. Monitor for ankle swelling, SOB, if weight gain > 5lbs in 2 days then to go back to old dose of lasix. GERD: discussed lifestyle. Send in PPI    Chronic allergies with nasal congestion but she doesn't like flonase. Advise for otc antihistamines    MONSERRAT compliant cpap     Constipation: send in miralax to use with iron supplement. She'll see her OBGYN for papsmear.      Colonoscopy 6/2013 normal due for repeat in 6/2023    Past Medical History:   Diagnosis Date    Anxiety and depression 9/26/2018    Arthritis knees, lower back.  BMI 45.0-49.9, adult ([facility] 9/26/2018    Essential tremor 9/26/2018    High cholesterol 8/14/2019    Hypertension     Iron deficiency anemia secondary to inadequate dietary iron intake 9/26/2018    Noncompliance 10/17/2019    Obesity, morbid ([facility] 8/29/2018    Vitamin D deficiency 10/31/2018     Past Surgical History:   Procedure Laterality Date    HX BREAST BIOPSY Left     HX GYN  2009    D&C     No Known Allergies  Current Outpatient Medications on File Prior to Visit   Medication Sig Dispense Refill    busPIRone (BUSPAR) 15 mg tablet TAKE 1 TAB BY MOUTH TWO (2) TIMES DAILY AS NEEDED (ANXIETY). 180 Tab 1    pantoprazole (PROTONIX) 40 mg tablet Take 1 Tab by mouth daily. 90 Tab 1    Olmesartan-amLODIPine-HCTZ 40-10-25 mg tab TAKE 1 TABLET DAILY 90 Tab 1    labetalol (NORMODYNE) 300 mg tablet TAKE 1 TABLET EVERY 12     HOURS (ONLY TOLERATES IT   TWO TIMES A DAY) 180 Tab 1    atorvastatin (LIPITOR) 20 mg tablet TAKE 1 TABLET DAILY 90 Tab 1    fluticasone propionate (FLONASE) 50 mcg/actuation nasal spray SPRAY 2 SPRAYS INTO EACH NOSTRIL EVERY DAY (Patient taking differently: 2 Sprays by Both Nostrils route as needed.) 1 Bottle 2    ferrous sulfate 325 mg (65 mg iron) tablet TAKE 1 TAB BY MOUTH THREE (3) TIMES DAILY (WITH MEALS). 90 Tab 1    cpap machine kit by Does Not Apply route.  naproxen sodium 220 mg cap Take  by mouth every twelve (12) hours as needed (pain). 1-2 caps as needed      ascorbic acid (VITAMIN C) 500 mg tablet Take 500 mg by mouth daily. No current facility-administered medications on file prior to visit. Objective:      Visit Vitals  /61   Pulse 69   Temp 97.7 °F (36.5 °C) (Oral)   Resp 18   Ht 5' 8\" (1.727 m)   Wt 307 lb (139.3 kg)   SpO2 98%   BMI 46.68 kg/m²      General appearance: alert, cooperative, no distress, appears stated age. Morbidly obese  Neurologic: Alert and oriented X 3, normal strength and tone, symmetric.  Normal without focal findings. Cranial nerves 2-12 intact. Normal coordination and gait. Mental status: Alert, oriented, thought content appropriate, affect: stable, mood-congruent. Head: Normocephalic, without obvious abnormality, atraumatic  Eyes: conjunctivae/corneas clear. PERRL, EOM's intact. Neck: supple, symmetrical, trachea midline, no JVD  Lungs: clear to auscultation bilaterally  Heart: regular rate and rhythm, S1, S2 normal, no murmur, click, rub or gallop  Abdomen: soft, non-tender. Extremities: extremities normal, atraumatic, no cyanosis or edema      Assessment/Plan:     Per orders. Gargle, use acetaminophen or other OTC analgesic, and take Rx fully as prescribed. Call if other family members develop similar symptoms. See prn. Diagnoses and all orders for this visit:    1. Essential hypertension  -     METABOLIC PANEL, BASIC  -     HEPATIC FUNCTION PANEL    2. Prediabetes  -     HEMOGLOBIN A1C W/O EAG    3. Anxiety and depression  -     METABOLIC PANEL, BASIC  -     HEPATIC FUNCTION PANEL  -     citalopram (CELEXA) 20 mg tablet; TAKE 1 TABLET BY MOUTH EVERY DAY    4. Mixed hyperlipidemia  -     METABOLIC PANEL, BASIC  -     HEPATIC FUNCTION PANEL    5. Coronary artery disease involving native heart without angina pectoris, unspecified vessel or lesion type  -     aspirin 81 mg chewable tablet; TAKE 1 TABLET BY MOUTH EVERY DAY  Indications: stroke prevention    6. Chronic diastolic congestive heart failure (HCC)  -     furosemide (LASIX) 80 mg tablet; Take 1 Tab by mouth daily. 7. Leg edema, left  -     furosemide (LASIX) 80 mg tablet; Take 1 Tab by mouth daily. 8. Screening for breast cancer  -     [facility] MAMMO BI SCREENING INCL CAD; Future    9. Encounter for long-term (current) use of medications  -     HEMOGLOBIN A1C W/O EAG  -     METABOLIC PANEL, BASIC  -     HEPATIC FUNCTION PANEL    10. Vitamin D deficiency  -     ergocalciferol (VITAMIN D2) 1,250 mcg (50,000 unit) capsule;  Take 1 Cap by mouth every seven (7) days. Other orders  -     potassium chloride SR (KLOR-CON 10) 10 mEq tablet; TAKE 2 TABLETS BY MOUTH TWICE A DAY      Follow-up and Dispositions    · Return in about 4 months (around 6/27/2020) for DM2, meds, labs.            Toni Castanon MD  2/27/2020

## 2020-06-25 NOTE — PROGRESS NOTES
Chief Complaint Patient presents with  Hypertension 4 mo check 1. Have you been to the ER, urgent care clinic since your last visit? Hospitalized since your last visit? no 
 
2. Have you seen or consulted any other health care providers outside of the [address] [place] [address] since your last visit? Include any pap smears or colon screening. yes

## 2020-06-25 NOTE — PROGRESS NOTES
Consent: Sujatha Jimenez, who was seen by synchronous (real-time) audio-video technology, and/or her healthcare decision maker, is aware that this patient-initiated, Telehealth encounter on 6/25/2020 is a billable service, with coverage as determined by her insurance carrier. She is aware that she may receive a bill and has provided verbal consent to proceed: Yes. Sujatha Jimenez is a 60 y.o. female No LMP recorded. Patient is postmenopausal. 
 
 
 has a past medical history of Anxiety and depression (9/26/2018), Arthritis, BMI 45.0-49.9, adult ([facility] (9/26/2018), Essential tremor (9/26/2018), High cholesterol (8/14/2019), Hypertension, Iron deficiency anemia secondary to inadequate dietary iron intake (9/26/2018), Noncompliance (10/17/2019), Obesity, morbid ([facility] (8/29/2018), and Vitamin D deficiency (10/31/2018). Anxiety depression: on buspar PRN for years. Seema Cooper had it since her college days. Family hx of anxiety from mother and sister. Denies SI or HI. Celexa have been doing well for her, \"i feel calmer\". buspar prn, clonazepam prn, she haven't need refill in many months. Continue course. 
  
HTN: BP high at home, but have been good 4 visits past.  
            Continue labetalol, olmesartan-amlodipine 
  
Prediabetes: A1C 6.0% 02/2020, 6.0% 10/2019, 6.1% 09/2018, discussed diet, exercise, avoid carbs. BMI 46 Recheck labs CAD: Dr. Carmina Roper   
            lipitor and ASA 81mg CHF diastolic:  
            Lasix 80mg BID prn based on her weight 
  
MONSERRAT compliant cpap GERD: discussed lifestyle. Send in PPI 
  
Chronic allergies with nasal congestion but she doesn't like flonase. Advise for otc antihistamines 
  
Constipation: send in miralax to use with iron supplement.  
  
She'll see her OBGYN for papsmear.  
  
Colonoscopy 6/2013 normal due for repeat in 6/2023 Reviewed: active problem list, medication list, allergies, notes from last encounter, lab results A comprehensive review of systems was negative except for that written in the HPI. Assessment & Plan:  
Diagnoses and all orders for this visit: 
 
1. Anxiety and depression 
-     citalopram (CELEXA) 20 mg tablet; TAKE 1 TABLET BY MOUTH EVERY DAY 
 
2. Essential hypertension -     Olmesartan-amLODIPine-HCTZ 40-10-25 mg tab; 1 tab dailyl 
-     labetaloL (NORMODYNE) 300 mg tablet; Take 1 Tab by mouth two (2) times a day. -     CBC W/O DIFF 
-     METABOLIC PANEL, COMPREHENSIVE 
-     TSH 3RD GENERATION 3. Mixed hyperlipidemia 
-     atorvastatin (LIPITOR) 20 mg tablet; Take 1 Tab by mouth nightly. -     LIPID PANEL 4. Prediabetes 
-     HEMOGLOBIN A1C W/O EAG 
 
5. Hypokalemia -     potassium chloride SR (KLOR-CON 10) 10 mEq tablet; TAKE 2 TABLETS BY MOUTH TWICE A DAY 6. Chronic diastolic congestive heart failure (HCC) 
-     CBC W/O DIFF 
-     METABOLIC PANEL, COMPREHENSIVE 
-     TSH 3RD GENERATION 7. Coronary artery disease involving native heart without angina pectoris, unspecified vessel or lesion type 
-     CBC W/O DIFF 
-     METABOLIC PANEL, COMPREHENSIVE 8. Encounter for long-term (current) use of medications 
-     CBC W/O DIFF 
-     HEMOGLOBIN A1C W/O EAG 
-     LIPID PANEL 
-     METABOLIC PANEL, COMPREHENSIVE 
-     TSH 3RD GENERATION Follow-up and Dispositions · Return in about 4 months (around 10/25/2020) for HTN, HLD, prediabetes, CHF, meds, labs. [address] Subjective:  
Hansel Smith is a 60 y.o. female who was seen for Hypertension Prior to Admission medications Medication Sig Start Date End Date Taking? Authorizing Provider  
citalopram (CELEXA) 20 mg tablet TAKE 1 TABLET BY MOUTH EVERY DAY 6/25/20  Yes Francisco Romo MD  
Olmesartan-amLODIPine-HCTZ 40-10-25 mg tab 1 tab dailyl 6/25/20  Yes Augusto Black MD  
labetaloL (NORMODYNE) 300 mg tablet Take 1 Tab by mouth two (2) times a day.  6/25/20  Yes Francisco Romo MD  
 atorvastatin (LIPITOR) 20 mg tablet Take 1 Tab by mouth nightly. 6/25/20  Yes Haroon Black MD  
potassium chloride SR (KLOR-CON 10) 10 mEq tablet TAKE 2 TABLETS BY MOUTH TWICE A DAY 6/25/20  Yes Haroon Black MD  
pantoprazole (PROTONIX) 40 mg tablet Take 1 Tab by mouth daily. 6/11/20  Yes Shane Kraus MD  
ergocalciferol (Vitamin D2) 1,250 mcg (50,000 unit) capsule Take 1 Cap by mouth every seven (7) days. 6/11/20  Yes Shane Kraus MD  
busPIRone (BUSPAR) 15 mg tablet TAKE 1 TABLET NIGHTLY 6/11/20  Yes Haroon Black MD  
clonazePAM (KlonoPIN) 0.5 mg tablet TAKE 0.5 TAB BY MOUTH NIGHTLY AS NEEDED FOR ANXIETY. MAX DAILY AMOUNT: 0.25MG 4/27/20  Yes Haroon Black MD  
furosemide (LASIX) 80 mg tablet TAKE 1 TABLET BY MOUTH TWICE A DAY 4/13/20  Yes Shane Kraus MD  
aspirin 81 mg chewable tablet TAKE 1 TABLET BY MOUTH EVERY DAY  Indications: stroke prevention 2/27/20  Yes Shane Kraus MD  
ferrous sulfate 325 mg (65 mg iron) tablet TAKE 1 TAB BY MOUTH THREE (3) TIMES DAILY (WITH MEALS). 2/3/19  Yes Shane Kraus MD  
cpap machine kit by Does Not Apply route. Yes Provider, Historical  
naproxen sodium 220 mg cap Take  by mouth every twelve (12) hours as needed (pain). 1-2 caps as needed   Yes Provider, Historical  
ascorbic acid (VITAMIN C) 500 mg tablet Take 500 mg by mouth daily. Yes Provider, Historical  
fluticasone propionate (FLONASE) 50 mcg/actuation nasal spray SPRAY 2 SPRAYS INTO EACH NOSTRIL EVERY DAY 3/9/20   Shane Kraus MD  
 
No Known Allergies Objective:  
Vital Signs: (As obtained by patient/caregiver at home) There were no vitals taken for this visit. Constitutional: [x] Appears well-developed and well-nourished [x] No apparent distress   
  [] Abnormal - Mental status: [x] Alert and awake  [x] Oriented to person/place/time [x] Able to follow commands   
[] Abnormal - Eyes:   EOM    [x]  Normal    [] Abnormal -  
Sclera  [x]  Normal    [] Abnormal - 
 Discharge [x]  None visible   [] Abnormal - HENT: [x] Normocephalic, atraumatic  [] Abnormal -  
[] Mouth/Throat: Mucous membranes are moist 
 
External Ears [x] Normal  [] Abnormal - Neck: [x] No visualized mass [] Abnormal - Pulmonary/Chest: [x] Respiratory effort normal   [x] No visualized signs of difficulty breathing or respiratory distress 
      [] Abnormal - Musculoskeletal:   [x] Normal gait with no signs of ataxia [x] Normal range of motion of neck [] Abnormal -  
 
Neurological:        [x] No Facial Asymmetry (Cranial nerve 7 motor function) (limited exam due to video visit) [x] No gaze palsy  
     [] Abnormal -   
     
Skin:        [x] No significant exanthematous lesions or discoloration noted on facial skin   
     [] Abnormal - Psychiatric:       [x] Normal Affect [] Abnormal -  
     [x] No Hallucinations We discussed the expected course, resolution and complications of the diagnosis(es) in detail. Medication risks, benefits, costs, interactions, and alternatives were discussed as indicated. I advised her to contact the office if her condition worsens, changes or fails to improve as anticipated. She expressed understanding with the diagnosis(es) and plan. Che Reina is a 60 y.o. female being evaluated by a video visit encounter for concerns as above. A caregiver was present when appropriate. Due to this being a TeleHealth encounter (During BRDWS-06 public health emergency), evaluation of the following organ systems was limited: Vitals/Constitutional/EENT/Resp/CV/GI//MS/Neuro/Skin/Heme-Lymph-Imm.  
Pursuant to the emergency declaration under the [facility], [facility] waiver authority and the "[employer] and [employer] Act, this Virtual  Visit was conducted, with patient's (and/or legal guardian's) consent, to reduce the patient's risk of exposure to COVID-19 and provide necessary medical care. Services were provided through a video synchronous discussion virtually to substitute for in-person clinic visit. Patient and provider were located at their individual homes.  
 
 
 
Dierdre Brunner, MD

## 2020-12-14 NOTE — TELEPHONE ENCOUNTER
Pt Last seen >6 months ago  Med refill 1 month until f/u  pls call for F/u appointment    thx    Rickey Jacobs MD  4/3/2018

## 2020-12-17 PROBLEM — J12.82 PNEUMONIA DUE TO COVID-19 VIRUS: Status: ACTIVE | Noted: 2020-01-01

## 2020-12-17 PROBLEM — J96.00 RESPIRATORY FAILURE, ACUTE (HCC): Status: ACTIVE | Noted: 2020-01-01

## 2020-12-17 PROBLEM — A41.9 SEPSIS (HCC): Status: ACTIVE | Noted: 2020-01-01

## 2020-12-17 PROBLEM — U07.1 PNEUMONIA DUE TO COVID-19 VIRUS: Status: ACTIVE | Noted: 2020-01-01

## 2020-12-17 NOTE — ED NOTES
Pt resting on stretcher in POC with call bell in reach. Pt remains on monitor x 3 and on BIPAP. VSS at this time.

## 2020-12-17 NOTE — ED NOTES
Patient resting sitting up in high fowlers position on Bi pap tolerating well . All labs are drawn and to the lab. Tolerated all well. Crackles in both upper lung fields

## 2020-12-17 NOTE — ED NOTES
Patient resting in room and biPap is now with filters. Medicated per orders and patient has Zithromax out standing at this time.

## 2020-12-17 NOTE — ED NOTES
Dr. Gibbs [place] at bedside to evaluate patient. PT on NRB at this time. Sats 81%. Respiratory paged.

## 2020-12-18 NOTE — PROGRESS NOTES
1400: TRANSFER - IN REPORT: 
 
Verbal report received from [facility], [address] [facility] (name) on Kavya Wilcox  being received from ED (unit) for routine progression of care Report consisted of patients Situation, Background, Assessment and  
Recommendations(SBAR). Information from the following report(s) SBAR, Kardex, ED Summary, Intake/Output, MAR, Recent Results and Cardiac Rhythm NSR was reviewed with the receiving nurse. Opportunity for questions and clarification was provided. 1450: Pt received to CCU 2520. Pt atached to cardiac monitor, VS taken. Pt given full CHG bath, clean gown and linens provided. Pt alert and oriented, able to follow commands. Remains on BiPAP 100%, ICD provider notified of pt arrival to floor. 1600: Reassessment completed; no changes from previous assessment. 1945: Reassessment completed; no changes from previous assessment. 2300: Verbal shift change report given to Jaun Parra RN (oncoming nurse) by Cherise Gann RN (offgoing nurse). Report included the following information SBAR, Kardex, ED Summary, Intake/Output, MAR, Recent Results and Cardiac Rhythm NSR.

## 2020-12-18 NOTE — PROGRESS NOTES
Received message from patient's nurse Tisha Cruz stating : 
 
ABGs are in. Look slightly worse than previous set. Please review results and advise Discussion / orders: 
 
 12/17/2020 12:58 12/18/2020 03:59  
pH (POC) 7.32 (L) 7.33 (L)  
pCO2 (POC) 47.2 (H) 44.3  
pO2 (POC) 59 (L) 53 (L) HCO3 (POC) 24.5 23.3  
sO2 (POC) 88 (L) 84 (L) Base deficit (POC) 2 3 FIO2 (POC) 100 100 Specimen type (POC) ARTERIAL ARTERIAL Site RIGHT RADIAL LEFT RADIAL Device: BIPAP BIPAP Total resp. rate 34 PIP (POC) 16 24 PEEP/CPAP (POC) 10 14 Allens test (POC) YES YES Evaluated patient in person Admitted with Covid pneumonia. She is currently on BiPAP. Respiratory therapist has seen patient and has adjusted BiPAP setting based on current ABG results. Patient denies deterioration of her shortness of breath. And is currently hemodynamically stable. Patient's nurse states that respiratory therapist has informed her that she cannot adjust BiPAP setting any higher. Patient continues to have oxygen saturations between 88 and 90% even on BiPAP with increased settings. · Already has consult pending with pulmonology · She has also been started on Decadron and Lovenox. · Added Zithromax · Consulted infectious disease Please note th if not she will go homeat this note was dictated using Dragon computer voice recognition software. Quite often unanticipated grammatical, syntax, homophones, and other interpretive errors are inadvertently transcribed by the computer software. Please disregard these errors. Please excuse any errors that have escaped final proofreading.

## 2020-12-18 NOTE — CONSULTS
Consult [address] Patient: Tania Nation MRN: [id number]  SSN: [id number] YOB: 1955  Age: 63 y.o. Sex: female Subjective:  
  
Tania Nation is a 63 y.o. female who was brought to the ED with cc of resp distress. Pt had called 911 for resp difficulty. She had been having increase in SOB which started a few days ago. When EMS arrived they found the pt sitting at the top of the stairs sitting in chair with SOB. She had been unable to make it down the stairs because of her resp distress. She felt that her symptoms are related to CHF. She denies any COVID exposures. She is not having any active chest pain. There has been no reported fever. She was found to be COVID positive, and was placed on Bipap. Overnight, she had worsening work of breathing requiring maximum support from Bipap. Critical care service was consulted to manage further. She currently states she is comfortable, that her work of breathing is manageable. She does not talk much secondary to feeling SOB. Past Medical History:  
Diagnosis Date  Anxiety and depression 9/26/2018  Arthritis   
 knees, lower back.  BMI 45.0-49.9, adult ([address] 9/26/2018  Essential tremor 9/26/2018  High cholesterol 8/14/2019  Hypertension  Iron deficiency anemia secondary to inadequate dietary iron intake 9/26/2018  Noncompliance 10/17/2019  Obesity, morbid ([address] 8/29/2018  Vitamin D deficiency 10/31/2018 Past Surgical History:  
Procedure Laterality Date  HX BREAST BIOPSY Left  HX GYN  2009 D&C Family History Problem Relation Age of Onset  Stroke Father 36  
 Heart Disease Father [facility] Arthritis-osteo Mother  Cancer Mother [de-identified]  
     breast cancer  Breast Cancer Mother 78 Social History Tobacco Use  Smoking status: Never Smoker  Smokeless tobacco: Never Used Substance Use Topics  Alcohol use: Yes Comment: rare Current Facility-Administered Medications Medication Dose Route Frequency Provider Last Rate Last Admin  enoxaparin (LOVENOX) injection 40 mg  40 mg SubCUTAneous Q12H Shanna MONROY MD   40 mg at 12/18/20 1014  cefTRIAXone (ROCEPHIN) 1 g in 0.9% sodium chloride (MBP/ADV) 50 mL MBP  1 g IntraVENous Q24H Milan Ribeiro MD      
 azithromycin (ZITHROMAX) 500 mg in 0.9% sodium chloride 250 mL (VIAL-MATE)  500 mg IntraVENous Q24H Jacklyn Patterson MD   Stopped at 12/18/20 1120  
 sodium chloride (NS) flush 5-40 mL  5-40 mL IntraVENous Q8H Milan Ribeiro MD   10 mL at 12/18/20 0600  
 sodium chloride (NS) flush 5-40 mL  5-40 mL IntraVENous PRN Milan Ribeiro MD      
 acetaminophen (TYLENOL) tablet 650 mg  650 mg Oral Q6H PRN Milan Ribeiro MD      
 Or  
 acetaminophen (TYLENOL) suppository 650 mg  650 mg Rectal Q6H PRN Milan Ribeiro MD      
 polyethylene glycol (MIRALAX) packet 17 g  17 g Oral DAILY PRN Milan Ribeiro MD      
 ondansetron (ZOFRAN ODT) tablet 4 mg  4 mg Oral Q8H PRN Milan Ribeiro MD      
 Or  
 ondansetron (ZOFRAN) injection 4 mg  4 mg IntraVENous Q6H PRN Milan Ribeiro MD   4 mg at 12/18/20 1109  aspirin chewable tablet 81 mg  81 mg Oral DAILY Milan Ribeiro MD   81 mg at 12/18/20 1016  
 ascorbic acid (vitamin C) (VITAMIN C) tablet 500 mg  500 mg Oral DAILY Margaux Hurley MD   500 mg at 12/18/20 1017  busPIRone (BUSPAR) tablet 15 mg  15 mg Oral BID PRN Milan Ribeiro MD   15 mg at 12/18/20 1016  citalopram (CELEXA) tablet 20 mg  20 mg Oral DAILY Milan Ribeiro MD   20 mg at 12/18/20 1017  ferrous sulfate tablet 325 mg  1 Tab Oral DAILY WITH BREAKFAST Milan Ribeiro MD   325 mg at 12/18/20 1016  fluticasone propionate (FLONASE) 50 mcg/actuation nasal spray 2 Spray  2 Spray Both Nostrils DAILY Milan Ribeiro MD      
 pantoprazole (PROTONIX) tablet 40 mg  40 mg Oral DAILY Milan Ribeiro MD   40 mg at 12/18/20 1015  labetaloL (NORMODYNE) tablet 300 mg  300 mg Oral BID Oni Culver MD   300 mg at 12/18/20 1028  atorvastatin (LIPITOR) tablet 20 mg  20 mg Oral QHS Oni Culver MD   Stopped at 12/17/20 2200  
 dexamethasone (DECADRON) 4 mg/mL injection 6 mg  6 mg IntraVENous Q24H Oni Culver MD   6 mg at 12/18/20 1102  
 insulin lispro (HUMALOG) injection   SubCUTAneous AC&HS Oni Culver MD   Stopped at 12/17/20 2200  
 glucose chewable tablet 16 g  4 Tab Oral PRN Oni Culver MD      
 dextrose (D50W) injection syrg 12.5-25 g  12.5-25 g IntraVENous PRN Oni Culver MD      
 glucagon (GLUCAGEN) injection 1 mg  1 mg IntraMUSCular PRN Oni Culver MD      
 albuterol (PROVENTIL HFA, VENTOLIN HFA, PROAIR HFA) inhaler 2 Puff  2 Puff Inhalation QID RT Oni Culver MD   Stopped at 12/18/20 0800  zinc sulfate (ZINCATE) 220 (50) mg capsule 220 mg  220 mg Oral DAILY Amanda Hurley MD   220 mg at 12/18/20 1017 Current Outpatient Medications Medication Sig Dispense Refill  labetaloL (NORMODYNE) 300 mg tablet Take 1 Tab by mouth two (2) times a day. 60 Tab 0  
 aspirin 81 mg chewable tablet TAKE 1 TABLET BY MOUTH EVERY DAY INDICATIONS: STROKE PREVENTION  Indications: stroke prevention 90 Tab 1  
 furosemide (LASIX) 80 mg tablet TAKE 1 TABLET BY MOUTH EVERY DAY 90 Tab 1  
 busPIRone (BUSPAR) 15 mg tablet TAKE 1 TAB BY MOUTH TWO (2) TIMES DAILY AS NEEDED (ANXIETY). 180 Tab 1  
 Olmesartan-amLODIPine-HCTZ 40-10-25 mg tab TAKE 1 TABLET DAILY 90 Tab 1  clonazePAM (KlonoPIN) 0.5 mg tablet TAKE 0.5 TAB BY MOUTH NIGHTLY AS NEEDED FOR ANXIETY. MAX DAILY AMOUNT: 0.25MG 15 Tab 0  
 citalopram (CELEXA) 20 mg tablet TAKE 1 TABLET BY MOUTH EVERY DAY 90 Tab 1  
 atorvastatin (LIPITOR) 20 mg tablet Take 1 Tab by mouth nightly. 90 Tab 1  potassium chloride SR (KLOR-CON 10) 10 mEq tablet TAKE 2 TABLETS BY MOUTH TWICE A  Tab 6  pantoprazole (PROTONIX) 40 mg tablet Take 1 Tab by mouth daily. 90 Tab 1  
 ergocalciferol (Vitamin D2) 1,250 mcg (50,000 unit) capsule Take 1 Cap by mouth every seven (7) days. 12 Cap 1  
 fluticasone propionate (FLONASE) 50 mcg/actuation nasal spray SPRAY 2 SPRAYS INTO EACH NOSTRIL EVERY DAY 3 Bottle 0  
 ferrous sulfate 325 mg (65 mg iron) tablet TAKE 1 TAB BY MOUTH THREE (3) TIMES DAILY (WITH MEALS). 90 Tab 1  
 cpap machine kit by Does Not Apply route.  naproxen sodium 220 mg cap Take  by mouth every twelve (12) hours as needed (pain). 1-2 caps as needed  ascorbic acid (VITAMIN C) 500 mg tablet Take 500 mg by mouth daily. No Known Allergies Review of Systems: A comprehensive review of systems was negative except for that written in the History of Present Illness. Objective:  
 
Vitals:  
 12/18/20 1000 12/18/20 1028 12/18/20 1100 12/18/20 1138 BP: (!) 130/57 (!) 130/57 (!) 125/56 Pulse: 73 72 68 Resp: 27  28 Temp:      
SpO2: 90% 90% 94% (!) 85% Weight:      
Height:      
  
 
Physical Exam: 
GENERAL: alert, fatigued, cooperative, no distress, appears older than stated age, morbidly obese THROAT & NECK: normal and supple, normal ROM 
LUNG: clear to auscultation bilaterally, diminished breath sounds R base, L base HEART: regular rate and rhythm, S1, S2 normal, no murmur, click, rub or gallop ABDOMEN: soft, non-tender. Non distended, morbidly obese. Non distended EXTREMITIES:  extremities normal, atraumatic, no cyanosis or edema PSYCHIATRIC: awake and alert, NAD, follows all commands Assessment:  
 
Hospital Problems  Date Reviewed: 2/27/2020 Codes Class Noted POA Res[facility] ICD-10-CM: J96.00 
ICD-9-CM: 518.81  12/17/2020 Unknown Sepsis ([facility] ICD-10-CM: A41.9 ICD-9-CM: 038.9, 995.91  12/17/2020 Unknown Pneumonia due to COVID-19 virus ICD-10-CM: U07.1, J12.89 ICD-9-CM: 480.8  12/17/2020 Unknown Plan: Acute hypoxic respiratory failure COVID-19 pneumonia. 
-Continue dexamethasone. 
-Continue Rocephin and azithromycin started empirically for possible superadded infection 
-Remdesivir cannot be started due to creatinine clearance below 30 
-Continue vitamin C and zinc 
-Continue BiPAP, currently on settings of 24/14, 100%, wean or sats >85% 
-will need to increase lovenox to therapeutic dose as pt is high risk from both a weight and disease standpoint 
 
  
Acute kidney injury on CKD stage III Creatinine is increased to 2.2. Patient at this point is slightly hypovolemic Continue to monitor for now Avoid nephrotoxic medication Follow BMP 
  
Diastolic CHF Echo pending, last echo shows EF 65%. Home dose of Lasix 80 mg daily, was given 40 mg IV in the ER Creatinine slightly elevated We will hold off on further diuretics for now 
  
Hypertension Continue labetalol, continue rest of the medication depending on the blood pressure. 
  
Hyperlipidemia Diabetes type 2 
-On statin, sliding scale insulin. 
  
Iron deficiency anemia Anxiety GERD 
-Continue home medications 
  
Nutrition: 
- pt high risk of decompensating if coming off Bipap to eat. Will need to place [place] and initiate tube feeds 
  
Code Status: Full code. Had lengthy conversation with pt regarding goals of care. Pt extremely high risk of mortality if she requires intubation. Discussed this with her, she wants to think about it, as well as think about her code status. Will need to readdress.  
  
DVT Prophylaxis: Lovenox GI Prophylaxis: not indicated 
  
Baseline: Walk with assistance at home 
  
SPECIAL EQUIPMENT Bipap 
  
DISPOSITION Will accept pt and transfer to ICU 
  
CRITICAL CARE CONSULTANT NOTE I had a face to face encounter with the patient, reviewed and interpreted patient data including clinical events, labs, images, vital signs, I/O's, and examined patient. I have discussed the case and the plan and management of the patient's care with the admitting services, the bedside nurses and the respiratory therapist.   
  
NOTE OF PERSONAL INVOLVEMENT IN CARE This patient has a high probability of imminent, clinically significant deterioration, which requires the highest level of preparedness to intervene urgently. I participated in the decision-making and personally managed or directed the management of the following life and organ supporting interventions that required my frequent assessment to treat or prevent imminent deterioration. 
  
I personally spent 60 minutes of critical care time. This is time spent at this critically ill patient's bedside actively involved in patient care as well as the coordination of care. This does not include any procedural time which has been billed separately. 
  
  
  
MD ADILIA Jones/ [address] 
12/18/2020 Signed By: Jessica Woodruff MD   
 December 18, 2020

## 2020-12-18 NOTE — PROGRESS NOTES
Notified Dr. Luo Messing that pt's O2 sats are ranging between 86-90% on Bipap 18/14 100% FiO2. Requested neb treatement. MD will review chart and place order. Jaclyn Palomares RN

## 2020-12-18 NOTE — PROGRESS NOTES
Both Pulmonary and the ICU were consulted on this pt. This pt needs ICU level care based on severe hypoxia. Will not see pt as the ICU team will be following. When pt is better will see this pt. Discussed with the ICU team who is seeing this pt. Discussed with Dr. Carla Cain.

## 2020-12-18 NOTE — PROGRESS NOTES
Received message from patient's nurse Aster Fleming stating : 
 
Pt admitted for Covid 19 pna. Pt has been on Bipap since around 12/17/20 around 13:00. Pt has been on setting of 18/14 FiO2 100% with this setting pt's O2 sats are ranging from 87%-92%. Are we okay with this O2 sat range? Pt also has not voided yet this shift. I bladder scanned her around 23:00 and her scan showed 219mL. Pt now has urge to void, but unable. Will bladder scan again, but can I have order to straight cath for retention? Discussion / orders: · ABG stat · Respiratory care consult · Straight cath and send urine sample for urinalysis with reflex culture Please note that this note was dictated using Dragon computer voice recognition software. Quite often unanticipated grammatical, syntax, homophones, and other interpretive errors are inadvertently transcribed by the computer software. Please disregard these errors. Please excuse any errors that have escaped final proofreading.

## 2020-12-18 NOTE — PROGRESS NOTES
Hospitalist Progress Note NAME: Elina Rascon :  1955 MRN:  [id number] Assessment / Plan: 
 
Acute hypoxic respiratory failure COVID-19 pneumonia Severe sepsis 
-Indications of sepsis are tachypnea, tachycardia and respiratory failure 
-Source of infection is COVID-19 pneumonia 
-Rapid COVID-19 test positive 
-Chest x-ray shows bilateral multilobar airspace disease -ABG 7.32/47.2/59/24.5 on BiPAP 16/10, FiO2 100 on admission. 
-Continue dexamethasone. 
-Continue Rocephin and azithromycin started empirically for possible superadded infection 
-Remdesivir cannot be started due to creatinine clearance below 30 
-Continue vitamin C and zinc 
-Continue BiPAP, currently on settings of  
-We will obtain intensive care consult for transfer to ICU, I personally spoke to the intensive care team and was advised that there is no bed available in the ICU at this point 
-Have requested intensive care consult, this patient in my opinion needs intensive care As her oxygenation remains in 80s despite on high settings of BiPAP 
-CRP 5.13, procalcitonin <0.05 
-Leukocytosis slightly worsened today, continue to monitor CBC 
-Pending pulmonology consultation 
-Albuterol 4 times daily. -Vitamin C, zinc. 
-Lovenox of moderate intensity 
-We will hold off on diuresis at this point as patient is clinically euvolemic 
  
Acute kidney injury on CKD stage III Creatinine is increased to 2.2. Patient at this point is slightly hypovolemic Continue to monitor for now Hold off on giving more diuretics Avoid nephrotoxic medication Follow BMP 
  
Diastolic CHF Echo pending, last echo shows EF 65%. Home dose of Lasix 80 mg daily, was given 40 mg IV in the ER Creatinine slightly elevated We will hold off on further diuretics for now Hypertension Continue labetalol, continue rest of the medication depending on the blood pressure. 
  
Hyperlipidemia Diabetes type 2 
-On statin, sliding scale insulin.   
Iron deficiency anemia Anxiety GERD 
-Continue the home medications 
  
  
Code Status: Full code 
  
DVT Prophylaxis: Lovenox GI Prophylaxis: not indicated 
  
Baseline: Walk with assistance at home Subjective: Chief Complaint / Reason for Physician Visit \" Patient remains on high BiPAP settings of 24 /14, oxygen saturation remains in 80s. Patient is tachypneic and visibly short of breath while on BiPAP\". Discussed with RN events overnight. Review of Systems: 
Symptom Y/N Comments  Symptom Y/N Comments Fever/Chills n   Chest Pain n   
Poor Appetite n   Edema n   
Cough n   Abdominal Pain n   
Sputum n   Joint Pain n   
SOB/FLORENCE y   Pruritis/Rash Nausea/vomit n   Tolerating PT/OT Diarrhea n   Tolerating Diet Constipation n   Other Could NOT obtain due to:   
 
Objective: VITALS:  
Last 24hrs VS reviewed since prior progress note. Most recent are: 
Patient Vitals for the past 24 hrs: 
 Temp Pulse Resp BP SpO2  
12/18/20 0735  75 22  90 % 12/18/20 0734  76 20  (!) 88 % 12/18/20 0733  76 20  (!) 88 % 12/18/20 0732  71 27  (!) 89 % 12/18/20 0731  70 26  (!) 89 % 12/18/20 0730  71 27  90 % 12/18/20 0729  73 21  (!) 86 % 12/18/20 0728  74 22  (!) 89 % 12/18/20 0727  74 21  91 % 12/18/20 0726  71 29  91 % 12/18/20 0724  71 20  (!) 88 % 12/18/20 0723  72 25  (!) 89 % 12/18/20 0722  68 25  (!) 88 % 12/18/20 0721  69 26  90 % 12/18/20 0720  72 25  91 % 12/18/20 0719  72 26  91 % 12/18/20 0718  73 24  (!) 88 % 12/18/20 0717  71 26  (!) 89 % 12/18/20 0716  72 20  (!) 89 % 12/18/20 0715  71 27  (!) 88 % 12/18/20 0714  71 19  (!) 88 % 12/18/20 0713  72 27  (!) 89 % 12/18/20 0712  74 26  (!) 89 % 12/18/20 0711  75 30  (!) 88 % 12/18/20 0710  72 23  (!) 88 % 12/18/20 0709  72 26  (!) 89 % 12/18/20 0708  72 29  (!) 88 % 12/18/20 0707  72 20  (!) 89 % 12/18/20 0706  71 20  (!) 89 % 12/18/20 0705  70 26  (!) 89 % 12/18/20 0704  70 28  (!) 89 % 12/18/20 0703  70 20  (!) 88 % 12/18/20 0702  69 20  (!) 87 % 12/18/20 0701  70 21  (!) 88 % 12/18/20 0700  72 22 125/75 (!) 87 % 12/18/20 0659  70 28  (!) 88 % 12/18/20 0658  71 28  (!) 88 % 12/18/20 0657  73 23  91 % 12/18/20 0656  71 25  (!) 89 % 12/18/20 0655  70 16  (!) 88 % 12/18/20 0654  70 22  (!) 89 % 12/18/20 0653  70 25  (!) 88 % 12/18/20 0652  67 24  90 % 12/18/20 0651  70 27  (!) 89 % 12/18/20 0650  68 21  (!) 89 % 12/18/20 0649  71 20  (!) 87 % 12/18/20 0648  68 25  (!) 86 % 12/18/20 0647  68 26  (!) 87 % 12/18/20 0646  68 30  (!) 86 % 12/18/20 0645  68 29  (!) 86 % 12/18/20 0644  68 (!) 32  (!) 87 % 12/18/20 0643  68 (!) 31  (!) 87 % 12/18/20 0642  68 (!) 31  (!) 88 % 12/18/20 0641  69 20  (!) 89 % 12/18/20 0640  71 21  (!) 89 % 12/18/20 0639  68 25  (!) 87 % 12/18/20 0638  67 28  (!) 87 % 12/18/20 0637  67 29  (!) 87 % 12/18/20 0636  67 29  (!) 88 % 12/18/20 0635  66 28  (!) 88 % 12/18/20 0634  66 26  (!) 87 % 12/18/20 0633  65 29  (!) 87 % 12/18/20 0632  66 25  (!) 87 % 12/18/20 0631  69 21  (!) 86 % 12/18/20 0630  65 28  (!) 86 % 12/18/20 0629  65 28  (!) 86 % 12/18/20 0628  66 27  (!) 86 % 12/18/20 0627  65 26  (!) 87 % 12/18/20 0626  65 29  (!) 88 % 12/18/20 0625  65 30  (!) 88 % 12/18/20 0624  65 28  (!) 87 % 12/18/20 0623  66 25  (!) 87 % 12/18/20 0622  66 27  (!) 86 % 12/18/20 0621  66 28  (!) 87 % 12/18/20 0620  65 24  (!) 87 % 12/18/20 0619  66 (!) 31  (!) 87 % 12/18/20 0618  68 (!) 31  (!) 88 % 12/18/20 0617  65 27  (!) 88 % 12/18/20 0616  70 (!) 31  (!) 88 % 12/18/20 0615  69 27  (!) 89 % 12/18/20 0614  70 (!) 31  (!) 89 % 12/18/20 0613  68 29  (!) 88 % 12/18/20 0612  74 24  (!) 88 % 12/18/20 0611  71 25  (!) 87 % 12/18/20 0610  71 29  (!) 88 % 12/18/20 0609  75 22  (!) 89 % 12/18/20 0608  68 26  (!) 87 % 12/18/20 0607  72 22  (!) 85 % 12/18/20 0606  69 24  (!) 87 % 12/18/20 0605  71 28  (!) 87 % 12/18/20 0604  69 19  (!) 86 % 12/18/20 0603  70 16  (!) 87 % 12/18/20 0602  70 30  (!) 87 % 12/18/20 0601  70 28  (!) 88 % 12/18/20 0600  69 28 129/89 (!) 89 % 12/18/20 0559  73 21  (!) 87 % 12/18/20 0558  74 20  (!) 89 % 12/18/20 0557  72 20  (!) 89 % 12/18/20 0556  73 23  (!) 88 % 12/18/20 0555  71 24  (!) 89 % 12/18/20 0554  72 17  (!) 88 % 12/18/20 0553  71 27  (!) 89 % 12/18/20 0552  71 23  90 % 12/18/20 0551  71 28  (!) 89 % 12/18/20 0550  71 20  (!) 88 % 12/18/20 0549  70 29  (!) 89 % 12/18/20 0548  71 27  (!) 87 % 12/18/20 0547  72 23  (!) 87 % 12/18/20 0546  71 19  (!) 88 % 12/18/20 0545  73 23  (!) 88 % 12/18/20 0544  69 23  (!) 89 % 12/18/20 0543  70 23  (!) 89 % 12/18/20 0542  71 20  (!) 89 % 12/18/20 0541  70 17  (!) 86 % 12/18/20 0540  69 29  (!) 86 % 12/18/20 0539  69 30  (!) 87 % 12/18/20 0538  72 27  (!) 87 % 12/18/20 0537  69 22  (!) 88 % 12/18/20 0536  72 21  (!) 88 % 12/18/20 0535  72 25  90 % 12/18/20 0534  72 21  92 % 12/18/20 0533  74 28  90 % 12/18/20 0532  69 24  90 % 12/18/20 0531  71 24  90 % 12/18/20 0530  69 24  90 % 12/18/20 0529  74 24  (!) 88 % 12/18/20 0528  70 22  91 % 12/18/20 0527  70 21  (!) 89 % 12/18/20 0526  69 27  (!) 89 % 12/18/20 0525  70 21  (!) 89 % 12/18/20 0524  68 20  90 % 12/18/20 0523  68 24  (!) 88 % 12/18/20 0522  70 24  (!) 88 % 12/18/20 0521  72 23  (!) 87 % 12/18/20 0520  69 27  (!) 88 % 12/18/20 0519  69 28  (!) 88 % 12/18/20 0518  69 27  (!) 87 % 12/18/20 0517  69 27  (!) 87 % 12/18/20 0516  68 27  (!) 88 % 12/18/20 0515  69 27  (!) 88 % 12/18/20 0514  69 (!) 32  (!) 89 % 12/18/20 0513  69 (!) 33  (!) 89 % 12/18/20 0512  68 29  90 % 12/18/20 0511  69 17  91 % 12/18/20 0510  70 17  92 % 12/18/20 0509  72 25  91 % 12/18/20 0508  71 19  92 % 12/18/20 0507  71 30  91 % 12/18/20 0506  71 21  90 % 12/18/20 0505  72 12  (!) 88 % 12/18/20 0504  76 27  (!) 80 % 12/18/20 0503  74 25  91 % 12/18/20 0502  69 20  91 % 12/18/20 0501  71 (!) 31  (!) 89 % 12/18/20 0500  74 30 122/79 90 % 12/18/20 0459  71 25  90 % 12/18/20 0458  72 25  91 % 12/18/20 0457  69 30  (!) 89 % 12/18/20 0456  72 23  90 % 12/18/20 0455  69 24  (!) 89 % 12/18/20 0454  68 26  91 % 12/18/20 0453  70 (!) 31  92 % 12/18/20 0452  71 26  (!) 88 % 12/18/20 0451  70 25  91 % 12/18/20 0450  74 24  (!) 89 % 12/18/20 0449  70 28  91 % 12/18/20 0448  69 27  91 % 12/18/20 0447  71 19  (!) 87 % 12/18/20 0446  68 22  (!) 88 % 12/18/20 0445  68 27  (!) 89 % 12/18/20 0444  69 30  (!) 89 % 12/18/20 0443  69 22  (!) 89 % 12/18/20 0442  70 27  (!) 88 % 12/18/20 0441  72 21  (!) 89 % 12/18/20 0440  72 23  90 % 12/18/20 0439  70 27  90 % 12/18/20 0438  69 28  (!) 89 % 12/18/20 0437  69 30  91 % 12/18/20 0436  72 21  91 % 12/18/20 0435  70 24  90 % 12/18/20 0434  68 29  90 % 12/18/20 0433  68 20  90 % 12/18/20 0432  72 19  91 % 12/18/20 0431  68 19  90 % 12/18/20 0430  69 30  91 % 12/18/20 0429  71 18  (!) 88 % 12/18/20 0428  69 26  90 % 12/18/20 0427  68 24  (!) 89 % 12/18/20 0426  69 27  90 % 12/18/20 0425  69 29  90 % 12/18/20 0424  69 25  (!) 88 % 12/18/20 0423  67 22  (!) 89 % 12/18/20 0422  69 19  (!) 88 % 12/18/20 0421  68 20  (!) 86 % 12/18/20 0420  69 23  (!) 86 % 12/18/20 0419  68 29  91 % 12/18/20 0418  67 (!) 32  (!) 88 % 12/18/20 0417  66 25  (!) 89 % 12/18/20 0416  66 27  (!) 89 % 12/18/20 0415  67 26  (!) 89 % 12/18/20 0414  69 21  (!) 89 % 12/18/20 0413  67 27  (!) 87 % 12/18/20 0412  67 29  (!) 88 % 12/18/20 0411  67 29  (!) 88 % 12/18/20 0410  67 29  (!) 89 % 12/18/20 0409  70 30  90 % 12/18/20 0408  69 30  90 % 12/18/20 0407  70 23  (!) 88 % 12/18/20 0406  69 24  (!) 88 % 12/18/20 0405  68 30  (!) 88 % 12/18/20 0404  69 (!) 31  (!) 88 % 12/18/20 0403  69 26  90 % 12/18/20 0402  72 25  (!) 89 % 12/18/20 0401  71 22  90 % 12/18/20 0400  72 23 (!) 145/77 90 % 12/18/20 0358  69 22  91 % 12/18/20 0357  69 29  (!) 88 % 12/18/20 0356  69 (!) 31  90 % 12/18/20 0355  69 (!) 33  (!) 89 % 12/18/20 0354  68 29  90 % 12/18/20 0353  69 (!) 33  91 % 12/18/20 0352  69 (!) 34  92 % 12/18/20 0351  69 (!) 33  93 % 12/18/20 0350  69 26  94 % 12/18/20 0349  69 (!) 31  94 % 12/18/20 0348  70 19  94 % 12/18/20 0347  69 25  92 % 12/18/20 0346  71 28  92 % 12/18/20 0345  71 19  93 % 12/18/20 0344  71 (!) 32  92 % 12/18/20 0343  72 20  92 % 12/18/20 0342  72 27  91 % 12/18/20 0341  78 23  92 % 12/18/20 0340  74 27  93 % 12/18/20 0339  72 22  93 % 12/18/20 0338  72 22  91 % 12/18/20 0337  71 26  91 % 12/18/20 0336  73 26  91 % 12/18/20 0335  70 27  91 % 12/18/20 0334  69 21  92 % 12/18/20 0333  70 24  91 % 12/18/20 0332  70 29  90 % 12/18/20 0331  70 22  91 % 12/18/20 0330  71 26  (!) 89 % 12/18/20 0329  72 22  90 % 12/18/20 0328  70 29  90 % 12/18/20 0327  80 28  94 % 12/18/20 0326  70 21  91 % 12/18/20 0325  71 22  92 % 12/18/20 0324  69 22  92 % 12/18/20 0323  71 24  91 % 12/18/20 0322  71 22  91 % 12/18/20 0321  70 20  91 % 12/18/20 0320  70 22  91 % 12/18/20 0319  69 30  91 % 12/18/20 0318  70 28  91 % 12/18/20 0317  70 26  90 % 12/18/20 0316  70 26  90 % 12/18/20 0315  70 23  90 % 12/18/20 0314  68 27  90 % 12/18/20 0313  70 26  90 % 12/18/20 0312  75 (!) 31  (!) 89 % 12/18/20 0311  85 25  94 % 12/18/20 0310  70 21  91 % 12/18/20 0309  70 18  90 % 12/18/20 0308  70 23  91 % 12/18/20 0307  69 19  91 % 12/18/20 0306  68 26  90 % 12/18/20 0305  69 26  (!) 89 % 12/18/20 0304  69 24  90 % 12/18/20 0303  70 27  90 % 12/18/20 0302  71 26  91 % 12/18/20 0301  70 18  (!) 88 % 12/18/20 0300  69 25 (!) 140/76 90 % 12/18/20 0259  70 24  90 % 12/18/20 0258  70 23  91 % 12/18/20 0257  71 25  90 % 12/18/20 0256  69 20  (!) 89 % 12/18/20 0255  69 24  90 % 12/18/20 0254  71 (!) 31  (!) 89 % 12/18/20 0253  69 22  90 % 12/18/20 0252  72 25  (!) 89 % 12/18/20 0251  69 28  (!) 89 % 12/18/20 0250  68 26  91 % 12/18/20 0249  68 22  (!) 89 % 12/18/20 0248  70 25  (!) 88 % 12/18/20 0247  71 20  (!) 88 % 12/18/20 0246  70 25  (!) 89 % 12/18/20 0245  68 25  90 % 12/18/20 0244  70 25  91 % 12/18/20 0243  70 28  91 % 12/18/20 0242  72 24  90 % 12/18/20 0241  71 27  90 % 12/18/20 0240  70 29  (!) 89 % 12/18/20 0239  69 27  91 % 12/18/20 0238  69 28  90 % 12/18/20 0237  71 24  91 % 12/18/20 0236  69 20  90 % 12/18/20 0235  69 25  (!) 88 % 12/18/20 0234  69 18  (!) 87 % 12/18/20 0233  69 29  (!) 87 % 12/18/20 0232  68 25  (!) 87 % 12/18/20 0231  68 29  (!) 87 % 12/18/20 0230  68 27  (!) 89 % 12/18/20 0229  69 (!) 33  90 % 12/18/20 0228  67 23  (!) 85 % 12/18/20 0227  66 (!) 31  (!) 86 % 12/18/20 0226  66 30  (!) 87 % 12/18/20 0225  67 (!) 32  (!) 87 % 12/18/20 0224  66 28  (!) 87 % 12/18/20 0223  68 23  (!) 86 % 12/18/20 0222  67 (!) 33  (!) 87 % 12/18/20 0221  66 (!) 32  (!) 87 % 12/18/20 0220  67 (!) 33  (!) 87 % 12/18/20 0219  68 29  (!) 87 % 12/18/20 0218  69 30  (!) 87 % 12/18/20 0217  67 30  (!) 87 % 12/18/20 0216  70 30  (!) 87 % 12/18/20 0215  71 (!) 31  (!) 86 % 12/18/20 0214  73 28  (!) 82 % 12/18/20 0213  73 19  (!) 73 % 12/18/20 0212  80 30  (!) 59 % 12/18/20 0211  80 25  (!) 60 % 12/18/20 0210  82 18  (!) 51 % 12/18/20 0209  79 24  (!) 64 % 12/18/20 0208  78 25  (!) 66 % 12/18/20 0207  77 27  (!) 67 % 12/18/20 0206  76 30  (!) 71 % 12/18/20 0205  74 24  (!) 86 % 12/18/20 0204  73 29  93 % 12/18/20 0203  66 22  90 % 12/18/20 0202  68 17  92 % 12/18/20 0201  67 26  (!) 89 % 12/18/20 0200  69 26 136/71 92 % 12/18/20 0159  68 26  90 % 12/18/20 0158  67 28  91 % 12/18/20 0157  68 28  91 % 12/18/20 0156  69 27  (!) 89 % 12/18/20 0155  67 27  91 % 12/18/20 0154  65 30  92 % 12/18/20 0153  67 26  92 % 12/18/20 0152  68 27  92 % 12/18/20 0151  67 24  91 % 12/18/20 0150  68 29  91 % 12/18/20 0149  67 (!) 32  91 % 12/18/20 0148  67 27  90 % 12/18/20 0147  66 30  (!) 89 % 12/18/20 0146  66 27  90 % 12/18/20 0145  67 28  90 % 12/18/20 0144  66 (!) 31  90 % 12/18/20 0143  66 (!) 34  90 % 12/18/20 0142  66 23  (!) 89 % 12/18/20 0141  67 (!) 31  (!) 89 % 12/18/20 0140  67 29  91 % 12/18/20 0139  67 (!) 32  91 % 12/18/20 0138  66 (!) 31  90 % 12/18/20 0137  67 22  91 % 12/18/20 0136  67 28  90 % 12/18/20 0135  65 30  91 % 12/18/20 0134  66 (!) 33  92 % 12/18/20 0133  66 27  (!) 89 % 12/18/20 0132  66 (!) 33  90 % 12/18/20 0131  67 (!) 31  90 % 12/18/20 0130  65 28  (!) 89 % 12/18/20 0129  66 26  (!) 89 % 12/18/20 0128  65 26  91 % 12/18/20 0127  67 (!) 31  (!) 89 % 12/18/20 0126  66 29  (!) 86 % 12/18/20 0125  65 (!) 33  (!) 88 % 12/18/20 0124  66 (!) 32  (!) 88 % 12/18/20 0123  66 (!) 31  (!) 88 % 12/18/20 0122  66 (!) 33  (!) 87 % 12/18/20 0121  66 (!) 33  (!) 88 % 12/18/20 0120  67 29  (!) 88 % 12/18/20 0119  68 (!) 31  90 % 12/18/20 0118  69 (!) 33  (!) 89 % 12/18/20 0117  68 29  (!) 88 % 12/18/20 0116  70 27  (!) 85 % 12/18/20 0115  74 18  (!) 67 % 12/18/20 0114  77 26  (!) 68 % 12/18/20 0113  76 29  (!) 68 % 12/18/20 0112  74 29  (!) 74 % 12/18/20 0111  72 26  90 % 12/18/20 0110  67 28  90 % 12/18/20 0109  68 25  (!) 89 % 12/18/20 0108  69 (!) 33  (!) 88 % 12/18/20 0107  68 26  (!) 87 % 12/18/20 0106  68 26  (!) 89 % 12/18/20 0105  68 28  (!) 89 % 12/18/20 0104  68 24  90 % 12/18/20 0103  69 28  91 % 12/18/20 0102  67 30  91 % 12/18/20 0101  68 (!) 31  (!) 88 % 12/18/20 0100 97.4 °F (36.3 °C) 69 24 129/79 90 % 12/18/20 0059  70 21  (!) 89 % 12/18/20 0058  69 20  (!) 89 % 12/18/20 0057  68 28  91 % 12/18/20 0056  68 24  90 % 12/18/20 0055  67 22  90 % 12/18/20 0054  67 27  (!) 89 % 12/18/20 0053  67 16  (!) 88 % 12/18/20 0052  68 21  (!) 89 % 12/18/20 0051  68 27  (!) 89 % 12/18/20 0050  66 (!) 31  90 % 12/18/20 0049  67 30  90 % 12/18/20 0048  68 29  91 % 12/18/20 0047  69 28  (!) 89 % 12/18/20 0046  68 24  (!) 88 % 12/18/20 0045  67 27  (!) 88 % 12/18/20 0044  68 27  (!) 89 % 12/18/20 0043  68 29  91 % 12/18/20 0042  68 29  90 % 12/18/20 0041  68 25  91 % 12/18/20 0040  67 (!) 32  91 % 12/18/20 0039  68 28  92 % 12/18/20 0038  69 28  92 % 12/18/20 0037  70 23  90 % 12/18/20 0036  68 25  (!) 89 % 12/18/20 0035  69 23  (!) 88 % 12/18/20 0033  66 29  (!) 89 % 12/18/20 0032  67 28  (!) 89 % 12/18/20 0031  67 (!) 32  90 % 12/18/20 0030  69 23  (!) 89 % 12/18/20 0029  69 21  (!) 87 % 12/18/20 0028  67 28  (!) 86 % 12/18/20 0027  66 30  (!) 86 % 12/18/20 0026  67 30  (!) 86 % 12/18/20 0025  67 (!) 31  (!) 87 % 12/18/20 0024  67 28  (!) 87 % 12/18/20 0023  68 (!) 34  (!) 86 % 12/18/20 0022  68 21  (!) 88 % 12/18/20 0021  68 (!) 34  (!) 87 % 12/18/20 0020  68 23  (!) 88 % 12/18/20 0019  68 22  (!) 87 % 12/18/20 0018  67 (!) 33  (!) 87 % 12/18/20 0017  67 (!) 33  (!) 88 % 12/18/20 0016  68 24  (!) 88 % 12/18/20 0015  67 (!) 33  (!) 88 % 12/18/20 0014  67 (!) 31  (!) 88 % 12/18/20 0013  68 26  (!) 87 % 12/18/20 0012  68 (!) 31  (!) 88 % 12/18/20 0011  68 24  (!) 89 % 12/18/20 0010  68 (!) 31  (!) 88 % 12/18/20 0009  70 (!) 31  (!) 89 % 12/18/20 0008  70 25  (!) 89 % 12/18/20 0007  70 26  (!) 88 % 12/18/20 0006  69 22  (!) 89 % 12/18/20 0005  70 23  (!) 89 % 12/18/20 0004  70 18  (!) 89 % 12/18/20 0003  70 24  (!) 89 % 12/18/20 0002  69 30  (!) 88 % 12/18/20 0001  69 (!) 31 (!) 111/90 (!) 87 % 12/18/20 0000  68 (!) 31  (!) 89 % 12/17/20 2359  70 25  (!) 89 % 12/17/20 2358  72 23  (!) 87 % 12/17/20 2357  70 28  (!) 87 % 12/17/20 2356  71 23  (!) 87 % 12/17/20 2355  71 26  (!) 85 % 12/17/20 2354  71 25  (!) 87 % 12/17/20 2353  68 30  (!) 86 % 12/17/20 2352  68 15  (!) 86 % 12/17/20 2351  69 20  (!) 85 % 12/17/20 2350  69 24  (!) 86 % 12/17/20 2349  70 19  (!) 85 % 12/17/20 2348  68 (!) 34  (!) 85 % 12/17/20 2347  68 24  (!) 85 % 12/17/20 2346  67 30  91 % 12/17/20 2345  68 23  (!) 86 % 12/17/20 2344  68 23  (!) 87 % 12/17/20 2343  70 18  (!) 84 % 12/17/20 2342  68 (!) 31  (!) 85 % 12/17/20 2341  69 27  (!) 84 % 12/17/20 2340  68 28  (!) 85 % 12/17/20 2339  69 27  (!) 86 % 12/17/20 2338  70 21  (!) 84 % 12/17/20 2337  68 (!) 32  (!) 85 % 12/17/20 2336  67 (!) 33  (!) 85 % 12/17/20 2335  67 (!) 33  (!) 87 % 12/17/20 2334  67 21  (!) 88 % 12/17/20 2333  68 (!) 31  (!) 85 % 12/17/20 2332  68 25  (!) 87 % 12/17/20 2331  69 26  (!) 88 % 12/17/20 2330  69 20  (!) 86 % 12/17/20 2329  68 (!) 31  (!) 87 % 12/17/20 2328  69 21  (!) 87 % 12/17/20 2327  68 25  (!) 87 % 12/17/20 2326  68 (!) 31  (!) 87 % 12/17/20 2325  66 (!) 31  (!) 87 % 12/17/20 2324  68 (!) 33  (!) 87 % 12/17/20 2323  69 21  (!) 88 % 12/17/20 2322  69 27  (!) 86 % 12/17/20 2321  68 28  (!) 87 % 12/17/20 2320  68 (!) 31  (!) 87 % 12/17/20 2319  69 18  (!) 87 % 12/17/20 2318  69 25  (!) 88 % 12/17/20 2317  69 22  (!) 87 % 12/17/20 2316  69 29  (!) 85 % 12/17/20 2315  69 23  (!) 86 % 12/17/20 2314  68 26  (!) 86 % 12/17/20 2313  68 28  (!) 87 % 12/17/20 2312  68 29  (!) 87 % 12/17/20 2311  68 24  (!) 88 % 12/17/20 2310  69 21  (!) 86 % 12/17/20 2309  69 29  (!) 86 % 12/17/20 2308  69 (!) 31  (!) 86 % 12/17/20 2307  70 29  (!) 86 % 12/17/20 2306  70 13  (!) 83 % 12/17/20 2305  71 24  (!) 85 % 12/17/20 2304  69 29  (!) 87 % 12/17/20 2303  70 22  (!) 87 % 12/17/20 2302  70 17  (!) 88 % 12/17/20 2301  68 (!) 31  (!) 86 % 12/17/20 2300  70 26 132/66 (!) 88 % 12/17/20 2259  69 24  (!) 88 % 12/17/20 2258  69 13  (!) 88 % 12/17/20 2257  68 29  (!) 87 % 12/17/20 2256  68 (!) 33  (!) 88 % 12/17/20 2255  67 (!) 31  (!) 89 % 12/17/20 2254  68 30  (!) 86 % 12/17/20 2253  69 25  90 % 12/17/20 2252  69 (!) 34  90 % 12/17/20 2251  68 19  (!) 88 % 12/17/20 2250  68 29  (!) 88 % 12/17/20 2249  68 28  (!) 89 % 12/17/20 2248  68 30  (!) 89 % 12/17/20 2247  68 24  (!) 89 % 12/17/20 2246  68 (!) 34  90 % 12/17/20 2245  68 29  91 % 12/17/20 2244  68 27  (!) 88 % 12/17/20 2243  69 26  (!) 88 % 12/17/20 2242  67 (!) 33  (!) 89 % 12/17/20 2241  67 29  (!) 89 % 12/17/20 2240  68 (!) 33  90 % 12/17/20 2239  69 (!) 34  90 % 12/17/20 2238  68 (!) 34  91 % 12/17/20 2237  69 25  90 % 12/17/20 2236  69 24  90 % 12/17/20 2235  69 25  (!) 87 % 12/17/20 2234  76 26  (!) 89 % 12/17/20 2233  69 30  91 % 12/17/20 2232  72 (!) 31  90 % 12/17/20 2231  70 27  (!) 89 % 12/17/20 2230  70 20  (!) 89 % 12/17/20 2229  70 22  (!) 88 % 12/17/20 2228  69 25  (!) 87 % 12/17/20 2227  69 28  (!) 87 % 12/17/20 2226  71 28  (!) 88 % 12/17/20 2225  71 27  (!) 89 % 12/17/20 2224  71 26  90 % 12/17/20 2223  72 22  (!) 88 % 12/17/20 2222  71 25  (!) 89 % 12/17/20 2221  71 21  (!) 88 % 12/17/20 2220  71 (!) 33  (!) 88 % 12/17/20 2219  72 25  90 % 12/17/20 2218  70 27  (!) 89 % 12/17/20 2217  70 (!) 31  (!) 89 % 12/17/20 2216  72 26  (!) 88 % 12/17/20 2215  71 (!) 33  (!) 88 % 12/17/20 2214  71 28  (!) 88 % 12/17/20 2213  72 (!) 32  (!) 89 % 12/17/20 2212  72 29  90 % 12/17/20 2211  72 25  (!) 89 % 12/17/20 2210  72 28  (!) 88 % 12/17/20 2209  70 29  (!) 89 % 12/17/20 2208  72 21  (!) 89 % 12/17/20 2207  70 29  (!) 89 % 12/17/20 2206  71 28  90 % 12/17/20 2205  74 25  90 % 12/17/20 2204  71 (!) 33  (!) 89 % 12/17/20 2203  72 27  90 % 12/17/20 2202  74 23  (!) 88 % 12/17/20 2201  73 (!) 32  (!) 87 % 12/17/20 2200  72 (!) 31 117/69 (!) 88 % 12/17/20 2159  73 (!) 32  (!) 89 % 12/17/20 2158  73 25  (!) 89 % 12/17/20 2157  74 26  (!) 89 % 12/17/20 2156  74 26  (!) 88 % 12/17/20 2155  73 (!) 31  (!) 88 % 12/17/20 2154  73 30  (!) 89 % 12/17/20 2153  76 25  (!) 88 % 12/17/20 2152  77 20  (!) 85 % 12/17/20 2151  78 22  (!) 87 % 12/17/20 2150  74 (!) 31  90 % 12/17/20 2149  74 (!) 32  90 % 12/17/20 2148  75 27  (!) 89 % 12/17/20 2147  75 29  (!) 89 % 12/17/20 2146  75 (!) 33  (!) 89 % 12/17/20 2145  75 29 (!) 140/54 90 % 12/17/20 2144  76 20  90 % 12/17/20 2143  75 22  90 % 12/17/20 2142  75 22 (!) 140/54 91 % 12/17/20 2141  74 28  91 % 12/17/20 2140  73 27  91 % 12/17/20 2139  73 (!) 32  92 % 12/17/20 2138  73 28  91 % 12/17/20 2137  74 (!) 35  91 % 12/17/20 2136  73 (!) 33  91 % 12/17/20 2135  73 (!) 31  91 % 12/17/20 2134  74 (!) 34  93 % 12/17/20 2133  76 22  92 % 12/17/20 2132  74 28  (!) 89 % 12/17/20 2131  74 (!) 32  90 % 12/17/20 2130  74 27  90 % 12/17/20 2129  73 (!) 33  (!) 89 % 12/17/20 2128  74 (!) 34  (!) 89 % 12/17/20 2127  75 (!) 33  (!) 89 % 12/17/20 2126  76 (!) 32  (!) 89 % 12/17/20 2125  76 (!) 31  (!) 89 % 12/17/20 2124  78 27  (!) 88 % 12/17/20 2123  79 22  (!) 83 % 12/17/20 2122  83 30  (!) 64 % 12/17/20 2121  83 28  (!) 64 % 12/17/20 2120  82 (!) 31  (!) 69 % 12/17/20 2119  81 (!) 34  (!) 71 % 12/17/20 2118  81 (!) 35  (!) 75 % 12/17/20 2117  77 (!) 33  (!) 86 % 12/17/20 2116  75 28  91 % 12/17/20 2115  69 30  90 % 12/17/20 2114  71 26  90 % 12/17/20 2113  70 (!) 34  91 % 12/17/20 2112  73 30  (!) 89 % 12/17/20 2111  73 26  (!) 89 % 12/17/20 2110  70 (!) 32  90 % 12/17/20 2109  72 (!) 31  91 % 12/17/20 2108  69 29  (!) 89 % 12/17/20 2107  69 (!) 33  (!) 89 % 12/17/20 2106  69 (!) 34  (!) 89 % 12/17/20 2105  68 (!) 31  (!) 89 % 12/17/20 2104  70 (!) 33  (!) 89 % 12/17/20 2103  69 (!) 31  90 % 12/17/20 2102  71 29  91 % 12/17/20 2101  69 29  (!) 89 % 12/17/20 2100  68 (!) 34  90 % 12/17/20 2059  68 (!) 35  90 % 12/17/20 2058  69 (!) 32  90 % 12/17/20 2057  68 (!) 32  90 % 12/17/20 2056  69 (!) 32  90 % 12/17/20 2055  69 29  91 % 12/17/20 2054  71 30  91 % 12/17/20 2053  70 30  90 % 12/17/20 2052  69 (!) 32  90 % 12/17/20 2051  69 (!) 33  (!) 89 % 12/17/20 2050  69 (!) 33  90 % 12/17/20 2049  69 (!) 33  91 % 12/17/20 2048  71 (!) 32  91 % 12/17/20 2047  70 (!) 32  (!) 89 % 12/17/20 2046  71 (!) 34  (!) 89 % 12/17/20 2045  71 (!) 31  90 % 12/17/20 2044  73 (!) 31  90 % 12/17/20 2043  73 29  (!) 89 % 12/17/20 2042  71 26  90 % 12/17/20 2041  74 28  90 % 12/17/20 2040  73 29  91 % 12/17/20 2039  71 (!) 32  92 % 12/17/20 2038  72 (!) 33  91 % 12/17/20 2037  75 (!) 31  (!) 89 % 12/17/20 2036  72 (!) 35  90 % 12/17/20 2035  72 (!) 35  91 % 12/17/20 2034  71 (!) 31  (!) 89 % 12/17/20 2033  71 30  90 % 12/17/20 2032  71 (!) 35  90 % 12/17/20 2031  71 (!) 34  91 % 12/17/20 2030  71 (!) 34  92 % 12/17/20 2029  72 (!) 35  92 % 12/17/20 2028  72 (!) 33  92 % 12/17/20 2027  74 (!) 31  92 % 12/17/20 2026  74 28  90 % 12/17/20 2025  77 25  91 % 12/17/20 2024  76 27  90 % 12/17/20 2023  78 (!) 33  (!) 86 % 12/17/20 2022  87 27  (!) 89 % 12/17/20 2021  79 28  (!) 86 % 12/17/20 2020  80 27  92 % 12/17/20 2019  75 (!) 31  92 % 12/17/20 2018  75 24  93 % 12/17/20 2017  75 26  93 % 12/17/20 2016  73 24  92 % 12/17/20 2015  74 26  91 % 12/17/20 2014  76 20  92 % 12/17/20 2013  74 21  93 % 12/17/20 2012  74 21  92 % 12/17/20 2011  75 27  93 % 12/17/20 2010  74 18 122/64 92 % 12/17/20 2009  73 (!) 33  91 % 12/17/20 2008  72 24  91 % 12/17/20 2007  72 28  91 % 12/17/20 2006 98 °F (36.7 °C) 74 21 122/64 94 % 12/17/20 1916     91 % 12/17/20 1830  73 28 (!) 124/56 90 % 12/17/20 1800  75 24 102/71 90 % 12/17/20 1504     91 % 12/17/20 1300  73 24 110/87 92 % 12/17/20 1247     91 % 12/17/20 1245  72 22 96/71 (!) 89 % 12/17/20 1225 98.3 °F (36.8 °C) 76 29 (!) 141/68 (!) 85 % Intake/Output Summary (Last 24 hours) at 12/18/2020 0805 Last data filed at 12/18/2020 2855 Gross per 24 hour Intake 290 ml Output 0 ml Net 290 ml PHYSICAL EXAM: 
 General: WD, WN. Alert, cooperative, patient is tachypneic and visibly short of breath. She is in moderate distress due to shortness of breath while on BiPAP 
EENT:  EOMI. Anicteric sclerae. MMM Resp:  Air entry is poor in all the lung fields. Patient is tachypneic CV:  Regular  rhythm,  No edema GI:  Soft, Non distended, Non tender.  +Bowel sounds Neurologic:  Alert and oriented X 3, normal speech, Psych:   Good insight. Not anxious nor agitated Skin:  No rashes. No jaundice Reviewed most current lab test results and cultures  YES Reviewed most current radiology test results   YES Review and summation of old records today    NO Reviewed patient's current orders and MAR    YES 
PMH/SH reviewed - no change compared to H&P 
________________________________________________________________________ Care Plan discussed with: 
  Comments Patient x Family RN x Care Manager Consultant  x  intensive care Multidiciplinary team rounds were held today with , nursing, pharmacist and clinical coordinator. Patient's plan of care was discussed; medications were reviewed and discharge planning was addressed. ________________________________________________________________________ Total NON critical care TIME:  35   Minutes Total CRITICAL CARE TIME Spent:   Minutes non procedure based Comments >50% of visit spent in counseling and coordination of care x   
________________________________________________________________________ Felisha Evans MD  
 
Procedures: see electronic medical records for all procedures/Xrays and details which were not copied into this note but were reviewed prior to creation of Plan. LABS: 
I reviewed today's most current labs and imaging studies. Pertinent labs include: 
Recent Labs 12/18/20 
0603 12/17/20 
1253 WBC 15.2* 10.4 HGB 11.7 11.3* HCT 36.4 35.0  
 165 Recent Labs 12/18/20 
0603 12/17/20 1253  
* 134* K 4.3 3.9  102 CO2 26 26 * 119* BUN 63* 50* CREA 2.21* 1.94* CA 10.0 9.8 MG 2.4 2.2 ALB 3.1* 3.2* TBILI 0.4 0.4 ALT 24 25 INR 1.0 1.0 Signed: Topher Ureña MD

## 2020-12-18 NOTE — H&P
Hospitalist Admission Note NAME: Pineda Earl :  1955 MRN:  [id number] Date/Time:  2020 9:49 PM 
 
Patient PCP: Serafin Heard MD 
______________________________________________________________________ Given the patient's current clinical presentation, I have a high level of concern for decompensation if discharged from the emergency department. Complex decision making was performed, which includes reviewing the patient's available past medical records, laboratory results, and x-ray films. My assessment of this patient's clinical condition and my plan of care is as follows. Assessment / Plan: Hypoxic respiratory failure COVID-19 pneumonia Sepsis 
-Rapid COVID-19 test positive 
-Chest x-ray shows bilateral multilobar airspace disease -ABG 7.32/47.2/59/24.5 on BiPAP 16/10, FiO2 100. 
-On dexamethasone. 
-On Rocephin and azithromycin 
-cannot give remdesivir because of bad kidney function. -CRP 5.13, procalcitonin <0.05 
-Consulted pulmonology 
-Albuterol 4 times daily. -Vitamin C, zinc. 
-Lasix 40 mg IV 1 dose cannot give more because of the blood kidney functions. 
-Diuresis as needed. Acute kidney injury on CKD stage III 
BUN/creatinine50/1.94 We will close watch, check tomorrow. Diastolic CHF Echo pending, last echo shows EF 65%. BNP is 253, give the Lasix 40 mg IV today, home dose Lasix 80 mg daily. Hold the Lasix for now depending on the kidney functions and echo. Lasix as needed. Hypertension Continue labetalol, continue rest of the medication depending on the blood pressure. Hyperlipidemia Diabetes type 2 
-On statin, sliding scale insulin. Iron deficiency anemia Anxiety GERD 
-Continue the home medications Code Status: Full code DVT Prophylaxis: Lovenox GI Prophylaxis: not indicated Baseline: Walk with assistance at home Subjective: CHIEF COMPLAINT: Shortness of breath HISTORY OF PRESENT ILLNESS:    
 Chrystal Sandoval is a 63 y.o.  female who presents with shortness of breath present for the last few days. Patient came to the ER by EMS after calling 911 for respiratory distress. EMS found the patient on the floor because of respiratory distress. Patient states that her shortness of breath is present for the last few days to the point she was not able to move at all today. Denies any chest pain, no nausea vomiting, no diarrhea, no fever or chills, no headache, no dizziness. Does report of some weakness. No history of sick contacts. Currently the patient is on BiPAP with saturation around 91. We were asked to admit for work up and evaluation of the above problems. Past Medical History:  
Diagnosis Date  Anxiety and depression 9/26/2018  Arthritis   
 knees, lower back.  BMI 45.0-49.9, adult ([address] 9/26/2018  Essential tremor 9/26/2018  High cholesterol 8/14/2019  Hypertension  Iron deficiency anemia secondary to inadequate dietary iron intake 9/26/2018  Noncompliance 10/17/2019  Obesity, morbid ([address] 8/29/2018  Vitamin D deficiency 10/31/2018 Past Surgical History:  
Procedure Laterality Date  HX BREAST BIOPSY Left  HX GYN  2009 D&C Social History Tobacco Use  Smoking status: Never Smoker  Smokeless tobacco: Never Used Substance Use Topics  Alcohol use: Yes Comment: rare Family History Problem Relation Age of Onset  Stroke Father 36  
 Heart Disease Father Xochitl Doss Arthritis-osteo Mother  Cancer Mother [de-identified]  
     breast cancer  Breast Cancer Mother 78 No Known Allergies Prior to Admission medications Medication Sig Start Date End Date Taking? Authorizing Provider  
labetaloL (NORMODYNE) 300 mg tablet Take 1 Tab by mouth two (2) times a day.  12/14/20   Alfredo Levi MD  
 aspirin 81 mg chewable tablet TAKE 1 TABLET BY MOUTH EVERY DAY INDICATIONS: STROKE PREVENTION  Indications: stroke prevention 12/14/20   Calvin Vargas MD  
furosemide (LASIX) 80 mg tablet TAKE 1 TABLET BY MOUTH EVERY DAY 8/10/20   Calvin Vargas MD  
busPIRone (BUSPAR) 15 mg tablet TAKE 1 TAB BY MOUTH TWO (2) TIMES DAILY AS NEEDED (ANXIETY). 7/23/20   Calvin Vargas MD  
Olmesartan-amLODIPine-HCTZ 40-10-25 mg tab TAKE 1 TABLET DAILY 7/13/20   Calvin Vargas MD  
clonazePAM (KlonoPIN) 0.5 mg tablet TAKE 0.5 TAB BY MOUTH NIGHTLY AS NEEDED FOR ANXIETY. MAX DAILY AMOUNT: 0.25MG 6/29/20   Calvin Vargas MD  
citalopram (CELEXA) 20 mg tablet TAKE 1 TABLET BY MOUTH EVERY DAY 6/25/20   Calvin Vargas MD  
atorvastatin (LIPITOR) 20 mg tablet Take 1 Tab by mouth nightly. 6/25/20   Calvin Vargas MD  
potassium chloride SR (KLOR-CON 10) 10 mEq tablet TAKE 2 TABLETS BY MOUTH TWICE A DAY 6/25/20   Calvin Vargas MD  
pantoprazole (PROTONIX) 40 mg tablet Take 1 Tab by mouth daily. 6/11/20   Calvin Vargas MD  
ergocalciferol (Vitamin D2) 1,250 mcg (50,000 unit) capsule Take 1 Cap by mouth every seven (7) days. 6/11/20   Calvin Vargas MD  
fluticasone propionate (FLONASE) 50 mcg/actuation nasal spray SPRAY 2 SPRAYS INTO EACH NOSTRIL EVERY DAY 3/9/20   Calvin Vargas MD  
ferrous sulfate 325 mg (65 mg iron) tablet TAKE 1 TAB BY MOUTH THREE (3) TIMES DAILY (WITH MEALS). 2/3/19   Calvin Vargas MD  
cpap machine kit by Does Not Apply route. Provider, Historical  
naproxen sodium 220 mg cap Take  by mouth every twelve (12) hours as needed (pain). 1-2 caps as needed    Provider, Historical  
ascorbic acid (VITAMIN C) 500 mg tablet Take 500 mg by mouth daily. Provider, Historical  
 
 
REVIEW OF SYSTEMS:    
I am not able to complete the review of systems because: The patient is intubated and sedated The patient has altered mental status due to his acute medical problems The patient has baseline aphasia from prior stroke(s) The patient has baseline dementia and is not reliable historian The patient is in acute medical distress and unable to provide information Total of 12 systems reviewed as follows:   
   POSITIVE= underlined text  Negative = text not underlined General:  fever, chills, sweats, generalized weakness, weight loss/gain,  
   loss of appetite Eyes:    blurred vision, eye pain, loss of vision, double vision ENT:    rhinorrhea, pharyngitis Respiratory:   cough, sputum production, SOB, [place], wheezing, pleuritic pain  
Cardiology:   chest pain, palpitations, orthopnea, PND, edema, syncope Gastrointestinal:  abdominal pain , N/V, diarrhea, dysphagia, constipation, bleeding Genitourinary:  frequency, urgency, dysuria, hematuria, incontinence Muskuloskeletal :  arthralgia, myalgia, back pain Hematology:  easy bruising, nose or gum bleeding, lymphadenopathy Dermatological: rash, ulceration, pruritis, color change / jaundice Endocrine:   hot flashes or polydipsia Neurological:  headache, dizziness, confusion, focal weakness, paresthesia, Speech difficulties, memory loss, gait difficulty Psychological: Feelings of anxiety, depression, agitation Objective: VITALS:   
Visit Vitals BP (!) 140/54 Pulse 75 Temp 98 °F (36.7 °C) Resp 21 Ht 5' 8\" (1.727 m) Wt 137.4 kg (302 lb 14.6 oz) SpO2 94% BMI 46.06 kg/m² PHYSICAL EXAM: 
 
General:    Alert, cooperative, in mild distress, appears stated age. HEENT: Atraumatic, anicteric sclerae, pink conjunctivae No oral ulcers, mucosa moist, throat clear, dentition fair Neck:  Supple, symmetrical,  thyroid: non tender Lungs:   Crackles, rhonchi lower lobes. Chest wall:  No tenderness  No Accessory muscle use. Heart:   Regular  rhythm,  No  murmur   No edema Abdomen:   Soft, non-tender. Not distended. Bowel sounds normal 
Extremities: No cyanosis.   No clubbing,   
 Skin turgor normal, Capillary refill normal, Radial dial pulse 2+ Skin:     Not pale. Not Jaundiced  No rashes Psych:  Good insight. Not depressed. Not anxious or agitated. Neurologic: EOMs intact. No facial asymmetry. No aphasia or slurred speech. Symmetrical strength, Sensation grossly intact. Alert and oriented X 4.  
 
_______________________________________________________________________ Care Plan discussed with: 
  Comments Patient x Family RN x Care Manager Consultant:     
_______________________________________________________________________ Expected  Disposition:  
Home with Family HH/PT/OT/RN   
SNF/LTC x  
SHARON   
________________________________________________________________________ TOTAL TIME:  61 Minutes Critical Care Provided     Minutes non procedure based Comments Reviewed previous records  
>50% of visit spent in counseling and coordination of care  Discussion with patient and/or family and questions answered 
  
 
________________________________________________________________________ Signed: Cheryl Acosta MD 
 
Procedures: see electronic medical records for all procedures/Xrays and details which were not copied into this note but were reviewed prior to creation of Plan. LAB DATA REVIEWED:   
Recent Results (from the past 24 hour(s)) EKG, 12 LEAD, INITIAL Collection Time: 12/17/20 12:38 PM  
Result Value Ref Range Ventricular Rate 71 BPM  
 Atrial Rate 71 BPM  
 P-R Interval 170 ms QRS Duration 82 ms Q-T Interval 392 ms QTC Calculation (Bezet) 425 ms Calculated P Axis 23 degrees Calculated R Axis -19 degrees Calculated T Axis 29 degrees Diagnosis Normal sinus rhythm Normal ECG When compared with ECG of 09-AUG-2018 13:52, 
premature ventricular complexes are no longer present Vent.  rate has decreased BY  42 BPM 
Confirmed by Verlie Mean ([id number]) on 12/17/2020 5:04:35 PM 
  
 CBC WITH AUTOMATED DIFF Collection Time: 12/17/20 12:53 PM  
Result Value Ref Range WBC 10.4 3.6 - 11.0 K/uL  
 RBC 3.82 3.80 - 5.20 M/uL  
 HGB 11.3 (L) 11.5 - 16.0 g/dL HCT 35.0 35.0 - 47.0 % MCV 91.6 80.0 - 99.0 FL  
 MCH 29.6 26.0 - 34.0 PG  
 MCHC 32.3 30.0 - 36.5 g/dL  
 RDW 14.7 (H) 11.5 - 14.5 % PLATELET 113 983 - 119 K/uL MPV 11.8 8.9 - 12.9 FL  
 NRBC 0.0 0  WBC ABSOLUTE NRBC 0.00 0.00 - 0.01 K/uL NEUTROPHILS 86 (H) 32 - 75 % LYMPHOCYTES 9 (L) 12 - 49 % MONOCYTES 5 5 - 13 % EOSINOPHILS 0 0 - 7 % BASOPHILS 0 0 - 1 % IMMATURE GRANULOCYTES 0 0.0 - 0.5 % ABS. NEUTROPHILS 8.9 (H) 1.8 - 8.0 K/UL  
 ABS. LYMPHOCYTES 1.0 0.8 - 3.5 K/UL  
 ABS. MONOCYTES 0.5 0.0 - 1.0 K/UL  
 ABS. EOSINOPHILS 0.0 0.0 - 0.4 K/UL  
 ABS. BASOPHILS 0.0 0.0 - 0.1 K/UL  
 ABS. IMM. GRANS. 0.0 0.00 - 0.04 K/UL  
 DF AUTOMATED METABOLIC PANEL, COMPREHENSIVE Collection Time: 12/17/20 12:53 PM  
Result Value Ref Range Sodium 134 (L) 136 - 145 mmol/L Potassium 3.9 3.5 - 5.1 mmol/L Chloride 102 97 - 108 mmol/L  
 CO2 26 21 - 32 mmol/L Anion gap 6 5 - 15 mmol/L Glucose 119 (H) 65 - 100 mg/dL BUN 50 (H) 6 - 20 MG/DL Creatinine 1.94 (H) 0.55 - 1.02 MG/DL  
 BUN/Creatinine ratio 26 (H) 12 - 20 GFR est AA 32 (L) >60 ml/min/1.73m2 GFR est non-AA 26 (L) >60 ml/min/1.73m2 Calcium 9.8 8.5 - 10.1 MG/DL Bilirubin, total 0.4 0.2 - 1.0 MG/DL  
 ALT (SGPT) 25 12 - 78 U/L  
 AST (SGOT) 35 15 - 37 U/L Alk. phosphatase 122 (H) 45 - 117 U/L Protein, total 8.4 (H) 6.4 - 8.2 g/dL Albumin 3.2 (L) 3.5 - 5.0 g/dL Globulin 5.2 (H) 2.0 - 4.0 g/dL A-G Ratio 0.6 (L) 1.1 - 2.2 NT-PRO BNP Collection Time: 12/17/20 12:53 PM  
Result Value Ref Range NT pro- (H) <125 PG/ML  
PROCALCITONIN Collection Time: 12/17/20 12:53 PM  
Result Value Ref Range Procalcitonin <0.05 ng/mL MAGNESIUM  Collection Time: 12/17/20 12:53 PM  
 Result Value Ref Range Magnesium 2.2 1.6 - 2.4 mg/dL PROTHROMBIN TIME + INR Collection Time: 12/17/20 12:53 PM  
Result Value Ref Range INR 1.0 0.9 - 1.1 Prothrombin time 10.9 9.0 - 11.1 sec PTT Collection Time: 12/17/20 12:53 PM  
Result Value Ref Range aPTT 24.0 22.1 - 31.0 sec  
 aPTT, therapeutic range     58.0 - 77.0 SECS  
TROPONIN I Collection Time: 12/17/20 12:53 PM  
Result Value Ref Range Troponin-I, Qt. <0.05 <0.05 ng/mL D DIMER Collection Time: 12/17/20 12:53 PM  
Result Value Ref Range D-dimer 0.72 (H) 0.00 - 0.65 mg/L FEU FERRITIN Collection Time: 12/17/20 12:53 PM  
Result Value Ref Range Ferritin 481 (H) 8 - 252 NG/ML  
LD Collection Time: 12/17/20 12:53 PM  
Result Value Ref Range  (H) 81 - 246 U/L  
C REACTIVE PROTEIN, QT Collection Time: 12/17/20 12:53 PM  
Result Value Ref Range C-Reactive protein 5.13 (H) 0.00 - 0.60 mg/dL FIBRINOGEN Collection Time: 12/17/20 12:53 PM  
Result Value Ref Range Fibrinogen 376 200 - 475 mg/dL POC LACTIC ACID Collection Time: 12/17/20 12:55 PM  
Result Value Ref Range Lactic Acid (POC) 0.85 0.40 - 2.00 mmol/L  
POC EG7 Collection Time: 12/17/20 12:58 PM  
Result Value Ref Range Calcium, ionized (POC) 1.40 (H) 1.12 - 1.32 mmol/L  
 FIO2 (POC) 100 % pH (POC) 7.32 (L) 7.35 - 7.45    
 pCO2 (POC) 47.2 (H) 35.0 - 45.0 MMHG  
 pO2 (POC) 59 (L) 80 - 100 MMHG  
 HCO3 (POC) 24.5 22 - 26 MMOL/L Base deficit (POC) 2 mmol/L  
 sO2 (POC) 88 (L) 92 - 97 % Site RIGHT RADIAL Device: BIPAP    
 PEEP/CPAP (POC) 10 cmH2O  
 PIP (POC) 16 Allens test (POC) YES Specimen type (POC) ARTERIAL Total resp. rate 34 SARS-COV-2 Collection Time: 12/17/20  3:12 PM  
Result Value Ref Range Specimen source Nasopharyngeal    
 Specimen source Nasopharyngeal    
 COVID-19 rapid test Detected (AA) NOTD Specimen type NP Swab Health status Symptomatic Testing

## 2020-12-18 NOTE — PROGRESS NOTES
Anticoagulation COVID Dosing Enoxaparin Indication:  DVT ppx Wt Readings from Last 1 Encounters:  
12/17/20 137.4 kg (302 lb 14.6 oz) Ht Readings from Last 1 Encounters:  
12/17/20 172.7 cm (68\") Body mass index is 46.06 kg/m². Estimated Creatinine Clearance: 38.4 mL/min (A) (based on SCr of 2.21 mg/dL (H)). Estimated Creatinine Clearance (using IBW):26.3 mL/min Recent Labs 12/18/20 
0603 12/17/20 
1253 CREA 2.21* 1.94* ALB 3.1* 3.2* HGB 11.7 11.3* HCT 36.4 35.0  
 165 INR 1.0 1.0 Plan:  
Group C Lower-intensity dosing protocol: 
Enoxaparin 30mg SQ q12h adjusted to Enoxaparin 40mg SQ q12h due to patient's BMI >/= 30 and eCrCl >/= 30mL/min Thank you, 
Nathalia Perez, [facility]

## 2020-12-18 NOTE — ED NOTES
TRANSFER - OUT REPORT: 
 
Verbal report given to Καλλιρρόης Shakeel (name) on Patricia Foot  being transferred to CCU (unit) for routine progression of care Report consisted of patients Situation, Background, Assessment and  
Recommendations(SBAR). Information from the following report(s) SBAR, Kardex, ED Summary, Intake/Output, MAR, Recent Results and Cardiac Rhythm NSR was reviewed with the receiving nurse. Lines:  
Peripheral IV 12/17/20 Right Hand (Active) Site Assessment Clean, dry, & intact 12/18/20 0325 Phlebitis Assessment 0 12/18/20 0325 Infiltration Assessment 0 12/18/20 0325 Dressing Status Clean, dry, & intact 12/18/20 0325 Dressing Type Tape;Transparent 12/18/20 0325 Hub Color/Line Status Pink;Capped 12/18/20 0325 Alcohol Cap Used Yes 12/17/20 1259 Peripheral IV 12/17/20 Left Antecubital (Active) Site Assessment Clean, dry, & intact 12/17/20 1300 Phlebitis Assessment 0 12/17/20 1300 Infiltration Assessment 0 12/17/20 1300 Dressing Status Clean, dry, & intact 12/17/20 1300 Dressing Type Transparent 12/17/20 1300 Hub Color/Line Status Pink;Flushed 12/17/20 1300 Alcohol Cap Used Yes 12/17/20 1300 Opportunity for questions and clarification was provided. Patient transported with: 
 Monitor Registered Nurse Respiratory Therapy

## 2020-12-18 NOTE — CONSULTS
Pt has been seen and evaluated by the ICU team. We will not be following pt until she is transferred out of the ICU teams care.

## 2020-12-18 NOTE — PROGRESS NOTES
Spoke to Dr Tal Juarez from Intensive care and transfer the care of pt on to intensive care team, medicine team will sign off for now until patient moves out of ICU

## 2020-12-18 NOTE — PROGRESS NOTES
End of Shift Note Bedside shift change report given to Kat Waddell RN and Sharon Amin RN (oncoming nurse) by Daniel Urbina RN (offgoing nurse). Report included the following information SBAR, Kardex, MAR, Recent Results and Cardiac Rhythm NSR Shift worked:  [id number] Shift summary and any significant changes:  
  difficulty maintaining O2 sats on bipap. Concerns for physician to address:  see above. PLEASE CALL DAUGHTER ANA PAULA WITH AN UPDATE [phone] [facility] phone for oncoming shift:   [phone] Activity: 
Activity Level: Up with Assistance Number times ambulated in hallways past shift: 0 Number of times OOB to chair past shift: 0 Cardiac:  
Cardiac Monitoring: Yes     
Cardiac Rhythm: Normal sinus rhythm Access:  
Current line(s): PIV Genitourinary:  
Urinary status: voiding and oliguric Respiratory:  
O2 Device: BIPAP Chronic home O2 use?: NO Incentive spirometer at bedside: N/A 
  
GI: 
  
Current diet:  DIET CARDIAC Regular Passing flatus: NO Tolerating current diet: NO-NPO due to continous bipap Pain Management:  
Patient states pain is manageable on current regimen: N/A Skin: 
Celestino Score: 17 Interventions: increase time out of bed and internal/external urinary devices Patient Safety: 
Fall Score: Total Score: 2 Interventions: gripper socks and pt to call before getting OOB Length of Stay: 
Expected LOS: - - - Actual LOS: 1 Daniel Urbina RN 
 
 
 
08:14  Updated daughter, Corbin Cagle that pt is still currently on bipap, and plans today are to consult with pulmonologist and infectious disease, as well as receive IV antibiotics and remdesivir. Corbin Cagle would like pulmonologist to call her with his assessment. Message relayed to dayshift RNs. 08:20  Called Lsia back and advised her that I mis spoke and at this point pt is not a candidate for Remdesivir. Opportunity for questions provided. Daughter wanted to know what medications pt has been started on. Reviewed MAR with Mela Martinez.

## 2020-12-18 NOTE — PROGRESS NOTES
03: 25  Following message sent to NP Nocturnist via [employer]: 
 
[employer] 3375 or Facility: [facility] From: Consuelo Galvez RE: Maritza Goldmann 1955 RM: ED 13 Pt admitted for Covid 19 pna. Pt has been on Bipap since around 12/17/20 around 13:00. Pt has been on setting of 18/14 FiO2 100% with this setting pt's O2 sats are ranging from 87%-92%. Are we okay with this O2 sat range? Pt also has not voided yet this shift. I bladder scanned her around 23:00 and her scan showed 219mL. Pt now has urge to void, but unable. Will bladder scan again, but can I have order to straight cath for retention? Need Callback: NO CALLBACK REQ Read 3:33 AM  
  
 
Orders received for STAT ABGs, notified Karen MARIANO. Also received order for straight cath. However prior to straight cath, pt was able to void about 100 mL. PVR was 88mL. Will hold off on straight cath at this time. Consuelo Galvez RN 
 
 
04:17  Following message sent to NP Nocturnist via [employer]: 
 
12/18/20 4:17 AM  
ABGs are in. Look slightly worse than previous set. Please review results and advise 04:45  NP Purveyor at bedside to eval pt. No new orders received at this time. Pt without complaints at this time. Denies worsening SOB. 05:39  Following message sent to NP Saudor via [employer]: 
 
12/18/20 5:39 AM  
Daughter, Lazarus Ishihara wants an update. I told her that pt's O2 sats are variying between 88%-92% on Bipap, and that you were determining what next step would be for pt. Daughter can be reached at [phone], and would like to be called before a decision is made.  
Read 5:50 AM

## 2020-12-18 NOTE — CONSULTS
Infectious Disease Consult Maira Castellanos MD [facility] Date of Consultation:  December 18, 2020 Date of Admission: 12/17/2020 Referring Physician: Elly Graf NP Subjective:  
 
Patient is a 63 y.o. female who is being seen for - Covid pneumonia IMPRESSION:  
· Severe Covid pneumonia · Covid rapid+ on 12/17. No known h/o exposure · CXR-bilateral multi lobar airspace disease · Acute hypoxic respiratory failure 91% on BiPAP · Elevated acute phase reactantsCRP5.13, D-dimer- 0.72, ferritin- 481, procalcitonin<0.05, OK938 
· Diastolic CHF, NT  · OLIVER on CKD 3, creatinine 2.21, eGFR 27 
· Diabetes type 2, A1c 6( 2/20) · Hypertension PLAN:  
  
Patient does not meet inclusion criteria for remdesivir IV, as her eGFR<30, & on mechanical ventilation -no clinical benefit. Continue BiPAP Pulmonary/critical care consult Continue Decadron, zinc vitamin C p.o. ceftriaxone azithromycin · Monitor acute phase reactants · Nothing else to add from ID standpoint Vinicius Robertson is a  63 y.o. female  who presented  to the ED with cc of resp distress. Per ED note pt had called 911 for resp difficulty. She  had been having increase in SOB starting a few days ago. Per ED note when EMS arrived they found the pt sitting at the top 
 of the stairs sitting in chair with SOB She had been unable to make it down the stairs because of her resp distress. She felt that her symptoms are related to CHF. No known COVID exposures. There has been no reported fever. Pt had been in severe resp distress  at the time of arrival to ED . Resp had been called to place pt on BiPAP. Covid rapid done in ED is positive. Patient is currently on BiPAP saturating at 91%. Patient Active Problem List  
Diagnosis Code  Chronic diastolic congestive heart failure (HCC) I50.32  
 Obesity, morbid (HCC) E66.01  
 O2 dependent Z99.81  
 Iron deficiency anemia secondary to inadequate dietary iron intake D50.8  Essential tremor G25.0  BMI 45.0-49.9, adult ([facility]) Z68.42  
 Anxiety and depression F41.9, F32.9  Bilateral leg edema R60.0  Vitamin D deficiency E55.9  Essential hypertension I10  
 Coronary artery disease involving native heart without angina pectoris I25.10  Chronic nasal congestion R09.81  
 MONSERRAT on CPAP G47.33, Z99.89  
 High cholesterol E78.00  
 Noncompliance Z91.19  
 Respiratory failure, acute ([facility]) J96.00  Sepsis ([facility] [address] A41.9  Pneumonia due to COVID-19 virus U07.1, J12.89 Past Medical History:  
Diagnosis Date  Anxiety and depression 9/26/2018  Arthritis   
 knees, lower back.  BMI 45.0-49.9, adult ([facility] [address] 9/26/2018  Essential tremor 9/26/2018  High cholesterol 8/14/2019  Hypertension  Iron deficiency anemia secondary to inadequate dietary iron intake 9/26/2018  Noncompliance 10/17/2019  Obesity, morbid ([facility] [address] 8/29/2018  Vitamin D deficiency 10/31/2018 Family History Problem Relation Age of Onset  Stroke Father 36  
 Heart Disease Father Marlon.Cadet Arthritis-osteo Mother  Cancer Mother [de-identified]  
     breast cancer  Breast Cancer Mother 78 Social History Tobacco Use  Smoking status: Never Smoker  Smokeless tobacco: Never Used Substance Use Topics  Alcohol use: Yes Comment: rare Past Surgical History:  
Procedure Laterality Date  HX BREAST BIOPSY Left  HX GYN  2009 D&C Prior to Admission medications Medication Sig Start Date End Date Taking? Authorizing Provider  
labetaloL (NORMODYNE) 300 mg tablet Take 1 Tab by mouth two (2) times a day.  12/14/20   Sherry Lilly MD  
aspirin 81 mg chewable tablet TAKE 1 TABLET BY MOUTH EVERY DAY INDICATIONS: STROKE PREVENTION  Indications: stroke prevention 12/14/20   Sherry Lilly MD  
furosemide (LASIX) 80 mg tablet TAKE 1 TABLET BY MOUTH EVERY DAY 8/10/20   Sherry Lilly MD  
 busPIRone (BUSPAR) 15 mg tablet TAKE 1 TAB BY MOUTH TWO (2) TIMES DAILY AS NEEDED (ANXIETY). 20   Faviola Galo MD  
Olmesartan-amLODIPine-HCTZ 40-10-25 mg tab TAKE 1 TABLET DAILY 20   Faviola Galo MD  
clonazePAM (KlonoPIN) 0.5 mg tablet TAKE 0.5 TAB BY MOUTH NIGHTLY AS NEEDED FOR ANXIETY. MAX DAILY AMOUNT: 0.25MG 20   Faviola Galo MD  
citalopram (CELEXA) 20 mg tablet TAKE 1 TABLET BY MOUTH EVERY DAY 20   Faviola Galo MD  
atorvastatin (LIPITOR) 20 mg tablet Take 1 Tab by mouth nightly. 20   Faviola Galo MD  
potassium chloride SR (KLOR-CON 10) 10 mEq tablet TAKE 2 TABLETS BY MOUTH TWICE A DAY 20   Faviola Galo MD  
pantoprazole (PROTONIX) 40 mg tablet Take 1 Tab by mouth daily. 20   Faviola Galo MD  
ergocalciferol (Vitamin D2) 1,250 mcg (50,000 unit) capsule Take 1 Cap by mouth every seven (7) days. 20   Faviola Galo MD  
fluticasone propionate (FLONASE) 50 mcg/actuation nasal spray SPRAY 2 SPRAYS INTO EACH NOSTRIL EVERY DAY 3/9/20   Faviola Galo MD  
ferrous sulfate 325 mg (65 mg iron) tablet TAKE 1 TAB BY MOUTH THREE (3) TIMES DAILY (WITH MEALS). 2/3/19   Faviola Galo MD  
cpap machine kit by Does Not Apply route. Provider, Historical  
naproxen sodium 220 mg cap Take  by mouth every twelve (12) hours as needed (pain). 1-2 caps as needed    Provider, Historical  
ascorbic acid (VITAMIN C) 500 mg tablet Take 500 mg by mouth daily. Provider, Historical  
 
No Known Allergies Review of Systems:  Review of systems not obtained due to patient factors. 10 point review of systems obtained . All other systems negative Objective:  
Blood pressure 127/60, pulse 69, temperature 97.4 °F (36.3 °C), resp. rate (!) 31, height 5' 8\" (1.727 m), weight 302 lb 14.6 oz (137.4 kg), SpO2 91 %. Temp (24hrs), Av.9 °F (36.6 °C), Min:97.4 °F (36.3 °C), Max:98.3 °F (36.8 °C) Current Facility-Administered Medications Medication Dose Route Frequency  enoxaparin (LOVENOX) injection 40 mg  40 mg SubCUTAneous Q12H  cefTRIAXone (ROCEPHIN) 1 g in 0.9% sodium chloride (MBP/ADV) 50 mL MBP  1 g IntraVENous Q24H  
 azithromycin (ZITHROMAX) 500 mg in 0.9% sodium chloride 250 mL (VIAL-MATE)  500 mg IntraVENous Q24H  
 sodium chloride (NS) flush 5-40 mL  5-40 mL IntraVENous Q8H  
 sodium chloride (NS) flush 5-40 mL  5-40 mL IntraVENous PRN  
 acetaminophen (TYLENOL) tablet 650 mg  650 mg Oral Q6H PRN Or  
 acetaminophen (TYLENOL) suppository 650 mg  650 mg Rectal Q6H PRN  polyethylene glycol (MIRALAX) packet 17 g  17 g Oral DAILY PRN  
 ondansetron (ZOFRAN ODT) tablet 4 mg  4 mg Oral Q8H PRN Or  
 ondansetron (ZOFRAN) injection 4 mg  4 mg IntraVENous Q6H PRN  
 aspirin chewable tablet 81 mg  81 mg Oral DAILY  ascorbic acid (vitamin C) (VITAMIN C) tablet 500 mg  500 mg Oral DAILY  busPIRone (BUSPAR) tablet 15 mg  15 mg Oral BID PRN  
 citalopram (CELEXA) tablet 20 mg  20 mg Oral DAILY  ferrous sulfate tablet 325 mg  1 Tab Oral DAILY WITH BREAKFAST  fluticasone propionate (FLONASE) 50 mcg/actuation nasal spray 2 Spray  2 Spray Both Nostrils DAILY  pantoprazole (PROTONIX) tablet 40 mg  40 mg Oral DAILY  labetaloL (NORMODYNE) tablet 300 mg  300 mg Oral BID  atorvastatin (LIPITOR) tablet 20 mg  20 mg Oral QHS  dexamethasone (DECADRON) 4 mg/mL injection 6 mg  6 mg IntraVENous Q24H  
 insulin lispro (HUMALOG) injection   SubCUTAneous AC&HS  
 glucose chewable tablet 16 g  4 Tab Oral PRN  
 dextrose (D50W) injection syrg 12.5-25 g  12.5-25 g IntraVENous PRN  
 glucagon (GLUCAGEN) injection 1 mg  1 mg IntraMUSCular PRN  
 albuterol (PROVENTIL HFA, VENTOLIN HFA, PROAIR HFA) inhaler 2 Puff  2 Puff Inhalation QID RT  
 zinc sulfate (ZINCATE) 220 (50) mg capsule 220 mg  220 mg Oral DAILY Current Outpatient Medications Medication Sig  
 labetaloL (NORMODYNE) 300 mg tablet Take 1 Tab by mouth two (2) times a day.  aspirin 81 mg chewable tablet TAKE 1 TABLET BY MOUTH EVERY DAY INDICATIONS: STROKE PREVENTION  Indications: stroke prevention  furosemide (LASIX) 80 mg tablet TAKE 1 TABLET BY MOUTH EVERY DAY  busPIRone (BUSPAR) 15 mg tablet TAKE 1 TAB BY MOUTH TWO (2) TIMES DAILY AS NEEDED (ANXIETY).  Olmesartan-amLODIPine-HCTZ 40-10-25 mg tab TAKE 1 TABLET DAILY  clonazePAM (KlonoPIN) 0.5 mg tablet TAKE 0.5 TAB BY MOUTH NIGHTLY AS NEEDED FOR ANXIETY. MAX DAILY AMOUNT: 0.25MG  citalopram (CELEXA) 20 mg tablet TAKE 1 TABLET BY MOUTH EVERY DAY  atorvastatin (LIPITOR) 20 mg tablet Take 1 Tab by mouth nightly.  potassium chloride SR (KLOR-CON 10) 10 mEq tablet TAKE 2 TABLETS BY MOUTH TWICE A DAY  pantoprazole (PROTONIX) 40 mg tablet Take 1 Tab by mouth daily.  ergocalciferol (Vitamin D2) 1,250 mcg (50,000 unit) capsule Take 1 Cap by mouth every seven (7) days.  fluticasone propionate (FLONASE) 50 mcg/actuation nasal spray SPRAY 2 SPRAYS INTO EACH NOSTRIL EVERY DAY  ferrous sulfate 325 mg (65 mg iron) tablet TAKE 1 TAB BY MOUTH THREE (3) TIMES DAILY (WITH MEALS).  cpap machine kit by Does Not Apply route.  naproxen sodium 220 mg cap Take  by mouth every twelve (12) hours as needed (pain). 1-2 caps as needed  ascorbic acid (VITAMIN C) 500 mg tablet Take 500 mg by mouth daily. Exam:   
/18/20 1327  82 130/57Abnormal            
12/18/20 0900  69 127/60    31Abnormal  91 %      
12/18/20 0823        91 % BIPAP     
12/18/20 0800  77 129/61    32Abnormal  90 %      
12/18/20 0735  75     22 90 %      
12/18/20 0734  76     20 88 %Abnormal       
12/18/20 0733  76     20 88 %Abnormal      Patient is currently in Covid isolation Pursuant to the emergency declaration under the [address] [facility] authority and the [employer] and [employer] Act, this Visit was conducted to reduce the patient's risk of exposure to COVID-19 and provide care for patient. Chart review & active discussion with Hospitalist done Data Reviewed: CBC:  
Recent Labs 12/18/20 
0603 12/17/20 
1253 WBC 15.2* 10.4 RBC 3.96 3.82  
HGB 11.7 11.3* HCT 36.4 35.0  
 165 GRANS 90* 86* LYMPH 6* 9* EOS 0 0  
 
CMP:  
Recent Labs 12/18/20 
0603 12/17/20 
1253 * 119* * 134* K 4.3 3.9  102 CO2 26 26 BUN 63* 50* CREA 2.21* 1.94* CA 10.0 9.8 AGAP 5 6 BUCR 29* 26*  122* TP 8.7* 8.4* ALB 3.1* 3.2*  
GLOB 5.6* 5.2* AGRAT 0.6* 0.6* Lab Results Component Value Date/Time Culture result: NO GROWTH AFTER 18 HOURS 12/17/2020 12:53 PM  
 Culture result: NO GROWTH 5 DAYS 08/09/2018 02:05 PM  
 Culture result: NO SIGNIFICANT GROWTH 08/09/2018 02:00 PM  
  
 
 
XR Results (most recent): 
Results from Hospital Encounter encounter on 12/17/20 XR CHEST PORT Narrative EXAM: XR CHEST PORT 
 
HISTORY: shortness of breath. COMPARISON: 8/16/2018 FINDINGS: Single view(s) of the chest. There are multiple bilateral areas of 
consolidation in the bilateral lungs. No pleural effusion or pneumothorax. The 
heart is on the upper limits of normal for size. The visualized osseous 
structures are unremarkable. Impression IMPRESSION: 
Bilateral multilobar airspace disease. Antibiotic History Signed By: Chrystal Bob MD FACP December 18, 2020

## 2020-12-18 NOTE — PROGRESS NOTES
Transition of Care: - Home with family - BLS transport vs family transport at d/c 
- Outpatient follow up: PCP Reason for Admission: hypoxic respiratory failure, COVID-19 pneumonia, sepsis RUR Score:  11% low risk for readmission Plan for utilizing home health: Pt has home health hx, uncertain of agency. Home health needs to be determined as hospital course progresses. PCP: First and Last name:  Eduin Fitch MD  
 Name of Practice:  
 Are you a current patient: Yes/No: Yes Approximate date of last visit: Unknown per  Can you participate in a virtual visit with your PCP: Yes Current Advanced Directive/Advance Care Plan: No ACP documents on file. Unable to discuss AMD with pt d/t being on BiPAP. Pt is a FULL code. Transition of Care Plan:  Home with spouse, daughter; outpatient follow up CM unable to complete assessment with pt due to pt being on BiPAP. CM completed assessment with pt's , Bety Lang. Pt resides with spouse and daughter in multi-level home with 5 ANNI. Stated that she was very careful ambulating up and down steps. No assistive devices reported. Pt's  states that pt has home oxygen, but that she does not wear it 24/7. Pt's  reports pt having HH hx, but unsure of agency. No issues obtaining or administering medications at home, preferred rx is [facility]. He was uncertain of pt's last PCP appt, but reports that pt can do virtual follow up. Care management will continue to follow for transition of care planning needs. Care Management Interventions PCP Verified by CM: Yes 
Palliative Care Criteria Met (RRAT>21 & CHF Dx)?: No 
Mode of Transport at Discharge: Other (see comment)(Family vs BLS transport) Transition of Care Consult (CM Consult): Discharge Planning Discharge Durable Medical Equipment: No(Pt has home oxygen) Physical Therapy Consult: No 
 Occupational Therapy Consult: No 
Speech Therapy Consult: No 
Current Support Network: Lives with Spouse(Spouse and daughter) Confirm Follow Up Transport: Family Discharge Location Discharge Placement: Home with outpatient services Shahida Shirley, [address] [facility], [address] [place] [address]

## 2020-12-18 NOTE — ED PROVIDER NOTES
EMERGENCY DEPARTMENT HISTORY AND PHYSICAL EXAM 
 
 
Date: 12/17/2020 Patient Name: Andre Du History of Presenting Illness No chief complaint on file. History Provided By: Patient HPI: Andre Du, 63 y.o. female presents to the ED with cc of resp distress. Pt had called 911 for resp difficulty. She had been having increase in SOA starting a few days ago. When EMS arrived they found the pt sitting at the top of the stairs sitting in chair with SOA. She had been unable to make it down the stairs because of her resp distress. She felt that her symptoms are related to CHF. She denies any COVID exposures. She is not having any active chest pain. There has been no reported fever. Pt resp distress severe at the time of arrival pt only able to speak 1 word at a time. Resp called to place pt on BiPAP. There are no other complaints, changes, or physical findings at this time. PCP: Vel Miller MD 
 
No current facility-administered medications on file prior to encounter. Current Outpatient Medications on File Prior to Encounter Medication Sig Dispense Refill  labetaloL (NORMODYNE) 300 mg tablet Take 1 Tab by mouth two (2) times a day. 60 Tab 0  
 aspirin 81 mg chewable tablet TAKE 1 TABLET BY MOUTH EVERY DAY INDICATIONS: STROKE PREVENTION  Indications: stroke prevention 90 Tab 1  
 furosemide (LASIX) 80 mg tablet TAKE 1 TABLET BY MOUTH EVERY DAY 90 Tab 1  
 busPIRone (BUSPAR) 15 mg tablet TAKE 1 TAB BY MOUTH TWO (2) TIMES DAILY AS NEEDED (ANXIETY). 180 Tab 1  
 Olmesartan-amLODIPine-HCTZ 40-10-25 mg tab TAKE 1 TABLET DAILY 90 Tab 1  clonazePAM (KlonoPIN) 0.5 mg tablet TAKE 0.5 TAB BY MOUTH NIGHTLY AS NEEDED FOR ANXIETY. MAX DAILY AMOUNT: 0.25MG 15 Tab 0  
 citalopram (CELEXA) 20 mg tablet TAKE 1 TABLET BY MOUTH EVERY DAY 90 Tab 1  
 atorvastatin (LIPITOR) 20 mg tablet Take 1 Tab by mouth nightly.  90 Tab 1  
  potassium chloride SR (KLOR-CON 10) 10 mEq tablet TAKE 2 TABLETS BY MOUTH TWICE A  Tab 6  pantoprazole (PROTONIX) 40 mg tablet Take 1 Tab by mouth daily. 90 Tab 1  
 ergocalciferol (Vitamin D2) 1,250 mcg (50,000 unit) capsule Take 1 Cap by mouth every seven (7) days. 12 Cap 1  
 fluticasone propionate (FLONASE) 50 mcg/actuation nasal spray SPRAY 2 SPRAYS INTO EACH NOSTRIL EVERY DAY 3 Bottle 0  
 ferrous sulfate 325 mg (65 mg iron) tablet TAKE 1 TAB BY MOUTH THREE (3) TIMES DAILY (WITH MEALS). 90 Tab 1  
 cpap machine kit by Does Not Apply route.  naproxen sodium 220 mg cap Take  by mouth every twelve (12) hours as needed (pain). 1-2 caps as needed  ascorbic acid (VITAMIN C) 500 mg tablet Take 500 mg by mouth daily. Past History Past Medical History: 
Past Medical History:  
Diagnosis Date  Anxiety and depression 9/26/2018  Arthritis   
 knees, lower back.  BMI 45.0-49.9, adult ([address] 9/26/2018  Essential tremor 9/26/2018  High cholesterol 8/14/2019  Hypertension  Iron deficiency anemia secondary to inadequate dietary iron intake 9/26/2018  Noncompliance 10/17/2019  Obesity, morbid ([address] 8/29/2018  Vitamin D deficiency 10/31/2018 Past Surgical History: 
Past Surgical History:  
Procedure Laterality Date  HX BREAST BIOPSY Left  HX GYN  2009 D&C Family History: 
Family History Problem Relation Age of Onset  Stroke Father 36  
 Heart Disease Father Delia Pete Arthritis-osteo Mother  Cancer Mother [de-identified]  
     breast cancer  Breast Cancer Mother 78 Social History: 
Social History Tobacco Use  Smoking status: Never Smoker  Smokeless tobacco: Never Used Substance Use Topics  Alcohol use: Yes Comment: rare  Drug use: No  
 
 
Allergies: 
No Known Allergies Review of Systems Review of Systems Unable to perform ROS: Severe respiratory distress Physical Exam  
Physical Exam 
 Vitals signs and nursing note reviewed. Constitutional:   
   General: She is in acute distress. Appearance: She is well-developed. She is ill-appearing. She is not diaphoretic. Comments: Morbidly obese HENT:  
   Head: Normocephalic and atraumatic. Mouth/Throat:  
   Mouth: Mucous membranes are dry. Pharynx: No oropharyngeal exudate. Eyes:  
   Extraocular Movements: Extraocular movements intact. Conjunctiva/sclera: Conjunctivae normal.  
   Pupils: Pupils are equal, round, and reactive to light. Neck: Musculoskeletal: Normal range of motion and neck supple. Vascular: No JVD. Trachea: No tracheal deviation. Cardiovascular:  
   Rate and Rhythm: Normal rate and regular rhythm. Heart sounds: Normal heart sounds. No murmur. Pulmonary:  
   Effort: Respiratory distress present. Breath sounds: No stridor. Rhonchi present. No wheezing or rales. Comments: Resp distress with tachypnea, increase work of breathing, with accessory muscle use, lung sounds diminished throughout with rhonchi in upper lung fields Abdominal:  
   General: There is no distension. Palpations: Abdomen is soft. Tenderness: There is no abdominal tenderness. There is no guarding or rebound. Musculoskeletal: Normal range of motion. General: No tenderness. Right lower leg: Edema present. Left lower leg: Edema present. Skin: 
   General: Skin is warm and dry. Capillary Refill: Capillary refill takes less than 2 seconds. Neurological:  
   Mental Status: She is alert and oriented to person, place, and time. Cranial Nerves: No cranial nerve deficit. Comments: No gross motor or sensory deficits Psychiatric:     
   Behavior: Behavior normal.  
   Comments: Anxious Diagnostic Study Results Labs - Recent Results (from the past 12 hour(s)) EKG, 12 LEAD, INITIAL  Collection Time: 12/17/20 12:38 PM  
 Result Value Ref Range Ventricular Rate 71 BPM  
 Atrial Rate 71 BPM  
 P-R Interval 170 ms QRS Duration 82 ms Q-T Interval 392 ms QTC Calculation (Bezet) 425 ms Calculated P Axis 23 degrees Calculated R Axis -19 degrees Calculated T Axis 29 degrees Diagnosis Normal sinus rhythm Normal ECG When compared with ECG of 09-AUG-2018 13:52, 
premature ventricular complexes are no longer present Vent. rate has decreased BY  42 BPM 
Confirmed by Maria Elena Goyal ([id number]) on 12/17/2020 5:04:35 PM 
  
CBC WITH AUTOMATED DIFF Collection Time: 12/17/20 12:53 PM  
Result Value Ref Range WBC 10.4 3.6 - 11.0 K/uL  
 RBC 3.82 3.80 - 5.20 M/uL  
 HGB 11.3 (L) 11.5 - 16.0 g/dL HCT 35.0 35.0 - 47.0 % MCV 91.6 80.0 - 99.0 FL  
 MCH 29.6 26.0 - 34.0 PG  
 MCHC 32.3 30.0 - 36.5 g/dL  
 RDW 14.7 (H) 11.5 - 14.5 % PLATELET 878 866 - 866 K/uL MPV 11.8 8.9 - 12.9 FL  
 NRBC 0.0 0  WBC ABSOLUTE NRBC 0.00 0.00 - 0.01 K/uL NEUTROPHILS 86 (H) 32 - 75 % LYMPHOCYTES 9 (L) 12 - 49 % MONOCYTES 5 5 - 13 % EOSINOPHILS 0 0 - 7 % BASOPHILS 0 0 - 1 % IMMATURE GRANULOCYTES 0 0.0 - 0.5 % ABS. NEUTROPHILS 8.9 (H) 1.8 - 8.0 K/UL  
 ABS. LYMPHOCYTES 1.0 0.8 - 3.5 K/UL  
 ABS. MONOCYTES 0.5 0.0 - 1.0 K/UL  
 ABS. EOSINOPHILS 0.0 0.0 - 0.4 K/UL  
 ABS. BASOPHILS 0.0 0.0 - 0.1 K/UL  
 ABS. IMM. GRANS. 0.0 0.00 - 0.04 K/UL  
 DF AUTOMATED METABOLIC PANEL, COMPREHENSIVE Collection Time: 12/17/20 12:53 PM  
Result Value Ref Range Sodium 134 (L) 136 - 145 mmol/L Potassium 3.9 3.5 - 5.1 mmol/L Chloride 102 97 - 108 mmol/L  
 CO2 26 21 - 32 mmol/L Anion gap 6 5 - 15 mmol/L Glucose 119 (H) 65 - 100 mg/dL BUN 50 (H) 6 - 20 MG/DL Creatinine 1.94 (H) 0.55 - 1.02 MG/DL  
 BUN/Creatinine ratio 26 (H) 12 - 20 GFR est AA 32 (L) >60 ml/min/1.73m2 GFR est non-AA 26 (L) >60 ml/min/1.73m2 Calcium 9.8 8.5 - 10.1 MG/DL  Bilirubin, total 0.4 0.2 - 1.0 MG/DL  
 ALT (SGPT) 25 12 - 78 U/L  
 AST (SGOT) 35 15 - 37 U/L Alk. phosphatase 122 (H) 45 - 117 U/L Protein, total 8.4 (H) 6.4 - 8.2 g/dL Albumin 3.2 (L) 3.5 - 5.0 g/dL Globulin 5.2 (H) 2.0 - 4.0 g/dL A-G Ratio 0.6 (L) 1.1 - 2.2 NT-PRO BNP Collection Time: 12/17/20 12:53 PM  
Result Value Ref Range NT pro- (H) <125 PG/ML  
PROCALCITONIN Collection Time: 12/17/20 12:53 PM  
Result Value Ref Range Procalcitonin <0.05 ng/mL MAGNESIUM Collection Time: 12/17/20 12:53 PM  
Result Value Ref Range Magnesium 2.2 1.6 - 2.4 mg/dL PROTHROMBIN TIME + INR Collection Time: 12/17/20 12:53 PM  
Result Value Ref Range INR 1.0 0.9 - 1.1 Prothrombin time 10.9 9.0 - 11.1 sec PTT Collection Time: 12/17/20 12:53 PM  
Result Value Ref Range aPTT 24.0 22.1 - 31.0 sec  
 aPTT, therapeutic range     58.0 - 77.0 SECS  
TROPONIN I Collection Time: 12/17/20 12:53 PM  
Result Value Ref Range Troponin-I, Qt. <0.05 <0.05 ng/mL D DIMER Collection Time: 12/17/20 12:53 PM  
Result Value Ref Range D-dimer 0.72 (H) 0.00 - 0.65 mg/L FEU FERRITIN Collection Time: 12/17/20 12:53 PM  
Result Value Ref Range Ferritin 481 (H) 8 - 252 NG/ML  
LD Collection Time: 12/17/20 12:53 PM  
Result Value Ref Range  (H) 81 - 246 U/L  
C REACTIVE PROTEIN, QT Collection Time: 12/17/20 12:53 PM  
Result Value Ref Range C-Reactive protein 5.13 (H) 0.00 - 0.60 mg/dL FIBRINOGEN Collection Time: 12/17/20 12:53 PM  
Result Value Ref Range Fibrinogen 376 200 - 475 mg/dL POC LACTIC ACID Collection Time: 12/17/20 12:55 PM  
Result Value Ref Range Lactic Acid (POC) 0.85 0.40 - 2.00 mmol/L  
POC EG7 Collection Time: 12/17/20 12:58 PM  
Result Value Ref Range Calcium, ionized (POC) 1.40 (H) 1.12 - 1.32 mmol/L  
 FIO2 (POC) 100 % pH (POC) 7.32 (L) 7.35 - 7.45    
 pCO2 (POC) 47.2 (H) 35.0 - 45.0 MMHG  
 pO2 (POC) 59 (L) 80 - 100 MMHG HCO3 (POC) 24.5 22 - 26 MMOL/L Base deficit (POC) 2 mmol/L  
 sO2 (POC) 88 (L) 92 - 97 % Site RIGHT RADIAL Device: BIPAP    
 PEEP/CPAP (POC) 10 cmH2O  
 PIP (POC) 16 Allens test (POC) YES Specimen type (POC) ARTERIAL Total resp. rate 34 SARS-COV-2 Collection Time: 12/17/20  3:12 PM  
Result Value Ref Range Specimen source Nasopharyngeal    
 Specimen source Nasopharyngeal    
 COVID-19 rapid test Detected (AA) NOTD Specimen type NP Swab Health status Symptomatic Testing Radiologic Studies -  
XR CHEST PORT Final Result IMPRESSION:  
Bilateral multilobar airspace disease. CT Results  (Last 48 hours) None CXR Results  (Last 48 hours) 12/17/20 1348  XR CHEST PORT Final result Impression:  IMPRESSION:  
Bilateral multilobar airspace disease. Narrative:  EXAM: XR CHEST PORT  
   
HISTORY: shortness of breath. COMPARISON: 8/16/2018 FINDINGS: Single view(s) of the chest. There are multiple bilateral areas of  
consolidation in the bilateral lungs. No pleural effusion or pneumothorax. The  
heart is on the upper limits of normal for size. The visualized osseous  
structures are unremarkable. Medical Decision Making I am the first provider for this patient. I reviewed the vital signs, available nursing notes, past medical history, past surgical history, family history and social history. Vital Signs-Reviewed the patient's vital signs. Patient Vitals for the past 12 hrs: 
 Temp Pulse Resp BP SpO2  
12/17/20 2006 98 °F (36.7 °C) 74 21 122/64 94 % 12/17/20 1916     91 % 12/17/20 1830  73 28 (!) 124/56 90 % 12/17/20 1800  75 24 102/71 90 % 12/17/20 1504     91 % 12/17/20 1300  73 24 110/87 92 % 12/17/20 1247     91 % 12/17/20 1245  72 22 96/71 (!) 89 % 12/17/20 1225 98.3 °F (36.8 °C) 76 29 (!) 141/68 (!) 85 % EKG interpretation: (Preliminary) NSR, rate 71, normal axis/pr/qrs, no acute ST changes, Sapna Shirley DO 
 
 
Records Reviewed: Nursing Notes, Old Medical Records, Previous electrocardiograms, Ambulance Run Sheet, Previous Radiology Studies and Previous Laboratory Studies, followed by Dr. Betsy Bundy, chronic diastolic heart failure Provider Notes (Medical Decision Making): DDx- Acute resp failure, pulmonary edema secondary to CHF, Pneumonia, COVID , ACS 
 
ED Course:  
Initial assessment performed. The patients presenting problems have been discussed, and they are in agreement with the care plan formulated and outlined with them. I have encouraged them to ask questions as they arise throughout their visit. Pt arrived in sig reso distress. Pt placed on BiPAP. There was improvement in pts resp distress. CXR show multi-lobar air space disease. Rapid COVID testing sent. Pt BNP not sig elevated. High suspicion of COVID infection based on symptoms and clinical and diagnostic evaluation. Will start IV Ab at this time as well as Decadron. Consult- 
Case discussed with hospitalist, they will evaluate and admit. Critical Care Time: CRITICAL CARE NOTE : 
 
IMPENDING DETERIORATION -Respiratory ASSOCIATED RISK FACTORS - Hypoxia MANAGEMENT- Bedside Assessment and Supervision of Care INTERPRETATION -  Xrays, Blood Gases, ECG, Blood Pressure, Cardiac Output Measures  and labs INTERVENTIONS - BiPAP, steroids, IV Ab 
CASE REVIEW - Hospitalist and Nursing TREATMENT RESPONSE -Improved PERFORMED BY - Self NOTES   : 
I have spent 40 minutes of critical care time involved in lab review, consultations with specialist, family decision- making, bedside attention and documentation. This time excludes time spent in any separate billed procedures. During this entire length of time I was immediately available to the patient . Sapna Shirley DO Disposition: 
Admit to medicine service Diagnosis Clinical Impression: 1. Acute respiratory failure with hypoxia ([facility] [address] 2. Pneumonia of both lungs due to infectious organism, unspecified part of lung 3. Suspected COVID-19 virus infection Attestations: 
 
Ellyn Rader, DO Please note that this dictation was completed with [employer], the computer voice recognition software. Quite often unanticipated grammatical, syntax, homophones, and other interpretive errors are inadvertently transcribed by the computer software. Please disregard these errors. Please excuse any errors that have escaped final proofreading. Thank you.

## 2020-12-19 NOTE — PROGRESS NOTES
Had very charged exchange with pt's daughter, Christos on afternoon zoom call. Bedside nurse requested that I come in to the room as she witnessed the pt's family having a very emotional conversation about the pt's decision to refuse intubation and resuscitation. Explained to the daughter that hospital policy here is that the patient does not have to physically sign a form, as long as they are of sound mind and can make their own medical decisions. Discussed that both myself and the bedside nurse, Jayant Stoner, had been present for all end of life conversations, and that no one forced the patient to choose one thing over the other. Attempted to explain to her and the other family the conversation that was held with the patient, which involved the discussion that due to her age and comorbidities, with COVID pneumonia, if she required intubation she would be highly unlikely of being extubated. Discussed trach and PEG with the patient. Discussed that again, her likelihood of a meaningful recovery should her heart stop was low. She was very clear in her wishes. She repeatedly told myself and the nurse that she did not want to be kept alive on a machine, and she did not want CPR. Her daughter became irate and belligerent, screaming on the zoom call that she did not want us in the room, telling her mother that she didn't say that yesterday, and not allowing me to finish a sentence. I told her that while I understand her concerns, the patient is my priority and I am serving as her advocate, as is her nurse. I asked her to please stop screaming as she was upsetting the patient, who needed to focus on her breathing and not get anxious. She continued to scream and began cursing at myself and the nurse. We ended the call at this point. We reiterated to the patient that her code status is her decision, and we will support whatever she decides. Told her that there is a standing DNR order in place. She did not ask to rescind it. She said she does not want to get back on the zoom call with her family tonight (we did offer to call them back). She wants to talk to them tomorrow. All of this was discussed with the bedside nurse in the room.

## 2020-12-19 NOTE — PROGRESS NOTES
Progress Note Patient: Lisa Hopson MRN: [id number]  SSN: [id number] YOB: 1955  Age: 63 y.o. Sex: female Admit Date: 12/17/2020 LOS: 2 days Subjective: HPI: Lisa Hopson is a 63 y.o. female who was brought to the ED with cc of resp distress. Pt had called 911 for resp difficulty. She had been having increase in SOB which started a few days ago. When EMS arrived they found the pt sitting at the top of the stairs sitting in chair with SOB. She had been unable to make it down the stairs because of her resp distress. She felt that her symptoms are related to CHF. She denies any COVID exposures. She is not having any active chest pain. There has been no reported fever. She was found to be COVID positive, and was placed on Bipap. Overnight, she had worsening work of breathing requiring maximum support from Bipap. Critical care service was consulted to manage further, and she was transferred to the ICU Overnight events: did much better while prone. This morning, had extensive conversation about goals of care witness by Wilder Fonseca RN. Pt states she does NOT want to be resuscitated or intubated.  
  
 
Objective:  
 
Vitals:  
 12/19/20 1030 12/19/20 1100 12/19/20 1130 12/19/20 1223 BP: 100/76 121/65 118/68 Pulse: 75 74 74 Resp: 28 (!) 33 30 Temp:      
SpO2: (!) 82% (!) 84% (!) 83% (!) 84% Weight:      
Height:      
  
 
Intake and Output: 
Current Shift: No intake/output data recorded. Last three shifts: 12/17 1901 - 12/19 0700 In: 290 [P.O.:240; I.V.:50] Out: 400 [Urine:300; Drains:100] Physical Exam:  
GENERAL: alert, cooperative, no distress, appears stated age, morbidly obese THROAT & NECK: normal and neck supple and symmetrical.  normal ROM 
LUNG: clear to auscultation bilaterally, diminished breath sounds R anterior, R base, L anterior, L base HEART: regular rate and rhythm ABDOMEN: soft, non-tender. Non distended, no rebound or guarding. obese EXTREMITIES:  extremities normal, atraumatic, no cyanosis or edema PSYCHIATRIC: non focal, awake and alert, conversant, answers all questions appropriately and follows commands. No narcotics on board Lab/Data Review: All lab results for the last 24 hours reviewed. Assessment:  
 
Active Problems: 
  Respiratory failure, acute ([facility] [address] (12/17/2020) Sepsis ([facility] [address] (12/17/2020) Pneumonia due to COVID-19 virus (12/17/2020) Morbid obesity COPD Diabetes Plan:  
 
Acute hypoxic respiratory failure COVID-19 pneumonia. 
-Continue dexamethasone. 
-Continue Rocephin and azithromycin started empirically for possible superadded infection 
-Remdesivir cannot be started due to creatinine clearance below 30 
-Continue vitamin C and zinc (5 days total) 
-Continue BiPAP, currently on settings of 24/14, 100%, wean for sats >85% 
-on therapeutic lovenox 
  
  
Acute kidney injury on CKD stage III Creatinine improving at 1.98 Patient at this point is slightly hypovolemic Continue to monitor for now Avoid nephrotoxic medication Follow BMP 
 
  
Hypertension 
labetolol switched to IV 
  
Hyperlipidemia Diabetes type 2 
-On sliding scale insulin. 
  
Iron deficiency anemia Anxiety GERD 
-PPI switched to IV, other home meds on hold as pt is npo 
  
Nutrition: 
- pt high risk of decompensating if coming off Bipap to eat. Still needs Dobhoff placement to initiate tube feeds 
  
Code Status: DNR/DNI. Catherine Yu conversation regarding goals of care with patient this am, who is alert, not altered, on no mind altering drugs, and is capable of making her own decisions. Witnessed by her bedside RN, Vira Cordoba, who concurs. Patient was very clear regarding her wishes. She states she does not want to be resuscitated should her heart stop and that she DOES NOT want a breathing tube inserted. DVT Prophylaxis: Lovenox GI Prophylaxis: PPI 
  
 Baseline: Walk with assistance at home 
  
SPECIAL EQUIPMENT Bipap 
  
DISPOSITION 
ICU 
  
CRITICAL CARE CONSULTANT NOTE I had a face to face encounter with the patient, reviewed and interpreted patient data including clinical events, labs, images, vital signs, I/O's, and examined patient.  I have discussed the case and the plan and management of the patient's care with the admitting services, the bedside nurses and the respiratory therapist.   
  
NOTE OF PERSONAL INVOLVEMENT IN CARE  
This patient has a high probability of imminent, clinically significant deterioration, which requires the highest level of preparedness to intervene urgently.  I participated in the decision-making and personally managed or directed the management of the following life and organ supporting interventions that required my frequent assessment to treat or prevent imminent deterioration. 
  
I personally spent 45 minutes of critical care time.  This is time spent at this critically ill patient's bedside actively involved in patient care as well as the coordination of care.  This does not include any procedural time which has been billed separately. 
  
  
  
MD ADILIA Rivers/ [address] 
12/19/2020

## 2020-12-19 NOTE — PROGRESS NOTES
0700: Report received from OLYA Gross. Included: SBAR, KARDEX, MAR, LDA's and current patient condition status. We discussed patient events leading up to admission and recent results. We spoke about family support and dynamics. 
 
0800: Assessment complete, Alert and Oriented x4, Patient calm and cooperative, currently resting in bed. Patient emotional this morning, discussed plan with her today and that we would take it one day at a time. Encouraged her that we will support her all the way.  
 
0900: 1015: Gave Orange juice x2 cups with meds, informed Dr. Ross BS 91. Swallowed meds appropriately without any issues or complications.  
 
1000: Dr. Ross at the bedside, discussed goals of care with pt. Witnessed DNR/DNI conversation. Patient A&OX4, Tearful/emotional of conversation.  
 
1200: Reassessment--no changes 
 
1300: Attemped Dobhoff insertion, patient did not swallow as directed, attempted x2, informed Dr. Ross, she said ok to give supplmental drinks in the mean time if we can reach her caloric needs.  
 
1330: Patient agreed to take supplmental drinks as opposed to Dobhoff at this time. She expressed that she would rather take it by mouth if she can with breaks. Gave her Apple juice and cranberry juice cocktail. Patient tolerated them well without complications or issues. 
 
1600: Reassessment--no changes 
 
 1740: ZOOM meeting occurred, family was disrespectful to RN and MD at bedside, yelled at treatment team about Mother's Care and made the patient upset. The patient was tearful, crying, and visibly upset, RR 40s and HR 105s. We asked the family to not upset the patient and to communicate to us professionally as the daughter, Kristy Ragland was cursing at staff. Redirected care to the patient and that we are treating her and supporting her decisions that she makes. The patient was appreciative and we discontinued the ZOOM meeting. The patient was very upset and tearful and apologized to us for her daughters behavior. We expressed that it is not her fault and that we only want to ensure that her oxygen and breathing are adequate because during the ZOOM she was very worked up from the conversation that her daughter had with her. She understood and I asked her if she wanted to talk to them again today, she stated, \"Not anymore today, maybe tomorrow, I'm so sorry\". I held her hand and reassured her that this has to be hard on her and that we are here for anything she needs. We comforted her with prayer/spiritual words of encouragement. 1830: Patient appears to be more comfortable now, gave her sips of cran-apple juice. Discussed nutritional shakes and will try to encourage her to have more tomorrow. End of Shift Note Bedside shift change report given to Boni Olivarez RN (oncoming nurse) by Mesha Doyle (offgoing nurse). Report included the following information SBAR, Kardex, Procedure Summary, Intake/Output, MAR, Recent Results, Med Rec Status, Cardiac Rhythm NSR and Quality Measures Shift worked:  DAYS Shift summary and any significant changes:  
  Discussed ZOOM meeting details, spoke about DNR/DNI status that was made this am. Discussed patient condition, current feelings about her situation and her breathing status. Patient desats with activity, focused on keeping her rested. Concerns for physician to address:  Patient desats at times, needs encouragement, she appears to be in better spirits since family ZOOM meeting. Zone phone for oncoming shift:   N/A Activity: 
Activity Level: Bed Rest 
Number times ambulated in hallways past shift: 0 Number of times OOB to chair past shift: 0 Cardiac:  
Cardiac Monitoring: Yes     
Cardiac Rhythm: Normal sinus rhythm Access:  
Current line(s): PIV Genitourinary:  
Urinary status: voiding Respiratory:  
O2 Device: BIPAP Chronic home O2 use?: N/A Incentive spirometer at bedside: N/A 
  
GI: 
Last Bowel Movement Date: 12/19/20 Current diet:  DIET NPO 
DIET NUTRITIONAL SUPPLEMENTS All Meals; Ensure [employer] Passing flatus: YES Tolerating current diet: YES Pain Management:  
Patient states pain is manageable on current regimen: YES Skin: 
Celestino Score: 16 Interventions: speciality bed, float heels, PT/OT consult, internal/external urinary devices and nutritional support Patient Safety: 
Fall Score: Total Score: 2 Interventions: bed/chair alarm, gripper socks and pt to call before getting OOB High Fall Risk: Yes Length of Stay: 
Expected LOS: - - - Actual LOS: 2 Andres Peace

## 2020-12-19 NOTE — PROGRESS NOTES
Respiratory care: 
 
Per Dr. Yesenia Baldwin order, take patient off BIPAP and  placed patient on [email or web address] to maintain Dg[employer] on the [employer] high flow system. Patient SATs are 86% on the HHFN.

## 2020-12-19 NOTE — PROGRESS NOTES
Assumed care at 2300, patient resting quietly on BiPAP. Has since had two episodes of urine incontinence and is able to turn but it does cause desaturation into the 80s. Fecal management system in place when assuming care and has green liquid stool draining into the drainage bag. 
 
0600  Replaced purewick and patient agrees to leave in place as position changes are less tolerated than earlier in the night. She does recover but slowly from the low 80s to the low 90s for her oxygen saturation while repositioning. The patient is now consistently laying on her left side despite consistent reminders and coaching to lay on her stomach. The overnight covering nurse practitioner has been updated.

## 2020-12-19 NOTE — PROGRESS NOTES
Problem: Risk for Spread of Infection Goal: Prevent transmission of infectious organism to others Description: Prevent the transmission of infectious organisms to other patients, staff members, and visitors. Outcome: Progressing Towards Goal 
  
Problem: Patient Education:  Go to Education Activity Goal: Patient/Family Education Outcome: Progressing Towards Goal 
  
Problem: Pressure Injury - Risk of 
Goal: *Prevention of pressure injury Description: Document Celestino Scale and appropriate interventions in the flowsheet. Outcome: Progressing Towards Goal 
Note: Pressure Injury Interventions: 
  
 
Moisture Interventions: Apply protective barrier, creams and emollients, Check for incontinence Q2 hours and as needed, Internal/External fecal devices Activity Interventions: Pressure redistribution bed/mattress(bed type) Mobility Interventions: Pressure redistribution bed/mattress (bed type) Nutrition Interventions: Document food/fluid/supplement intake Friction and Shear Interventions: Lift sheet Problem: Patient Education: Go to Patient Education Activity Goal: Patient/Family Education Outcome: Progressing Towards Goal 
  
Problem: Falls - Risk of 
Goal: *Absence of Falls Description: Document Nicole Torrez Fall Risk and appropriate interventions in the flowsheet. Outcome: Progressing Towards Goal 
Note: Fall Risk Interventions: 
Mobility Interventions: Bed/chair exit alarm, Patient to call before getting OOB Medication Interventions: Bed/chair exit alarm, Patient to call before getting OOB, Teach patient to arise slowly Elimination Interventions: Call light in reach, Patient to call for help with toileting needs, Toileting schedule/hourly rounds Problem: Patient Education: Go to Patient Education Activity Goal: Patient/Family Education Outcome: Progressing Towards Goal 
  
Problem: Breathing Pattern - Ineffective Goal: *Absence of hypoxia Outcome: Progressing Towards Goal 
Goal: *Use of effective breathing techniques Outcome: Progressing Towards Goal 
  
Problem: Patient Education: Go to Patient Education Activity Goal: Patient/Family Education Outcome: Progressing Towards Goal

## 2020-12-20 NOTE — PROGRESS NOTES
Progress Note Patient: Sujatha Jimenez MRN: [id number]  SSN: [id number] YOB: 1955  Age: 63 y.o. Sex: female Admit Date: 12/17/2020 LOS: 3 days Subjective: HPI: Sujatha Jimenez is a 63 y.o. female who was brought to the ED with cc of resp distress. Pt had called 911 for resp difficulty. She had been having increase in SOB which started a few days ago. When EMS arrived they found the pt sitting at the top of the stairs sitting in chair with SOB. She had been unable to make it down the stairs because of her resp distress. She felt that her symptoms are related to CHF. She denies any COVID exposures. She is not having any active chest pain. There has been no reported fever. She was found to be COVID positive, and was placed on Bipap. Overnight, she had worsening work of breathing requiring maximum support from Bipap. Critical care service was consulted to manage further, and she was transferred to the ICU Overnight events: Pt did not prone, but has maintained sats around 90 overnight. CXR with worsening infiltrates this am, pt states she feels better.  
  
 
Objective:  
 
Vitals:  
 12/20/20 0800 12/20/20 0900 12/20/20 1000 12/20/20 1100 BP: 125/78 133/75 139/72 (!) 140/71 Pulse: 86 90 91 88 Resp: (!) 38 (!) 36 (!) 37 (!) 33 Temp: 98.3 °F (36.8 °C) SpO2: (!) 84% 90% 91% 91% Weight:      
Height:      
  
 
Intake and Output: 
Current Shift: 12/20 0701 - 12/20 1900 In: -  
Out: 700 [Urine:600; Drains:100] Last three shifts: 12/18 1901 - 12/20 0700 In: 2020 [P.O.:2020] Out: 995 [Urine:750; Drains:245] Physical Exam:  
GENERAL: alert, cooperative, no distress, appears stated age, morbidly obese THROAT & NECK: normal and neck supple and symmetrical.  normal ROM 
LUNG: clear to auscultation bilaterally, diminished breath sounds R anterior, R base, L anterior, L base HEART: regular rate and rhythm ABDOMEN: soft, non-tender. Non distended, no rebound or guarding. obese EXTREMITIES:  extremities normal, atraumatic, no cyanosis or edema PSYCHIATRIC: non focal, awake and alert, conversant, answers all questions appropriately and follows commands. No narcotics on board Pt examined on 12/20/20. Exam unchanged from prior day. Lab/Data Review: All lab results for the last 24 hours reviewed. Assessment:  
 
Active Problems: 
  Respiratory failure, acute ([facility] [address] (12/17/2020) Sepsis ([facility] [address] (12/17/2020) Pneumonia due to COVID-19 virus (12/17/2020) Morbid obesity COPD Diabetes Plan:  
 
Acute hypoxic respiratory failure COVID-19 pneumonia. 
-Continue dexamethasone. 
-Continue Rocephin and azithromycin started empirically for possible superadded infection 
-Remdesivir cannot be started due to creatinine clearance below 30 
-Continue vitamin C and zinc (5 days total) 
-Continue BiPAP, currently on settings of 24/14, 100%, wean for sats >85% 
-on therapeutic lovenox 
  
  
Acute kidney injury on CKD stage III Creatinine improving at 1.37 Slightly positive Will try diuresis today with lasix Avoid nephrotoxic medication Follow BMP 
 
  
Hypertension Cont home labetolol, switched to IV 
  
Hyperlipidemia Diabetes type 2 
-On sliding scale insulin. 
  
Iron deficiency anemia Anxiety GERD 
-PPI IV 
  
Nutrition: 
- pt high risk of decompensating if coming off Bipap to eat. Still needs Dobhoff placement to initiate tube feeds- attempt made yesterday but it coiled.  Pt wanted to try to drink ensure but she will not meet her caloric needs, so will try to get a dobhoff placed again today 
  
 Code Status: DNR/DNI. Zain Zieglre evening events with family, again discussed with pt her wishes. Pt saw her CXR and we discussed the results of it as well. She again was clear, of sound mind and very reasonable. I did not offer my personal opinion as to what she should do, nor did I express what I would do for myself or my family. Presented all medical information I have about her case. She did not ask to rescind the DNR order. Reiterated that we will support whatever she wishes, and that she can also rescind the DNR/DNI at any time. She stated she understood, and all questions were answered. Witnessed by her bedside RN, Carlota Hinson, who concurs. DVT Prophylaxis: Lovenox GI Prophylaxis: PPI 
  
Baseline: Walk with assistance at home 
  
SPECIAL EQUIPMENT Bipap 
  
DISPOSITION 
ICU 
  
CRITICAL CARE CONSULTANT NOTE I had a face to face encounter with the patient, reviewed and interpreted patient data including clinical events, labs, images, vital signs, I/O's, and examined patient.  I have discussed the case and the plan and management of the patient's care with the admitting services, the bedside nurses and the respiratory therapist.   
  
NOTE OF PERSONAL INVOLVEMENT IN CARE  
This patient has a high probability of imminent, clinically significant deterioration, which requires the highest level of preparedness to intervene urgently.  I participated in the decision-making and personally managed or directed the management of the following life and organ supporting interventions that required my frequent assessment to treat or prevent imminent deterioration. 
  
I personally spent 45 minutes of critical care time.  This is time spent at this critically ill patient's bedside actively involved in patient care as well as the coordination of care.  This does not include any procedural time which has been billed separately. 
  
  
  
MD ADILIA Armando/ [address] 64 
12/20/2020

## 2020-12-20 NOTE — PROGRESS NOTES
Problem: Risk for Spread of Infection Goal: Prevent transmission of infectious organism to others Description: Prevent the transmission of infectious organisms to other patients, staff members, and visitors. Outcome: Progressing Towards Goal 
  
Problem: Patient Education:  Go to Education Activity Goal: Patient/Family Education Outcome: Progressing Towards Goal 
  
Problem: Pressure Injury - Risk of 
Goal: *Prevention of pressure injury Description: Document Celestino Scale and appropriate interventions in the flowsheet. Outcome: Progressing Towards Goal 
Note: Pressure Injury Interventions: 
  
 
Moisture Interventions: Absorbent underpads, Maintain skin hydration (lotion/cream), Minimize layers Activity Interventions: Assess need for specialty bed, Pressure redistribution bed/mattress(bed type), PT/OT evaluation Mobility Interventions: Assess need for specialty bed, HOB 30 degrees or less, Float heels, PT/OT evaluation Nutrition Interventions: Discuss nutritional consult with provider Friction and Shear Interventions: Apply protective barrier, creams and emollients, HOB 30 degrees or less, Lift sheet, Minimize layers, Transferring/repositioning devices Problem: Patient Education: Go to Patient Education Activity Goal: Patient/Family Education Outcome: Progressing Towards Goal 
  
Problem: Falls - Risk of 
Goal: *Absence of Falls Description: Document [place] [facility] Fall Risk and appropriate interventions in the flowsheet. Outcome: Progressing Towards Goal 
Note: Fall Risk Interventions: 
Mobility Interventions: Communicate number of staff needed for ambulation/transfer, OT consult for ADLs, Patient to call before getting OOB, PT Consult for mobility concerns, PT Consult for assist device competence, Strengthening exercises (ROM-active/passive), Utilize walker, cane, or other assistive device, Utilize gait belt for transfers/ambulation Medication Interventions: Evaluate medications/consider consulting pharmacy, Patient to call before getting OOB, Teach patient to arise slowly, Utilize gait belt for transfers/ambulation Elimination Interventions: Call light in reach, Patient to call for help with toileting needs, Stay With Me (per policy), Toilet paper/wipes in reach, Toileting schedule/hourly rounds Problem: Patient Education: Go to Patient Education Activity Goal: Patient/Family Education Outcome: Progressing Towards Goal 
  
Problem: Breathing Pattern - Ineffective Goal: *Absence of hypoxia Outcome: Progressing Towards Goal 
Goal: *Use of effective breathing techniques Outcome: Progressing Towards Goal 
  
Problem: Patient Education: Go to Patient Education Activity Goal: Patient/Family Education Outcome: Progressing Towards Goal

## 2020-12-20 NOTE — PROGRESS NOTES
End of Shift Note Bedside shift change report given to Wilder Appiah RN (oncoming nurse) by Monica Crespo RN (offgoing nurse). Report included the following information SBAR, Intake/Output and Cardiac Rhythm NSR Shift worked:  7a-7p Shift summary and any significant changes:  
  Weaned oxygen support on BiPap to 80%, tolerating sips of liquids,  
  
Concerns for physician to address:   
  
Zone phone for oncoming shift:   [phone] Activity: 
Activity Level: Bed Rest 
Number times ambulated in hallways past shift: 0 Number of times OOB to chair past shift: 0 Cardiac:  
Cardiac Monitoring: Yes     
Cardiac Rhythm: Normal sinus rhythm Access:  
Current line(s): PIV Genitourinary:  
Urinary status: voiding and external catheter Respiratory:  
O2 Device: BIPAP Chronic home O2 use?: NO Incentive spirometer at bedside: NO 
  
GI: 
Last Bowel Movement Date: 12/20/20 Current diet:  DIET NPO 
DIET NUTRITIONAL SUPPLEMENTS All Meals; Ensure [employer] Passing flatus: YES Tolerating current diet: YES Pain Management:  
Patient states pain is manageable on current regimen: YES Skin: 
Celestino Score: 15 Interventions: turn team, float heels, internal/external urinary devices and nutritional support Patient Safety: 
Fall Score: Total Score: 0 Interventions: bed/chair alarm High Fall Risk: Yes Length of Stay: 
Expected LOS: - - - Actual LOS: 3 Zoom meeting with family members this afternoon around [address], patient was in good spirits interacting with family member, writing a few notes and blowing kisses.   
 
Monica Crespo RN

## 2020-12-21 NOTE — PROGRESS NOTES
Spiritual Care Assessment/Progress Note [address] 
 
 
NAME: Lisa Hopson      MRN: [id number] AGE: 63 y.o. SEX: female Islam Affiliation: Other Language: English  
 
12/21/2020     Total Time (in minutes): 20 Spiritual Assessment begun in [facility] 2 CRITICAL CARE 3 through conversation with: 
  
    []Patient        [x] Family    [] Friend(s) Reason for Consult: Family care Spiritual beliefs: (Please include comment if needed) [x] Identifies with a lv tradition:     
   [] Supported by a lv community:        
   [] Claims no spiritual orientation:       
   [] Seeking spiritual identity:            
   [] Adheres to an individual form of spirituality:       
   [] Not able to assess:                   
 
    
Identified resources for coping:  
   [] Prayer                           
   [x] Music GOSPEL                [] Guided Imagery [x] Family/friends                 [] Pet visits [] Devotional reading                         [] Unknown 
   [] Other:                                        
 
 
Interventions offered during this visit: (See comments for more details) Patient Interventions: Other (comment)(Pt unavailable, Consult with RN) Family/Friend(s): Affirmation of emotions/emotional suffering, Catharsis/review of pertinent events in supportive environment, Coping skills reviewed/reinforced, Guidance concerning next steps/process to be expected, Life review/legacy, Normalization of emotional/spiritual concerns, Reframing Plan of Care: 
 
 [] Support spiritual and/or cultural needs  
 [] Support AMD and/or advance care planning process    
 [] Support grieving process 
 [] Coordinate Rites and/or Rituals  
 [] Coordination with community clergy [] No spiritual needs identified at this time 
 [] Detailed Plan of Care below (See Comments)  [] Make referral to Music Therapy 
[] Make referral to Pet Therapy [] Make referral to Addiction services 
[] [facility] to [facility] 
[] Make referral to Spiritual Care Partner 
[] No future visits requested       
[x] Follow up upon further referrals Comments: Reached out to pt Trista Noel daughter through telephone call in order to assess needs and provide family care. Provided pastoral presence and calming tones in conversation to slow shaniqua of family speech patterns as she reviewed perceptions of pt health and pt desires for emergent scenarios. Affirmed love for pt and desire to honor wishes. Processed perception of recent Zoom meeting and family recollection of pt wishes prior to ER admission (family affirms pt did not want to be intubated, but had originally articulated desire for other measures).  did not try to correct perceptions based on pt desires as I was unable to assess outside of staff and chart consults. Processed family desire for AMD and spoke of process. Agreed to check in with staff about pt ability to complete according to covid-19 protocols. Family articulated that pt can write wishes. Family mentioned the importance of music as a comfort to patient (wanda) and the ability for her phone to function as a player. Family photos were also seen as a support from family who desire to be close to bedside. Affirmed acts of love and concern in the midst of hard space and dislocation. [facility] Spiritual Care Provider  Paging Service [phone])

## 2020-12-21 NOTE — PROGRESS NOTES
Problem: Risk for Spread of Infection Goal: Prevent transmission of infectious organism to others Description: Prevent the transmission of infectious organisms to other patients, staff members, and visitors. Outcome: Progressing Towards Goal 
  
Problem: Patient Education:  Go to Education Activity Goal: Patient/Family Education Outcome: Progressing Towards Goal 
  
Problem: Pressure Injury - Risk of 
Goal: *Prevention of pressure injury Description: Document Celestino Scale and appropriate interventions in the flowsheet. Outcome: Progressing Towards Goal 
Note: Pressure Injury Interventions: 
  
 
Moisture Interventions: Absorbent underpads, Apply protective barrier, creams and emollients, Assess need for specialty bed, Check for incontinence Q2 hours and as needed, Internal/External fecal devices, Internal/External urinary devices, Maintain skin hydration (lotion/cream), Limit adult briefs, Minimize layers, Moisture barrier, Offer toileting Q_hr Activity Interventions: Assess need for specialty bed, Pressure redistribution bed/mattress(bed type), PT/OT evaluation Mobility Interventions: Assess need for specialty bed, HOB 30 degrees or less, Float heels, Pressure redistribution bed/mattress (bed type), PT/OT evaluation Nutrition Interventions: Discuss nutritional consult with provider, Offer support with meals,snacks and hydration, Document food/fluid/supplement intake Friction and Shear Interventions: Apply protective barrier, creams and emollients, Feet elevated on foot rest, Foam dressings/transparent film/skin sealants, Lift sheet, Minimize layers, Transfer aides:transfer board/Adonis lift/ceiling lift, Transferring/repositioning devices Problem: Patient Education: Go to Patient Education Activity Goal: Patient/Family Education Outcome: Progressing Towards Goal 
  
Problem: Falls - Risk of 
Goal: *Absence of Falls Description: Document [facility] Fall Risk and appropriate interventions in the flowsheet. Outcome: Progressing Towards Goal 
Note: Fall Risk Interventions: 
Mobility Interventions: Communicate number of staff needed for ambulation/transfer, OT consult for ADLs, Patient to call before getting OOB, PT Consult for mobility concerns, PT Consult for assist device competence, Strengthening exercises (ROM-active/passive), Utilize walker, cane, or other assistive device, Utilize gait belt for transfers/ambulation Medication Interventions: Evaluate medications/consider consulting pharmacy, Patient to call before getting OOB, Teach patient to arise slowly, Utilize gait belt for transfers/ambulation Elimination Interventions: Call light in reach, Patient to call for help with toileting needs, Toilet paper/wipes in reach, Toileting schedule/hourly rounds Problem: Patient Education: Go to Patient Education Activity Goal: Patient/Family Education Outcome: Progressing Towards Goal 
  
Problem: Breathing Pattern - Ineffective Goal: *Absence of hypoxia Outcome: Progressing Towards Goal 
Goal: *Use of effective breathing techniques Outcome: Progressing Towards Goal 
  
Problem: Patient Education: Go to Patient Education Activity Goal: Patient/Family Education Outcome: Progressing Towards Goal

## 2020-12-21 NOTE — PROGRESS NOTES
[facility] 
 
ICU TEAM Progress Note Name: Agata Orr : 1955 MRN: [id number] Date: 2020 Subjective:  
Progress Note: 2020 Reason for ICU Admission: Ms Rachell Magallon is a 64 yo female admitted to [facility] on  with acute hypoxic respiratory failure in the setting of severe COVID pneumonia. She was not a candidate for remdesivir due to GFR <30. She required BiPAP due to hypoxia with eventual transfer to the ICU on the afternoon of . Her lovenox was increase to therapeutic dosing on . The patient has been requiring maximum support on BiPAP. She has been undergoing intermittent diuresis. She was net negative 2L on . Of note, the patient had an extensive goals of care conversation with the intensivist and RN as per the progress note documented on  and wished to be a DNR. Her code status was changed to DNR at this time. Overnight Events: Continues to require continuous BiPAP with precipitous desaturations if taking off even to take PO. 
 
[phone] Lisa - Daughter point of contact - Spoke with her today Past Surgical History:  
 
 has a past surgical history that includes hx gyn (2009) and hx breast biopsy (Left). Home Medications:  
 
Prior to Admission medications Medication Sig Start Date End Date Taking? Authorizing Provider  
labetaloL (NORMODYNE) 300 mg tablet Take 1 Tab by mouth two (2) times a day. 20   Calvin Vargas MD  
aspirin 81 mg chewable tablet TAKE 1 TABLET BY MOUTH EVERY DAY INDICATIONS: STROKE PREVENTION  Indications: stroke prevention 20   Calvin Vargas MD  
furosemide (LASIX) 80 mg tablet TAKE 1 TABLET BY MOUTH EVERY DAY 8/10/20   Calvin Vargas MD  
busPIRone (BUSPAR) 15 mg tablet TAKE 1 TAB BY MOUTH TWO (2) TIMES DAILY AS NEEDED (ANXIETY).  20   Calvin Vargas MD  
Olmesartan-amLODIPine-HCTZ 40-10-25 mg tab TAKE 1 TABLET DAILY 20   Calvin Vargas MD  
 clonazePAM (KlonoPIN) 0.5 mg tablet TAKE 0.5 TAB BY MOUTH NIGHTLY AS NEEDED FOR ANXIETY. MAX DAILY AMOUNT: 0.25MG 6/29/20   Sherry Lilly MD  
citalopram (CELEXA) 20 mg tablet TAKE 1 TABLET BY MOUTH EVERY DAY 6/25/20   Sherry Lilly MD  
atorvastatin (LIPITOR) 20 mg tablet Take 1 Tab by mouth nightly. 6/25/20   Sherry Lilly MD  
potassium chloride SR (KLOR-CON 10) 10 mEq tablet TAKE 2 TABLETS BY MOUTH TWICE A DAY 6/25/20   Sherry Lilly MD  
pantoprazole (PROTONIX) 40 mg tablet Take 1 Tab by mouth daily. 6/11/20   Sherry Lilly MD  
ergocalciferol (Vitamin D2) 1,250 mcg (50,000 unit) capsule Take 1 Cap by mouth every seven (7) days. 6/11/20   Sherry Lilly MD  
fluticasone propionate (FLONASE) 50 mcg/actuation nasal spray SPRAY 2 SPRAYS INTO EACH NOSTRIL EVERY DAY 3/9/20   Sherry Lilly MD  
ferrous sulfate 325 mg (65 mg iron) tablet TAKE 1 TAB BY MOUTH THREE (3) TIMES DAILY (WITH MEALS). 2/3/19   Sherry Lilly MD  
cpap machine kit by Does Not Apply route. Provider, Historical  
naproxen sodium 220 mg cap Take  by mouth every twelve (12) hours as needed (pain). 1-2 caps as needed    Provider, Historical  
ascorbic acid (VITAMIN C) 500 mg tablet Take 500 mg by mouth daily. Provider, Historical  
 
Allergies/Social/Family History: No Known Allergies Social History Tobacco Use  Smoking status: Never Smoker  Smokeless tobacco: Never Used Substance Use Topics  Alcohol use: Yes Comment: rare Family History Problem Relation Age of Onset  Stroke Father 36  
 Heart Disease Father [facility] Arthritis-osteo Mother  Cancer Mother [de-identified]  
     breast cancer  Breast Cancer Mother 78 Objective:  
Vital Signs: 
Visit Vitals BP (!) 143/70 (BP 1 Location: Right arm, BP Patient Position: At rest) Pulse 92 Temp 99.7 °F (37.6 °C) Resp 30 Ht 5' 8\" (1.727 m) Wt 128.9 kg (284 lb 2.8 oz) SpO2 90% BMI 43.21 kg/m²  O2 Flow Rate (L/min): 15 l/min O2 Device: BIPAP Temp (24hrs), Av.7 °F (37.6 °C), Min:99.1 °F (37.3 °C), Max:100.4 °F (38 °C) 
 
    
 
Intake/Output:  
 
Intake/Output Summary (Last 24 hours) at 2020 1653 
Last data filed at 2020 1600 
Gross per 24 hour  
Intake 1770 ml  
Output 2050 ml  
Net -280 ml  
 
Physical Exam: 
 
General:  alert, cooperative, appears stated age, mild respiratory distress, tachypnea on BiPAP 
Eye:  PERRLA 
Neurologic:  A/O x3 
Neck:  normal and no erythema or exudates noted.  
Lungs:  clear to auscultation bilaterally on BiPAP 
Heart:  regular rate and rhythm, S1, S2 normal, no murmur, click, rub or gallop 
Abdomen:  soft, non-tender. Bowel sounds normal. No masses,  no organomegaly 
Skin:  no rash or abnormalities 
 
LABS AND  DATA: Personally reviewed 
Recent Labs  
  202 20  
WBC 16.6* 15.5*  
HGB 13.0 12.4  
HCT 41.1 38.8  
 236  
 
Recent Labs  
  202 20 
0431  
 140  
K 4.1 4.2  
 108  
CO2 30 28  
BUN 34* 52*  
CREA 1.16* 1.37*  
* 103*  
CA 11.3* 10.7*  
MG 2.4 2.8*  
 
Recent Labs  
  202 20  
* 114  
TP 8.9* 8.8*  
ALB 2.9* 3.0*  
GLOB 6.0* 5.8*  
 
Recent Labs  
  202 20 
0431  
INR 1.1 1.1  
PTP 11.8* 11.4*  
APTT 28.8 30.4  
  
BiPAP 90%  
 
MEDS: Reviewed 
 
Chest X-Ray: personally reviewed and report checked 
 
No TTE found this admission 
 
Assessment & Plan  
 
Acute Hypoxic Respiratory Failure Secondary to COVID Pneumonia: Requiring continuous BiPAP. Completed a course of ceftriaxone. 
- Continue dexamethasone 
- Continue azithromycin 
- Add cefepime if decompensates  
- Continue therapeutic lovenox 
 
OLIVER on CKD 3 c/b volume overload: 
- Diuresis w 40 lasix, goal -1L 
- Follow BMP at 2200 
 
HTN: Continue labetalol  
 
DM2:  SSI 
 
GERD:  PPI IV 
 
Deconditioning: PT/OT when able to participate 
 
Multidisciplinary Rounds Completed:  Yes 
 
 ABCDEF Bundle/Checklist 
Pain Medications: None Target RASS: 0 - Alert & Calm - Spontaneously pays attention to caregiver Sedation Medications: None CAM-ICU:  Negative Mobility: Poor PT/OT: PT consulted and on board Restraints: None needed at this time Discussed Plan of Care (goals of care): Yes Addressed Code Status: Do Not Resuscitate CARDIOVASCULAR Cardiac Gtts: None SBP Goal of: > 90 mmHg MAP Goal of: > 65 mmHg Transfusion Trigger (Hgb): <7 g/dL RESPIRATORY 
DVT Prophylaxis (if no, list reason): Lovenox SPO2 Goal: > 92% GI/ GI Prophylaxis: Protonix (pantoprazole) Nutrition: Pending Insulin: SSI q6 
 
ANTIBIOTICS Antibiotics: Azithro DISPOSITION Remain in ICU CRITICAL CARE CONSULTANT NOTE I had a face to face encounter with the patient, reviewed and interpreted patient data including clinical events, labs, images, vital signs, I/O's, and examined patient. I have discussed the case and the plan and management of the patient's care with the consulting services, the bedside nurses and the respiratory therapist.   
 
NOTE OF PERSONAL INVOLVEMENT IN CARE This patient has a high probability of imminent, clinically significant deterioration, which requires the highest level of preparedness to intervene urgently. I participated in the decision-making and personally managed or directed the management of the following life and organ supporting interventions that required my frequent assessment to treat or prevent imminent deterioration. I personally spent 45 minutes of critical care time. This is time spent at this critically ill patient's bedside actively involved in patient care as well as the coordination of care and discussions with the patient's family. This does not include any procedural time which has been billed separately. Gabrielle Ocampo Staff Intensivist NP [facility] 
12/21/2020

## 2020-12-21 NOTE — PROGRESS NOTES
Verbal and bedside shift change report given to Shruthi Ruvalcaba (oncoming nurse) by Eunice Dakins (offgoing nurse). Report included the following information SBAR.  
 
9628 - Per NP, can disregard repeat labs ordered this AM. To be doen with AM labs tomorrow. 1030 - Addressed nutrition status in rounds. No plans for an NGT at this time. One time dose of IV lasix administered, pt still has increased WOB. 1115 - Updated patient's daughter Stann Schilder. 1600 - Notified NP that patient's WOB is increased, RR now 30-40's with oxygen saturation 88-90's. Given order for a stat ABG. RT made aware. 1630 - NP aware of ABG results, will call patient's daughter Stann Schilder with updates when zoom call is over. RT to increase oxygen to 100%. End of Shift Note Bedside shift change report given to Eunice Dakins (oncoming nurse) by Jil Pierson (offgoing nurse). Report included the following information SBAR Shift worked:  4210-6955 Shift summary and any significant changes:  
  Still SOB on BIPAP. ABG gotten this evening. Lasix x2. Labs due at 2200. Zoom chat with family, NP updated daughter.  to go over AMD's tomorrow with patient. Concerns for physician to address:   
  
Zone phone for oncoming shift:    
  
 
Activity: 
Activity Level: Bed Rest 
Number times ambulated in hallways past shift: 0 Number of times OOB to chair past shift: 0 Cardiac:  
Cardiac Monitoring: Yes     
Cardiac Rhythm: Normal sinus rhythm Access:  
Current line(s): PIV Genitourinary:  
Urinary status: external catheter Respiratory:  
O2 Device: BIPAP Chronic home O2 use?: NO Incentive spirometer at bedside: N/A 
  
GI: 
Last Bowel Movement Date: 12/21/20 Current diet:  DIET NPO 
DIET NUTRITIONAL SUPPLEMENTS All Meals; Ensure [employer] Passing flatus: YES Tolerating current diet: NO 
  
 
Pain Management:  
Patient states pain is manageable on current regimen: YES Skin: 
Celestino Score: 15 Interventions: turn team 
 
 Patient Safety: 
Fall Score: Total Score: 2 Interventions: bed/chair alarm High Fall Risk: Yes Length of Stay: 
Expected LOS: 5d 12h Actual LOS: 4 Tonia Berger

## 2020-12-21 NOTE — PROGRESS NOTES
Spiritual Care Assessment/Progress Note [address] 
 
 
NAME: Hansel Smith      MRN: [id number] AGE: 63 y.o. SEX: female Mandaeism Affiliation: Other Language: English  
 
12/21/2020     Total Time (in minutes): 15 Spiritual Assessment begun in [facility] 2 CRITICAL CARE 3 through conversation with: 
  
    []Patient        [] Family    [] Friend(s) Reason for Consult: Request by staff, Advance medical directive consult Spiritual beliefs: (Please include comment if needed) 
   [] Identifies with a lv tradition:     
   [] Supported by a lv community:        
   [] Claims no spiritual orientation:       
   [] Seeking spiritual identity:            
   [] Adheres to an individual form of spirituality:       
   [x] Not able to assess:                   
 
    
Identified resources for coping:  
   [] Prayer                           
   [] Music                  [] Guided Imagery 
   [] Family/friends                 [] Pet visits [] Devotional reading                         [x] Unknown 
   [] Other:                                          
 
 
Interventions offered during this visit: (See comments for more details) Patient Interventions: Other (comment)(Pt unavailable, Consult with RN) Plan of Care: 
 
 [] Support spiritual and/or cultural needs  
 [] Support AMD and/or advance care planning process    
 [] Support grieving process 
 [] Coordinate Rites and/or Rituals  
 [] Coordination with community clergy [] No spiritual needs identified at this time 
 [] Detailed Plan of Care below (See Comments)  [] Make referral to Music Therapy 
[] Make referral to Pet Therapy    
[] Make referral to Addiction services 
[] Make referral to [facility] 
[] Make referral to Spiritual Care Partner 
[] No future visits requested       
[x] Follow up upon further referrals Comments: Responded to request from patient advocacy to assess ability for Advanced Medical Directive Consult for pt Adi Galicia on CCU. Per Covid-19 protocols visit needed to take place through telephone. Performed chart review and consult with RN after finding patient on Bipap. It was discussed that pt might decompensate during phone conversation as Carolyn Maricopa would have to be removed, which was not an option at this time. As such, pt was unavailable for consult and spiritual care visit. Advised of chaplaincy services and asked RN to page with future referrals. [address] [facility] Spiritual Care Provider  Paging Service [phone])

## 2020-12-21 NOTE — INTERDISCIPLINARY ROUNDS
Critical care interdisciplinary rounds held on 12/21/2020. Following members present, Pharmacy, Diabetes Treatment, Case Management, Physical Therapy, Clinical Lead, Palliative Care and Nutrition. Led by OLYA Gnozalez NP. Plan of care discussed. See clinical pathway for plan of care and interventions and desired outcomes.

## 2020-12-22 NOTE — PROGRESS NOTES
Problem: Risk for Spread of Infection Goal: Prevent transmission of infectious organism to others Description: Prevent the transmission of infectious organisms to other patients, staff members, and visitors. Outcome: Progressing Towards Goal 
  
Problem: Patient Education:  Go to Education Activity Goal: Patient/Family Education Outcome: Progressing Towards Goal 
  
Problem: Pressure Injury - Risk of 
Goal: *Prevention of pressure injury Description: Document Celestino Scale and appropriate interventions in the flowsheet. Outcome: Progressing Towards Goal 
Note: Pressure Injury Interventions: 
Sensory Interventions: Assess changes in LOC, Assess need for specialty bed, Avoid rigorous massage over bony prominences, Chair cushion, Check visual cues for pain, Discuss PT/OT consult with provider, Float heels, Keep linens dry and wrinkle-free, Maintain/enhance activity level, Minimize linen layers, Pad between skin to skin, Monitor skin under medical devices Moisture Interventions: Absorbent underpads, Apply protective barrier, creams and emollients, Assess need for specialty bed, Check for incontinence Q2 hours and as needed, Internal/External urinary devices, Limit adult briefs, Maintain skin hydration (lotion/cream), Minimize layers, Moisture barrier, Offer toileting Q_hr Activity Interventions: Assess need for specialty bed, Chair cushion, PT/OT evaluation, Pressure redistribution bed/mattress(bed type), Increase time out of bed Mobility Interventions: Assess need for specialty bed, Chair cushion, Float heels, HOB 30 degrees or less, Pressure redistribution bed/mattress (bed type), PT/OT evaluation, Turn and reposition approx. every two hours(pillow and wedges) Nutrition Interventions: Document food/fluid/supplement intake, Discuss nutritional consult with provider, Offer support with meals,snacks and hydration Friction and Shear Interventions: Apply protective barrier, creams and emollients, Feet elevated on foot rest, Foam dressings/transparent film/skin sealants, HOB 30 degrees or less, Lift sheet, Lift team/patient mobility team, Minimize layers Problem: Patient Education: Go to Patient Education Activity Goal: Patient/Family Education Outcome: Progressing Towards Goal 
  
Problem: Falls - Risk of 
Goal: *Absence of Falls Description: Document Olegario Salas Fall Risk and appropriate interventions in the flowsheet. Outcome: Progressing Towards Goal 
Note: Fall Risk Interventions: 
Mobility Interventions: Bed/chair exit alarm, Assess mobility with egress test, Communicate number of staff needed for ambulation/transfer, OT consult for ADLs, Patient to call before getting OOB, PT Consult for mobility concerns Medication Interventions: Assess postural VS orthostatic hypotension, Bed/chair exit alarm, Evaluate medications/consider consulting pharmacy, Patient to call before getting OOB, Teach patient to arise slowly, Utilize gait belt for transfers/ambulation Elimination Interventions: Bed/chair exit alarm, Call light in reach, Patient to call for help with toileting needs, Stay With Me (per policy), Toilet paper/wipes in reach, Toileting schedule/hourly rounds Problem: Patient Education: Go to Patient Education Activity Goal: Patient/Family Education Outcome: Progressing Towards Goal 
  
Problem: Breathing Pattern - Ineffective Goal: *Absence of hypoxia Outcome: Progressing Towards Goal 
Goal: *Use of effective breathing techniques Outcome: Progressing Towards Goal 
  
Problem: Patient Education: Go to Patient Education Activity Goal: Patient/Family Education Outcome: Progressing Towards Goal

## 2020-12-22 NOTE — PROGRESS NOTES
[facility] 
 
ICU TEAM Progress Note Name: Roxana Pierce : 1955 MRN: [id number] Date: 2020 Subjective:  
Progress Note: 2020 Reason for ICU Admission: Ms Esvin Cali is a 64 yo female admitted to [facility] on  with acute hypoxic respiratory failure in the setting of severe COVID pneumonia. She was not a candidate for remdesivir due to GFR <30. She required BiPAP due to hypoxia with eventual transfer to the ICU on the afternoon of . Her lovenox was increase to therapeutic dosing on . The patient has been requiring maximum support on BiPAP. She has been undergoing intermittent diuresis. She was net negative 2L on  and 0.5L the last 24h. Of note, the patient had an extensive goals of care conversation with the intensivist and RN as per the progress note documented on  and wished to be a DNR. Her code status was changed to DNR at this time. Overnight Events: Continues to require continuous BiPAP with precipitous desaturations if taking off even to take PO making her status very critical and tenuous. She remains at very high risk for decompensation. She remains tachypneic with RR in the high 30s and oxygen saturations around 90%. Overall worsening respiratory status today as compared with last 24h [phone] Lisa - Daughter point of contact - Spoke with her on the phone . Plan to call her again this afternoon. Past Surgical History:  
 
 has a past surgical history that includes hx gyn (2009) and hx breast biopsy (Left). Home Medications:  
 
Prior to Admission medications Medication Sig Start Date End Date Taking? Authorizing Provider  
labetaloL (NORMODYNE) 300 mg tablet Take 1 Tab by mouth two (2) times a day.  20   De Browne MD  
aspirin 81 mg chewable tablet TAKE 1 TABLET BY MOUTH EVERY DAY INDICATIONS: STROKE PREVENTION  Indications: stroke prevention 20   De Browne MD  
 furosemide (LASIX) 80 mg tablet TAKE 1 TABLET BY MOUTH EVERY DAY 8/10/20   Aniceto Andrade MD  
busPIRone (BUSPAR) 15 mg tablet TAKE 1 TAB BY MOUTH TWO (2) TIMES DAILY AS NEEDED (ANXIETY). 7/23/20   Aniceto Andrade MD  
Olmesartan-amLODIPine-HCTZ 40-10-25 mg tab TAKE 1 TABLET DAILY 7/13/20   Aniceto Andrade MD  
clonazePAM (KlonoPIN) 0.5 mg tablet TAKE 0.5 TAB BY MOUTH NIGHTLY AS NEEDED FOR ANXIETY. MAX DAILY AMOUNT: 0.25MG 6/29/20   Aniceto Andrade MD  
citalopram (CELEXA) 20 mg tablet TAKE 1 TABLET BY MOUTH EVERY DAY 6/25/20   Aniceto Andrade MD  
atorvastatin (LIPITOR) 20 mg tablet Take 1 Tab by mouth nightly. 6/25/20   Aniceto Andrade MD  
potassium chloride SR (KLOR-CON 10) 10 mEq tablet TAKE 2 TABLETS BY MOUTH TWICE A DAY 6/25/20   Aniceto Andrade MD  
pantoprazole (PROTONIX) 40 mg tablet Take 1 Tab by mouth daily. 6/11/20   Aniceto Andrade MD  
ergocalciferol (Vitamin D2) 1,250 mcg (50,000 unit) capsule Take 1 Cap by mouth every seven (7) days. 6/11/20   Aniceto Andrade MD  
fluticasone propionate (FLONASE) 50 mcg/actuation nasal spray SPRAY 2 SPRAYS INTO EACH NOSTRIL EVERY DAY 3/9/20   Aniceto Andrade MD  
ferrous sulfate 325 mg (65 mg iron) tablet TAKE 1 TAB BY MOUTH THREE (3) TIMES DAILY (WITH MEALS). 2/3/19   Aniceto Andrade MD  
cpap machine kit by Does Not Apply route. Provider, Historical  
naproxen sodium 220 mg cap Take  by mouth every twelve (12) hours as needed (pain). 1-2 caps as needed    Provider, Historical  
ascorbic acid (VITAMIN C) 500 mg tablet Take 500 mg by mouth daily. Provider, Historical  
 
Allergies/Social/Family History: No Known Allergies Social History Tobacco Use  Smoking status: Never Smoker  Smokeless tobacco: Never Used Substance Use Topics  Alcohol use: Yes Comment: rare Family History Problem Relation Age of Onset  Stroke Father 36  
 Heart Disease Father [employer] Arthritis-osteo Mother  Cancer Mother [de-identified]  
     breast cancer  Breast Cancer Mother 78 Objective:  
Vital Signs: 
Visit Vitals BP (!) 140/62 Pulse 96 Temp 98 °F (36.7 °C) Resp (!) 44 Ht 5' 8\" (1.727 m) Wt 128.9 kg (284 lb 2.8 oz) SpO2 (!) 89% BMI 43.21 kg/m² Intake/Output:  
 
Intake/Output Summary (Last 24 hours) at 12/22/2020 7467 Last data filed at 12/22/2020 0300 Gross per 24 hour Intake 1370 ml Output 1900 ml Net -530 ml Physical Exam: 
 
General:  Alert, cooperative, appears stated age, moderate respiratory distress, tachypnea to 38 on BiPAP Eye:  PERRLA Neurologic:  A/O x3; able to make needs known on BiPAP Neck:  normal and no erythema or exudates noted. Lungs:  clear to auscultation bilaterally on BiPAP Heart:  regular rate and rhythm, S1, S2 normal, no murmur, click, rub or gallop Abdomen:  soft, non-tender. Bowel sounds normal. No masses,  no organomegaly Skin:  no rash or abnormalities LABS AND  DATA: Personally reviewed Recent Labs  
  12/22/20 
5410 12/21/20 
3799 WBC 19.4* 16.6* HGB 13.0 13.0 HCT 42.9 41.1  248 Recent Labs  
  12/22/20 
0307 12/21/20 
2216  139  
K 4.0 4.2  106 CO2 33* 30 BUN 37* 34* CREA 1.25* 1.27*  161* CA 11.7* 11.1*  
MG 2.4 2.2 PHOS  --  2.4* Recent Labs  
  12/21/20 
0452 12/20/20 
0431 * 114  
TP 8.9* 8.8* ALB 2.9* 3.0*  
GLOB 6.0* 5.8* Recent Labs  
  12/22/20 
5663 12/21/20 
7234 INR 1.2* 1.1 PTP 12.2* 11.8* APTT 28.5 28.8 BiPAP 100% 22/18 MEDS: Reviewed Chest X-Ray: personally reviewed and report checked 12/22: Bilateral infiltrates or edema, elevation of the left Hemidiaphragm. Grossly unchanged from piror film on 12/20. No TTE found this admission Assessment & Plan Acute Hypoxic Respiratory Failure Secondary to COVID Pneumonia: Requiring continuous BiPAP. Completed a course of ceftriaxone. Increasing WBC count, worsening tachypnea, maximized on BiPAP settings. Experienced an episode of desaturation yesterday despite being maximized on BiPAP settings. Poor response to 40mg of lasix. Now increasing to 80mg of lasix. - Repeat CXR 
- Added cefepime for empiric coverage - Added procalcitonin - Lasix 80mg IV ordered; goal -1L; Will increase lasix dose further if necessary 
- Continue dexamethasone - Continue azithromycin 
- Continue therapeutic lovenox OLIVER on CKD 3 c/b volume overload: - Diuresis w 80 lasix, goal -1L 
 
HTN: Continue labetalol DM2:  SSI q6 GERD:  PPI IV Deconditioning: PT/OT when able to participate Multidisciplinary Rounds Completed: Yes ABCDEF Bundle/Checklist 
Pain Medications: None Target RASS: 0 - Alert & Calm - Spontaneously pays attention to caregiver Sedation Medications: None CAM-ICU:  Negative Mobility: Poor PT/OT: PT consulted and on board Restraints: None needed at this time Discussed Plan of Care (goals of care): Yes Addressed Code Status: Do Not Resuscitate CARDIOVASCULAR Cardiac Gtts: None SBP Goal of: > 90 mmHg MAP Goal of: > 65 mmHg Transfusion Trigger (Hgb): <7 g/dL RESPIRATORY 
DVT Prophylaxis (if no, list reason): Lovenox SPO2 Goal: > 92% GI/ GI Prophylaxis: Protonix (pantoprazole) Nutrition: Pending Insulin: SSI q6 
 
ANTIBIOTICS Antibiotics: Azithro DISPOSITION Remain in ICU CRITICAL CARE CONSULTANT NOTE I had a face to face encounter with the patient, reviewed and interpreted patient data including clinical events, labs, images, vital signs, I/O's, and examined patient.   I have discussed the case and the plan and management of the patient's care with the consulting services, the bedside nurses and the respiratory therapist.   
 
NOTE OF PERSONAL INVOLVEMENT IN CARE  
 This patient has a high probability of imminent, clinically significant deterioration, which requires the highest level of preparedness to intervene urgently. I participated in the decision-making and personally managed or directed the management of the following life and organ supporting interventions that required my frequent assessment to treat or prevent imminent deterioration. I personally spent 37 minutes of critical care time. This is time spent at this critically ill patient's bedside actively involved in patient care as well as the coordination of care and discussions with the patient's family. This does not include any procedural time which has been billed separately. Enrrique Yee Staff Intensivist NP [facility] 
12/22/2020

## 2020-12-22 NOTE — PROGRESS NOTES
0700 - Bedside shift report received from outgoing RN. Report included SBAR, kardex, I&O's, MAR, recent labs, BiPap and pertinent assessment findings. Assumed patient care, will continue to monitor. 0820 - Performed shift assessment, see flowsheets or details. 1040 - Multidisciplinary team rounds performed, see note for details. 1240 - Performed re-assessment, see flowsheets for details. 1500 - Zoom meeting w/ family initiated as previously requested by Josué Pelletier (daughter). Performed re-assessment, see flowsheets or details. 1900 -  Bedside shift report given to incoming RN. Report included SBAR, kardex, I&O's, MAR, recent labs, BiPap and pertinent assessment findings.

## 2020-12-22 NOTE — INTERDISCIPLINARY ROUNDS
Interdisciplinary team rounds were held 12/22/2020 with the following team members:Care Management, Diabetes Treatment Specialist, Nursing, Nutrition, Palliative Care, Pharmacy, Physical Therapy, Physician and Respiratory Therapy. Plan of care discussed. See clinical pathway and/or care plan for interventions and desired outcomes.

## 2020-12-22 NOTE — PROGRESS NOTES
Physician Progress Note Cheo Yap 
CSN #:                  [id number] :                       1955 ADMIT DATE:       2020 12:14 PM 
[address] [place] DATE: 
RESPONDING 
PROVIDER #:        Perez Mahmood MD 
 
 
 
 
QUERY TEXT: 
 
Patient admitted with SOB. Documentation reflects Sepsis in H&P by IM dated . If possible, please document in the progress notes and discharge summary if Sepsis was: The medical record reflects the following: 
Risk Factors: Hx: HTN / Rob Mates / ONEIDA / 
Clinical Indicators: 98 75 21 140/54 94%. ....... ED VS: RR:20-34; O2 Sats: 85%-89% in the ED on BiPAP. .. WBC: 10.4. [facility] [place] Bands: 8.9. [facility] Na+: 134. .. [facility] Bun/Cr: 50/1.94. .. [facility] Alk phos: 122. [facility] LD: 274. [facility] Procal:.<0.05. .. [facility] Tnl: <0.05. ... pBNP: 253. [facility] Ferritin: 481. .. [facility] C-react protein: 5.13.... [facility] BCx: NG after 4 days. ... COVID-19 rapid test Detected. .. [facility] CXR: B/L multiolobar airspace Dz. [facility] Repeat CXR: Persistent B/L airspace opacification w/ interval worsening in the LLL Treatment: IV Decadron; IV Zithromax 500mg ; IV Roocephin 1g; Duo-Neb; Supplemental O2; PO Zinc; PO Vitamin C; Labs; BCx; COVID screening; IP ICU Admit; CXR Thank you, Deyanira Nicholas [facility] 
 Options provided: 
-- Sepsis ruled out after study -- Sepsis treated and resolved -- Sepsis confirmed after study -- Other - I will add my own diagnosis -- Disagree - Not applicable / Not valid -- Disagree - Clinically unable to determine / Unknown 
-- Refer to Clinical Documentation Reviewer PROVIDER RESPONSE TEXT: 
 
Sepsis confirmed after study.  
 
Query created by: Elliott Torres on 2020 4:57 PM 
 
 
Electronically signed by:  Perez Mahmood MD 2020 7:29 AM

## 2020-12-22 NOTE — PROGRESS NOTES
Pharmacy Automatic Renal Dosing Protocol - Antimicrobials Indication for Antimicrobials: HAP, COVID-19 Current Regimen of Each Antimicrobial: 
Cefepime 1g IV q8h (Start Date ; Day # 1) Azithromycin 500 mg IV q24h (Start date ; Day # 6) Previous Antimicrobial Therapy: 
Ceftriaxone 1g IV q24h (Start Date ; End date: ; Received 5 days of tx) Significant Cultures:  
 Blood- NG x 4 days, pending Radiology / Imaging results: (X-ray, CT scan or MRI):  
 CXR: IMPRESSION: Persistent bilateral airspace opacification with interval worsening 
in the left lower lobe. Paralysis, amputations, malnutrition:   
 
Labs: 
Recent Labs  
  20 
0307 20 
2216 20 
0452 20 
0431 CREA 1.25* 1.27* 1.16* 1.37* BUN 37* 34* 34* 52* WBC 19.4*  --  16.6* 15.5* Temp (24hrs), Av.7 °F (37.1 °C), Min:98 °F (36.7 °C), Max:99.7 °F (37.6 °C) Is the Patient on Dialysis? No 
 
Creatinine Clearance (mL/min):  
CrCl (Actual Body Weight): 93.7 CrCl (Adjusted Body Weight): 65.4 CrCl (Ideal Body Weight): 46.5 Impression/Plan:  
Increase Cefepime dose to 2g IV q8h Antimicrobial stop date  Pharmacy will follow daily and adjust medications as appropriate for renal function and/or serum levels. Thank you, 
Nivia Sandhoff Recommended duration of therapy 
http://[facility]/Pharmacy/Clinical%20Companion/Duration%20of%20ABX%20therapy. docx Renal Dosing 
http://[facility]/[place]/[facility]/Pharmacy/Clinical%20Companion/Renal%[id number]. pdf

## 2020-12-23 NOTE — PROGRESS NOTES
Problem: Risk for Spread of Infection Goal: Prevent transmission of infectious organism to others Description: Prevent the transmission of infectious organisms to other patients, staff members, and visitors. Outcome: Progressing Towards Goal 
  
Problem: Patient Education:  Go to Education Activity Goal: Patient/Family Education Outcome: Progressing Towards Goal 
  
Problem: Pressure Injury - Risk of 
Goal: *Prevention of pressure injury Description: Document Celestino Scale and appropriate interventions in the flowsheet. Outcome: Progressing Towards Goal 
Note: Pressure Injury Interventions: 
Sensory Interventions: Assess changes in LOC, Assess need for specialty bed, Avoid rigorous massage over bony prominences, Check visual cues for pain, Discuss PT/OT consult with provider, Float heels, Keep linens dry and wrinkle-free, Maintain/enhance activity level, Minimize linen layers, Monitor skin under medical devices, Pressure redistribution bed/mattress (bed type), Turn and reposition approx. every two hours (pillows and wedges if needed) Moisture Interventions: Absorbent underpads, Apply protective barrier, creams and emollients, Assess need for specialty bed, Check for incontinence Q2 hours and as needed, Internal/External fecal devices, Internal/External urinary devices, Limit adult briefs, Maintain skin hydration (lotion/cream), Minimize layers, Moisture barrier, Offer toileting Q_hr Activity Interventions: Assess need for specialty bed, Pressure redistribution bed/mattress(bed type), PT/OT evaluation Mobility Interventions: Assess need for specialty bed, Float heels, HOB 30 degrees or less, Pressure redistribution bed/mattress (bed type), PT/OT evaluation, Turn and reposition approx. every two hours(pillow and wedges) Nutrition Interventions: Document food/fluid/supplement intake, Discuss nutritional consult with provider, Offer support with meals,snacks and hydration Friction and Shear Interventions: Apply protective barrier, creams and emollients, Feet elevated on foot rest, Lift sheet, Minimize layers, Transfer aides:transfer board/Adonis lift/ceiling lift, Transferring/repositioning devices Problem: Patient Education: Go to Patient Education Activity Goal: Patient/Family Education Outcome: Progressing Towards Goal 
  
Problem: Falls - Risk of 
Goal: *Absence of Falls Description: Document [place] Fall Risk and appropriate interventions in the flowsheet. Outcome: Progressing Towards Goal 
Note: Fall Risk Interventions: 
Mobility Interventions: Communicate number of staff needed for ambulation/transfer, OT consult for ADLs, Patient to call before getting OOB, PT Consult for mobility concerns, PT Consult for assist device competence, Strengthening exercises (ROM-active/passive), Utilize walker, cane, or other assistive device, Utilize gait belt for transfers/ambulation Medication Interventions: Evaluate medications/consider consulting pharmacy, Patient to call before getting OOB, Teach patient to arise slowly, Utilize gait belt for transfers/ambulation Elimination Interventions: Call light in reach, Patient to call for help with toileting needs, Stay With Me (per policy), Toilet paper/wipes in reach, Toileting schedule/hourly rounds Problem: Patient Education: Go to Patient Education Activity Goal: Patient/Family Education Outcome: Progressing Towards Goal 
  
Problem: Breathing Pattern - Ineffective Goal: *Absence of hypoxia Outcome: Not Progressing Towards Goal 
Goal: *Use of effective breathing techniques Outcome: Not Progressing Towards Goal 
  
Problem: Patient Education: Go to Patient Education Activity Goal: Patient/Family Education Outcome: Progressing Towards Goal

## 2020-12-23 NOTE — PROGRESS NOTES
Physician Progress Note Joann Drummond 
CSN #:                  [id number] :                       1955 ADMIT DATE:       2020 12:14 PM 
[address] [place] DATE: 
RESPONDING 
PROVIDER #:        Jordin Trivedi MD 
 
 
 
 
QUERY TEXT: 
 
Pt admitted with COVID-19 and noted to have tachypnea, tachycardia and respiratory failure. If possible, please document in progress notes and discharge summary if you are evaluating and/or treating: The medical record reflects the following: 
Risk Factors: Hx: HTN / Cristine Mode / ONEIDA / 
Clinical Indicators: 98 75 21 140/54 94%. ....... ED VS: RR:20-34; O2 Sats: 85%-89% in the ED on BiPAP. .. WBC: 10.4. [facility] Bands: 8.9. [facility] Na+: 134. .. [facility] Bun/Cr: 50/1.94. .. [facility] Alk phos: 122. [facility] LD: 274. [facility] Procal:.<0.05. .. [facility] Tnl: <0.05. ... pBNP: 253. [facility] Ferritin: 481. .. [facility] C-react protein: 5.13.... [facility] BCx: NG after 4 days. ... COVID-19 rapid test Detected. .. [facility] CXR: B/L multiolobar airspace Dz. [facility] Repeat CXR: Persistent B/L airspace opacification w/ interval worsening in the LLL Treatment: IV Decadron; IV Zithromax 500mg ; IV Roocephin 1g; Duo-Neb; Supplemental O2; PO Zinc; PO Vitamin C; Labs; BCx; COVID screening; IP ICU Admit; CXR Thank you, Ayad Dumont [facility] 
 Options provided: 
-- Sepsis due to COVID-19 
-- COVID-19 without sepsis -- Other - I will add my own diagnosis -- Disagree - Not applicable / Not valid -- Disagree - Clinically unable to determine / Unknown 
-- Refer to Clinical Documentation Reviewer PROVIDER RESPONSE TEXT: 
 
This patient has sepsis due to COVID-19.  
 
Query created by: Robby Richardson on 2020 5:50 PM 
 
 
Electronically signed by:  Jordin Trivedi MD 2020 4:41 PM

## 2020-12-23 NOTE — PROGRESS NOTES
[facility] 
 
ICU TEAM Progress Note Name: Kalie Aldridge : 1955 MRN: [id number] Date: 2020 Subjective:  
Progress Note: 2020 Reason for ICU Admission: Ms Carla Maria is a 62 yo female admitted to [address] [place] [address] on  with acute hypoxic respiratory failure in the setting of severe COVID pneumonia. She was not a candidate for remdesivir due to GFR <30. She required BiPAP due to hypoxia with eventual transfer to the ICU on the afternoon of . Her lovenox was increase to therapeutic dosing on . The patient has been requiring maximum support on BiPAP. She has been undergoing intermittent diuresis. She was net negative 2L on  and 0.5L the last 24h. Of note, the patient had an extensive goals of care conversation with the intensivist and RN as per the progress note documented on  and wished to be a DNR. Her code status was changed to DNR at this time. 24h Events: Continues to require continuous BiPAP with precipitous desaturations if taking off even to take PO making her status very critical and tenuous. She remains at very high risk for decompensation. She remains tachypneic with RR in the high 30s and oxygen saturations around 90%. [phone] Lisa - Daughter point of contact - Palliative spoke with the daughter and patient on the phone today. The patient changed her code status to partial code. She was previously full DNR and in conversation with her daughter has decided to be intubation ok (with the contingency of a short intubation - 7 days maximum) and remain DNR) Past Surgical History:  
 
 has a past surgical history that includes hx gyn (2009) and hx breast biopsy (Left). Home Medications:  
 
Prior to Admission medications Medication Sig Start Date End Date Taking? Authorizing Provider  
labetaloL (NORMODYNE) 300 mg tablet Take 1 Tab by mouth two (2) times a day.  20   Abigail White MD  
 aspirin 81 mg chewable tablet TAKE 1 TABLET BY MOUTH EVERY DAY INDICATIONS: STROKE PREVENTION  Indications: stroke prevention 12/14/20   Johnathon Jaquez MD  
furosemide (LASIX) 80 mg tablet TAKE 1 TABLET BY MOUTH EVERY DAY 8/10/20   Johnathon Jaquez MD  
busPIRone (BUSPAR) 15 mg tablet TAKE 1 TAB BY MOUTH TWO (2) TIMES DAILY AS NEEDED (ANXIETY). 7/23/20   Johnathon Jaquez MD  
Olmesartan-amLODIPine-HCTZ 40-10-25 mg tab TAKE 1 TABLET DAILY 7/13/20   Johnathon Jaquez MD  
clonazePAM (KlonoPIN) 0.5 mg tablet TAKE 0.5 TAB BY MOUTH NIGHTLY AS NEEDED FOR ANXIETY. MAX DAILY AMOUNT: 0.25MG 6/29/20   Johnathon Jaquez MD  
citalopram (CELEXA) 20 mg tablet TAKE 1 TABLET BY MOUTH EVERY DAY 6/25/20   Johnathon Jaquez MD  
atorvastatin (LIPITOR) 20 mg tablet Take 1 Tab by mouth nightly. 6/25/20   Johnathon Jaquez MD  
potassium chloride SR (KLOR-CON 10) 10 mEq tablet TAKE 2 TABLETS BY MOUTH TWICE A DAY 6/25/20   Johnathon Jaquez MD  
pantoprazole (PROTONIX) 40 mg tablet Take 1 Tab by mouth daily. 6/11/20   Johnathon Jaquez MD  
ergocalciferol (Vitamin D2) 1,250 mcg (50,000 unit) capsule Take 1 Cap by mouth every seven (7) days. 6/11/20   Johnathon Jaquez MD  
fluticasone propionate (FLONASE) 50 mcg/actuation nasal spray SPRAY 2 SPRAYS INTO EACH NOSTRIL EVERY DAY 3/9/20   Johnathon Jaquez MD  
ferrous sulfate 325 mg (65 mg iron) tablet TAKE 1 TAB BY MOUTH THREE (3) TIMES DAILY (WITH MEALS). 2/3/19   Johnathon Jaquez MD  
cpap machine kit by Does Not Apply route. Provider, Historical  
naproxen sodium 220 mg cap Take  by mouth every twelve (12) hours as needed (pain). 1-2 caps as needed    Provider, Historical  
ascorbic acid (VITAMIN C) 500 mg tablet Take 500 mg by mouth daily. Provider, Historical  
 
Allergies/Social/Family History: No Known Allergies Social History Tobacco Use  Smoking status: Never Smoker  Smokeless tobacco: Never Used Substance Use Topics  Alcohol use: Yes Comment: rare Family History Problem Relation Age of Onset  Stroke Father 36  
 Heart Disease Father [facility] Arthritis-osteo Mother  Cancer Mother [de-identified]  
     breast cancer  Breast Cancer Mother 78 Objective:  
Vital Signs: 
Visit Vitals BP (!) 161/72 Pulse 81 Temp 98.2 °F (36.8 °C) Resp (!) 33 Ht 5' 8\" (1.727 m) Wt 128.9 kg (284 lb 2.8 oz) SpO2 90% BMI 43.21 kg/m² Intake/Output:  
 
Intake/Output Summary (Last 24 hours) at 12/23/2020 1042 Last data filed at 12/23/2020 0400 Gross per 24 hour Intake 660 ml Output 2450 ml Net -1790 ml Physical Exam: 
 
General:  Alert, cooperative, appears stated age, moderate respiratory distress, tachypnea on BiPAP Eye:  PERRLA Neurologic:  A/O x3; able to make needs known on BiPAP, though labored breathing Neck:  normal and no erythema or exudates noted. Lungs:  clear to auscultation bilaterally on BiPAP Heart:  regular rate and rhythm, S1, S2 normal, no murmur, click, rub or gallop, 1+ edema Abdomen:  soft, non-tender. Bowel sounds normal. No masses,  no organomegaly Skin:  no rash or abnormalities LABS AND  DATA: Personally reviewed Recent Labs  
  12/23/20 
0543 12/22/20 
7645 WBC 21.3* 19.4* HGB 13.1 13.0 HCT 42.1 42.9  282 Recent Labs  
  12/23/20 
0543 12/22/20 
2204  141  
K 4.3 4.6  105 CO2 34* 32 BUN 44* 38* CREA 1.37* 1.28* GLU 85 109* CA 11.6* 11.6*  
MG 2.4 2.4 PHOS 3.3 3.1 Recent Labs  
  12/21/20 
5234 * TP 8.9* ALB 2.9*  
GLOB 6.0* Recent Labs  
  12/23/20 
0543 12/22/20 
5127 INR 1.2* 1.2* PTP 12.2* 12.2* APTT 26.8 28.5 BiPAP 100% 22/18 - > Attempted to wean to 90% FIO2, but desaturated to the 80s MEDS: Reviewed Chest X-Ray: personally reviewed and report checked 12/22: Bilateral infiltrates or edema, elevation of the left Hemidiaphragm. Grossly unchanged from piror film on 12/20. No TTE found this admission Assessment & Plan Acute Hypoxic Respiratory Failure Secondary to COVID Pneumonia: Requiring continuous BiPAP. Completed a course of ceftriaxone. Increasing WBC count, worsening tachypnea, maximized on BiPAP settings. Experienced an episode of desaturation yesterday despite being maximized on BiPAP settings. - Added cefepime for empiric coverage - Added procalcitonin - Diuresis with goal -1L net negative; utilizing 80mg of Lasix per dose, responding well to this 
- Continue dexamethasone - Continue therapeutic lovenox - Palli following; I spoke with them today OLIVER on CKD 3 c/b volume overload: - Diuresis w 80 lasix, goal -1L; typically requires two doses per day to achieve goal 
 
HTN: Continue labetalol DM2:  SSI q6 GERD:  PPI IV Deconditioning: PT/OT when able to participate Multidisciplinary Rounds Completed: Yes ABCDEF Bundle/Checklist 
Pain Medications: None Target RASS: 0 - Alert & Calm - Spontaneously pays attention to caregiver Sedation Medications: None CAM-ICU:  Negative Mobility: Poor PT/OT: PT consulted and on board Restraints: None needed at this time Discussed Plan of Care (goals of care): Yes Addressed Code Status: Do Not Resuscitate CARDIOVASCULAR Cardiac Gtts: None SBP Goal of: > 90 mmHg MAP Goal of: > 65 mmHg Transfusion Trigger (Hgb): <7 g/dL RESPIRATORY 
DVT Prophylaxis (if no, list reason): Lovenox SPO2 Goal: > 92% GI/ GI Prophylaxis: Protonix (pantoprazole) Nutrition: Pending Insulin: SSI q6 
 
ANTIBIOTICS Antibiotics: Azithro DISPOSITION Remain in ICU CRITICAL CARE CONSULTANT NOTE I had a face to face encounter with the patient, reviewed and interpreted patient data including clinical events, labs, images, vital signs, I/O's, and examined patient.   I have discussed the case and the plan and management of the patient's care with the consulting services, the bedside nurses and the respiratory therapist.   
 
NOTE OF PERSONAL INVOLVEMENT IN CARE  
 This patient has a high probability of imminent, clinically significant deterioration, which requires the highest level of preparedness to intervene urgently. I participated in the decision-making and personally managed or directed the management of the following life and organ supporting interventions that required my frequent assessment to treat or prevent imminent deterioration. I personally spent 35 minutes of critical care time. This is time spent at this critically ill patient's bedside actively involved in patient care as well as the coordination of care and discussions with the patient's family. This does not include any procedural time which has been billed separately. Daryl Rodriguez Staff Intensivist NP [facility] Critical Care 
12/23/2020

## 2020-12-23 NOTE — PROGRESS NOTES
MARCELLUS PLAN:  TBD Pt is a 64 yo female admitted from home for treatment of respiratory failure from COVID PNA. 100% BIPAP - not able to remove for trials or PO. Pt voiced intention of DNR/DNI status, but oscar has since revised code status to partial - would consent to intubation for 7 days maximum. Pt lives w/ her  and oscar in multi-level home. Pt has home 02 but was not using pta. Pt was modified indep for mobility pta - had to be very cautious with stairs. CM is following to assist w/ dc planning as indicated by pt's clinical progress. CARYN Vanegas

## 2020-12-23 NOTE — PROGRESS NOTES
Attempted to provide follow-up visit for patient Yumiko Smith after receiving new palliative care consult. Pt was asleep and appeared to be continuing bipap. As such, pt was unavailable for pastoral visit through telephone per Covid-19 protocols. Attempted to reach pt family through phone, but there was no answer. Pt daughter has been very active in care questions as a family support through telephone. Consult with RN and attended interdisciplinary rounds. [facility] Spiritual Care Provider  Paging Service [phone])

## 2020-12-23 NOTE — CONSULTS
Palliative Medicine Consult Anam: [phone] (2020) Patient Name: Andre Du YOB: 1955 Date of Initial Consult: 12/23/2020 Reason for Consult: Care Decisions Requesting Provider: Gabrielle Ocampo NP 
Primary Care Physician: Vel Miller MD 
 
 SUMMARY:  
Andre Du is a 63 y.o. with a past history of anxiety, morbid obesity, HTN, HLD, and arthritis who was admitted on 12/17/2020 from home with a diagnosis of hypoxic respiratory failure and COVID-19 PNA. She presented to the ED after being short of breath for the past few days, which worsened the day of admission to the point that she was not able to move at all. She was transferred to the ICU on 12/18 and has been bipap dependent. She did say no to intubation initially, but her family has not been on board with this decision. She is not doing well- still on quite a bit of support from bipap. Current medical issues leading to Palliative Medicine involvement include: conversations around her wishes if things continue to get worse. PALLIATIVE DIAGNOSES:  
1. Goals of care 2. Shortness of breath 3. Fatigue 4. Need for emotional support PLAN:  
1. Met with Ms Yumiko Smith after reviewing her chart and speaking with the attending and the nurse 2. She is on bipap, uncomfortable as she is very dry and wants to drink, but otherwise tells me she feels OK. She is awake, alert, conversational, and able to write if you cant understand her because of bipap 3. We had a conversation about some \"what if\" scenarios, and what limits does she wants to put on her care. What lines will she not cross. 4. She was able to tell me that she wants to be \"active\" when she leaves the hospital, so we talked about how debilitating hospitalizations are, how- IF she is able to recover- she will have a lengthy rehab for her body as well as her lungs, and how every escalating intervention that is added, makes rehab even harder, but also means that she is getting sicker so lessens her chances of recovery 5. She very clearly told me that if she looses the ability to make her own decisions for any reason, she wants her daughter Josué Pelletier, not her , to be her voice. Noted this in the ACP tab 6. She wanted to talk with Josué Pelletier before making any decisions- so we did a zoom call. Prior to doing the zoom, I shared the same information with Josué Pelletier that I did with her mom 7. Ms Johan Courtney clearly loves her family, and her choices that she makes here in the hospital are for them as much as they are for her. After talking to her with her daughter, and then again with her alone, she dictated the following 1. She is OK with intubation for respiratory failure 2. She wants to remain a DNR for cardiac arrest, as she understands that if she is so sick that she dies- CPR will not improve her situation as she is already under aggressive ICU level care 3. She is OK with CRRT/HD if her kidneys start to fail, even if it means long term dialysis outside of the hospital 
4. She is OK with pressor support should she need this 5. She wants to limit her time intubated to 7 days- if there is no improvement in her status at 7 days, she wants to be compassionately extubated 6. SHE DOES NOT WANT TRACH AND PEG 
8. I have a strong suspicion that her decisions will change as time goes on, as her family really want her to be more aggressive, even to the point of being trached and vent dependent- but as it stands now, Ms Johan Courtney was not willing to bend on that part. She does rely on her daughter heavily for support in decision making. 9. Will follow peripherally, but please let me know if we need to talk more. Ms Eleanor Blum also knows to ask for me if she wants to talk more or adjust the plan 10. Initial consult note routed to primary continuity provider and/or primary health care team members 11. Communicated plan of care with: Palliative [facility] Team 
 
 GOALS OF CARE / TREATMENT PREFERENCES:  
 
GOALS OF CARE: 
Patient/Health Care Proxy Stated Goals: Prolong life TREATMENT PREFERENCES:  
Code Status: DNR Advance Care Planning: 
[x] The [facility] Interdisciplinary Team has updated the ACP Navigator with [address] and Patient Capacity Primary Decision MakerCaro Wang  Renu - [phone] Medical Interventions: Limited additional interventions Other Instructions: Other: As far as possible, the palliative care team has discussed with patient / health care proxy about goals of care / treatment preferences for patient. HISTORY:  
 
History obtained from: chart, patient CHIEF COMPLAINT: \"I want some water\" HPI/SUBJECTIVE: The patient is:  
[x] Verbal and participatory [] Non-participatory due to:  
 
Awake, alert but on bipap so conversation somewhat limited. If you cant hear her, she is able to write her thoughts down for us Clinical Pain Assessment (nonverbal scale for severity on nonverbal patients):  
Clinical Pain Assessment Severity: 0 Activity (Movement): Lying quietly, normal position Duration: for how long has pt been experiencing pain (e.g., 2 days, 1 month, years) Frequency: how often pain is an issue (e.g., several times per day, once every few days, constant) FUNCTIONAL ASSESSMENT:  
 
Palliative Performance Scale (PPS): PPS: 30 
 
 
 PSYCHOSOCIAL/SPIRITUAL SCREENING:  
 
 Palliative IDT has assessed this patient for cultural preferences / practices and a referral made as appropriate to needs (Cultural Services, Patient Advocacy, Ethics, etc.) Any spiritual / Nondenominational concerns: 
[] Yes /  [x] No 
 
Caregiver Burnout: 
[] Yes /  [x] No /  [] No Caregiver Present Anticipatory grief assessment:  
[x] Normal  / [] Maladaptive ESAS Anxiety: Anxiety: 0 
 
ESAS Depression: Depression: 0 REVIEW OF SYSTEMS:  
 
Positive and pertinent negative findings in ROS are noted above in HPI. The following systems were [x] reviewed / [] unable to be reviewed as noted in HPI Other findings are noted below. Systems: constitutional, ears/nose/mouth/throat, respiratory, gastrointestinal, genitourinary, musculoskeletal, integumentary, neurologic, psychiatric, endocrine. Positive findings noted below. Modified ESAS Completed by: provider Fatigue: 4 Drowsiness: 0 Depression: 0 Pain: 0 Anxiety: 0 Nausea: 0 Anorexia: 4 Dyspnea: 6 Stool Occurrence(s): 1 PHYSICAL EXAM:  
 
From RN flowsheet: 
Wt Readings from Last 3 Encounters:  
12/21/20 284 lb 2.8 oz (128.9 kg) 02/27/20 307 lb (139.3 kg) 02/26/20 308 lb 3.2 oz (139.8 kg) Blood pressure (!) 169/82, pulse 90, temperature 98.2 °F (36.8 °C), resp. rate (!) 34, height 5' 8\" (1.727 m), weight 284 lb 2.8 oz (128.9 kg), SpO2 91 %. Pain Scale 1: Numeric (0 - 10) Pain Intensity 1: 0 Last bowel movement, if known:  
 
Constitutional: in no acute distress, calm, able to rest 
ENMT: very dry mouth/lips Respiratory: breathing labored, on bipap Gastrointestinal: soft non-tender, +bowel sounds Musculoskeletal: no deformity, no tenderness to palpation Skin: warm, dry Neurologic: following commands, moving all extremities Psychiatric: full affect, no hallucinations Other: 
 
 
 HISTORY:  
 
Active Problems: 
  Respiratory failure, acute ([facility] [address] (12/17/2020) Sepsis ([facility] [address] (12/17/2020) Pneumonia due to COVID-19 virus (12/17/2020) Past Medical History:  
Diagnosis Date  Anxiety and depression 9/26/2018  Arthritis   
 knees, lower back.  BMI 45.0-49.9, adult ([facility] [address] 9/26/2018  Essential tremor 9/26/2018  High cholesterol 8/14/2019  Hypertension  Iron deficiency anemia secondary to inadequate dietary iron intake 9/26/2018  Noncompliance 10/17/2019  Obesity, morbid ([facility] [address] 8/29/2018  Vitamin D deficiency 10/31/2018 Past Surgical History:  
Procedure Laterality Date  HX BREAST BIOPSY Left  HX GYN  2009 D&C Family History Problem Relation Age of Onset  Stroke Father 36  
 Heart Disease Father [facility] Arthritis-osteo Mother  Cancer Mother [de-identified]  
     breast cancer  Breast Cancer Mother 78 History reviewed, no pertinent family history. Social History Tobacco Use  Smoking status: Never Smoker  Smokeless tobacco: Never Used Substance Use Topics  Alcohol use: Yes Comment: rare No Known Allergies Current Facility-Administered Medications Medication Dose Route Frequency  cefepime (MAXIPIME) 2 g in 0.9% sodium chloride (MBP/ADV) 100 mL MBP  2 g IntraVENous Q8H  
 insulin lispro (HUMALOG) injection   SubCUTAneous Q6H  
 enoxaparin (LOVENOX) injection 135 mg ++partial syringe++  135 mg SubCUTAneous Q12H  
 albuterol-ipratropium (DUO-NEB) 2.5 MG-0.5 MG/3 ML  3 mL Nebulization Q6H RT  
 pantoprazole (PROTONIX) 40 mg in 0.9% sodium chloride 10 mL injection  40 mg IntraVENous DAILY  labetaloL (NORMODYNE;TRANDATE) injection 20 mg  20 mg IntraVENous Q12H  hydrALAZINE (APRESOLINE) 20 mg/mL injection 10 mg  10 mg IntraVENous Q6H PRN  
 alcohol 62% (NOZIN) nasal  1 Ampule  1 Ampule Topical Q12H  
 [Held by provider] chlorhexidine (ORAL CARE KIT) 0.12 % mouthwash 15 mL  15 mL Oral Q12H  
 [Held by provider] sodium chloride (NS) flush 5-40 mL  5-40 mL IntraVENous Q8H  
  sodium chloride (NS) flush 5-40 mL  5-40 mL IntraVENous PRN  
 acetaminophen (TYLENOL) tablet 650 mg  650 mg Oral Q6H PRN Or  
 acetaminophen (TYLENOL) suppository 650 mg  650 mg Rectal Q6H PRN  polyethylene glycol (MIRALAX) packet 17 g  17 g Oral DAILY PRN  
 ondansetron (ZOFRAN ODT) tablet 4 mg  4 mg Oral Q8H PRN Or  
 ondansetron (ZOFRAN) injection 4 mg  4 mg IntraVENous Q6H PRN  
 [Held by provider] aspirin chewable tablet 81 mg  81 mg Oral DAILY  busPIRone (BUSPAR) tablet 15 mg  15 mg Oral BID PRN  
 [Held by provider] citalopram (CELEXA) tablet 20 mg  20 mg Oral DAILY  [Held by provider] ferrous sulfate tablet 325 mg  1 Tab Oral DAILY WITH BREAKFAST  [Held by provider] fluticasone propionate (FLONASE) 50 mcg/actuation nasal spray 2 Spray  2 Spray Both Nostrils DAILY  [Held by provider] labetaloL (NORMODYNE) tablet 300 mg  300 mg Oral BID  [Held by provider] atorvastatin (LIPITOR) tablet 20 mg  20 mg Oral QHS  dexamethasone (DECADRON) 4 mg/mL injection 6 mg  6 mg IntraVENous Q24H  
 glucose chewable tablet 16 g  4 Tab Oral PRN  
 dextrose (D50W) injection syrg 12.5-25 g  12.5-25 g IntraVENous PRN  
 glucagon (GLUCAGEN) injection 1 mg  1 mg IntraMUSCular PRN  
 
 
 
 LAB AND IMAGING FINDINGS:  
 
Lab Results Component Value Date/Time WBC 21.3 (H) 12/23/2020 05:43 AM  
 HGB 13.1 12/23/2020 05:43 AM  
 PLATELET 817 53/34/7247 05:43 AM  
 
Lab Results Component Value Date/Time Sodium 140 12/23/2020 05:43 AM  
 Potassium 4.3 12/23/2020 05:43 AM  
 Chloride 104 12/23/2020 05:43 AM  
 CO2 34 (H) 12/23/2020 05:43 AM  
 BUN 44 (H) 12/23/2020 05:43 AM  
 Creatinine 1.37 (H) 12/23/2020 05:43 AM  
 Calcium 11.6 (H) 12/23/2020 05:43 AM  
 Magnesium 2.4 12/23/2020 05:43 AM  
 Phosphorus 3.3 12/23/2020 05:43 AM  
  
Lab Results Component Value Date/Time Alk.  phosphatase 126 (H) 12/21/2020 04:52 AM  
 Protein, total 8.9 (H) 12/21/2020 04:52 AM  
 Albumin 2.9 (L) 12/21/2020 04:52 AM  
 Globulin 6.0 (H) 12/21/2020 04:52 AM  
 
Lab Results Component Value Date/Time INR 1.2 (H) 12/23/2020 05:43 AM  
 Prothrombin time 12.2 (H) 12/23/2020 05:43 AM  
 aPTT 26.8 12/23/2020 05:43 AM  
  
Lab Results Component Value Date/Time Iron 21 (L) 08/29/2018 12:56 PM  
 TIBC 226 (L) 08/29/2018 12:56 PM  
 Iron % saturation 9 (LL) 08/29/2018 12:56 PM  
 Ferritin 481 (H) 12/17/2020 12:53 PM  
  
Lab Results Component Value Date/Time pH 7.40 12/21/2020 04:10 PM  
 PCO2 44 12/21/2020 04:10 PM  
 PO2 52 (L) 12/21/2020 04:10 PM  
 
No components found for: [place] Lab Results Component Value Date/Time  (H) 08/10/2018 04:18 AM  
 CK - MB 1.5 08/10/2018 04:18 AM  
  
 
 
   
 
Total time:  
Counseling / coordination time, spent as noted above:  
> 50% counseling / coordination?:  
 
Prolonged service was provided for  []30 min   []75 min in face to face time in the presence of the patient, spent as noted above. Time Start:  
Time End:  
Note: this can only be billed with 04649 (initial) or 67109 (follow up). If multiple start / stop times, list each separately.

## 2020-12-23 NOTE — PROGRESS NOTES
0700 - Bedside shift report received from outgoing RN. Report included SBAR, kardex, I&O's, MAR, recent labs, Bipap settings and pertinent assessment findings. Assumed patient care, will continue to monitor. 0820 - Performed shift assessment, see flowsheets or details. 1040 - Multidisciplinary team rounds performed, see note for details. 1200 - Performed re-assessment, see flowsheets for details. 1600 - Performed re-assessment, see flowsheets or details. Zoom meeting with family as requested by Ade (daughter). 1900 -  Bedside shift report given to incoming RN. Report included SBAR, kardex, I&O's, MAR, recent labs, Bipap settings and pertinent assessment findings.

## 2020-12-24 NOTE — PROGRESS NOTES
Report received from Michele Mcallister RN. Labs noted. 0830  Labs sent. Assessment completed, readily awakens to speech. Forgetful possibly of instruction for not pulling at mask. Mouth care and meds also given. Assisted with turning to left side. Gama Carpenter remains in place, will monitor urine output closely. [address] fairly, pulls off occasionally. Encouraged not to pull it off and mouth care is given at least every 3 hours. 1350  Pulled off Oxygen, labored with disconnection initially not noticed on tubing. sats very low, heart rate escalated to 150. Now back in ST in 110 range and sats only 81%. Dr. Harper Means paged for a sedative. PICC team aware of need for a line for TPN. 1446  Patient aware of starting sedative, IV infusing without difficulty. To have zoom meeting later today. 12 Spoke with daughter, PICC team here to insert picc. Will zoom after this is finished. 0217 Nichelle Brito [place] daughter as patient does not want to visit on zoom. Resting with eyes closed. 1755  Resting more comfortably now, assisted with turn with cues. Still occasionally pulls at Bipap, but does not dislodge Oxygen. 1915  Report given to NATALIIA Montero.

## 2020-12-24 NOTE — PROGRESS NOTES
Pharmacy Automatic Renal Dosing Protocol - Antimicrobials Indication for Antimicrobials: HAP, COVID-19 Current Regimen of Each Antimicrobial: 
Cefepime 2g IV q8h (Start Date ; Day # 3) Previous Antimicrobial Therapy: 
Ceftriaxone 1g IV q24h (Start Date ; End date: ; Received 5 days of tx) Azithromycin 500 mg IV q24h (Start date ; Day # 6) Significant Cultures:  
 Blood- NG x 4 days, pending Radiology / Imaging results: (X-ray, CT scan or MRI):  
 CXR: IMPRESSION: Persistent bilateral airspace opacification with interval worsening 
in the left lower lobe. Paralysis, amputations, malnutrition:  none noted Labs: 
Recent Labs  
  20 
0341 20 
0543 20 
2204 20 
4523 CREA 1.18* 1.37* 1.28* 1.25* BUN 41* 44* 38* 37* WBC 25.2* 21.3*  --  19.4* Temp (24hrs), Av.6 °F (37 °C), Min:97.7 °F (36.5 °C), Max:99.4 °F (37.4 °C) Is the Patient on Dialysis? No 
 
Creatinine Clearance (mL/min):  
CrCl (Actual Body Weight): 99.3 CrCl (Adjusted Body Weight): 69.3 CrCl (Ideal Body Weight): 49.2 Impression/Plan:  
Scr improved some Borderline renal function, will continue cefepime at current dose Antimicrobial stop date  Pharmacy will follow daily and adjust medications as appropriate for renal function and/or serum levels. Thank you, Nataliia Tanner, Melba Woods

## 2020-12-24 NOTE — PROGRESS NOTES
Progress Note Pt Name  Tiana Alicia Date of Birth 1955 Medical Record Number  [id number] Age  63 y.o. PCP Aniceto Andrade MD  
Admit date:  12/17/2020 Room Number  2520/01  @ [facility] Date of Service  12/24/2020 Admission Diagnoses:  <principal problem not specified> History of present illness (copied from H&P) \" Jennifer Bolaños is a 63 y.o.  female who presents with shortness of breath present for the last few days. Patient came to the ER by EMS after calling 911 for respiratory distress. EMS found the patient on the floor because of respiratory distress. Patient states that her shortness of breath is present for the last few days to the point she was not able to move at all today. Denies any chest pain, no nausea vomiting, no diarrhea, no fever or chills, no headache, no dizziness. Does report of some weakness. No history of sick contacts. Currently the patient is on BiPAP with saturation around 91 \" Interval History/progress:  
Copied from Ms. Ant Amor NP's note from 12/23/20 \"Reason for ICU Admission: Ms Selwyn Soriano is a 64 yo female admitted to [facility] ER on 12/17 with acute hypoxic respiratory failure in the setting of severe COVID pneumonia. She was not a candidate for remdesivir due to GFR <30. She required BiPAP due to hypoxia with eventual transfer to the ICU on the afternoon of 12/18. Her lovenox was increase to therapeutic dosing on 12/18. The patient has been requiring maximum support on BiPAP. She has been undergoing intermittent diuresis. She was net negative 2L on 12/20 and 0.5L the last 24h.  
  
Of note, the patient had an extensive goals of care conversation with the intensivist and RN as per the progress note documented on 12/19 and wished to be a DNR.   Her code status was changed to DNR at this time. 
  
 24h Events: Continues to require continuous BiPAP with precipitous desaturations if taking off even to take PO making her status very critical and tenuous. She remains at very high risk for decompensation. She remains tachypneic with RR in the high 30s and oxygen saturations around 90%.   
[phone] Lisa - Daughter point of contact - Palliative spoke with the daughter and patient on the phone today. The patient changed her code status to partial code. She was previously full DNR and in conversation with her daughter has decided to be intubation ok (with the contingency of a short intubation - 7 days maximum) and remain DNR)\" Assessment and plan:  
 
Acute respiratory failure with hypoxia COVID PNA Persistent resp distress Ongoing low grade temp/fever WBC is rising Procalcitonin remains low 0.13 on 12/23 Blood cultures from 12/17 negative     - 
· Continue dexamethasone 6mg IV daily · Continue therapeutic dose of lovenox · Continue Empiric Cefepime · OLIVER -improving creatinine · Monitor closely HTN - 
· PRN IV hydralazine -PO meds is not an option at this time. Hyperlipidemia -chronic · PO meds on hold Body mass index is 43.21 kg/m². -morbid obesity - chronic CODE STATUS   Partial    
Functional Status Surrogate decision maker:  Pt's daughter Prophylaxis   Lovenox Discharge Plan:  Pending ,    
Misc Benefit:  Payor: [facility] / Plan: [facility] PPO / Product Type: PPO /   
Isolation :  Droplet, Droplet Plus ADT status:  INPATIENT Query   None noted today Prognosis Social issues  Date  Comment 12/24/20 11:17 AM No family or visitor here with the patient today Subjective Data \"OK \" Review of Systems - History obtained from the patient Respiratory ROS: positive for - cough and shortness of breath Objective Data Comments  Pt is lying in bed in no apparent distress, with BiPAP mask on Her nurse is in the room. Patient Vitals for the past 24 hrs: 
 BP  
12/24/20 1000 (!) 150/74  
12/24/20 0904 (!) 161/85  
12/24/20 0800 (!) 126/91  
12/24/20 0700 (!) 139/90  
12/24/20 0600 (!) 160/61  
12/24/20 0523 (!) 147/76  
12/24/20 0500 (!) 172/95  
12/24/20 0408 (!) 152/94  
12/24/20 0300 (!) 166/87  
12/24/20 0200 (!) 157/79  
12/24/20 0100 (!) 155/71  
12/24/20 0001 (!) 154/65  
12/23/20 2300 137/74  
12/23/20 2200 136/75  
12/23/20 2100 134/76  
12/23/20 2000 (!) 160/84  
12/23/20 1900 (!) 157/77  
12/23/20 1800 (!) 140/72  
12/23/20 1700 (!) 154/75  
12/23/20 1600 (!) 163/73  
12/23/20 1500 (!) 154/79  
12/23/20 1400 (!) 160/74  
12/23/20 1300 (!) 169/82  
12/23/20 1200 (!) 161/77  
12/23/20 1100 (!) 160/89 Patient Vitals for the past 24 hrs: 
 Pulse 12/24/20 1000 92  
12/24/20 0953 99  
12/24/20 0904 (!) 116  
12/24/20 0900 (!) 114  
12/24/20 0800 (!) 112  
12/24/20 0729 95  
12/24/20 0700 98  
12/24/20 0600 (!) 112  
12/24/20 0523 (!) 101  
12/24/20 0500 91  
12/24/20 0408 91  
12/24/20 0309 90  
12/24/20 0300 86  
12/24/20 0200 87  
12/24/20 0100 82  
12/24/20 0001 79  
12/23/20 2300 82  
12/23/20 2256 85  
12/23/20 2200 74  
12/23/20 2100 71  
12/23/20 2000 83  
12/23/20 1938 81  
12/23/20 1900 86  
12/23/20 1800 82  
12/23/20 1700 87  
12/23/20 1600 91  
12/23/20 1525 90  
12/23/20 1500 88  
12/23/20 1400 88  
12/23/20 1300 92  
12/23/20 1200 91  
12/23/20 1129 90  
12/23/20 1100 91 Patient Vitals for the past 24 hrs: 
 Resp  
12/24/20 1000 (!) 53  
12/24/20 0953 30  
12/24/20 0904 (!) 34  
12/24/20 0900 (!) 37  
12/24/20 0800 (!) 36  
12/24/20 0729 (!) 36  
12/24/20 0700 (!) 35  
12/24/20 0600 (!) 33  
12/24/20 0523 (!) 31  
12/24/20 0500 (!) 35  
12/24/20 0408 (!) 33  
12/24/20 0309 27  
12/24/20 0300 (!) 32  
12/24/20 0200 (!) 34  
12/24/20 0100 (!) 32  
12/24/20 0001 30  
12/23/20 2300 30 12/23/20 2256 (!) 36  
12/23/20 2200 (!) 32  
12/23/20 2100 (!) 31  
12/23/20 2000 (!) 32  
12/23/20 1938 26  
12/23/20 1900 30  
12/23/20 1800 (!) 33  
12/23/20 1700 30  
12/23/20 1600 28  
12/23/20 1525 (!) 34  
12/23/20 1500 (!) 33  
12/23/20 1400 (!) 31  
12/23/20 1300 (!) 32  
12/23/20 1200 24  
12/23/20 1129 (!) 33  
12/23/20 1100 (!) 32 Patient Vitals for the past 24 hrs: 
 Temp 12/24/20 0904 99.4 °F (37.4 °C)  
12/24/20 0900 99.4 °F (37.4 °C)  
12/24/20 0408 98.1 °F (36.7 °C)  
12/23/20 2000 98.9 °F (37.2 °C)  
12/23/20 1600 97.8 °F (36.6 °C)  
12/23/20 1200 97.7 °F (36.5 °C) SpO2 Readings from Last 6 Encounters:  
12/24/20 90% 02/27/20 98% 02/26/20 95% 10/17/19 96% 08/14/19 95% 06/18/19 94% O2 Flow Rate (L/min): 15 l/min  O2 Device: BIPAP Body mass index is 43.21 kg/m². - 
Wt Readings from Last 10 Encounters:  
12/21/20 128.9 kg (284 lb 2.8 oz) 02/27/20 139.3 kg (307 lb)  
02/26/20 139.8 kg (308 lb 3.2 oz) 10/17/19 141.3 kg (311 lb 9.6 oz) 08/14/19 138.4 kg (305 lb 3.2 oz) 06/18/19 137.9 kg (304 lb) 04/16/19 137.9 kg (304 lb)  
11/16/18 137.3 kg (302 lb 11.2 oz) 10/31/18 138.9 kg (306 lb 3.2 oz) 10/19/18 137.2 kg (302 lb 6.4 oz) Intake/Output Summary (Last 24 hours) at 12/24/2020 1055 Last data filed at 12/24/2020 1000 Gross per 24 hour Intake 400 ml Output 1525 ml Net -1125 ml Physical Exam:            
General:  Alert, cooperative, Obese   
well developed,  
appears stated age Ears/Eyes:  Hearing intact Sclera anicteric. Pupils equal  
Mouth/Throat:    
Neck:    
Lungs:  Trachea midline Chest excursion symmetrical  
Auscultation B/L Symmetrical with Vesicular breath sounds CVS:  Regular rate and rhythm  
no  murmur, No click, rub or gallop S1 normal  
S2 normal  
Pedal pulses  b/l symmetrical  
Abdomen:  Obese Soft, non-tender Bowel sounds normal 
No distension Extremities: No cyanosis, jaundice No edema noted No sign of DVT/cord like lesion on palpation No sign of acute trauma Skin:   
Skin color, texture, turgor normal. no acute rash or lesions Lymph nodes: Musculoskeletal Muscle bulk B/L symmetrical  
Neuro Cranial nerves are intact, Psych:  Alert and oriented,  
normal mood & affect Medications reviewed Current Facility-Administered Medications Medication Dose Route Frequency  cefepime (MAXIPIME) 2 g in 0.9% sodium chloride (MBP/ADV) 100 mL MBP  2 g IntraVENous Q8H  
 insulin lispro (HUMALOG) injection   SubCUTAneous Q6H  
 enoxaparin (LOVENOX) injection 135 mg ++partial syringe++  135 mg SubCUTAneous Q12H  
 albuterol-ipratropium (DUO-NEB) 2.5 MG-0.5 MG/3 ML  3 mL Nebulization Q6H RT  
 pantoprazole (PROTONIX) 40 mg in 0.9% sodium chloride 10 mL injection  40 mg IntraVENous DAILY  labetaloL (NORMODYNE;TRANDATE) injection 20 mg  20 mg IntraVENous Q12H  hydrALAZINE (APRESOLINE) 20 mg/mL injection 10 mg  10 mg IntraVENous Q6H PRN  
 alcohol 62% (NOZIN) nasal  1 Ampule  1 Ampule Topical Q12H  
 [Held by provider] chlorhexidine (ORAL CARE KIT) 0.12 % mouthwash 15 mL  15 mL Oral Q12H  
 [Held by provider] sodium chloride (NS) flush 5-40 mL  5-40 mL IntraVENous Q8H  
 sodium chloride (NS) flush 5-40 mL  5-40 mL IntraVENous PRN  
 acetaminophen (TYLENOL) tablet 650 mg  650 mg Oral Q6H PRN Or  
 acetaminophen (TYLENOL) suppository 650 mg  650 mg Rectal Q6H PRN  polyethylene glycol (MIRALAX) packet 17 g  17 g Oral DAILY PRN  
 ondansetron (ZOFRAN ODT) tablet 4 mg  4 mg Oral Q8H PRN Or  
 ondansetron (ZOFRAN) injection 4 mg  4 mg IntraVENous Q6H PRN  
 [Held by provider] aspirin chewable tablet 81 mg  81 mg Oral DAILY  busPIRone (BUSPAR) tablet 15 mg  15 mg Oral BID PRN  
 [Held by provider] citalopram (CELEXA) tablet 20 mg  20 mg Oral DAILY  [Held by provider] ferrous sulfate tablet 325 mg  1 Tab Oral DAILY WITH BREAKFAST  [Held by provider] fluticasone propionate (FLONASE) 50 mcg/actuation nasal spray 2 Spray  2 Spray Both Nostrils DAILY  [Held by provider] labetaloL (NORMODYNE) tablet 300 mg  300 mg Oral BID  [Held by provider] atorvastatin (LIPITOR) tablet 20 mg  20 mg Oral QHS  dexamethasone (DECADRON) 4 mg/mL injection 6 mg  6 mg IntraVENous Q24H  
 glucose chewable tablet 16 g  4 Tab Oral PRN  
 dextrose (D50W) injection syrg 12.5-25 g  12.5-25 g IntraVENous PRN  
 glucagon (GLUCAGEN) injection 1 mg  1 mg IntraMUSCular PRN Relevant other informations: Other medical conditions listed in [facility] problem list section; all of these and other pertinent data were taken into consideration when treatment plan is developed and customized to this patient's unique overall circumstances and needs. We have reviewed available old medical records within the constraints of this admission process. Data Review:  
Recent Days: 
All Micro Results Procedure Component Value Units Date/Time CULTURE, BLOOD, PAIRED [[id number]] Collected: 12/17/20 1253 Order Status: Completed Specimen: Blood Updated: 12/22/20 6855 Special Requests: NO SPECIAL REQUESTS Culture result: NO GROWTH 5 DAYS Recent Labs  
  12/24/20 
0341 12/23/20 
0543 12/22/20 
7209 WBC 25.2* 21.3* 19.4* HGB 13.3 13.1 13.0 HCT 42.3 42.1 42.9  286 282 Recent Labs  
  12/24/20 
0341 12/23/20 
0543 12/22/20 
2204 12/22/20 
9919  140 141 139  
K 4.5 4.3 4.6 4.0  
* 104 105 104 CO2 30 34* 32 33* GLU 83 85 109* 100 BUN 41* 44* 38* 37* CREA 1.18* 1.37* 1.28* 1.25* CA 11.8* 11.6* 11.6* 11.7* MG 2.4 2.4 2.4 2.4 PHOS 2.9 3.3 3.1  --   
INR 1.2* 1.2*  --  1.2* Lab Results Component Value Date/Time TSH 2.080 10/17/2019 11:11 AM  
 
 
 
  
Care Plan discussed with:Patient/Family and Nurse Other medical conditions are listed in the active hospital problem list section; these and other pertinent data were taken into consideration when the treatment plan was developed and customized to this patient's unique overall circumstances and needs. High complexity decision making was performed for this patient who is at high risk for decompensation with multiple organ involvement. Today total floor/unit time was 40 minutes critical care while caring for this patient and greater than 50% of that time was spent with patient (and/or family) coordinating patients clinical issues; this includes time spent during multidisciplinary rounds. Nathan Acuña MD MPH FACP   
12/24/2020

## 2020-12-24 NOTE — PROGRESS NOTES
1900: Shift report received from [facility]. 2000:Shift assessment complete. (See flowsheet). Patient alert and oriented w/ no complaints of pain. O2 94 % on BiPAP. 2100: Updated Daughter Stann Schilder on patient condition. Performed mouth care on patient. 0000: Reassessment complete. Patient more anxious, complaining of dry mouth. Mouth care performed. 9278: Patient exteremly anxious. continusoly disconnecting BIPAP tubing. Educated patient multiple times. One time dose of Haldol given. End of Shift Note Bedside shift change report given to  (oncoming nurse) by Aida Barragan (offgoing nurse). Report included the following information SBAR, Kardex and [facility] Shift worked:  [id number] Shift summary and any significant changes:  
   
  
Concerns for physician to address:   
  
Zone phone for oncoming shift:    
  
 
Activity: 
Activity Level: Bed Rest 
Number times ambulated in hallways past shift: 0 Number of times OOB to chair past shift: 0 Cardiac:  
Cardiac Monitoring: Yes     
Cardiac Rhythm: Normal sinus rhythm Access:  
Current line(s): PIV Genitourinary:  
Urinary status: voiding Respiratory:  
O2 Device: BIPAP Chronic home O2 use?: N/A Incentive spirometer at bedside: N/A 
  
GI: 
Last Bowel Movement Date: 12/23/20 Current diet:  DIET NPO 
DIET NUTRITIONAL SUPPLEMENTS All Meals; Ensure [employer] Passing flatus: YES Tolerating current diet:  
 
Pain Management:  
Patient states pain is manageable on current regimen: YES Skin: 
Celestino Score: 14 Interventions: turn team 
 
Patient Safety: 
Fall Score: Total Score: 2 Interventions: bed/chair alarm High Fall Risk: Yes Length of Stay: 
Expected LOS: 4d 19h Actual LOS: 7 Aida Barragan

## 2020-12-24 NOTE — PROGRESS NOTES
Nutrition Note Per discussion with Pharmacist, TPN to start once PICC line is placed today. Recommend AA6.4%,D20% Day 1: 42mL/hr 
Day 2: if BG <200mg/dL and lytes WNL, advance to 63mL/hr 
Day 3: if BG <200mg/dL and lytes WNL, advance to goal of 83mL/hr 
+250mL lipids 3 days/wk Goal will provide 127g Pro, 2079 kcal, 398g CHO Goal will meet 100% protein and 92% kcal needs Electronically signed by Amelie Sargent RD on 12/24/2020 at 3:07 PM 
 
Contact: [phone] Pager [phone]

## 2020-12-24 NOTE — PROGRESS NOTES
PICC Education: Explained reason and rationale for PICC placement along with providing education in order to make an informed consent including nature, risks, benefits, potential complications, care and maintenance of PICC line. The opportunity for questions or concerns was given. A 'Patient PICC Handbook was also provided. Patient 's  Jen Dove) gave telephone consent for PICC procedure to be done at the bedside. Patient 's  verbalizes understanding at this time. Also spoke with patient's daughter Christin Pan) about PICC placement education and consent. Procedure: 
Time out completed. Pre procedure assessment done. Maximum sterile barrier precautions observed throughout procedure. Lidocaine 1% 3.0ml sc given prior to cannulation Cannulated Cephalic vein using ultrasound guidance and modified seldinger technique. Inserted DOUBLE lumen 5 fr PICC in  RIGHT arm using, [employer] Tip Location System and 3CG Patient has sinus rhythm. Tip Positioning System indicating tall P wave and no negative deflection before P wave which would indicate the PICC tip is properly placed in the distal SVC or the CAJ. PICC tip was confirmed by 2 PICC nurses and 3CG printout was placed on patients chart. Blood return verified and flushed with 20ml NS in each port. Sterile dressing applied with Biopatch, Stat loc, occlusive dressing per protocol. Curios caps applied to each port. Reason for access (TPN). Complications related to insertion (None). Patient tolerated procedure well with minimal blood loss. PICC procedure performed by: Fauzia Birch RN [facility] Vascular Access Nurse Assisted by: Jennifer Piper RN, [facility] Vascular Access Nurse RIGHT  arm circumference: 44 cm. Catheter internal length:  42 cm Catheter external length: 0 cm PICC catheter occupies 23% of vein Brand of catheter BARD SOLO POWER PICC, Lot #[id number] REF# [id number] EXP 10/31/2021. Primary nurse Sheldon Son RN, notified PICC line may be used and to hang new infusion tubing prior to use. Rajiv Aguirre RN, [facility] Vascular Access Team

## 2020-12-25 NOTE — PROGRESS NOTES
Pharmacy Automatic Renal Dosing Protocol - Antimicrobials Indication for Antimicrobials: HAP, COVID-19 Current Regimen of Each Antimicrobial: 
Vancomycin 2000 mg IV once, 1500 mg q18hr (Start Date ; Day # 1) Cefepime 2g IV q8h (Start Date ; Day 4) Previous Antimicrobial Therapy: 
Ceftriaxone 1g IV q24h (Start Date ; End date: ; Received 5 days of tx) Azithromycin 500 mg IV q24h (Start date ; Day # 6) Goal Level: VANCOMYCIN TROUGH GOAL RANGE Vancomycin Trough: 15 - 20 mcg/mL  (AUC: 400 - 600 mg/hr/Liter/day) Date Dose & Interval Measured (mcg/mL) Extrapolated (mcg/mL) Significant Cultures:  
 Blood- NG x 4 days, pending Radiology / Imaging results: (X-ray, CT scan or MRI):  
 CXR: IMPRESSION: Persistent bilateral airspace opacification with interval worsening 
in the left lower lobe.  CXR: Extensive interstitial and parenchymal opacities on the right and on the left Paralysis, amputations, malnutrition:  none noted Labs: 
Recent Labs  
  20 
0217 20 
0341 20 
0543 CREA 1.52* 1.18* 1.37* BUN 50* 41* 44* WBC 27.7* 25.2* 21.3* Temp (24hrs), Av.8 °F (37.1 °C), Min:98.3 °F (36.8 °C), Max:100.3 °F (37.9 °C) Is the Patient on Dialysis? No 
 
Creatinine Clearance (mL/min):  
CrCl (Adjusted Body Weight): 52.6 CrCl (Ideal Body Weight): 38.2 Impression/Plan:  
Scr worsening Change cefepime to q12h Change vancomycin to q24h Antimicrobial stop date  for cefepime, 14 days for vancomycin Pharmacy will follow daily and adjust medications as appropriate for renal function and/or serum levels. Thank you, EDUARDO Dixon

## 2020-12-25 NOTE — PROGRESS NOTES
Progress Note Pt Name  Agata Orr Date of Birth 1955 Medical Record Number  [id number] Age  63 y.o. PCP Calvin Vargas MD  
Admit date:  12/17/2020 Room Number  2520/01  @ [facility] Date of Service  12/25/2020 Admission Diagnoses:  Acute respiratory failure with hypoxia History of present illness (copied from H&P) \" Daniela Gomez is a 63 y.o.  female who presents with shortness of breath present for the last few days. Patient came to the ER by EMS after calling 911 for respiratory distress. EMS found the patient on the floor because of respiratory distress. Patient states that her shortness of breath is present for the last few days to the point she was not able to move at all today. Denies any chest pain, no nausea vomiting, no diarrhea, no fever or chills, no headache, no dizziness. Does report of some weakness. No history of sick contacts. Currently the patient is on BiPAP with saturation around 91 \" Interval History/progress:  
Copied from Ms. Enrrique Martinez NP's note from 12/23/20 \"Reason for ICU Admission: Ms Rachell Magallon is a 64 yo female admitted to [facility] ER on 12/17 with acute hypoxic respiratory failure in the setting of severe COVID pneumonia. She was not a candidate for remdesivir due to GFR <30. She required BiPAP due to hypoxia with eventual transfer to the ICU on the afternoon of 12/18. Her lovenox was increase to therapeutic dosing on 12/18. The patient has been requiring maximum support on BiPAP. She has been undergoing intermittent diuresis. She was net negative 2L on 12/20 and 0.5L the last 24h.  
  
Of note, the patient had an extensive goals of care conversation with the intensivist and RN as per the progress note documented on 12/19 and wished to be a DNR.   Her code status was changed to DNR at this time. 
  
 24h Events: Continues to require continuous BiPAP with precipitous desaturations if taking off even to take PO making her status very critical and tenuous. She remains at very high risk for decompensation. She remains tachypneic with RR in the high 30s and oxygen saturations around 90%.   
[phone] Lisa - Daughter point of contact - Palliative spoke with the daughter and patient on the phone today. The patient changed her code status to partial code. She was previously full DNR and in conversation with her daughter has decided to be intubation ok (with the contingency of a short intubation - 7 days maximum) and remain DNR)\" 
 
12/24/20 - Intubated Assessment and plan:  
 
Acute respiratory failure with hypoxia -intubated 12/24/20 COVID PNA Persistent resp distress Ongoing low grade temp/fever WBC is rising Procalcitonin remains low 0.13 on 12/23 Blood cultures from 12/17 negative     - 
· Continue dexamethasone 6mg IV daily · Continue therapeutic dose of lovenox · Continue Empiric Cefepime · Vancomycin started empirically 12/25/20 am  
· Vent management per Dr. Dinora Jain OLIVER -improving creatinine · Monitor closely HTN - 
· PRN IV hydralazine -PO meds is not an option at this time. Hyperlipidemia -chronic · PO meds on hold Body mass index is 41.63 kg/m². -morbid obesity - chronic CODE STATUS   Partial    
Functional Status Surrogate decision maker:  Pt's daughter Prophylaxis   Lovenox Discharge Plan:  Pending ,    
Misc Benefit:  Payor: [facility] / Plan: [facility] PPO / Product Type: PPO /   
Isolation :  Droplet, Droplet Plus ADT status:  INPATIENT Query   None noted today Prognosis Social issues  Date  Comment 12/24/20 11:17 AM No family or visitor here with the patient today Subjective Data \"OK \" Review of Systems - History obtained from the patient Respiratory ROS: positive for - cough and shortness of breath Objective Data Comments  Pt is now intubated, sedated. Patient Vitals for the past 24 hrs: 
 BP  
12/25/20 1000 132/80  
12/25/20 0900 126/79  
12/25/20 0800 (!) 141/90  
12/25/20 0700 (!) 140/86  
12/25/20 0600 128/83  
12/25/20 0530 (!) 156/94  
12/25/20 0515 (!) 168/99  
12/25/20 0500 (!) 168/96  
12/25/20 0400 (!) 150/82  
12/25/20 0300 (!) 157/88  
12/25/20 0230 (!) 145/88  
12/25/20 0200 128/70  
12/25/20 0100 132/70  
12/25/20 0000 116/68  
12/24/20 2300 (!) 148/89  
12/24/20 2200 136/80  
12/24/20 2100 128/81  
12/24/20 2000 125/66  
12/24/20 1940 (!) 140/84  
12/24/20 1800 139/81  
12/24/20 1700 (!) 163/79  
12/24/20 1600 (!) 146/91  
12/24/20 1404 (!) 154/69  
12/24/20 1315 (!) 170/89  
12/24/20 1300 (!) 172/90  
12/24/20 1200 (!) 156/86  
12/24/20 1100 133/89 Patient Vitals for the past 24 hrs: 
 Pulse 12/25/20 1000 71  
12/25/20 0900 73  
12/25/20 0800 68  
12/25/20 0730 71  
12/25/20 0700 71  
12/25/20 0600 76  
12/25/20 0530 72  
12/25/20 0515 68  
12/25/20 0500 69  
12/25/20 0400 92  
12/25/20 0313 95  
12/25/20 0300 99  
12/25/20 0230 (!) 105  
12/25/20 0200 (!) 112  
12/25/20 0100 (!) 115  
12/25/20 0001 88  
12/25/20 0000 85  
12/24/20 2300 80  
12/24/20 2200 79  
12/24/20 2109 78  
12/24/20 2100 80  
12/24/20 2000 78  
12/24/20 1940 80  
12/24/20 1900 78  
12/24/20 1800 78  
12/24/20 1730 86  
12/24/20 1700 91  
12/24/20 1600 97  
12/24/20 1505 (!) 102  
12/24/20 1500 (!) 106  
12/24/20 1453 (!) 108  
12/24/20 1430 (!) 109  
12/24/20 1420 (!) 111  
12/24/20 1405 (!) 115  
12/24/20 1404 (!) 110  
12/24/20 1400 (!) 144  
12/24/20 1315 98  
12/24/20 1300 99  
12/24/20 1219 97  
12/24/20 1200 99  
12/24/20 1109 93  
12/24/20 1100 96 Patient Vitals for the past 24 hrs: 
 Resp  
12/25/20 1000 30  
12/25/20 0900 30  
12/25/20 0800 30  
12/25/20 0730 30  
12/25/20 0700 30  
12/25/20 0600 30  
12/25/20 0530 30  
 12/25/20 0515 30  
12/25/20 0500 30  
12/25/20 0400 20  
12/25/20 0313 20  
12/25/20 0300 20  
12/25/20 0230 20  
12/25/20 0200 20  
12/25/20 0100 (!) 31  
12/25/20 0001 20  
12/25/20 0000 20  
12/24/20 2300 (!) 38  
12/24/20 2200 (!) 37  
12/24/20 2109 (!) 34  
12/24/20 2100 (!) 33  
12/24/20 2000 30  
12/24/20 1940 (!) 34  
12/24/20 1900 29  
12/24/20 1800 30  
12/24/20 1730 26  
12/24/20 1700 (!) 33  
12/24/20 1600 (!) 32  
12/24/20 1505 (!) 35  
12/24/20 1500 (!) 35  
12/24/20 1453 (!) 34  
12/24/20 1430 (!) 39  
12/24/20 1420 (!) 37  
12/24/20 1405 (!) 33  
12/24/20 1404 (!) 35  
12/24/20 1400 16  
12/24/20 1315 (!) 31  
12/24/20 1300 (!) 31  
12/24/20 1219 29  
12/24/20 1200 (!) 33  
12/24/20 1109 (!) 33  
12/24/20 1100 (!) 35 Patient Vitals for the past 24 hrs: 
 Temp 12/25/20 0400 98.3 °F (36.8 °C)  
12/25/20 0000 98.4 °F (36.9 °C)  
12/24/20 2000 98.4 °F (36.9 °C)  
12/24/20 1940 98.4 °F (36.9 °C)  
12/24/20 1600 99 °F (37.2 °C)  
12/24/20 1219 100.3 °F (37.9 °C)  
12/24/20 1200 100.3 °F (37.9 °C) SpO2 Readings from Last 6 Encounters:  
12/25/20 96% 02/27/20 98% 02/26/20 95% 10/17/19 96% 08/14/19 95% 06/18/19 94% O2 Flow Rate (L/min): 15 l/min  O2 Device: BIPAP Body mass index is 41.63 kg/m². - 
Wt Readings from Last 10 Encounters:  
12/24/20 124.2 kg (273 lb 13 oz) 02/27/20 139.3 kg (307 lb)  
02/26/20 139.8 kg (308 lb 3.2 oz) 10/17/19 141.3 kg (311 lb 9.6 oz) 08/14/19 138.4 kg (305 lb 3.2 oz) 06/18/19 137.9 kg (304 lb) 04/16/19 137.9 kg (304 lb)  
11/16/18 137.3 kg (302 lb 11.2 oz) 10/31/18 138.9 kg (306 lb 3.2 oz) 10/19/18 137.2 kg (302 lb 6.4 oz) Intake/Output Summary (Last 24 hours) at 12/25/2020 1022 Last data filed at 12/25/2020 1000 Gross per 24 hour Intake 2335.18 ml Output 1525 ml Net 810.18 ml Physical Exam:            
General:  Sedated Obese   
well developed,  
appears stated age Ears/Eyes:  Hearing unable to assess Sclera anicteric. Pupils equal  
Mouth/Throat:    
Neck:    
Lungs:  Trachea midline Chest excursion symmetrical  
Auscultation B/L Symmetrical with Vesicular breath sounds CVS:  Regular rate and rhythm  
no  murmur, No click, rub or gallop S1 normal  
S2 normal  
Pedal pulses  b/l symmetrical  
Abdomen:  Obese Soft, non-tender Bowel sounds normal 
No distension Extremities: No cyanosis, jaundice Pruning of ankle/calves noted No sign of DVT/cord like lesion on palpation No sign of acute trauma Skin:   
Skin color, texture, turgor normal. no acute rash or lesions Lymph nodes: Musculoskeletal Muscle bulk B/L symmetrical  
Neuro Cranial nerves are intact, Psych:  Alert and oriented,  
normal mood & affect Medications reviewed Current Facility-Administered Medications Medication Dose Route Frequency  epoprostenol (VELETRI) 30 mcg/mL in 0.9% sodium chloride 50 mL inhalation solution  30 ng/kg/min (Ideal) Inhalation CONTINUOUS  
 NOREPINephrine (LEVOPHED) 8 mg in 5% dextrose 250mL (32 mcg/mL) infusion  0.5-30 mcg/min IntraVENous TITRATE  vancomycin (VANCOCIN) 1500 mg in  ml infusion  1,500 mg IntraVENous Q18H  
 cisatracurium (NIMBEX) 200 mg in 0.9% sodium chloride 100 mL (2 mg/mL) infusion  0-2 mcg/kg/min Peripherally Inserted Central Catheter TITRATE  remdesivir 100 mg in 0.9% sodium chloride 250 mL IVPB  100 mg IntraVENous Q24H  
 bacitracin 500 unit/gram packet 1 Packet  1 Packet Topical PRN  
 heparin (porcine) pf 300 Units  300 Units InterCATHeter PRN  
 TPN ADULT - CENTRAL   IntraVENous CONTINUOUS  
 dexmedeTOMidine (PRECEDEX) 400 mcg in 0.9% sodium chloride 100 mL infusion  0.1-1.5 mcg/kg/hr IntraVENous TITRATE  propofol (DIPRIVAN) 10 mg/mL infusion  0-50 mcg/kg/min IntraVENous TITRATE  fentaNYL (PF) 1,500 mcg/30 mL (50 mcg/mL) infusion  0-300 mcg/hr IntraVENous TITRATE  cefepime (MAXIPIME) 2 g in 0.9% sodium chloride (MBP/ADV) 100 mL MBP  2 g IntraVENous Q8H  
 insulin lispro (HUMALOG) injection   SubCUTAneous Q6H  
 enoxaparin (LOVENOX) injection 135 mg ++partial syringe++  135 mg SubCUTAneous Q12H  
 albuterol-ipratropium (DUO-NEB) 2.5 MG-0.5 MG/3 ML  3 mL Nebulization Q6H RT  
 pantoprazole (PROTONIX) 40 mg in 0.9% sodium chloride 10 mL injection  40 mg IntraVENous DAILY  labetaloL (NORMODYNE;TRANDATE) injection 20 mg  20 mg IntraVENous Q12H  hydrALAZINE (APRESOLINE) 20 mg/mL injection 10 mg  10 mg IntraVENous Q6H PRN  
 alcohol 62% (NOZIN) nasal  1 Ampule  1 Ampule Topical Q12H  chlorhexidine (ORAL CARE KIT) 0.12 % mouthwash 15 mL  15 mL Oral Q12H  
 [Held by provider] sodium chloride (NS) flush 5-40 mL  5-40 mL IntraVENous Q8H  
 sodium chloride (NS) flush 5-40 mL  5-40 mL IntraVENous PRN  
 acetaminophen (TYLENOL) tablet 650 mg  650 mg Oral Q6H PRN Or  
 acetaminophen (TYLENOL) suppository 650 mg  650 mg Rectal Q6H PRN  polyethylene glycol (MIRALAX) packet 17 g  17 g Oral DAILY PRN  
 ondansetron (ZOFRAN ODT) tablet 4 mg  4 mg Oral Q8H PRN Or  
 ondansetron (ZOFRAN) injection 4 mg  4 mg IntraVENous Q6H PRN  
 [Held by provider] aspirin chewable tablet 81 mg  81 mg Oral DAILY  busPIRone (BUSPAR) tablet 15 mg  15 mg Oral BID PRN  
 [Held by provider] citalopram (CELEXA) tablet 20 mg  20 mg Oral DAILY  [Held by provider] ferrous sulfate tablet 325 mg  1 Tab Oral DAILY WITH BREAKFAST  [Held by provider] fluticasone propionate (FLONASE) 50 mcg/actuation nasal spray 2 Spray  2 Spray Both Nostrils DAILY  [Held by provider] labetaloL (NORMODYNE) tablet 300 mg  300 mg Oral BID  [Held by provider] atorvastatin (LIPITOR) tablet 20 mg  20 mg Oral QHS  dexamethasone (DECADRON) 4 mg/mL injection 6 mg  6 mg IntraVENous Q24H  
 glucose chewable tablet 16 g  4 Tab Oral PRN  
  dextrose (D50W) injection syrg 12.5-25 g  12.5-25 g IntraVENous PRN  
 glucagon (GLUCAGEN) injection 1 mg  1 mg IntraMUSCular PRN Relevant other informations: Other medical conditions listed in [facility] hospital problem list section; all of these and other pertinent data were taken into consideration when treatment plan is developed and customized to this patient's unique overall circumstances and needs. We have reviewed available old medical records within the constraints of this admission process. Data Review:  
Recent Days: 
All Micro Results Procedure Component Value Units Date/Time CULTURE, RESPIRATORY/SPUTUM/BRONCH Champ Loupe STAIN [[id number]] Order Status: Sent Specimen: Sputum,ET Suction CULTURE, BLOOD, PAIRED [[id number]] Collected: 12/17/20 1253 Order Status: Completed Specimen: Blood Updated: 12/22/20 1038 Special Requests: NO SPECIAL REQUESTS Culture result: NO GROWTH 5 DAYS Recent Labs  
  12/25/20 
0217 12/24/20 
0341 12/23/20 
0543 WBC 27.7* 25.2* 21.3* HGB 12.8 13.3 13.1 HCT 42.4 42.3 42.1  289 286 Recent Labs  
  12/25/20 
0217 12/24/20 
0341 12/23/20 
0543  143 140  
K 4.3 4.5 4.3 * 109* 104 CO2 29 30 34* * 83 85 BUN 50* 41* 44* CREA 1.52* 1.18* 1.37* CA 11.5* 11.8* 11.6*  
MG 2.6* 2.4 2.4 PHOS 3.5 2.9 3.3 INR 1.2* 1.2* 1.2* Lab Results Component Value Date/Time TSH 2.080 10/17/2019 11:11 AM  
 
 
 
  
Care Plan discussed with:Patient/Family and Nurse Other medical conditions are listed in the active hospital problem list section; these and other pertinent data were taken into consideration when the treatment plan was developed and customized to this patient's unique overall circumstances and needs. High complexity decision making was performed for this patient who is at high risk for decompensation with multiple organ involvement. Today total floor/unit time was 35 minutes critical care while caring for this patient and greater than 50% of that time was spent with patient (and/or family) coordinating patients clinical issues; this includes time spent during multidisciplinary rounds. Elly Canchola MD MPH FACP   
12/25/2020

## 2020-12-25 NOTE — PROGRESS NOTES
12/25/20 8751 ABCDEF Bundle SBT Safety Screen Passed No  
SBT Screen Reason for Failure FiO2 > 50%;PEEP > 7.5

## 2020-12-25 NOTE — PROGRESS NOTES
Report received from Dalia Munoz RN. Labs reviewed. Noted events of prior night. 0800  Assessment completed, deeply sedated with Nimbex, Diprivan, Precedex and Fentanyl. Ear rings yellow metal, two chain necklaces also yellow metal and two rings, also yellow metal still noted on patient. Cell phone  placed in patient's bag in closet. Two parts of upper partial placed into denture cup. 
1100  Weaning Precedex, will plan to halt as patient tolerates. VSS. Not requiring BP support. 1200 No changes in assessment noted.   Blood cultures from left hand obtained and sent to lab. Precedex turned off. No changes in assessment noted. 1920  Report given to NATALIIA Daugherty.

## 2020-12-25 NOTE — PROGRESS NOTES
Pharmacy Automatic Renal Dosing Protocol - Antimicrobials Indication for Antimicrobials: HAP, COVID-19 Current Regimen of Each Antimicrobial: 
Vancomycin 2000 mg IV once, 1500 mg q18hr (Start Date ; Day # 1) Cefepime 2g IV q8h (Start Date ; Day # 3) Previous Antimicrobial Therapy: 
Ceftriaxone 1g IV q24h (Start Date ; End date: ; Received 5 days of tx) Azithromycin 500 mg IV q24h (Start date ; Day # 6) Goal Level: VANCOMYCIN TROUGH GOAL RANGE Vancomycin Trough: 15 - 20 mcg/mL  (AUC: 400 - 600 mg/hr/Liter/day) Date Dose & Interval Measured (mcg/mL) Extrapolated (mcg/mL) Date & time of next level:  
 
Significant Cultures:  
 Blood- NG x 4 days, pending Radiology / Imaging results: (X-ray, CT scan or MRI):  
 CXR: IMPRESSION: Persistent bilateral airspace opacification with interval worsening 
in the left lower lobe.  CXR: Extensive interstitial and parenchymal opacities on the right and on the left Paralysis, amputations, malnutrition:  none noted Labs: 
Recent Labs  
  20 
0341 20 
0543 20 
2204 20 
0869 CREA 1.18* 1.37* 1.28* 1.25* BUN 41* 44* 38* 37* WBC 25.2* 21.3*  --  19.4* Temp (24hrs), Av.2 °F (37.3 °C), Min:98.1 °F (36.7 °C), Max:100.3 °F (37.9 °C) Is the Patient on Dialysis? No 
 
Creatinine Clearance (mL/min):  
CrCl (Actual Body Weight): 95.7 CrCl (Adjusted Body Weight): 67.8 CrCl (Ideal Body Weight): 49.2 Impression/Plan:  
Vancomycin 2000 mg IV once, then 1500 mg IV every 18 hours (predicted trough 15.9 mcg/ml; ) Antimicrobial stop date  for cefepime, 14 days for vancomycin Pharmacy will follow daily and adjust medications as appropriate for renal function and/or serum levels. Thank you, Nidia Herrera, PHARMD

## 2020-12-25 NOTE — PROCEDURES
Emergent Intubation Performed by: Sariah Etienne MD 
Authorized by: Sariah Etienne MD  
 
Emergent Intubation: Location:  ICU Date/Time:  12/24/2020 11:30 PM 
Indications:  Respiratory failure Spontaneous Ventilation: present Level of Consciousness: awake Preoxygenated: Yes Airway Documentation: Airway:  ETT - Cuffed Technique:  Direct laryngoscopy Advanced Technique:  Glide scope Blade Size:  3 ETT size (mm):  7.5 ETT Line Saroj:  Lips ETT Insertion depth (cm):  23 Placement verified by: auscultation, EtCO2 and BBS Attempts:  2 Difficult airway: Yes   
RSI. Hardened and dry secretions in hypopharynx with DVL #1. Magill forceps requested. Nasal trumpet placed and patient bagged with 100% O2. Difficult hand/mask ventilation. DVL #2 successful. ETT secured.

## 2020-12-25 NOTE — PROGRESS NOTES
Mrs. Malika Valdes is critically ill due to acute hypoxic respiratory failure in the setting of COVID pneumonia. She has required persistent BIPAP due to hypoxia. I have been called to the bedside several times over the course of the night as she has had repeated desaturations as low as 74%. I instructed the RT and RN on adjustments to her BiPAP, increasing the IPAP and EPAP. We were unable to get her sats above the low 80s. She was using abdominal breathing. She also exhibited agitation, ultimately attempting to remove her BiPAP. I consulted with anesthesia and made the determination to intubate her. I was in the room during and after the intubation, instructing the nurse in adjustments on sedation. The initial CXR demonstrated R mainstem intubation. I instructed the ETT to be pulled back 3cm and had the CXR redone. The ETT was then appropriately positioned. We utilized propofol, fentanyl, and precedex with pushes of versed to rapidly achieve a RASS of -5 as the patient's sats were in the 70s following intubation. We then initiated a cisatracurium drip for paralysis in efforts to maximize ventilatory compliance. I also ordered epoprostenol via inhalation for rescue therapy. The patient's sats have remains in the high 70s low 80s. I have increased PEEP incrementally without impact. Current vent settings are: 100% PEEP 14  R 20. I called the patients daughter 3x over the course of events, explaining the overall poor prognosis and trajectory. The patient remains a DNR, but I ok. I spent 100 minutes of critical care time on the above activities.  
 
Venice Burk NP

## 2020-12-25 NOTE — PROGRESS NOTES
2100 
Patient desat to 83% > reposiion her twice. NP at bedside > will give her few minutes for recovery > sat up to 88% > to follow up 
 
5318-5141 Patient pull the mask > desat to 79% > mask adjusted > sat to to 85% > precedex up to 0.8 as she feel anxious. [facility] Patient still sat 83-86%, RR 35-40, Precedex at 1 lewis/kg/hr > NP notified. 2537-3906 Patient intubated, dry mucus seen in her throat > anaesthesia tried to pull it, patient intubated > sedated very well > Nimbex started for TOF 2/4, anoth 2 PIV inserted, Levophed started for low BP, lasix 80 mg given. [id number] Hernandez inserted as per order > 575 ml clear yellow, turned to left side, Ventilator adjusted for ABGs, sat up to 97%, Levophed titrated off, family updated by NP and now in [address] [place] [address], Bedside and Verbal shift change report given to Shira Munoz RN (oncoming nurse) by Yudelka Acosta RN (offgoing nurse). Report included the following information SBAR, Kardex, Intake/Output, MAR, Accordion, Recent Results, Med Rec Status and Cardiac Rhythm NSR.

## 2020-12-26 NOTE — PROGRESS NOTES
2000, Bedside and Verbal shift change report given to OLYA Knox (oncoming nurse) by Marcelino Plaza RN (offgoing nurse). Report included the following information SBAR, Kardex, Intake/Output, MAR, Accordion, Recent Results, Med Rec Status and Cardiac Rhythm Sinus Niles to NSR. Temp;  97.7 orally > 
[id number] 
 hypothermia after bath > 94.3 Rectally > room temp adjusted to heat, extra blanket and warmer blanket >  
2791-2256 Temp up to 96.0 
 
0500 
96.9 > Warmer turned off. Neuro; sedated on Propofol infusion at 40 lewis/kg/min, Nimbex infusion at 2 lewis/kg/min for TOF 2/4 > now 3/4, Fentantyl infusion at 100 lewis/hr, no follow command, no eyes contact, pupils sluggish  GCS;  Eye; 1, verbal; 1 for ETT, motor; 1. Restraints discontinued, Reassessment;  Nimbex adjusted to 1.5 lewis to keep TOF 2, Respiratory; Sat 93% on Ventilator A/C mode, , RR 30/30, Peep 16, FiO2 90%, ETT 22 cm at lips, secretion; nil, Veletri inhalation 30 ng/kg/min, Reassessment; Sat at right side 93%, at left side up to 96%,  
 
Cardiac; Sinus Bradycardia to NSR, 58-64 B/min, NIBP 104/57 mmHg, on Levophed on hold. Reassessment;  Bradycardia 47-56 > hypothermia > warmer blanket > soft BP > levophed restarted and titrated up to 4 lewis/min, GI; NPO, Abd obese semi soft, on TPN at 63 ml/hr. Reassessment;  Smear greenish BM, Renal; saunders cath with adequate urine out put sedimentation present. Reassessment; Intake/Output Summary (Last 24 hours) at 12/26/2020 9542 Last data filed at 12/26/2020 0600 Gross per 24 hour Intake 3831.59 ml Output 1168 ml Net 2663.59 ml  
 
Weight change: 1 kg (2 lb 3.3 oz) Skin; dark pigmentation at nose > venelex applied, small spot dark pigmentation at right Buttock > venelex, Ecchymosis at left forearm and abd,  . Endo; SSI every 6 hours, Lines; ETT, right PICC, PIV X 3, Saunders. Code; partial DNR > intubation only and for 1 week as per note.  
Lab;  
 DVT; Lovenox 135 mg BID > small bloody secretion at mouth. Plan; ventilator management, sedation as per order, pain management, follow lab tomorrow,   
 
0700 Bedside and Verbal shift change report given to [place], RN (oncoming nurse) by Binta Berg RN (offgoing nurse). Report included the following information SBAR, Kardex, Intake/Output, MAR, Accordion, Recent Results, Med Rec Status and Cardiac Rhythm NSR.

## 2020-12-26 NOTE — PROGRESS NOTES
[facility] 
 
ICU TEAM Progress Note Name: Luther Boast : 1955 MRN: [id number] Date: 2020 Subjective:  
Progress Note: 2020 Reason for ICU Admission: Ms Sohail Sandoval is a 64 yo female admitted to [address] [place] [address] on  with acute hypoxic respiratory failure in the setting of severe COVID pneumonia. She was not a candidate for remdesivir due to GFR <30. She required BiPAP due to hypoxia with eventual transfer to the ICU on the afternoon of . Her lovenox was increase to therapeutic dosing on . The patient has been requiring maximum support on BiPAP. She has been undergoing intermittent diuresis. She was net negative 2L on  and 0.5L the last 24h. Of note, the patient had an extensive goals of care conversation with the intensivist and RN as per the progress note documented on  and wished to be a DNR. Her code status was changed to DNR at this time. 24h Events: Continues to require continuous BiPAP with precipitous desaturations if taking off even to take PO making her status very critical and tenuous. She remains at very high risk for decompensation. She remains tachypneic with RR in the high 30s and oxygen saturations around 90%. [phone] Lisa - Daughter point of contact - Palliative spoke with the daughter and patient on the phone today. The patient changed her code status to partial code. She was previously full DNR and in conversation with her daughter has decided to be intubation ok (with the contingency of a short intubation - 7 days maximum) and remain DNR) Past Surgical History:  
 
 has a past surgical history that includes hx gyn (2009) and hx breast biopsy (Left). Home Medications:  
 
Prior to Admission medications Medication Sig Start Date End Date Taking? Authorizing Provider  
labetaloL (NORMODYNE) 300 mg tablet Take 1 Tab by mouth two (2) times a day.  20   Phoebe Saxena MD  
 aspirin 81 mg chewable tablet TAKE 1 TABLET BY MOUTH EVERY DAY INDICATIONS: STROKE PREVENTION  Indications: stroke prevention 12/14/20   Sherry Lilly MD  
furosemide (LASIX) 80 mg tablet TAKE 1 TABLET BY MOUTH EVERY DAY 8/10/20   Sherry Lilly MD  
busPIRone (BUSPAR) 15 mg tablet TAKE 1 TAB BY MOUTH TWO (2) TIMES DAILY AS NEEDED (ANXIETY). 7/23/20   Sherry Lilly MD  
Olmesartan-amLODIPine-HCTZ 40-10-25 mg tab TAKE 1 TABLET DAILY 7/13/20   Sherry Lilly MD  
clonazePAM (KlonoPIN) 0.5 mg tablet TAKE 0.5 TAB BY MOUTH NIGHTLY AS NEEDED FOR ANXIETY. MAX DAILY AMOUNT: 0.25MG 6/29/20   Sherry Lilly MD  
citalopram (CELEXA) 20 mg tablet TAKE 1 TABLET BY MOUTH EVERY DAY 6/25/20   Sherry Lilly MD  
atorvastatin (LIPITOR) 20 mg tablet Take 1 Tab by mouth nightly. 6/25/20   Sherry Lilly MD  
potassium chloride SR (KLOR-CON 10) 10 mEq tablet TAKE 2 TABLETS BY MOUTH TWICE A DAY 6/25/20   Sherry Lilly MD  
pantoprazole (PROTONIX) 40 mg tablet Take 1 Tab by mouth daily. 6/11/20   Sherry Lilly MD  
ergocalciferol (Vitamin D2) 1,250 mcg (50,000 unit) capsule Take 1 Cap by mouth every seven (7) days. 6/11/20   Sherry Lilly MD  
fluticasone propionate (FLONASE) 50 mcg/actuation nasal spray SPRAY 2 SPRAYS INTO EACH NOSTRIL EVERY DAY 3/9/20   Sherry Lilly MD  
ferrous sulfate 325 mg (65 mg iron) tablet TAKE 1 TAB BY MOUTH THREE (3) TIMES DAILY (WITH MEALS). 2/3/19   Sherry Lilly MD  
cpap machine kit by Does Not Apply route. Provider, Historical  
naproxen sodium 220 mg cap Take  by mouth every twelve (12) hours as needed (pain). 1-2 caps as needed    Provider, Historical  
ascorbic acid (VITAMIN C) 500 mg tablet Take 500 mg by mouth daily. Provider, Historical  
 
Allergies/Social/Family History: No Known Allergies Social History Tobacco Use  Smoking status: Never Smoker  Smokeless tobacco: Never Used Substance Use Topics  Alcohol use: Yes Comment: rare Family History Problem Relation Age of Onset  Stroke Father 36  
 Heart Disease Father Adele Moss Arthritis-osteo Mother  Cancer Mother [de-identified]  
     breast cancer  Breast Cancer Mother 78 Objective:  
Vital Signs: 
Visit Vitals BP (!) 107/48 (BP 1 Location: Left arm, BP Patient Position: At rest) Pulse 81 Temp 99.3 °F (37.4 °C) Resp 30 Ht 5' 8\" (1.727 m) Wt 125.2 kg (276 lb 0.3 oz) SpO2 99% BMI 41.97 kg/m² Intake/Output:  
 
Intake/Output Summary (Last 24 hours) at 12/26/2020 1442 Last data filed at 12/26/2020 0179 Gross per 24 hour Intake 2911.82 ml Output 880 ml Net 2031.82 ml Physical Exam: 
 
General: intubated, sedated, and paralyzed Eye:  PERRLA Neurologic: sedated and paralyzed Neck:  normal and no erythema or exudates noted. Lungs:  clear to auscultation bilaterally, mechanical ventilation Heart:  regular rate and rhythm, S1, S2 normal, no murmur, click, rub or gallop, 1+ edema Abdomen:  soft, non-tender. Bowel sounds normal. No masses,  no organomegaly Skin:  no rash or abnormalities LABS AND  DATA: Personally reviewed Recent Labs  
  12/26/20 0340 12/25/20 0217 WBC 21.7* 27.7* HGB 12.4 12.8 HCT 40.4 42.4  284 Recent Labs  
  12/26/20 
0340 12/25/20 0217 12/24/20 
0341 * 144 143  
K 3.4* 4.3 4.5  
* 112* 109* CO2 26 29 30 BUN 61* 50* 41* CREA 1.43* 1.52* 1.18* * 146* 83  
CA 11.0* 11.5* 11.8* MG  --  2.6* 2.4 PHOS  --  3.5 2.9 No results for input(s): AP, TBIL, TP, ALB, GLOB, AML, LPSE in the last 72 hours. No lab exists for component: SGOT, GPT, AMYP Recent Labs  
  12/26/20 0340 12/25/20 0217 INR 1.2* 1.2* PTP 12.3* 12.8* APTT 29.1 23.3 AC 90%/16/18/40 MEDS: Reviewed Chest X-Ray: The cardiomediastinal silhouette is 
stable. Diffuse bilateral airspace disease, right worse than left, has improved 
minimally on the right and moderately on the left. No TTE found this admission Assessment & Plan Acute Hypoxic Respiratory Failure Secondary to COVID Pneumonia: . Completed a course of ceftriaxone. Intubated on 12/25 having failed bipap.  
- continue decadron 
- continue therapeutic lovenox for CAC 
- continue cefepime and vancomycin 
- continue mechanical ventilation 
- continue propofol and fentanyl for sedation 
- continue cis - check ABG now, if PF ratio is less than 150, plan to prone - trend ABG 
- continue flolan for now OLIVER on CKD 3 c/b volume overload: rising sodium with concern for free water losses. Bun 61 and Cr 1.43. UO adequate 
- continue to trend BUN and Cr 
- avoid nephrotoxic agents 
- strict I/O 
- consider advanced imaging  
- renally dose vancomycin HTN: currently normotensive 
- dc scheduled labetalol 
- continue prn labetalol and hydralazine DM2:   
- SSI q6 GERD:   
- PPI IV Mild protein calorie deficit malnutrition: NST following  
- transition from TPN to trickle TF Deconditioning:  
- PT/OT when able to participate Multidisciplinary Rounds Completed: Yes ABCDEF Bundle/Checklist 
Pain Medications: None Target RASS: 0 - Alert & Calm - Spontaneously pays attention to caregiver Sedation Medications: None CAM-ICU:  Negative Mobility: Poor PT/OT: PT consulted and on board Restraints: None needed at this time Discussed Plan of Care (goals of care): Yes Addressed Code Status: Do Not Resuscitate CARDIOVASCULAR Cardiac Gtts: None SBP Goal of: > 90 mmHg MAP Goal of: > 65 mmHg Transfusion Trigger (Hgb): <7 g/dL RESPIRATORY 
DVT Prophylaxis (if no, list reason): Lovenox SPO2 Goal: > 92% GI/ GI Prophylaxis: Protonix (pantoprazole) Nutrition: Pending Insulin: SSI q6 
 
ANTIBIOTICS Antibiotics: cefepime and vanco  
 
DISPOSITION Remain in ICU CRITICAL CARE CONSULTANT NOTE I had a face to face encounter with the patient, reviewed and interpreted patient data including clinical events, labs, images, vital signs, I/O's, and examined patient. I have discussed the case and the plan and management of the patient's care with the consulting services, the bedside nurses and the respiratory therapist.   
 

## 2020-12-26 NOTE — PROGRESS NOTES
12/26/20 1259 ABCDEF Bundle SBT Safety Screen Passed No  
SBT Screen Reason for Failure FiO2 > 50%;PEEP > 7.5

## 2020-12-26 NOTE — PROGRESS NOTES
0700: Report received from Conor Tesfaye RN. Included: SBAR, KARDEX, MAR, LDA's and current patient condition. Discussed overnight events of hypothermia, patient now at appropriate temp. Rectally 98.9. 
 
0800: Assessment complete, patient currently sedated and paralyzed on nimbex. TOF 2/3 on 40mA via Facial Nerves. NSR, PVC's occasional. RASS -5/Unarousable, BS diminished, pulses strong and equal, Trace/+1 edema generalized. Urine output 150 this hour. Patient tolerates movement poorly with turns, desats to low 80s with each turn--recovers in 5 mins. Skin: Bridge of nose hyperpigmented applying venelex. 1040: Updated daughter, Dg Mclaughlin) over the phone, verified 4 digit security access code. Updated her on labs overnight, hypothermia incident, patient status now, and plans to wean FiO2 in small increments at this time. Discussed with her that her Mother is still sedated and paralyzed. The daughter would like updates from NP about chest Xray--will defer to Katlyn Echevarria NP.  
 
1200: Reassessment--no changes 1600: Reassessment--no changes, Unable to find ZOOM computer at this time, notified Charge RN 
 
1700: Patient moving tongue and slight finger movement noted. Increased Nimbex to 2. Placing OGT at the bedside with NP Katlyn Echevarria with MAC 3 for better visual. Was able to obtain OGT at 70cm at the lips [facility], [address] [place] [address],[address]: KUB order placed for confirmation 1800: KUB placement of OGT is in correct placement. SEE Results for details 1900: End of Shift Note Bedside shift change report given to Sergey Cazares RN (oncoming nurse) by Rajesh Victor (offgoing nurse). Report included the following information SBAR, Kardex, Intake/Output, MAR, Recent Results, Cardiac Rhythm NSR, Alarm Parameters  and Quality Measures Shift worked:  Days Shift summary and any significant changes: OGT placed successfully. Concerns for physician to address:  Nutrition concerns (possible to start Tube feeds?) Zone phone for oncoming shift:   N/A Activity: 
Activity Level: Bed Rest 
Number times ambulated in hallways past shift: 0 Number of times OOB to chair past shift: 0 Cardiac:  
Cardiac Monitoring: Yes     
Cardiac Rhythm: Normal sinus rhythm Access:  
Current line(s): PIV and PICC Genitourinary:  
Urinary status: saunders Respiratory:  
O2 Device: Ventilator Chronic home O2 use?: N/A Incentive spirometer at bedside: N/A 
  
GI: 
Last Bowel Movement Date: 12/23/20 Current diet:  DIET NPO 
DIET NUTRITIONAL SUPPLEMENTS All Meals; Ensure [employer] Passing flatus: YES Tolerating current diet: YES Pain Management:  
Patient states pain is manageable on current regimen: YES Skin: 
Celestino Score: 10 Interventions: speciality bed, float heels and internal/external urinary devices Patient Safety: 
Fall Score: Total Score: 2 Interventions: bed/chair alarm, assistive device (walker, cane, etc) and gripper socks High Fall Risk: Yes Length of Stay: 
Expected LOS: 4d 19h [facility]

## 2020-12-26 NOTE — PROGRESS NOTES
Nutrition Note Chart reviewed, case discussed during CCU rounds. Pt intubated and sedated with propofol @ 29.8mL/h, which provides 786 kcals daily. PSU is 2150. Levo at 4, MAP's in the 60's for the most part. Pt would be an enteral feeding candidate but RN reports that staff has been unable to insert NGT/OGT/dobbhoff. Pt is having issues with bleeding and concern is if they cause trauma with gastric tube placement she will bleed uncontrollably. RN also reports they are hitting significant resistance whenever they attempt to place tube, will likely need IR help. If enteral access can be obtained, recommend: Osmolite 1.2 @ 40mL/h + 2 Prosource TID + 50mL H2O flush q 4h. If unable to gain enteral access, recommend advancing TPN towards Goal Rate of D15, 6.4%AA @ 83mL/h (provides 1525kcals/127gPro/298gDextrose). Will continue to monitor pt's case closely. Thank you! Electronically signed by Pham Mccartney RD, [address]  on 12/26/2020 at 2:04 PM 
 
Contact: [id number]

## 2020-12-27 NOTE — PROGRESS NOTES
Pharmacy  Enoxaparin (Lovenox®) Monitoring Indication: covid + Current Dose: Enoxaparin 135 mg subcutaneously every 12 hours Creatinine Clearance (mL/min): 30.4 mL/min (IBW) Current Weight: 127.8 Kg Labs: 
Recent Labs  
  12/27/20 
0114 12/26/20 
0340 12/25/20 
0217 CREA 1.91* 1.43* 1.52* HGB 11.4* 12.4 12.8  255 284 INR 1.2* 1.2* 1.2* Wt Readings from Last 1 Encounters:  
12/26/20 130.7 kg (288 lb 2.3 oz) Ht Readings from Last 1 Encounters:  
12/23/20 172.7 cm (68\") Impression/Plan:  
Scr trending up; change enoxaparin to q24h

## 2020-12-27 NOTE — PROGRESS NOTES
[facility] 
 
ICU TEAM Progress Note Name: Elliott Gonzalez : 1955 MRN: [id number] Date: 2020 Subjective:  
Progress Note: 2020 Reason for ICU Admission: Ms Natali Morrison is a 62 yo female admitted to [address] [place] [address] on  with acute hypoxic respiratory failure in the setting of severe COVID pneumonia. She was not a candidate for remdesivir due to GFR <30. She required BiPAP due to hypoxia with eventual transfer to the ICU on the afternoon of . Her lovenox was increase to therapeutic dosing on . The patient has been requiring maximum support on BiPAP. She has been undergoing intermittent diuresis. She was net negative 2L on  and 0.5L the last 24h. Of note, the patient had an extensive goals of care conversation with the intensivist and RN as per the progress note documented on  and wished to be a DNR. Her code status was changed to DNR at this time. [phone] Lisa - Daughter point of contact - Palliative spoke with the daughter and patient on the phone today. The patient changed her code status to partial code. She was previously full DNR and in conversation with her daughter has decided to be intubation ok (with the contingency of a short intubation - 7 days maximum) and remain DNR) 24h Events: Required intubation on the  and paralyzed due to vent dyssynchrony, levophed weaned down to 3 mcg/min last night otherwise uneventful Past Surgical History:  
 
 has a past surgical history that includes hx gyn (2009) and hx breast biopsy (Left). Home Medications:  
 
Prior to Admission medications Medication Sig Start Date End Date Taking? Authorizing Provider  
labetaloL (NORMODYNE) 300 mg tablet Take 1 Tab by mouth two (2) times a day.  20   Jayshree Roque MD  
aspirin 81 mg chewable tablet TAKE 1 TABLET BY MOUTH EVERY DAY INDICATIONS: STROKE PREVENTION  Indications: stroke prevention 20   Jayshree Roque MD  
 furosemide (LASIX) 80 mg tablet TAKE 1 TABLET BY MOUTH EVERY DAY 8/10/20   Milena Nolan MD  
busPIRone (BUSPAR) 15 mg tablet TAKE 1 TAB BY MOUTH TWO (2) TIMES DAILY AS NEEDED (ANXIETY). 7/23/20   Milena Nolan MD  
Olmesartan-amLODIPine-HCTZ 40-10-25 mg tab TAKE 1 TABLET DAILY 7/13/20   Milena Nolan MD  
clonazePAM (KlonoPIN) 0.5 mg tablet TAKE 0.5 TAB BY MOUTH NIGHTLY AS NEEDED FOR ANXIETY. MAX DAILY AMOUNT: 0.25MG 6/29/20   Milena Nolan MD  
citalopram (CELEXA) 20 mg tablet TAKE 1 TABLET BY MOUTH EVERY DAY 6/25/20   Milena Nolan MD  
atorvastatin (LIPITOR) 20 mg tablet Take 1 Tab by mouth nightly. 6/25/20   Milena Nolan MD  
potassium chloride SR (KLOR-CON 10) 10 mEq tablet TAKE 2 TABLETS BY MOUTH TWICE A DAY 6/25/20   Milena Nolan MD  
pantoprazole (PROTONIX) 40 mg tablet Take 1 Tab by mouth daily. 6/11/20   Milena Nolan MD  
ergocalciferol (Vitamin D2) 1,250 mcg (50,000 unit) capsule Take 1 Cap by mouth every seven (7) days. 6/11/20   Milena Nolan MD  
fluticasone propionate (FLONASE) 50 mcg/actuation nasal spray SPRAY 2 SPRAYS INTO EACH NOSTRIL EVERY DAY 3/9/20   Milena Nolan MD  
ferrous sulfate 325 mg (65 mg iron) tablet TAKE 1 TAB BY MOUTH THREE (3) TIMES DAILY (WITH MEALS). 2/3/19   Milena Nolan MD  
cpap machine kit by Does Not Apply route. Provider, Historical  
naproxen sodium 220 mg cap Take  by mouth every twelve (12) hours as needed (pain). 1-2 caps as needed    Provider, Historical  
ascorbic acid (VITAMIN C) 500 mg tablet Take 500 mg by mouth daily. Provider, Historical  
 
Allergies/Social/Family History: No Known Allergies Social History Tobacco Use  Smoking status: Never Smoker  Smokeless tobacco: Never Used Substance Use Topics  Alcohol use: Yes Comment: rare Family History Problem Relation Age of Onset  Stroke Father 36  
 Heart Disease Father Ardyth Moritz Arthritis-osteo Mother  Cancer Mother [de-identified]  
     breast cancer  Breast Cancer Mother 78 Objective:  
Vital Signs: 
Visit Vitals BP (!) 103/51 Pulse 93 Temp 98.8 °F (37.1 °C) Resp 30 Ht 5' 8\" (1.727 m) Wt 130.7 kg (288 lb 2.3 oz) SpO2 90% BMI 43.81 kg/m² Intake/Output:  
 
Intake/Output Summary (Last 24 hours) at 12/27/2020 1151 Last data filed at 12/27/2020 1100 Gross per 24 hour Intake 1601.8 ml Output 965 ml Net 636.8 ml Physical Exam: 
 
General: intubated, sedated, and paralyzed Eye:  PERRLA Neurologic: sedated and paralyzed Neck:  normal and no erythema or exudates noted. Lungs:  clear to auscultation bilaterally, mechanical ventilation Heart:  regular rate and rhythm, S1, S2 normal, no murmur, click, rub or gallop, 1+ edema Abdomen:  soft, non-tender. Bowel sounds hypoactive. No masses,  no organomegaly Skin:  no rash or abnormalities LABS AND  DATA: Personally reviewed Recent Labs  
  12/27/20 0114 12/26/20 0340 WBC 25.2* 21.7* HGB 11.4* 12.4 HCT 37.5 40.4  255 Recent Labs  
  12/27/20 0114 12/26/20 0340 12/25/20 
0217 * 146* 144  
K 3.8 3.4* 4.3  
* 116* 112* CO2 24 26 29 BUN 72* 61* 50* CREA 1.91* 1.43* 1.52* GLU 87 127* 146* CA 10.5* 11.0* 11.5* MG 2.2  --  2.6* PHOS 4.3  --  3.5 No results for input(s): AP, TBIL, TP, ALB, GLOB, AML, LPSE in the last 72 hours. No lab exists for component: SGOT, GPT, AMYP Recent Labs  
  12/27/20 0114 12/26/20 0340 INR 1.2* 1.2* PTP 12.3* 12.3* APTT 34.8* 29.1 AC 90%/16/18/40 MEDS: Reviewed Chest X-Ray: The cardiomediastinal silhouette is 
stable. Diffuse bilateral airspace disease, right worse than left, has improved 
minimally on the right and moderately on the left. No TTE found this admission Assessment & Plan Acute Hypoxic Respiratory Failure Secondary to COVID Pneumonia: . Completed a course of ceftriaxone. Intubated on 12/25 having failed bipap.  
- decadron course completed 12/26 - continue therapeutic lovenox for CAC 
- continue cefepime and vancomycin 
- continue mechanical ventilation 
- continue propofol and fentanyl for sedation 
- continue nimbex -  trend ABG, plan to prone if oxygenation worsens with increasing ventilator requirements 
- continue flolan for now  
-continue remdesivir for 4 doses OLIVER on CKD 3 c/b volume overload: rising sodium with concern for free water losses. Bun 61 and Cr 1.43. UO adequate 
- continue to trend BUN and Cr 
- avoid nephrotoxic agents 
- strict I/O 
- consider advanced imaging  
- renally dose vancomycin  
-start d5 1/2NS for BS and free water replacement HTN: currently normotensive 
- continue prn labetalol and hydralazine DM2:  Now with hypoglycemic episodes 
- SSI q6 GERD:   
- PPI IV Mild protein calorie deficit malnutrition: NST following  
-continue TF 
-off TPN, now with periods of hypoglycemia, start d51/2 at 100 ml/hr Deconditioning:  
- PT/OT when able to participate ABCDEF Bundle/Checklist 
Pain Medications: Fentanyl Target RASS: -4 - Deep Sedation - No response to voice, but movement or eye opening Sedation Medications: Propofol, nimbex CAM-ICU:  [place] Mobility: Poor PT/OT: PT consulted and on board Restraints: None needed at this time Discussed Plan of Care (goals of care): Yes Addressed Code Status: Do Not Resuscitate CARDIOVASCULAR Cardiac Gtts: Norepinephrine SBP Goal of: > 90 mmHg MAP Goal of: > 65 mmHg Transfusion Trigger (Hgb): <7 g/dL RESPIRATORY 
DVT Prophylaxis (if no, list reason): Lovenox SPO2 Goal: > 92% GI/ GI Prophylaxis: Protonix (pantoprazole) Nutrition: Pending Insulin: SSI q6 
 
ANTIBIOTICS Antibiotics: cefepime DISPOSITION Remain in ICU CRITICAL CARE CONSULTANT NOTE I had a face to face encounter with the patient, reviewed and interpreted patient data including clinical events, labs, images, vital signs, I/O's, and examined patient. I have discussed the case and the plan and management of the patient's care with the consulting services, the bedside nurses and the respiratory therapist.   
 
NOTE OF PERSONAL INVOLVEMENT IN CARE This patient has a high probability of imminent, clinically significant deterioration, which requires the highest level of preparedness to intervene urgently. I participated in the decision-making and personally managed or directed the management of the following life and organ supporting interventions that required my frequent assessment to treat or prevent imminent deterioration. I personally spent 35 minutes of critical care time. This is time spent at this critically ill patient's bedside actively involved in patient care as well as the coordination of care and discussions with the patient's family. This does not include any procedural time which has been billed separately. [facility] Critical Care 
12/27/2020

## 2020-12-27 NOTE — PROGRESS NOTES
0700: Report received from Daun Ormond, RN. Included: SBAR, KARDEX, MAR, LDA's and current patient condition. Discussed overnight events, recent results and labs. Patient BS 88 this AM, will consider tube feeds during rounding by MD/NP. 0800: Assessment complete, patient resting comfortably in bed. Nimbex at 2, TOF 2 at 40mA, Levo at 4mcg for MAP >65, KVO for abx, and Fent at 100mcg. NSR, PVCs occasionally noted, OGT in place and confirmed by KUB from yesterday evening, clamped at the moment. Green Smear soft stool noted with turn this AM and bath given. Buttocks has a small scab/scar noted will apply venelex and bridge of nose has some hyperpigmentation noted. BS hypoactive, does not move any extremities at this time. GCS 3.  
 
1000: Patient desats very low with turns, 70s-75.  
 
1200: Reassessment--no changes 1220: Spoke to the daughter, Rocky Ma over the phone. Verified 4 digit security code access prior to [address]. Discussed overnight events as minimal, went over recent labs from the AM. Discussed low blood sugars in 80s, started IV Fluids. Discussed nose bridge hyperpigmentation with BIPAP mask, applying venelex to nose and sacrum scab. Lastly, we spoke about current drips and BP management, unable to titrate levophed lower at this time but NP and I discussed with IV Fluids, that we may work on that more as the day progresses. ZOOM Meeting planned for 4pm today. 1600: Reassessment--no changes, ZOOM meeting started, Steve Rosas and family at meeting. 1900: End of Shift Note Bedside shift change report given to OLYA Villeda (oncoming nurse) by Jeffrey Lyons (offgoing nurse). Report included the following information SBAR, Kardex, Procedure Summary, MAR, Recent Results, Med Rec Status, Cardiac Rhythm NSR and Quality Measures Shift worked:  Days Shift summary and any significant changes:  
  None at this time, see NOTES for details Concerns for physician to address:  BS 88-90s patient placed on D5W1/2 saline IV Fluids at 100ml/hr. Zone phone for oncoming shift:   n/a Activity: 
Activity Level: Bed Rest 
 
 
Cardiac:  
Cardiac Monitoring: Yes     
Cardiac Rhythm: Normal sinus rhythm Access:  
Current line(s): PIV and PICC Genitourinary:  
Urinary status: saunders Respiratory:  
O2 Device: Ventilator GI: 
Last Bowel Movement Date: 12/23/20 Current diet:  DIET NPO 
DIET NUTRITIONAL SUPPLEMENTS All Meals; Ensure [employer] Passing flatus: YES Pain Management:  
Patient states pain is manageable on current regimen: YES Skin: 
Celestino Score: 8 Interventions: speciality bed, float heels, increase time out of bed and internal/external urinary devices Patient Safety: 
Fall Score: Total Score: 2 Interventions: bed/chair alarm, assistive device (walker, cane, etc) and gripper socks High Fall Risk: Yes Length of Stay: 
Expected LOS: 4d 19h Actual LOS: [place] [address] [place] [address]

## 2020-12-27 NOTE — PROGRESS NOTES
Pharmacy Automatic Renal Dosing Protocol - Antimicrobials Goal Level: VANCOMYCIN TROUGH GOAL RANGE Vancomycin Trough: 15 - 20 mcg/mL  (AUC: 400 - 600 mg/hr/Liter/day) Date Dose & Interval Measured (mcg/mL) Extrapolated (mcg/mL)  
12/26 18:47 1500 mg q24h 18.8 18.29 Impression/Plan:  
Vancomycin trough resulted at 18.29 mcg/ml. Continue with the current regimen of 1500 mg IV every 24 hours Pharmacy will follow daily and adjust medications as appropriate for renal function and/or serum levels. Thank you, Hemant Brizuela, PHARMD

## 2020-12-27 NOTE — PROGRESS NOTES
1900- Bedside report received from Jeramy Jara0 [facility]- Patient assessment completed, see charting for details. Patient is resting on the vent, not restrained. Patient is on propofol, fent, and nimbex. Daughter called for update.    
 
TOF- 2 twitches on 4. 2113- Levo down to 3 
 
0000- TOF- 2 twitches on 4 
 
0400- TOF- 2 twitches on 4 
 
0700- Bedside report given to OLYA Jara

## 2020-12-27 NOTE — PROGRESS NOTES
Pharmacy Automatic Renal Dosing Protocol - Antimicrobials Indication for Antimicrobials: HAP, COVID-19 Current Regimen of Each Antimicrobial: 
Vancomycin 1500 mg q24hr (Start Date ; Day 3) Cefepime 2g IV q12h (Start Date ; Day 6) Previous Antimicrobial Therapy: 
Ceftriaxone 1g IV q24h (Start Date ; End date: ; Received 5 days of tx) Azithromycin 500 mg IV q24h (Start date ; Day # 6) Goal Level: VANCOMYCIN TROUGH GOAL RANGE Vancomycin Trough: 15 - 20 mcg/mL  (AUC: 400 - 600 mg/hr/Liter/day) Date Dose & Interval Measured (mcg/mL) Extrapolated (mcg/mL)  
 18:47 1500 mg q24h 18.8 18.29 Significant Cultures:  
 Blood- NG , final 
 blood cx NGTD pending  sputum cx yeast, normal cristian, pending Radiology / Imaging results: (X-ray, CT scan or MRI):  
 CXR: IMPRESSION: Persistent bilateral airspace opacification with interval worsening 
in the left lower lobe.  CXR: Extensive interstitial and parenchymal opacities on the right and on the left Paralysis, amputations, malnutrition:  none noted Labs: 
Recent Labs  
  20 
0114 20 
0340 20 
0217 CREA 1.91* 1.43* 1.52* BUN 72* 61* 50* WBC 25.2* 21.7* 27.7* Temp (24hrs), Av.9 °F (37.2 °C), Min:98.1 °F (36.7 °C), Max:99.3 °F (37.4 °C) Is the Patient on Dialysis? No 
 
Creatinine Clearance (mL/min):  
CrCl (Adjusted Body Weight): 43.1 CrCl (Ideal Body Weight): 30.4 Impression/Plan:  
Scr rising Change cefepime to q24h Change vancomycin to dosing based on random levels Vancomycin random level 12 AM 
Antimicrobial stop date  for cefepime, 14 days for vancomycin Pharmacy will follow daily and adjust medications as appropriate for renal function and/or serum levels. Thank you, EDUARDO Espinal

## 2020-12-28 NOTE — PROGRESS NOTES
Bedside and Verbal shift change report given to RALEIGH Hernandez RN (oncoming nurse) by Arleth Crump. Silvina Kat (offgoing nurse). Report included the following information SBAR, Kardex, Intake/Output, MAR, Recent Results and Cardiac Rhythm NSR.  
2020: Assessment completed. Patient intubated on paralytics, sedatives and narcotics. Nimbex @ 2mcg/kg/min, Propofol @ 45mcg/kg/min, Fentanyl @ 100mcg/hr. TOF 1/4 on 30mA. Pupils 2mm bilaterally and sluggishly reactive to light. No cough or gag. #7.5 ETT secured at 21cm @ teeth. AC 30, , FiO2 80%, PEEP +14. RR 30, PIP 32. Lungs coarse, diminished at bases. Dark bloody oral tracheal secretions, scant ET secretions. Abdomen soft, obese, hypoactive bowel soungs. OGT secured at 68cm @ lip, clamped. Hernandez patent and draining cloudy yellow urine with sediment. Peripheral pulses palpable, +1 generalized edema. R arm DL PICC infusing Propofol, Fentanyl as above as well as Levophed at 4mcg/min to maintain MAP>65 via red port; D51/2NS infusing at 100ml/hr via purple port. NS @ KVO via 20g PIV R hand. Nimbex as above via 22g L hand. 2330: Reassessment unchanged. 0145: Levophed up to 6mcg/min to maintain MAP>65. 
0210: O2 sats consistently 84-86% while lying on R side, despite increasing FiO2 to 90%. Turned back to L side. FiO2 back to previous setting 80%, SpO2 up to 90-92%. 3123: Reassessment unchanged. 5009Aida Ridge, NP of creat up to 3.1 this am (from 1.9 yesterday). No new orders. Levophed up to 8mcg/min to maintain MAP>65.   
0737: Bedside and Verbal shift change report given to DAYSI Hernandez RN (oncoming nurse) by RALEIGH Hernandez RN (offgoing nurse). Report included the following information SBAR, Kardex, Intake/Output, MAR, Recent Results and Cardiac Rhythm SR-ST.

## 2020-12-28 NOTE — PROGRESS NOTES
Care Management: 
 
MARCELLUS PLAN:  TBD 
  
Pt is a 62 yo female admitted from home for treatment of respiratory failure from COVID PNA. 100% BIPAP - not able to remove for trials or PO. Kidneys are failing . Prognosis is poor.  
  
Pt voiced intention of DNR/DNI status, but oscar has since revised code status to partial - would consent to intubation for 7 days maximum. Patient intubated December 24th.  
  
Pt lives w/ her  and oscar in multi-level home. Pt has home 02 but was not using pta. Pt was modified indep for mobility pta - had to be very cautious with stairs. 
  
CM is following to assist w/ dc planning as indicated by pt's clinical progress. Jordon Epperson RN ACM [id number]

## 2020-12-28 NOTE — PROGRESS NOTES
Pharmacy Automatic Renal Dosing Protocol - Antimicrobials Indication for Antimicrobials: HAP, COVID-19 Current Regimen of Each Antimicrobial: 
Vancomycin 1500 mg q24hr (Start Date ; Day 4) Cefepime 2g IV q24h (Start Date ; Day 7) Previous Antimicrobial Therapy: 
Ceftriaxone 1g IV q24h (Start Date ; End date: ; Received 5 days of tx) Azithromycin 500 mg IV q24h (Start date ; Day # 6) Goal Level: VANCOMYCIN TROUGH GOAL RANGE Vancomycin Trough: 15 - 20 mcg/mL  (AUC: 400 - 600 mg/hr/Liter/day) Date Dose & Interval Measured (mcg/mL) Extrapolated (mcg/mL)  
 18:47 1500 mg q24h 18.8 18.29  
 4:14 Holding 23.5 Significant Cultures:  
 Blood- NG , final 
 blood cx NGTD pending  sputum cx yeast, normal cristian, pending Radiology / Imaging results: (X-ray, CT scan or MRI):  
 CXR: IMPRESSION: Persistent bilateral airspace opacification with interval worsening 
in the left lower lobe.  CXR: Extensive interstitial and parenchymal opacities on the right and on the left Paralysis, amputations, malnutrition:  none noted Labs: 
Recent Labs  
  20 
0414 20 
0114 20 
0340 CREA 3.11* 1.91* 1.43* BUN 89* 72* 61* WBC 27.1* 25.2* 21.7* Temp (24hrs), Av.7 °F (37.1 °C), Min:97.6 °F (36.4 °C), Max:99 °F (37.2 °C) Is the Patient on Dialysis? No 
 
Creatinine Clearance (mL/min):  
CrCl (Adjusted Body Weight): 26.6 CrCl (Ideal Body Weight): 18.7 Impression/Plan:  
Scr up again Cefepime completed Vancomycin level this am still above goal, will get random level in the AM.  SCr up by more than 1.0 today Antimicrobial stop date  for cefepime, 14 days for vancomycin Pharmacy will follow daily and adjust medications as appropriate for renal function and/or serum levels. Thank you, Norma Amin, PHARMD

## 2020-12-28 NOTE — PROGRESS NOTES
Comprehensive Nutrition Assessment Type and Reason for Visit: Kanwal Osman Nutrition Recommendations/Plan:  
Initiate TF via OGT:   
 Start Osmolite 1.2 @ 15mL/h, advance rate 10mL q 8h as tolerated to Goal Rate of 35mL/h + 2 Prosource TID + 50mL H2O flush q 4h (provides 1368kcals/137gPro/132gCHO/989mL) Recommend adding miralax daily Nutrition Assessment:   Chart reviewed, case discussed during CCU rounds. Pt is intubated and sedated with propofol @ 33.5mL/h, which provides 884 kcals daily. Levo at 18, MAP's in the 60-70's. TPN was discontinued over the weekend. Gastric tube successfully placed. Per discussion with Intensivist santosh to start TF. Lytes WNL. Will start trophics and slowly advance towards goal.  Pt has been NPO most of this admission so will need to monitor for refeeding syndrome. TF at goal + propofol will meet 102% kcal and 100% protein needs. Estimated Daily Nutrient Needs: 
Energy (kcal): PSU 6004 (MSJ 1920); Weight Used for Energy Requirements: Current Protein (g): 127-159g (2-2.5gPro/kg IBW); Weight Used for Protein Requirements: Ideal 
Fluid (ml/day): 1500mL or per MD; Method Used for Fluid Requirements: 1 ml/kcal 
 
 
Nutrition Related Findings:  Meds: cefepime, humalog, protonix, vancomycin, D5, [email or web address]; Drips: levo, nimbex, propofol, fentanyl. Edema: +1-generalized. BM 12/23 Wounds:   
Skin tears Current Nutrition Therapies: NPO Anthropometric Measures: 
· Height:  5' 8\" (172.7 cm) · Current Body Wt:  131.7 kg (290 lb 5.5 oz) · Ideal Body Wt:  140 lbs:  203 % · BMI Category:  Obese class 3 (BMI 40.0 or greater) Nutrition Diagnosis:  
· Inadequate protein-energy intake related to impaired respiratory function as evidenced by NPO or clear liquid status due to medical condition Previous dx continues. Nutrition Interventions:  
Food and/or Nutrient Delivery: Start tube feeding Nutrition Education and Counseling: No recommendations at this time Coordination of Nutrition Care: Continue to monitor while inpatient, Interdisciplinary rounds Goals: Pt will tolerate TF initiation with residuals <500mL and no signs/sx of intolerance in 2-4 days. Nutrition Monitoring and Evaluation:  
Behavioral-Environmental Outcomes: None identified Food/Nutrient Intake Outcomes: Enteral nutrition intake/tolerance Physical Signs/Symptoms Outcomes: Biochemical data, Fluid status or edema, Hemodynamic status, Weight, GI status Discharge Planning: Too soon to determine Electronically signed by Radha Louise RD, [address]  on 12/28/2020 at 12:22 PM 
 
Contact: [phone]

## 2020-12-28 NOTE — INTERDISCIPLINARY ROUNDS
Interdisciplinary team rounds were held 12/28/2020 with the following team members:Care Management, Diabetes Treatment Specialist, Nursing, Nutrition, Palliative Care, Pharmacy, Physical Therapy, Physician and Respiratory Therapy. Plan of care discussed. See clinical pathway and/or care plan for interventions and desired outcomes.

## 2020-12-28 NOTE — PROGRESS NOTES
[facility] 
 
ICU TEAM Progress Note Name: Sujatha Jimenez : 1955 MRN: [id number] Date: 2020 I Subjective:  
Progress Note: 2020 Reason for ICU Admission: Acute hypoxic respiratory failure due to COVID-19 pneumonia Interval history: This is a 61-year-old lady with chronic kidney disease stage III, diastolic heart failure and hypertension who was admitted to the hospital on  with acute hypoxic respiratory failure due to COVID-19 infection. Her condition worsened and she had to be intubated on . Patient initially was DNR but then agreed to change the CODE STATUS to partial code as she is okay to be intubated electively but not to be on the ventilator for more than 7 days after discussion with her daughter. Overnight Events:  
No acute event overnight, no improvement continue to require high ventilatory setting, minimal urine output, no fever. Active Problem List:  
 
Problem List  Date Reviewed: 2020 Codes Class Goals of care, counseling/discussion ICD-10-CM: Z71.89 ICD-9-CM: V65.49 Shortness of breath ICD-10-CM: R06.02 
ICD-9-CM: 786.05 Fatigue, unspecified type ICD-10-CM: R53.83 ICD-9-CM: 780.79 Need for emotional support ICD-10-CM: R45.89 ICD-9-CM: 799.29 Respiratory failure, acute ([facility] ICD-10-CM: J96.00 
ICD-9-CM: 518.81 Sepsis ([facility] ICD-10-CM: A41.9 ICD-9-CM: 038.9, 995.91 Pneumonia due to COVID-19 virus ICD-10-CM: U07.1, J12.89 ICD-9-CM: 480.8 Noncompliance ICD-10-CM: Z91.19 ICD-9-CM: V15.81 High cholesterol ICD-10-CM: E78.00 ICD-9-CM: 272.0 Vitamin D deficiency (Chronic) ICD-10-CM: E55.9 ICD-9-CM: 268.9 Essential hypertension ICD-10-CM: I10 
ICD-9-CM: 401.9 Coronary artery disease involving native heart without angina pectoris ICD-10-CM: I25.10 ICD-9-CM: 414.01  Chronic nasal congestion ICD-10-CM: R09.81 
 ICD-9-CM: 478.19   
   
 MONSERRAT on CPAP ICD-10-CM: G47.33, Z99.89 ICD-9-CM: 327.23, V46.8 Bilateral leg edema ICD-10-CM: R60.0 ICD-9-CM: 782.3 Iron deficiency anemia secondary to inadequate dietary iron intake ICD-10-CM: D50.8 ICD-9-CM: 280.1 Essential tremor ICD-10-CM: G25.0 ICD-9-CM: 333.1 BMI 45.0-49.9, adult [facility]) ICD-10-CM: I05.17 
ICD-9-CM: V85.42 Anxiety and depression ICD-10-CM: F41.9, F32.9 ICD-9-CM: 300.00, 311 Chronic diastolic congestive heart failure (HCC) ICD-10-CM: I50.32 
ICD-9-CM: 428.32, 428.0 Obesity, morbid ([facility] ICD-10-CM: E66.01 
ICD-9-CM: 278.01   
   
 O2 dependent ICD-10-CM: Z99.81 ICD-9-CM: V46.2 Past Medical History:  
 
 has a past medical history of Anxiety and depression (9/26/2018), Arthritis, BMI 45.0-49.9, adult ([facility] [address] [facility] (9/26/2018), Essential tremor (9/26/2018), High cholesterol (8/14/2019), Hypertension, Iron deficiency anemia secondary to inadequate dietary iron intake (9/26/2018), Noncompliance (10/17/2019), Obesity, morbid ([facility] (8/29/2018), and Vitamin D deficiency (10/31/2018). Past Surgical History:  
 
 has a past surgical history that includes hx gyn (2009) and hx breast biopsy (Left). Home Medications:  
 
Prior to Admission medications Medication Sig Start Date End Date Taking? Authorizing Provider  
labetaloL (NORMODYNE) 300 mg tablet Take 1 Tab by mouth two (2) times a day. 12/14/20   Candy Pena MD  
aspirin 81 mg chewable tablet TAKE 1 TABLET BY MOUTH EVERY DAY INDICATIONS: STROKE PREVENTION  Indications: stroke prevention 12/14/20   Candy Pena MD  
furosemide (LASIX) 80 mg tablet TAKE 1 TABLET BY MOUTH EVERY DAY 8/10/20   Candy Pena MD  
busPIRone (BUSPAR) 15 mg tablet TAKE 1 TAB BY MOUTH TWO (2) TIMES DAILY AS NEEDED (ANXIETY).  7/23/20   Candy Pena MD  
Olmesartan-amLODIPine-HCTZ 40-10-25 mg tab TAKE 1 TABLET DAILY 7/13/20   Candy Pena MD  
 clonazePAM (KlonoPIN) 0.5 mg tablet TAKE 0.5 TAB BY MOUTH NIGHTLY AS NEEDED FOR ANXIETY. MAX DAILY AMOUNT: 0.25MG 6/29/20   Traci Mitchell MD  
citalopram (CELEXA) 20 mg tablet TAKE 1 TABLET BY MOUTH EVERY DAY 6/25/20   Traci Mitchell MD  
atorvastatin (LIPITOR) 20 mg tablet Take 1 Tab by mouth nightly. 6/25/20   Traci Mitchell MD  
potassium chloride SR (KLOR-CON 10) 10 mEq tablet TAKE 2 TABLETS BY MOUTH TWICE A DAY 6/25/20   Traci Mitchell MD  
pantoprazole (PROTONIX) 40 mg tablet Take 1 Tab by mouth daily. 6/11/20   Traci Mitchell MD  
ergocalciferol (Vitamin D2) 1,250 mcg (50,000 unit) capsule Take 1 Cap by mouth every seven (7) days. 6/11/20   Traci Mitchell MD  
fluticasone propionate (FLONASE) 50 mcg/actuation nasal spray SPRAY 2 SPRAYS INTO EACH NOSTRIL EVERY DAY 3/9/20   Traci Mitchell MD  
ferrous sulfate 325 mg (65 mg iron) tablet TAKE 1 TAB BY MOUTH THREE (3) TIMES DAILY (WITH MEALS). 2/3/19   Traci Mitchell MD  
cpap machine kit by Does Not Apply route. Provider, Historical  
naproxen sodium 220 mg cap Take  by mouth every twelve (12) hours as needed (pain). 1-2 caps as needed    Provider, Historical  
ascorbic acid (VITAMIN C) 500 mg tablet Take 500 mg by mouth daily. Provider, Historical  
 
 
Allergies/Social/Family History: No Known Allergies Social History Tobacco Use  Smoking status: Never Smoker  Smokeless tobacco: Never Used Substance Use Topics  Alcohol use: Yes Comment: rare Family History Problem Relation Age of Onset  Stroke Father 36  
 Heart Disease Father Car.Bussing Arthritis-osteo Mother  Cancer Mother [de-identified]  
     breast cancer  Breast Cancer Mother 78 Review of Systems:  
 
Not able to obtain due to patient medical condition Objective:  
Vital Signs: 
Visit Vitals BP (!) 126/59 Pulse (!) 117 Temp 98.6 °F (37 °C) Resp 26 Ht 5' 8\" (1.727 m) Wt 131.7 kg (290 lb 5.5 oz) SpO2 (!) 85% BMI 44.15 kg/m² O2 Flow Rate (L/min): 15 l/min O2 Device: Ventilator Temp (24hrs), Av.7 °F (37.1 °C), Min:97.6 °F (36.4 °C), Max:99 °F (37.2 °C) Intake/Output:  
 
Intake/Output Summary (Last 24 hours) at 2020 1352 Last data filed at 2020 1216 Gross per 24 hour Intake 2712.95 ml Output 653 ml Net 2059.95 ml Physical Exam: 
General: intubated, sedated, and paralyzed Eye:  PERRLA Neurologic: sedated and paralyzed Neck:  normal and no erythema or exudates noted. Lungs:  clear to auscultation bilaterally, mechanical ventilation Heart:  regular rate and rhythm, S1, S2 normal, no murmur, click, rub or gallop, 1+ edema Abdomen:  soft, non-tender. Bowel sounds hypoactive. No masses,  no organomegaly Skin:  no rash or abnormalities LABS AND  DATA: Personally reviewed Recent Labs  
  204 20 
011 WBC 27.1* 25.2* HGB 10.5* 11.4* HCT 34.9* 37.5  284 Recent Labs  
  204 20 
011  146*  
K 4.0 3.8 * 118* CO2 22 24 BUN 89* 72* CREA 3.11* 1.91* * 87  
CA 10.3* 10.5* MG  --  2.2 PHOS  --  4.3 No results for input(s): AP, TBIL, TP, ALB, GLOB, AML, LPSE in the last 72 hours. No lab exists for component: SGOT, GPT, AMYP Recent Labs  
  204 20 
011 INR 1.2* 1.2* PTP 12.5* 12.3* APTT 33.3* 34.8* Recent Labs  
  20 
1633 PHI 7.31* PCO2I 49.7* PO2I 116* FIO2I 0.90 No results for input(s): CPK, CKMB, TROIQ, BNPP in the last 72 hours. Hemodynamics:  
PAP:   CO:    
Wedge:   CI:    
CVP:    SVR:    
  PVR:    
 
Ventilator Settings: 
Mode Rate Tidal Volume Pressure FiO2 PEEP Assist control, Volume control   400 ml  6 cm H2O 80 % 14 cm H20 Peak airway pressure: 35 cm H2O Minute ventilation: 11.8 l/min MEDS: Reviewed Chest X-Ray: CXR Results  (Last 48 hours) None Assessment and Plan: Acute hypoxic respiratory failure due to COVID-19 infection: 
Continue protective ventilation strategy, patient condition declining. Now up 100% and PEEP of 15. Barely able to maintain saturation at 90%. Extensive bilateral infiltrate on x-ray. Completed steroid, had to stop remdesivir as her renal function worsening Continues to require sedation and paralytics Continue Flolan for now as ordered. Acute kidney injury: 
Progressive worsening. Based on the patient's previous wishes that she want to be on life support not more than 7 days I do not believe she is a good candidate for hemodialysis especially with the ongoing worsening in her condition. I discussed this with her  over the phone. I attempted to call her daughter but unfortunately there was no response. I will try again later. Continue IV fluid hydration as well as free water through NG tube Diabetes mellitus type 2: Insulin sliding scale DVT prophylaxis, GI prophylaxis, nutrition support, ventilator bundle Patient condition is deteriorating. Her outcome is poor. DISPOSITION Stay in ICU CRITICAL CARE CONSULTANT NOTE I had a face to face encounter with the patient, reviewed and interpreted patient data including clinical events, labs, images, vital signs, I/O's, and examined patient. I have discussed the case and the plan and management of the patient's care with the consulting services, the bedside nurses and the respiratory therapist.   
 
NOTE OF PERSONAL INVOLVEMENT IN CARE This patient has a high probability of imminent, clinically significant deterioration, which requires the highest level of preparedness to intervene urgently. I participated in the decision-making and personally managed or directed the management of the following life and organ supporting interventions that required my frequent assessment to treat or prevent imminent deterioration. I personally spent 40 minutes of critical care time. This is time spent at this critically ill patient's bedside actively involved in patient care as well as the coordination of care and discussions with the patient's family. This does not include any procedural time which has been billed separately. Edilson Berman M.D. Staff Intensivist/Pulmonologist 
[facility] 
12/28/2020

## 2020-12-28 NOTE — PROGRESS NOTES
[place] [id number] care of pt after rounding report from Christiana RN. Assessment to follow. [address] AM assessment complete, refer to flow sheet. TOF 2/4 at 40MA 
 
 
 
1020 Rounds with team, POC changes will be updated to include discontinue use of rendesivir 2/2 decline in kidney function. Perdomo Austin Full CHG and soap/water bath given. Leads, gown and linen changed. Turned to right side position, but had to turn to left side when pt desaturated with SPO2 62-70 % even with O2 bolus air given at 100%. This also happened overnight per RN report. Pt only tolerated left side and supine position overnight. [address] Maintained as previously described. No further changes. [address] Turned pt and sudden decline in SPO2. MD at doorway, 100% O2 bolus given. Requested to increased PEEP to 15. 
 
1430 Repositioned to supine and HOB >30d FiO2 100% from 80% on vent (Per MD at bedside). Propofol  From 45 to 50mcg/kg/min Suctioned cristin colored sputum from ETT and thick mucous with bloody-dark clots from mouth with [place]. TOF tested. 2/4 twitches at 40MA

## 2020-12-29 NOTE — PROGRESS NOTES
[facility] 
 
ICU TEAM Progress Note Name: Tiana Alicia : 1955 MRN: [id number] Date: 2020 I Subjective:  
Progress Note: 2020 Reason for ICU Admission: Acute hypoxic respiratory failure due to COVID-19 pneumonia Interval history: This is a 62-year-old lady with chronic kidney disease stage III, diastolic heart failure and hypertension who was admitted to the hospital on  with acute hypoxic respiratory failure due to COVID-19 infection. Her condition worsened and she had to be intubated on . Patient initially was DNR but then agreed to change the CODE STATUS to partial code as she is okay to be intubated electively but not to be on the ventilator for more than 7 days after discussion with her daughter. Overnight Events:  
No major event, no improvement, saturation is somewhat better now in the low 90s on 100% with 15 of PEEP. However requirement for norepinephrine increased, urine output decreased and her renal function worsened on today lab. She remains sedated and paralyzed. Active Problem List:  
 
Problem List  Date Reviewed: 2020 Codes Class Goals of care, counseling/discussion ICD-10-CM: Z71.89 ICD-9-CM: V65.49 Shortness of breath ICD-10-CM: R06.02 
ICD-9-CM: 786.05 Fatigue, unspecified type ICD-10-CM: R53.83 ICD-9-CM: 780.79 Need for emotional support ICD-10-CM: R45.89 ICD-9-CM: 799.29 Respiratory failure, acute ([facility] [address] ICD-10-CM: J96.00 
ICD-9-CM: 518.81 Sepsis ([facility] [address] ICD-10-CM: A41.9 ICD-9-CM: 038.9, 995.91 Pneumonia due to COVID-19 virus ICD-10-CM: U07.1, J12.89 ICD-9-CM: 480.8 Noncompliance ICD-10-CM: Z91.19 ICD-9-CM: V15.81 High cholesterol ICD-10-CM: E78.00 ICD-9-CM: 272.0 Vitamin D deficiency (Chronic) ICD-10-CM: E55.9 ICD-9-CM: 268.9 Essential hypertension ICD-10-CM: I10 
ICD-9-CM: 401.9 Coronary artery disease involving native heart without angina pectoris ICD-10-CM: I25.10 ICD-9-CM: 414.01 Chronic nasal congestion ICD-10-CM: R09.81 ICD-9-CM: 478.19   
   
 MONSERRAT on CPAP ICD-10-CM: G47.33, Z99.89 ICD-9-CM: 327.23, V46.8 Bilateral leg edema ICD-10-CM: R60.0 ICD-9-CM: 782.3 Iron deficiency anemia secondary to inadequate dietary iron intake ICD-10-CM: D50.8 ICD-9-CM: 280.1 Essential tremor ICD-10-CM: G25.0 ICD-9-CM: 333.1 BMI 45.0-49.9, adult [facility]) ICD-10-CM: D58.09 
ICD-9-CM: V85.42 Anxiety and depression ICD-10-CM: F41.9, F32.9 ICD-9-CM: 300.00, 311 Chronic diastolic congestive heart failure (HCC) ICD-10-CM: I50.32 
ICD-9-CM: 428.32, 428.0 Obesity, morbid ([facility] [address] ICD-10-CM: E66.01 
ICD-9-CM: 278.01   
   
 O2 dependent ICD-10-CM: Z99.81 ICD-9-CM: V46.2 Past Medical History:  
 
 has a past medical history of Anxiety and depression (9/26/2018), Arthritis, BMI 45.0-49.9, adult ([facility] [address] (9/26/2018), Essential tremor (9/26/2018), High cholesterol (8/14/2019), Hypertension, Iron deficiency anemia secondary to inadequate dietary iron intake (9/26/2018), Noncompliance (10/17/2019), Obesity, morbid ([facility] [address] (8/29/2018), and Vitamin D deficiency (10/31/2018). Past Surgical History:  
 
 has a past surgical history that includes hx gyn (2009) and hx breast biopsy (Left). Home Medications:  
 
Prior to Admission medications Medication Sig Start Date End Date Taking? Authorizing Provider  
labetaloL (NORMODYNE) 300 mg tablet Take 1 Tab by mouth two (2) times a day.  12/14/20   Divina Carreno MD  
aspirin 81 mg chewable tablet TAKE 1 TABLET BY MOUTH EVERY DAY INDICATIONS: STROKE PREVENTION  Indications: stroke prevention 12/14/20   Divina Carreno MD  
furosemide (LASIX) 80 mg tablet TAKE 1 TABLET BY MOUTH EVERY DAY 8/10/20   Divina Carreno MD  
 busPIRone (BUSPAR) 15 mg tablet TAKE 1 TAB BY MOUTH TWO (2) TIMES DAILY AS NEEDED (ANXIETY). 7/23/20   Alejandra Lim MD  
Olmesartan-amLODIPine-HCTZ 40-10-25 mg tab TAKE 1 TABLET DAILY 7/13/20   Alejandra Lim MD  
clonazePAM (KlonoPIN) 0.5 mg tablet TAKE 0.5 TAB BY MOUTH NIGHTLY AS NEEDED FOR ANXIETY. MAX DAILY AMOUNT: 0.25MG 6/29/20   Alejandra Lim MD  
citalopram (CELEXA) 20 mg tablet TAKE 1 TABLET BY MOUTH EVERY DAY 6/25/20   Alejandra Lim MD  
atorvastatin (LIPITOR) 20 mg tablet Take 1 Tab by mouth nightly. 6/25/20   Alejandra Lim MD  
potassium chloride SR (KLOR-CON 10) 10 mEq tablet TAKE 2 TABLETS BY MOUTH TWICE A DAY 6/25/20   Alejandra Lim MD  
pantoprazole (PROTONIX) 40 mg tablet Take 1 Tab by mouth daily. 6/11/20   Alejandra Lim MD  
ergocalciferol (Vitamin D2) 1,250 mcg (50,000 unit) capsule Take 1 Cap by mouth every seven (7) days. 6/11/20   Alejandra Lim MD  
fluticasone propionate (FLONASE) 50 mcg/actuation nasal spray SPRAY 2 SPRAYS INTO EACH NOSTRIL EVERY DAY 3/9/20   Alejandra Lim MD  
ferrous sulfate 325 mg (65 mg iron) tablet TAKE 1 TAB BY MOUTH THREE (3) TIMES DAILY (WITH MEALS). 2/3/19   Alejandra Lim MD  
cpap machine kit by Does Not Apply route. Provider, Historical  
naproxen sodium 220 mg cap Take  by mouth every twelve (12) hours as needed (pain). 1-2 caps as needed    Provider, Historical  
ascorbic acid (VITAMIN C) 500 mg tablet Take 500 mg by mouth daily. Provider, Historical  
 
 
Allergies/Social/Family History: No Known Allergies Social History Tobacco Use  Smoking status: Never Smoker  Smokeless tobacco: Never Used Substance Use Topics  Alcohol use: Yes Comment: rare Family History Problem Relation Age of Onset  Stroke Father 36  
 Heart Disease Father [place]i Arthritis-osteo Mother  Cancer Mother [de-identified]  
     breast cancer  Breast Cancer Mother 78 Review of Systems:  
 
Not able to obtain due to patient medical condition Objective:  
Vital Signs: 
Visit Vitals BP (!) 110/52 Pulse (!) 113 Temp 98.6 °F (37 °C) Resp 30 Ht 5' 8\" (1.727 m) Wt 135.7 kg (299 lb 2.6 oz) SpO2 96% BMI 45.49 kg/m² O2 Flow Rate (L/min): 15 l/min O2 Device: Ventilator Temp (24hrs), Av.7 °F (37.1 °C), Min:98.4 °F (36.9 °C), Max:99 °F (37.2 °C) Intake/Output:  
 
Intake/Output Summary (Last 24 hours) at 2020 1603 Last data filed at 2020 7288 Gross per 24 hour Intake 2719.72 ml Output 275 ml Net 2444.72 ml Physical Exam: 
General: intubated, sedated, and paralyzed Eye:  PERRLA Neurologic: sedated and paralyzed Neck:  normal and no erythema or exudates noted. Lungs:  clear to auscultation bilaterally, mechanical ventilation Heart:  regular rate and rhythm, S1, S2 normal, no murmur, click, rub or gallop, 1+ edema Abdomen:  soft, non-tender. Bowel sounds hypoactive. No masses,  no organomegaly Skin:  no rash or abnormalities LABS AND  DATA: Personally reviewed Recent Labs  
  20 WBC 27.7* 27.1* HGB 10.2* 10.5* HCT 35.1 34.9*  
 266 Recent Labs  
  20 
0114  144 146*  
K 4.5 4.0 3.8 * 114* 118* CO2 20* 22 24 * 89* 72* CREA 4.52* 3.11* 1.91* * 104* 87  
CA 10.1 10.3* 10.5* MG  --   --  2.2 PHOS  --   --  4.3 No results for input(s): AP, TBIL, TP, ALB, GLOB, AML, LPSE in the last 72 hours. No lab exists for component: SGOT, GPT, AMYP Recent Labs  
  12/29/20 
0426 12/28/20 
0414 INR 1.2* 1.2* PTP 12.3* 12.5* APTT 34.3* 33.3* Recent Labs  
  20 
1633 PHI 7.31* PCO2I 49.7* PO2I 116* FIO2I 0.90 No results for input(s): CPK, CKMB, TROIQ, BNPP in the last 72 hours.  
 
Hemodynamics:  
PAP:   CO:    
Wedge:   CI:    
CVP:    SVR:    
  PVR:    
 
Ventilator Settings: 
Mode Rate Tidal Volume Pressure FiO2 PEEP  
 Assist control, Volume control   400 ml  6 cm H2O 100 % 15 cm H20 Peak airway pressure: 39 cm H2O Minute ventilation: 11.8 l/min MEDS: Reviewed Chest X-Ray: CXR Results  (Last 48 hours) None Assessment and Plan:  
Acute hypoxic respiratory failure due to COVID-19 infection: 
Continue protective ventilation strategy,  no improvement in respiratory status. Now up 100% and PEEP of 15. Extensive bilateral infiltrate on x-ray. Completed steroid, had to stop remdesivir as her renal function worsening Continues to require sedation and paralytics Continue Flolan for now as ordered. 
  
Acute kidney injury: Now oliguric Progressive worsening. Based on the patient's previous wishes that she want to be on life support not more than 7 days I do not believe she is a good candidate for hemodialysis especially with the ongoing worsening in her condition. I discussed this with her daughter and with her  on December 28. They both expressed understanding, daughter confirmed the patient's wishes not to be on extended period On ventilator especially if there is no improvement. Continue IV fluid hydration as well as free water through NG tube 
  
Diabetes mellitus type 2: Insulin sliding scale 
  
DVT prophylaxis, GI prophylaxis, nutrition support, ventilator bundle Patient condition is deteriorating. Her outcome is poor. She is DNR Discussed with her daughter DISPOSITION Stay in ICU CRITICAL CARE CONSULTANT NOTE I had a face to face encounter with the patient, reviewed and interpreted patient data including clinical events, labs, images, vital signs, I/O's, and examined patient.   I have discussed the case and the plan and management of the patient's care with the consulting services, the bedside nurses and the respiratory therapist.   
 
NOTE OF PERSONAL INVOLVEMENT IN CARE  
 This patient has a high probability of imminent, clinically significant deterioration, which requires the highest level of preparedness to intervene urgently. I participated in the decision-making and personally managed or directed the management of the following life and organ supporting interventions that required my frequent assessment to treat or prevent imminent deterioration. I personally spent 40 minutes of critical care time. This is time spent at this critically ill patient's bedside actively involved in patient care as well as the coordination of care and discussions with the patient's family. This does not include any procedural time which has been billed separately. Mario Alberto Rae M.D. Staff Intensivist/Pulmonologist 
[facility] 
12/29/2020

## 2020-12-29 NOTE — PROGRESS NOTES
12/29/20 0601 ABCDEF Bundle SBT Safety Screen Passed No  
SBT Screen Reason for Failure FiO2 > 50%;PEEP > 7.5 
(veletri)

## 2020-12-29 NOTE — PROGRESS NOTES
Interdisciplinary team rounds were held 12/29/2020 with the following team members:Care Management, Diabetes Treatment Specialist, Nursing, Nutrition, Pharmacy, Physical Therapy, Physician and Clinical Coordinator. Plan of care discussed. Goal: See MD orders and progress notes for further  interventions and desired outcomes.

## 2020-12-29 NOTE — PROGRESS NOTES
Bedside and Verbal shift change report given to RALEIGH Hernandez RN (oncoming nurse) by Jaci Son RN (offgoing nurse). Report included the following information SBAR, Kardex, Intake/Output, MAR, Recent Results and Cardiac Rhythm Sinus tach. 2030: Assessment completed. Patient overbreathing ventilator, TOF 4/4 on 40mA on Nimbex @ 2mcg/kg/min (max per order.) Propofol @ 50mcg/kg/min, Fentanyl @ 100mcg/hr. Lungs coarse, scant ET drainage per suction. AC 30, , FiO2 100%, PEEP 15. RR 36, PIP 38. Tube feedings initiated today per OGT secured at 68cm @ lip. Residual 170ml greenish drainage. Will not increase rate at this time. Peripheral pulses palpable. Minimal urine output. 2100: Rony Marroquin NP of TOF 4/4 on NImbex @ 2mcg/kg/min, overbreathing ventilator. Orders received may increase max to 3mcg/kg/min. Increased rate to 2.5mcg/kg/min per mar. 2126: TOF remains 4/4 on 40mA on Nimbex @ 2.5mcg/kg/min. Increased Nimbex to max 3mcg/kg/min. 2220: TOF remains 4/4 on 40mA. Continues overbreathing ventilator. Orders received to give Nimbex bolus. Also notified of minimal urine output tonight despite adequate urine output on day shift (15 ml in past 3 hours.)  
0000: Reassessment unchanged. Not tolerating tube feedings well, residual >200ml. No urine output. 0030: Rony Marroquin NP of oliguria and high residuals. Orders received to stop tube feedings. Patient not a candidate for dialysis. 0115: TF stopped. 0413: Reassessment unchanged. 0710: Bedside and Verbal shift change report given to DAYSI Hernandez RN (oncoming nurse) by RALEIGH Hernandez RN (offgoing nurse). Report included the following information SBAR, Kardex, Intake/Output, MAR, Recent Results and Cardiac Rhythm Sinus tach.

## 2020-12-29 NOTE — PROGRESS NOTES
[facility] 5 care of pt after rounding report from OLYA Villeda. Assessment to follow. 0800 Assessment completed, see flow sheet. Secretions minimal, thick and tan via ETT Oral secretions are moderate-large maroon colored and laden with clots 21  After rounds with team, TF restarted at 10mL/hr. No advancement. Will monitor residuals. 200 Blood and clots present in mouth with oral care. ETT no secretions noted. [address] PM assessment completed. Residuals from  black liquid. MD informed. TF off. 
 
1800 Minimal urine output (15cc's/12 hours). Flushed Hernandez with SNS with no increase in output. Paralytics continued, (Nimbex at 3mg for TOF 2/4). [address] Bedside and Verbal shift change report given to RALEIGH Mancera RN (oncoming nurse) by Christina Walton RN (offgoing nurse). Report included the following information SBAR, Kardex, Intake/Output, MAR, Recent Results and Cardiac Rhythm NSR.

## 2020-12-30 NOTE — PROGRESS NOTES
0700: 
 Bedside and rounding report received from Stephani Allen RN. Included: SBAR, KARDEX, MAR, LDA's and current patient condition. Discussed overnight events, recent results and labs. Assumed care of pt. 
 
0800 Assessment: 
Paralyzed with Nimbex 3mg/hr for TOF 2/4 at 40 mA. Mouth care rendered, bloody secretions via mouth. ETT no secretions . Bloody drainage from right nares. Small amount of blood from left nares onto shoulder. Cleaned and and repositioned to supine. Levo at 34mcg, Vasopressin at 0.01 unit/min for MAP >/= 65. Propofol at 50 mcg, Fentanyl at 200mcg and Nimbex at 3mg. Amiodarone gtt at 0.5mg for A. Fib overnight (now NSR) rate 93 Vent: AC 15+/30/400, FiO2 100%, SPO2 91% (goal >/= 88%)temporarily 98.5 AX 
 
1200 Assessment unchanged. Cleaned Hernandez with CHG wipes. No urine output. OGT to IWS 300ml's total. 
 
 
1330 New orders noted. BMP drawn and sent. No urine out. Remains on all medications as described above. TOF 2/4 MA40 
 
1430 Justin Harris NP for Palliative consult. Pt made wishes known and documented in the chart of compassionate extubation after 7 days of vent dependence without getting better. New orders to line pt and start CVVH. Discussed with Dr. Akhil Vieira and Palliative care team for ethics comity evaluation. [address]OM meeting with family. 36 Stopped Zoom meeting to care for pt. Family wants to plan another meeting between 14:00-15:00 tomorrow. K+ 6.4 Orders received and followed for Insulin 10 units IV, D50 25mg and Lasix 100mg IV. 
 
1800 Milly DOBSON on unit to start CVVH. Pt does not have a line as this plan is still in discussion with the family. Per Ethics: Compassionate extubation tomorrow. If survives can do CVVH. [address] Coworker attempted blood cultures, vein blew before completing first bottle. [address] Rounding shift report given to oncoming RNChristiana. Included changes in care, new events and plan to extubate tomorrow per pt's wishes. Palliative care team and Ethics discussed events of CVVH and progression of care as it pertains to pt's pre-intubation wishes.

## 2020-12-30 NOTE — PROGRESS NOTES
[facility] 
 
ICU TEAM Progress Note Name: Patricia Fountain : 1955 MRN: [id number] Date: 2020 I Subjective:  
Progress Note: 2020 Reason for ICU Admission: Acute hypoxic respiratory failure due to COVID-19 pneumonia Interval history: This is a 61-year-old lady with chronic kidney disease stage III, diastolic heart failure and hypertension who was admitted to the hospital on  with acute hypoxic respiratory failure due to COVID-19 infection. Her condition worsened and she had to be intubated on . Patient initially was DNR but then agreed to change the CODE STATUS to partial code as she is okay to be intubated electively but not to be on the ventilator for more than 7 days after discussion with her daughter. Overnight Events:  
No acute event overnight, no improvement continue to require high ventilatory setting, minimal urine output, no fever. Active Problem List:  
 
Problem List  Date Reviewed: 2020 Codes Class Goals of care, counseling/discussion ICD-10-CM: Z71.89 ICD-9-CM: V65.49 Shortness of breath ICD-10-CM: R06.02 
ICD-9-CM: 786.05 Fatigue, unspecified type ICD-10-CM: R53.83 ICD-9-CM: 780.79 Need for emotional support ICD-10-CM: R45.89 ICD-9-CM: 799.29 Respiratory failure, acute ([facility] [address] ICD-10-CM: J96.00 
ICD-9-CM: 518.81 Sepsis ([facility] ICD-10-CM: A41.9 ICD-9-CM: 038.9, 995.91 Pneumonia due to COVID-19 virus ICD-10-CM: U07.1, J12.89 ICD-9-CM: 480.8 Noncompliance ICD-10-CM: Z91.19 ICD-9-CM: V15.81 High cholesterol ICD-10-CM: E78.00 ICD-9-CM: 272.0 Vitamin D deficiency (Chronic) ICD-10-CM: E55.9 ICD-9-CM: 268.9 Essential hypertension ICD-10-CM: I10 
ICD-9-CM: 401.9 Coronary artery disease involving native heart without angina pectoris ICD-10-CM: I25.10 ICD-9-CM: 414.01  Chronic nasal congestion ICD-10-CM: R09.81 
 ICD-9-CM: 478.19   
   
 MONSERRAT on CPAP ICD-10-CM: G47.33, Z99.89 ICD-9-CM: 327.23, V46.8 Bilateral leg edema ICD-10-CM: R60.0 ICD-9-CM: 782.3 Iron deficiency anemia secondary to inadequate dietary iron intake ICD-10-CM: D50.8 ICD-9-CM: 280.1 Essential tremor ICD-10-CM: G25.0 ICD-9-CM: 333.1 BMI 45.0-49.9, adult [facility]) ICD-10-CM: M11.82 
ICD-9-CM: V85.42 Anxiety and depression ICD-10-CM: F41.9, F32.9 ICD-9-CM: 300.00, 311 Chronic diastolic congestive heart failure (HCC) ICD-10-CM: I50.32 
ICD-9-CM: 428.32, 428.0 Obesity, morbid ([facility] ICD-10-CM: E66.01 
ICD-9-CM: 278.01   
   
 O2 dependent ICD-10-CM: Z99.81 ICD-9-CM: V46.2 Past Medical History:  
 
 has a past medical history of Anxiety and depression (9/26/2018), Arthritis, BMI 45.0-49.9, adult ([facility] [address] (9/26/2018), Essential tremor (9/26/2018), High cholesterol (8/14/2019), Hypertension, Iron deficiency anemia secondary to inadequate dietary iron intake (9/26/2018), Noncompliance (10/17/2019), Obesity, morbid ([facility] [address] (8/29/2018), and Vitamin D deficiency (10/31/2018). Past Surgical History:  
 
 has a past surgical history that includes hx gyn (2009) and hx breast biopsy (Left). Home Medications:  
 
Prior to Admission medications Medication Sig Start Date End Date Taking? Authorizing Provider  
labetaloL (NORMODYNE) 300 mg tablet Take 1 Tab by mouth two (2) times a day. 12/14/20   Vera Enriquez MD  
aspirin 81 mg chewable tablet TAKE 1 TABLET BY MOUTH EVERY DAY INDICATIONS: STROKE PREVENTION  Indications: stroke prevention 12/14/20   Vera Enriquez MD  
furosemide (LASIX) 80 mg tablet TAKE 1 TABLET BY MOUTH EVERY DAY 8/10/20   Vera Enriquez MD  
busPIRone (BUSPAR) 15 mg tablet TAKE 1 TAB BY MOUTH TWO (2) TIMES DAILY AS NEEDED (ANXIETY).  7/23/20   Vera Enriquez MD  
Olmesartan-amLODIPine-HCTZ 40-10-25 mg tab TAKE 1 TABLET DAILY 7/13/20   Vera Enriquez MD  
 clonazePAM (KlonoPIN) 0.5 mg tablet TAKE 0.5 TAB BY MOUTH NIGHTLY AS NEEDED FOR ANXIETY. MAX DAILY AMOUNT: 0.25MG 6/29/20   Stefano Matias MD  
citalopram (CELEXA) 20 mg tablet TAKE 1 TABLET BY MOUTH EVERY DAY 6/25/20   Stefano Matias MD  
atorvastatin (LIPITOR) 20 mg tablet Take 1 Tab by mouth nightly. 6/25/20   Stefano Matias MD  
potassium chloride SR (KLOR-CON 10) 10 mEq tablet TAKE 2 TABLETS BY MOUTH TWICE A DAY 6/25/20   Stefano Matias MD  
pantoprazole (PROTONIX) 40 mg tablet Take 1 Tab by mouth daily. 6/11/20   Stefano Matias MD  
ergocalciferol (Vitamin D2) 1,250 mcg (50,000 unit) capsule Take 1 Cap by mouth every seven (7) days. 6/11/20   Stefano Matias MD  
fluticasone propionate (FLONASE) 50 mcg/actuation nasal spray SPRAY 2 SPRAYS INTO EACH NOSTRIL EVERY DAY 3/9/20   Stefano Matias MD  
ferrous sulfate 325 mg (65 mg iron) tablet TAKE 1 TAB BY MOUTH THREE (3) TIMES DAILY (WITH MEALS). 2/3/19   Stefano Matias MD  
cpap machine kit by Does Not Apply route. Provider, Historical  
naproxen sodium 220 mg cap Take  by mouth every twelve (12) hours as needed (pain). 1-2 caps as needed    Provider, Historical  
ascorbic acid (VITAMIN C) 500 mg tablet Take 500 mg by mouth daily. Provider, Historical  
 
 
Allergies/Social/Family History: No Known Allergies Social History Tobacco Use  Smoking status: Never Smoker  Smokeless tobacco: Never Used Substance Use Topics  Alcohol use: Yes Comment: rare Family History Problem Relation Age of Onset  Stroke Father 36  
 Heart Disease Father Lor Enriquez Arthritis-osteo Mother  Cancer Mother [de-identified]  
     breast cancer  Breast Cancer Mother 78 Review of Systems:  
 
Not able to obtain due to patient medical condition Objective:  
Vital Signs: 
Visit Vitals BP (!) 123/45 Pulse 88 Temp 98.5 °F (36.9 °C) Resp 30 Ht 5' 8\" (1.727 m) Wt 131.7 kg (290 lb 5.5 oz) SpO2 94% BMI 44.15 kg/m² O2 Flow Rate (L/min): 15 l/min O2 Device: Ventilator Temp (24hrs), Av.8 °F (37.1 °C), Min:98.3 °F (36.8 °C), Max:99.3 °F (37.4 °C) Intake/Output:  
 
Intake/Output Summary (Last 24 hours) at 2020 1309 Last data filed at 2020 0800 Gross per 24 hour Intake 3036.57 ml Output 215 ml Net 2821.57 ml Physical Exam: 
General: intubated, sedated, and paralyzed Eye:  PERRLA Neurologic: sedated and paralyzed Neck:  normal and no erythema or exudates noted. Lungs:  clear to auscultation bilaterally, mechanical ventilation Heart:  regular rate and rhythm, S1, S2 normal, no murmur, click, rub or gallop, 1+ edema Abdomen:  soft, non-tender. Bowel sounds hypoactive. No masses,  no organomegaly Skin:  no rash or abnormalities LABS AND  DATA: Personally reviewed Recent Labs 20 
9245 20 
2433 WBC 30.2* 27.7* HGB 10.3* 10.2* HCT 35.1 35.1  260 Recent Labs 20 
0746 20 
7924 * 140  
K 5.7* 4.5  
 111* CO2 18* 20* * 106* CREA 6.11* 4.52* * 101* CA 9.6 10.1 MG 2.6*  -- No results for input(s): AP, TBIL, TP, ALB, GLOB, AML, LPSE in the last 72 hours. No lab exists for component: SGOT, GPT, AMYP Recent Labs 20 
9921 20 
2962 INR 1.2* 1.2* PTP 12.2* 12.3* APTT 37.9* 34.3* No results for input(s): PHI, PCO2I, PO2I, FIO2I in the last 72 hours. No results for input(s): CPK, CKMB, TROIQ, BNPP in the last 72 hours. Hemodynamics:  
PAP:   CO:    
Wedge:   CI:    
CVP:    SVR:    
  PVR:    
 
Ventilator Settings: 
Mode Rate Tidal Volume Pressure FiO2 PEEP Assist control   400 ml  6 cm H2O 100 % 15 cm H20 Peak airway pressure: 40 cm H2O Minute ventilation: 11.6 l/min MEDS: Reviewed Chest X-Ray: CXR Results  (Last 48 hours) None Assessment and Plan:  
Acute hypoxic respiratory failure due to COVID-19 pneumonia and ARDS: 
 Continue protective ventilation strategy, patient condition declining. Now up 100% and PEEP of 15. Barely able to maintain saturation at 90%. Extensive bilateral infiltrate on x-ray. Completed steroid, had to stop remdesivir as her renal function worsening Continues to require sedation and paralytics Continue Flolan for now as ordered. Acute renal failure: 
Progressive worsening. Based on the patient's previous wishes that she want to be on life support not more than 7 days not a good candidate for hemodialysis especially with the ongoing worsening in her condition. I discussed this with her  over the phone, he said that he disagrees with the decision. Her 7th day of intubation will be tomorrow. I called the daughter and patient had verbalized to palliative care team that she wanted her  to be her spokesperson. Daughter agrees with decision of palliative extubation tomorrow. She wants to have family zoom call with the patient. I did consult nephrology for worsening renal function but if we are going to do palliative extubation tomorrow then dialysis would not be beneficial and daughter is in agreement but will get back to us. Ethics committee and risk management was called and updated about the care plan and both agree that we should comply with family wishes. Diabetes mellitus type 2: Insulin sliding scale DVT prophylaxis, GI prophylaxis, nutrition support, ventilator bundle Patient condition is deteriorating. Her outcome is poor. DISPOSITION Stay in ICU CRITICAL CARE CONSULTANT NOTE I had a face to face encounter with the patient, reviewed and interpreted patient data including clinical events, labs, images, vital signs, I/O's, and examined patient.   I have discussed the case and the plan and management of the patient's care with the consulting services, the bedside nurses and the respiratory therapist.   
 
NOTE OF PERSONAL INVOLVEMENT IN CARE  
 This patient has a high probability of imminent, clinically significant deterioration, which requires the highest level of preparedness to intervene urgently. I participated in the decision-making and personally managed or directed the management of the following life and organ supporting interventions that required my frequent assessment to treat or prevent imminent deterioration. I personally spent 40 minutes of critical care time. This is time spent at this critically ill patient's bedside actively involved in patient care as well as the coordination of care and discussions with the patient's family. This does not include any procedural time which has been billed separately. Babs Tate M.D. Staff Intensivist/Pulmonologist 
[facility] [place] [facility] 
12/30/2020 I called daughter Yvonne Roche again and discussed the plan regarding dialysis. I explained to her that if we were going to do compassionate extubation tomorrow which she is agreeable with plan dialysis may not alter the course of her illness, while she is requiring 100% FiO2 and PEEP of 15 cm of water. She is agreeable with compassionate extubation tomorrow. I did explain to her that while her potassium is going up that may lead to cardiac arrest and she understands that we will not do CPR on her due to her partial code status.

## 2020-12-30 NOTE — PROGRESS NOTES
12/30/20 9194 ABCDEF Bundle SBT Safety Screen Passed No  
SBT Screen Reason for Failure FiO2 > 50%;PEEP > 7.5

## 2020-12-30 NOTE — PROGRESS NOTES
Bedside and Verbal shift change report given to RALEIGH Simons RN (oncoming nurse) by Joanna Monae RN (offgoing nurse). Report included the following information SBAR, Kardex, Intake/Output, MAR, Recent Results and Cardiac Rhythm Sinus tach. 2000: Assessment complete. Patient remains intubated on chemical paralytics, sedatives and narcotics. #7.5ETT secured at 22cm @ teeth. AC 30, , FiO2 95%, PEEP +15; on Veletri as well. RR 30, PIP 35. Lungs coarse and diminished throughout. Small amount bloody oral secretions, scant to no ET secretions. Nimbex @ 3mcg/kg/min, Propofol @ 50mcg/kg/min, Fentanyl @ 200mcg/hr. Pupils 3mm bilaterally and sluggishly reactive to light. No cough or gag, no corneal reflex, no movement of extremities, synchronous respirations with ventilator. TOF 1/4 twitches on 30mA (baseline 40mA. ) Sinus tach on monitor (patient afebrile), BP stable on Levophed @ 20mcg/min. Abdomen soft, obese without bowel sounds. OGT secured at 70cm @ lip, clamped. Placement verified. Tube feedings stopped again today because of high residuals. 40ml black fluid residual at this time. Hernandez patent, patient anuric/oliguric (15-30ml/24hours). No urine output at this time. Peripheral pulses palpable, trace generalized edema. R hand 20g PIV infusing NS @ [place]. R upper arm DL PICC infusing Fentanyl, Levophed and Propofol to red port. Purple port capped. L hand 22g PIV infusing Nimbex. 2100: Patient bathed, linens changed. Small amount of bleeding noted from L nare when turned to L side. Patient's daughter calls while RN bathing patient and spoke to another nurse. Daughter requests a return phone call from primary nurse and a zoom meeting tonight. 2121: Attempted to call patient's daughter Shelia De León @ [phone], which is the phone number listed for Elisabet Pastor in the chart, but no answer. 2330: Reassessment unchanged. 0014: tachycardic on monitor, 160's. EKG obtained. Digna Gonsales NP notified. Orders received to given metoprolol 5mg IV push. FiO2 increased to 100% (sats 80's.) 
0026: Orders received to give Amiodarone bolus, then drip. Amiodarone bolus given. 0029:Levophed increased to 30mcg/min. 0034:  HR back down to 110-120's, rhythm remains a fib. 0037: Vasopressin started at 0.01units/min. 9632: Amiodarone drip started at 1mg/min. 8858: Hydrocortisone 100mg IV given. 0229: Converted back to NSR, rate 90's. 0400: Reassessment unchanged except drips as above.  
3612: Bedside and Verbal shift change report given to T. Reyes Quan, RN (oncoming nurse) by A. Reyes Quan, RN (offgoing nurse). Report included the following information SBAR, Kardex, Intake/Output, MAR, Recent Results and Cardiac Rhythm NSR.

## 2020-12-30 NOTE — PROGRESS NOTES
Palliative Medicine Consult Anam: [phone] ([phone]) Patient Name: Venecia Klein YOB: 1955 Date of Initial Consult: 2020 Reason for Consult: Care Decisions Requesting Provider: Shade Weber NP 
Primary Care Physician: Trish Brush MD 
 
 SUMMARY:  
Venecia Klein is a 63 y.o. with a past history of anxiety, morbid obesity, HTN, HLD, and arthritis who was admitted on 2020 from home with a diagnosis of hypoxic respiratory failure and COVID-19 PNA. She presented to the ED after being short of breath for the past few days, which worsened the day of admission to the point that she was not able to move at all. She was transferred to the ICU on  and has been bipap dependent. She did say no to intubation initially, but her family has not been on board with this decision. She is not doing well- still on quite a bit of support from bipap. Pt was intubated on .   
 
: She remains intubated, on levo and vasopressin. Cr.6.66. Current medical issues leading to Palliative Medicine involvement include: conversations around her wishes if things continue to get worse. PALLIATIVE DIAGNOSES:  
1. Goals of care 2. Shortness of breath 3. Fatigue 4. Need for emotional support PLAN:  
1. There has been concerns regarding the plan of care moving forward for Mrs. Joey Hodges, so to clear it up, I spoke with Nancy Gomez with Ethics, Maryanne Fajardo with Risk Management, Dr. Sommer Pearson. and confirmed the followin. Per consult note dated  by Barb Faust, Palliative Medicine NP: 
1. Patient made it clear that she was ok with intubation but only for 7 days, if there is no improvement in her status at 7 days, she wants to be compassionately intubated, therefore we are to follow patient's wishes and extubate her tomorrow 20. 2. Patient stated she is ok with CRRT/HD if her kidneys start to fail, even if it means long term dialysis outside the hospital, therefore, if she is stable enough for this treatment will proceed with catheter placement and dialysis. 3. Patient was clear that she wanted her daughter Livia Rose as surrogate decision maker, not her . Surrogate decision maker cannot change the plan dictated by the patient, rather she is to ensure enforcement of patient's decisions and help with decisions that are not clear or already made clear by the patient. 3. Zoom visit arranged for family. 4. Initial consult note routed to primary continuity provider and/or primary health care team members 5. Communicated plan of care with: Palliative IDT, [address] Team 
 
 GOALS OF CARE / TREATMENT PREFERENCES:  
 
GOALS OF CARE: 
Patient/Health Care Proxy Stated Goals: Prolong life TREATMENT PREFERENCES:  
Code Status: Partial Code Advance Care Planning: 
[x] [employer] [facility] Interdisciplinary Team has updated the ACP Navigator with [address] and Patient Capacity Primary Decision MakerJoice Slice - Child - [phone] Medical Interventions: Limited additional interventions Other Instructions: Other: As far as possible, the palliative care team has discussed with patient / health care proxy about goals of care / treatment preferences for patient. HISTORY:  
 
History obtained from: chart, patient CHIEF COMPLAINT: \"I want some water\" HPI/SUBJECTIVE: The patient is:  
[x] Verbal and participatory [] Non-participatory due to:  
 
Awake, alert but on bipap so conversation somewhat limited. If you cant hear her, she is able to write her thoughts down for us Clinical Pain Assessment (nonverbal scale for severity on nonverbal patients):  
Clinical Pain Assessment Severity: 0 Activity (Movement): Lying quietly, normal position Duration: for how long has pt been experiencing pain (e.g., 2 days, 1 month, years) Frequency: how often pain is an issue (e.g., several times per day, once every few days, constant) FUNCTIONAL ASSESSMENT:  
 
Palliative Performance Scale (PPS): PPS: 30 
 
 
 PSYCHOSOCIAL/SPIRITUAL SCREENING:  
 
Palliative IDT has assessed this patient for cultural preferences / practices and a referral made as appropriate to needs (Cultural Services, Patient Advocacy, Ethics, etc.) Any spiritual / Jehovah's witness concerns: 
[] Yes /  [x] No 
 
Caregiver Burnout: 
[] Yes /  [x] No /  [] No Caregiver Present Anticipatory grief assessment:  
[x] Normal  / [] Maladaptive ESAS Anxiety: Anxiety: 0 
 
ESAS Depression: Depression: 0 REVIEW OF SYSTEMS:  
 
Positive and pertinent negative findings in ROS are noted above in HPI. The following systems were [x] reviewed / [] unable to be reviewed as noted in HPI Other findings are noted below. Systems: constitutional, ears/nose/mouth/throat, respiratory, gastrointestinal, genitourinary, musculoskeletal, integumentary, neurologic, psychiatric, endocrine. Positive findings noted below. Modified ESAS Completed by: provider Fatigue: 4 Drowsiness: 0 Depression: 0 Pain: 0 Anxiety: 0 Nausea: 0 Anorexia: 4 Dyspnea: 6 Stool Occurrence(s): 0 PHYSICAL EXAM:  
 
From RN flowsheet: 
Wt Readings from Last 3 Encounters:  
12/29/20 290 lb 5.5 oz (131.7 kg) 02/27/20 307 lb (139.3 kg) 02/26/20 308 lb 3.2 oz (139.8 kg) Blood pressure (!) 131/45, pulse 89, temperature 98.3 °F (36.8 °C), resp. rate 30, height 5' 8\" (1.727 m), weight 290 lb 5.5 oz (131.7 kg), SpO2 93 %. Pain Scale 1: Behavioral Pain Scale (BPS) Pain Intensity 1: 3 Last bowel movement, if known:  
 
Constitutional: intubated In order to limit the exposure risk from COVID 19 and to preserve the hospital's limited supply of PPEs, seen through ICU window. Intubated. Levo 35 mcg/min and vasopressin. I spoke with her ICU nurse. HISTORY:  
 
Active Problems: 
  Respiratory failure, acute ([address] (12/17/2020) Sepsis ([address] (12/17/2020) Pneumonia due to COVID-19 virus (12/17/2020) Goals of care, counseling/discussion () Shortness of breath () Fatigue, unspecified type () Need for emotional support () Past Medical History:  
Diagnosis Date  Anxiety and depression 9/26/2018  Arthritis   
 knees, lower back.  BMI 45.0-49.9, adult ([address] 9/26/2018  Essential tremor 9/26/2018  High cholesterol 8/14/2019  Hypertension  Iron deficiency anemia secondary to inadequate dietary iron intake 9/26/2018  Noncompliance 10/17/2019  Obesity, morbid ([facility] 8/29/2018  Vitamin D deficiency 10/31/2018 Past Surgical History:  
Procedure Laterality Date  HX BREAST BIOPSY Left  HX GYN  2009 D&C Family History Problem Relation Age of Onset  Stroke Father 36  
 Heart Disease Father [facility] Arthritis-osteo Mother  Cancer Mother [de-identified]  
     breast cancer  Breast Cancer Mother 78 History reviewed, no pertinent family history. Social History Tobacco Use  Smoking status: Never Smoker  Smokeless tobacco: Never Used Substance Use Topics  Alcohol use: Yes Comment: rare No Known Allergies Current Facility-Administered Medications Medication Dose Route Frequency  vasopressin (VASOSTRICT) 20 Units in 0.9% sodium chloride 100 mL infusion  0-0.04 Units/min IntraVENous TITRATE  amiodarone (CORDARONE) 375 mg/250 mL D5W infusion  0.5-1 mg/min IntraVENous TITRATE  hydrocortisone Sod Succ (PF) (SOLU-CORTEF) injection 50 mg  50 mg IntraVENous Q6H  
 sodium chloride (OCEAN) 0.65 % nasal squeeze bottle 2 Spray  2 Spray Both Nostrils Q2H PRN  
  cisatracurium (NIMBEX) 200 mg in 0.9% sodium chloride 100 mL (2 mg/mL) infusion  0-3 mcg/kg/min Peripherally Inserted Central Catheter TITRATE  
 NOREPINephrine (LEVOPHED) 32 mg in 5% dextrose 250 mL infusion  0.5-50 mcg/min IntraVENous TITRATE  Vancomycin is being dosed based on random levels   Other Rx Dosing/Monitoring  furosemide (LASIX) injection 100 mg  100 mg IntraVENous ONCE  
 insulin regular (NOVOLIN R, HUMULIN R) injection 10 Units  10 Units IntraVENous ONCE  
 dextrose (D50W) injection syrg 25 g  25 g IntraVENous ONCE  
 sodium bicarbonate 150 mEq/1000 mL D5W (premix)   IntraVENous CONTINUOUS  
 bicarbonate dialysis (PRISMASOL) BG K 2/Ca 3.5 5000 ml solution   Extracorporeal DIALYSIS CONTINUOUS  piperacillin-tazobactam (ZOSYN) 3.375 g in 0.9% sodium chloride (MBP/ADV) 100 mL MBP  3.375 g IntraVENous Q8H Followed by  
 piperacillin-tazobactam (ZOSYN) 3.375 g in 0.9% sodium chloride (MBP/ADV) 100 mL MBP  3.375 g IntraVENous ONCE  
 lidocaine (XYLOCAINE) 20 mg/mL (2 %) injection 360 mg  18 mL SubCUTAneous ONCE  
 heparin (porcine) pf 300 Units  300 Units InterCATHeter ONCE  
 heparinized saline 2 units/mL infusion 800 Units  400 mL Irrigation ONCE  
 heparin (porcine) injection 7,500 Units  7,500 Units SubCUTAneous Q12H  
 epoprostenol (VELETRI) 30 mcg/mL in 0.9% sodium chloride 50 mL inhalation solution  30 ng/kg/min (Ideal) Inhalation CONTINUOUS  
 balsam peru-castor oiL (VENELEX) ointment   Topical BID  
 bacitracin 500 unit/gram packet 1 Packet  1 Packet Topical PRN  
 heparin (porcine) pf 300 Units  300 Units InterCATHeter PRN  propofol (DIPRIVAN) 10 mg/mL infusion  0-50 mcg/kg/min IntraVENous TITRATE  fentaNYL (PF) 1,500 mcg/30 mL (50 mcg/mL) infusion  0-300 mcg/hr IntraVENous TITRATE  insulin lispro (HUMALOG) injection   SubCUTAneous Q6H  
 albuterol-ipratropium (DUO-NEB) 2.5 MG-0.5 MG/3 ML  3 mL Nebulization Q6H RT  
  pantoprazole (PROTONIX) 40 mg in 0.9% sodium chloride 10 mL injection  40 mg IntraVENous DAILY  alcohol 62% (NOZIN) nasal  1 Ampule  1 Ampule Topical Q12H  chlorhexidine (ORAL CARE KIT) 0.12 % mouthwash 15 mL  15 mL Oral Q12H  
 sodium chloride (NS) flush 5-40 mL  5-40 mL IntraVENous Q8H  
 sodium chloride (NS) flush 5-40 mL  5-40 mL IntraVENous PRN  
 acetaminophen (TYLENOL) tablet 650 mg  650 mg Oral Q6H PRN Or  
 acetaminophen (TYLENOL) suppository 650 mg  650 mg Rectal Q6H PRN  polyethylene glycol (MIRALAX) packet 17 g  17 g Oral DAILY PRN  
 ondansetron (ZOFRAN ODT) tablet 4 mg  4 mg Oral Q8H PRN Or  
 ondansetron (ZOFRAN) injection 4 mg  4 mg IntraVENous Q6H PRN  
 [Held by provider] aspirin chewable tablet 81 mg  81 mg Oral DAILY  [Held by provider] busPIRone (BUSPAR) tablet 15 mg  15 mg Oral BID PRN  
 [Held by provider] citalopram (CELEXA) tablet 20 mg  20 mg Oral DAILY  [Held by provider] ferrous sulfate tablet 325 mg  1 Tab Oral DAILY WITH BREAKFAST  [Held by provider] fluticasone propionate (FLONASE) 50 mcg/actuation nasal spray 2 Spray  2 Spray Both Nostrils DAILY  [Held by provider] atorvastatin (LIPITOR) tablet 20 mg  20 mg Oral QHS  glucose chewable tablet 16 g  4 Tab Oral PRN  
 dextrose (D50W) injection syrg 12.5-25 g  12.5-25 g IntraVENous PRN  
 glucagon (GLUCAGEN) injection 1 mg  1 mg IntraMUSCular PRN  
 
 
 
 LAB AND IMAGING FINDINGS:  
 
Lab Results Component Value Date/Time WBC 30.2 (H) 12/30/2020 03:52 AM  
 HGB 10.3 (L) 12/30/2020 03:52 AM  
 PLATELET 086 07/18/2836 03:52 AM  
 
Lab Results Component Value Date/Time  Sodium 132 (L) 12/30/2020 01:33 PM  
 Potassium 6.4 (H) 12/30/2020 01:33 PM  
 Chloride 103 12/30/2020 01:33 PM  
 CO2 18 (L) 12/30/2020 01:33 PM  
  (H) 12/30/2020 01:33 PM  
 Creatinine 6.66 (H) 12/30/2020 01:33 PM  
 Calcium 9.4 12/30/2020 01:33 PM  
 Magnesium 2.6 (H) 12/30/2020 03:52 AM  
 Phosphorus 4.3 12/27/2020 01:14 AM  
  
Lab Results Component Value Date/Time Alk. phosphatase 126 (H) 12/21/2020 04:52 AM  
 Protein, total 8.9 (H) 12/21/2020 04:52 AM  
 Albumin 2.9 (L) 12/21/2020 04:52 AM  
 Globulin 6.0 (H) 12/21/2020 04:52 AM  
 
Lab Results Component Value Date/Time INR 1.2 (H) 12/30/2020 03:52 AM  
 Prothrombin time 12.2 (H) 12/30/2020 03:52 AM  
 aPTT 37.9 (H) 12/30/2020 03:52 AM  
  
Lab Results Component Value Date/Time Iron 21 (L) 08/29/2018 12:56 PM  
 TIBC 226 (L) 08/29/2018 12:56 PM  
 Iron % saturation 9 (LL) 08/29/2018 12:56 PM  
 Ferritin 481 (H) 12/17/2020 12:53 PM  
  
Lab Results Component Value Date/Time pH 7.39 12/25/2020 12:28 PM  
 PCO2 45 12/25/2020 12:28 PM  
 PO2 62 (L) 12/25/2020 12:28 PM  
 
No components found for: [place] Lab Results Component Value Date/Time  (H) 08/10/2018 04:18 AM  
 CK - MB 1.5 08/10/2018 04:18 AM  
  
 
 
   
 
Total time:  
Counseling / coordination time, spent as noted above:  
> 50% counseling / coordination?:  
 
Prolonged service was provided for  []30 min   []75 min in face to face time in the presence of the patient, spent as noted above. Time Start:  
Time End:  
Note: this can only be billed with 83226 (initial) or 92464 (follow up). If multiple start / stop times, list each separately.

## 2020-12-30 NOTE — CONSULTS
R[facility] Mary  
 
 
 
NAME:Francisca Vernon   :1955 # Severe OLIVER, hyperkalemia related to COVID-19 # COVID-19 septic shock/ resp failure Pt needs to start dialysis Called and spoke with  and daughter and both agreed with dialysis Both also mentioned they want to continue full treatment for patient, to please call them before any major decisions IR consult for STAT dialysis line Once line is in  Place and ready for use, please call [facility] Getting CVVH orders in place, HD unstable, on pressors, not able to tolerate iHD Thank you for allowing us to participate in this patient's care. Discussed w/ Dr Rupali Humphreys Full note to follow Viktor Santos, [address] [facility] [place] [facility] [place], [address] [place] Phone - [phone] Fax - [phone] [facility] [address] [facility] Phone - [phone] Fax - [phone]    
[email or web address]

## 2020-12-30 NOTE — PROGRESS NOTES
Addendum: - CVVH to start 
- change zosyn to q8h 
- will still check vancomycin level tomorrow AM prior to redosing EDUARDO Decker  
 
 
Pharmacy Automatic Renal Dosing Protocol - Antimicrobials Indication for Antimicrobials: HAP, COVID-19 Current Regimen of Each Antimicrobial: 
Piperacillin/tazobactam 2.25g IV q8h, start ; day 1 Vancomycin - pharmacy to dose; restarted ; day 1 Previous Antimicrobial Therapy: 
Ceftriaxone 1g IV q24h (Start Date ; End date: ; Received 5 days of tx) Azithromycin 500 mg IV q24h (Start date ; Day # 6) Vancomycin 1500 mg q24hr (Start Date -) Cefepime 2g IV q24h (Start Date -) Goal Level: VANCOMYCIN TROUGH GOAL RANGE Vancomycin Trough: 15 - 20 mcg/mL  (AUC: 400 - 600 mg/hr/Liter/day) Date Dose & Interval Measured (mcg/mL) Extrapolated (mcg/mL)  
 18:47 1500 mg q24h 18.8 18.29  
 4:14 Holding 23.5   
 N/a 22.7 Significant Cultures:  
 Blood- NG , final 
 blood cx NGTD pending  sputum cx yeast, normal cristian, pending  blood cx pending  sputum cx pending Paralysis, amputations, malnutrition:  none noted Labs: 
Recent Labs 20 
1333 20 
0352 20 
0426 20 
9801 CREA 6.66* 6.11* 4.52* 3.11* * 118* 106* 89* WBC  --  30.2* 27.7* 27.1* Temp (24hrs), Av.7 °F (37.1 °C), Min:98.3 °F (36.8 °C), Max:99.3 °F (37.4 °C) Procalcitonin:  
 0.88 
 2.77 Is the Patient on Dialysis? No 
 
Creatinine Clearance (mL/min):  
CrCl (Adjusted Body Weight): 12.4 CrCl (Ideal Body Weight): 8.7 Impression/Plan: · Scr trending up · Antibiotics restarted today · Vancomycin will be dosed based on random levels, last level yesterday above goal, so will recheck tomorrow  AM 
· Change zosyn to 3.375g IV q12 per protocol · Antimicrobial stop date pending Pharmacy will follow daily and adjust medications as appropriate for renal function and/or serum levels. Thank you, EDUARDO Rose

## 2020-12-31 NOTE — PROGRESS NOTES
[facility] 
 
ICU TEAM Progress Note Name: Kristen Sparks : 1955 MRN: [id number] Date: 2020 I Subjective:  
Progress Note: 2020 Reason for ICU Admission: Acute hypoxic respiratory failure due to COVID-19 pneumonia Interval history: This is a 47-year-old lady with chronic kidney disease stage III, diastolic heart failure and hypertension who was admitted to the hospital on  with acute hypoxic respiratory failure due to COVID-19 infection. Her condition worsened and she had to be intubated on . Patient initially was DNR but then agreed to change the CODE STATUS to partial code as she is okay to be intubated electively but not to be on the ventilator for more than 7 days after discussion with her daughter. Overnight Events:  
No acute event overnight, no improvement continue to require high ventilatory setting, minimal urine output, no fever. Active Problem List:  
 
Problem List  Date Reviewed: 2020 Codes Class Goals of care, counseling/discussion ICD-10-CM: Z71.89 ICD-9-CM: V65.49 Shortness of breath ICD-10-CM: R06.02 
ICD-9-CM: 786.05 Fatigue, unspecified type ICD-10-CM: R53.83 ICD-9-CM: 780.79 Need for emotional support ICD-10-CM: R45.89 ICD-9-CM: 799.29 Respiratory failure, acute ([facility] ICD-10-CM: J96.00 
ICD-9-CM: 518.81 Sepsis ([facility] ICD-10-CM: A41.9 ICD-9-CM: 038.9, 995.91 Pneumonia due to COVID-19 virus ICD-10-CM: U07.1, J12.89 ICD-9-CM: 480.8 Noncompliance ICD-10-CM: Z91.19 ICD-9-CM: V15.81 High cholesterol ICD-10-CM: E78.00 ICD-9-CM: 272.0 Vitamin D deficiency (Chronic) ICD-10-CM: E55.9 ICD-9-CM: 268.9 Essential hypertension ICD-10-CM: I10 
ICD-9-CM: 401.9 Coronary artery disease involving native heart without angina pectoris ICD-10-CM: I25.10 ICD-9-CM: 414.01  Chronic nasal congestion ICD-10-CM: R09.81 
 ICD-9-CM: 478.19   
   
 MONSERRAT on CPAP ICD-10-CM: G47.33, Z99.89 ICD-9-CM: 327.23, V46.8 Bilateral leg edema ICD-10-CM: R60.0 ICD-9-CM: 782.3 Iron deficiency anemia secondary to inadequate dietary iron intake ICD-10-CM: D50.8 ICD-9-CM: 280.1 Essential tremor ICD-10-CM: G25.0 ICD-9-CM: 333.1 BMI 45.0-49.9, adult [facility]) ICD-10-CM: B47.04 
ICD-9-CM: V85.42 Anxiety and depression ICD-10-CM: F41.9, F32.9 ICD-9-CM: 300.00, 311 Chronic diastolic congestive heart failure (HCC) ICD-10-CM: I50.32 
ICD-9-CM: 428.32, 428.0 Obesity, morbid ([facility] ICD-10-CM: E66.01 
ICD-9-CM: 278.01   
   
 O2 dependent ICD-10-CM: Z99.81 ICD-9-CM: V46.2 Past Medical History:  
 
 has a past medical history of Anxiety and depression (9/26/2018), Arthritis, BMI 45.0-49.9, adult ([facility] (9/26/2018), Essential tremor (9/26/2018), High cholesterol (8/14/2019), Hypertension, Iron deficiency anemia secondary to inadequate dietary iron intake (9/26/2018), Noncompliance (10/17/2019), Obesity, morbid ([facility] (8/29/2018), and Vitamin D deficiency (10/31/2018). Past Surgical History:  
 
 has a past surgical history that includes hx gyn (2009) and hx breast biopsy (Left). Home Medications:  
 
Prior to Admission medications Medication Sig Start Date End Date Taking? Authorizing Provider  
labetaloL (NORMODYNE) 300 mg tablet Take 1 Tab by mouth two (2) times a day. 12/14/20   Jayshree Roque MD  
aspirin 81 mg chewable tablet TAKE 1 TABLET BY MOUTH EVERY DAY INDICATIONS: STROKE PREVENTION  Indications: stroke prevention 12/14/20   Jayshree Roque MD  
furosemide (LASIX) 80 mg tablet TAKE 1 TABLET BY MOUTH EVERY DAY 8/10/20   Jayshree Roque MD  
busPIRone (BUSPAR) 15 mg tablet TAKE 1 TAB BY MOUTH TWO (2) TIMES DAILY AS NEEDED (ANXIETY).  7/23/20   Jayshree Roque MD  
Olmesartan-amLODIPine-HCTZ 40-10-25 mg tab TAKE 1 TABLET DAILY 7/13/20   Jayshree Roque MD  
 clonazePAM (KlonoPIN) 0.5 mg tablet TAKE 0.5 TAB BY MOUTH NIGHTLY AS NEEDED FOR ANXIETY. MAX DAILY AMOUNT: 0.25MG 6/29/20   Miah Jack MD  
citalopram (CELEXA) 20 mg tablet TAKE 1 TABLET BY MOUTH EVERY DAY 6/25/20   Miah Jack MD  
atorvastatin (LIPITOR) 20 mg tablet Take 1 Tab by mouth nightly. 6/25/20   Miah Jack MD  
potassium chloride SR (KLOR-CON 10) 10 mEq tablet TAKE 2 TABLETS BY MOUTH TWICE A DAY 6/25/20   Miah Jack MD  
pantoprazole (PROTONIX) 40 mg tablet Take 1 Tab by mouth daily. 6/11/20   Miah Jack MD  
ergocalciferol (Vitamin D2) 1,250 mcg (50,000 unit) capsule Take 1 Cap by mouth every seven (7) days. 6/11/20   Miah Jack MD  
fluticasone propionate (FLONASE) 50 mcg/actuation nasal spray SPRAY 2 SPRAYS INTO EACH NOSTRIL EVERY DAY 3/9/20   Miah Jack MD  
ferrous sulfate 325 mg (65 mg iron) tablet TAKE 1 TAB BY MOUTH THREE (3) TIMES DAILY (WITH MEALS). 2/3/19   Miah Jack MD  
cpap machine kit by Does Not Apply route. Provider, Historical  
naproxen sodium 220 mg cap Take  by mouth every twelve (12) hours as needed (pain). 1-2 caps as needed    Provider, Historical  
ascorbic acid (VITAMIN C) 500 mg tablet Take 500 mg by mouth daily. Provider, Historical  
 
 
Allergies/Social/Family History: No Known Allergies Social History Tobacco Use  Smoking status: Never Smoker  Smokeless tobacco: Never Used Substance Use Topics  Alcohol use: Yes Comment: rare Family History Problem Relation Age of Onset  Stroke Father 36  
 Heart Disease Father [place] Arthritis-osteo Mother  Cancer Mother [de-identified]  
     breast cancer  Breast Cancer Mother 78 Review of Systems:  
 
Not able to obtain due to patient medical condition Objective:  
Vital Signs: 
Visit Vitals BP (!) 138/51 Pulse 91 Temp 98.7 °F (37.1 °C) Resp (!) 31 Ht 5' 8\" (1.727 m) Wt 139.6 kg (307 lb 12.2 oz) SpO2 95% BMI 46.80 kg/m² O2 Flow Rate (L/min): 15 l/min O2 Device: Ventilator Temp (24hrs), Av.7 °F (37.1 °C), Min:98.3 °F (36.8 °C), Max:99 °F (37.2 °C) Intake/Output:  
 
Intake/Output Summary (Last 24 hours) at 2020 0183 Last data filed at 2020 0800 Gross per 24 hour Intake 3389.07 ml Output 103 ml Net 3286.07 ml Physical Exam: 
General: intubated, sedated. Neurologic: sedated Lungs:  mechanical ventilation Heart:  regular rate and rhythm LABS AND  DATA: Personally reviewed Recent Labs 20 
3677 20 
7450 WBC 31.5* 30.2* HGB 9.8* 10.3* HCT 32.3* 35.1  261 Recent Labs 20 
0288 20 
1333 20 
0001 * 132* 135* K 6.2* 6.4* 5.7* CL 99 103 105 CO2 18* 18* 18* * 122* 118* CREA 7.08* 6.66* 6.11* * 124* 119* CA 9.0 9.4 9.6 MG 2.4  --  2.6* PHOS 11.0*  --   --   
 
Recent Labs 20 
8715 ALB 1.6* Recent Labs 20 
7386 20 
5891 INR 1.1 1.2* PTP 11.2* 12.2* APTT 31.6* 37.9* No results for input(s): PHI, PCO2I, PO2I, FIO2I in the last 72 hours. No results for input(s): CPK, CKMB, TROIQ, BNPP in the last 72 hours. Hemodynamics:  
PAP:   CO:    
Wedge:   CI:    
CVP:    SVR:    
  PVR:    
 
Ventilator Settings: 
Mode Rate Tidal Volume Pressure FiO2 PEEP Assist control   400 ml  6 cm H2O 100 % 15 cm H20 Peak airway pressure: 37 cm H2O Minute ventilation: 9.69 l/min MEDS: Reviewed Chest X-Ray: CXR Results  (Last 48 hours) None Assessment and Plan:  
Acute hypoxic respiratory failure due to COVID-19 pneumonia and ARDS: 
Continue protective ventilation strategy, patient condition declining. Now up 100% and PEEP of 15. Barely able to maintain saturation at 90%. Extensive bilateral infiltrate on x-ray. Completed steroid, had to stop remdesivir as her renal function worsening Continues to require sedation and paralytics Continue Flolan for now as ordered. Acute renal failure: 
Progressive worsening. Based on the patient's previous wishes that she want to be on life support not more than 7 days. Plan was discussed with daughter in detail yesterday. Plan is to do compassionate extubation today. Appreciate palliative care team help. Diabetes mellitus type 2: Insulin sliding scale DVT prophylaxis, GI prophylaxis, nutrition support, ventilator bundle DISPOSITION Stay in ICU CRITICAL CARE CONSULTANT NOTE I had a face to face encounter with the patient, reviewed and interpreted patient data including clinical events, labs, images, vital signs, I/O's, and examined patient. I have discussed the case and the plan and management of the patient's care with the consulting services, the bedside nurses and the respiratory therapist.   
 
NOTE OF PERSONAL INVOLVEMENT IN CARE This patient has a high probability of imminent, clinically significant deterioration, which requires the highest level of preparedness to intervene urgently. I participated in the decision-making and personally managed or directed the management of the following life and organ supporting interventions that required my frequent assessment to treat or prevent imminent deterioration. Theodora Campbell M.D. Staff Intensivist/Pulmonologist 
[facility] 
12/31/2020

## 2020-12-31 NOTE — PROGRESS NOTES
Responded to page for this pt's death in [address]. No family or friends present during this visit. Contact spiritual care services for any spiritual or emotional support needs. Kiko Nieves MDiv. Staff  Request  Support/Spiritual Care Services via [facility]

## 2020-12-31 NOTE — PROGRESS NOTES
Bedside and Verbal shift change report given to RALEIGH Galvan, RN (oncoming nurse) by Ismael Caldwell, OLYA (offgoing nurse). Report included the following information SBAR, Kardex, Intake/Output, MAR, Recent Results and Cardiac Rhythm NSR.  
2020: Assessment completed. 2140: Notified MICHELLE Clancy NP of unable to obtain blood cultures or respiratory cultures despite several attempts by previous shift. Plan for compassionate extubation tomorrow afternoon. Orders received to discontinue order for cultures. 0000: Reassessment unchanged. tof 1/4 on 30mA. Nimbex continues at 3mcg/kg/min. Propofol @ 50mcg/kg/min, Fentanyl @ 200mcg/hr. Levophed @ 35mcg/min. Vasopressin off. Bicarb drip @ 75ml/hr. 0130: Not[facility] of plan to compassionately extubate this afternoon. Due to COVID +, will not be a candidate for organ donation. Please call with time of death. 0225: R nare oozing blood. Packed with cotton. Notified MICHELLE Clancy NP. Orders received to hold this am dose of heparin. 0400: Reassessment unchanged. 0720: Bedside and Verbal shift change report given to KATIE Tirado RN (oncoming nurse) by RALEIGH Galvan RN (offgoing nurse). Report included the following information SBAR, Kardex, Intake/Output, MAR, Recent Results and Cardiac Rhythm NSR.

## 2020-12-31 NOTE — CONSULTS
Nephrology Consult Note Ralph Mary  
 
www. [facility]              Phone - [phone] Patient: Jhoana Aldana YOB: 1955 Date- 12/31/2020 MRN: [id number] REASON FOR CONSULTATION:OLIVER, hyperkalemia CONSULTING PHYSICIAN: Dr Elizabet Esquivel ADMIT DATE:12/17/2020 PATIENT PCP:Haroon Black MD  
 
IMPRESSION/PLAN  
  
# Severe OLIVER w/ hyperkalemia related to COVID-19 # COVID-19 septic shock/ resp failure Called and spoke with  and daughter and both agreed with dialysis IR consult for STAT dialysis line Once line is in  Place and ready for use, please call [facility] orders in place, HD unstable, on pressors, not able to tolerate iHD 
serial labs ordered 
  
Will follow · Active Problems: 
·   Respiratory failure, acute ([facility] [address] (12/17/2020) ·  
·   Sepsis ([facility] [address] (12/17/2020) ·  
·   Pneumonia due to COVID-19 virus (12/17/2020) ·  
·   Goals of care, counseling/discussion () ·  
·   Shortness of breath () ·  
·   Fatigue, unspecified type () ·  
·   Need for emotional support () ·  
· [x] High complexity decision making was performed 
[x] Patient is at high-risk of decompensation with multiple organ involvement Subjective: HPI:  
 
63 y.o.  female who presented to the ED by EMS on 12/17/2020 with shortness of breath present for the last few days. EMS found the patient on the floor because of respiratory distress Course complicated by resp failure( currently on MV), septic shock and progressive OLIVER w/ hyperkalemia Review of Systems:  
 Not performed. Pt on airborne isolation for COVID-19, intubated Past Medical History:  
Diagnosis Date  Anxiety and depression 9/26/2018  Arthritis   
 knees, lower back.  BMI 45.0-49.9, adult ([facility] [address] 9/26/2018  Essential tremor 9/26/2018  High cholesterol 8/14/2019  Hypertension  Iron deficiency anemia secondary to inadequate dietary iron intake 9/26/2018  Noncompliance 10/17/2019  Obesity, morbid ([address] 8/29/2018  Vitamin D deficiency 10/31/2018 Past Surgical History:  
Procedure Laterality Date  HX BREAST BIOPSY Left  HX GYN  2009 D&C Prior to Admission medications Medication Sig Start Date End Date Taking? Authorizing Provider  
labetaloL (NORMODYNE) 300 mg tablet Take 1 Tab by mouth two (2) times a day. 12/14/20   David Petit MD  
aspirin 81 mg chewable tablet TAKE 1 TABLET BY MOUTH EVERY DAY INDICATIONS: STROKE PREVENTION  Indications: stroke prevention 12/14/20   David Petit MD  
furosemide (LASIX) 80 mg tablet TAKE 1 TABLET BY MOUTH EVERY DAY 8/10/20   David Petit MD  
busPIRone (BUSPAR) 15 mg tablet TAKE 1 TAB BY MOUTH TWO (2) TIMES DAILY AS NEEDED (ANXIETY). 7/23/20   David Petit MD  
Olmesartan-amLODIPine-HCTZ 40-10-25 mg tab TAKE 1 TABLET DAILY 7/13/20   David Petit MD  
clonazePAM (KlonoPIN) 0.5 mg tablet TAKE 0.5 TAB BY MOUTH NIGHTLY AS NEEDED FOR ANXIETY. MAX DAILY AMOUNT: 0.25MG 6/29/20   David Petit MD  
citalopram (CELEXA) 20 mg tablet TAKE 1 TABLET BY MOUTH EVERY DAY 6/25/20   David Petit MD  
atorvastatin (LIPITOR) 20 mg tablet Take 1 Tab by mouth nightly. 6/25/20   David Petit MD  
potassium chloride SR (KLOR-CON 10) 10 mEq tablet TAKE 2 TABLETS BY MOUTH TWICE A DAY 6/25/20   David Petit MD  
pantoprazole (PROTONIX) 40 mg tablet Take 1 Tab by mouth daily. 6/11/20   David Petit MD  
ergocalciferol (Vitamin D2) 1,250 mcg (50,000 unit) capsule Take 1 Cap by mouth every seven (7) days. 6/11/20   David Petit MD  
fluticasone propionate (FLONASE) 50 mcg/actuation nasal spray SPRAY 2 SPRAYS INTO EACH NOSTRIL EVERY DAY 3/9/20   David Petit MD  
ferrous sulfate 325 mg (65 mg iron) tablet TAKE 1 TAB BY MOUTH THREE (3) TIMES DAILY (WITH MEALS).  2/3/19   David Petit MD  
 cpap machine kit by Does Not Apply route. Provider, Historical  
naproxen sodium 220 mg cap Take  by mouth every twelve (12) hours as needed (pain). 1-2 caps as needed    Provider, Historical  
ascorbic acid (VITAMIN C) 500 mg tablet Take 500 mg by mouth daily. Provider, Historical  
 
No Known Allergies Social History Tobacco Use  Smoking status: Never Smoker  Smokeless tobacco: Never Used Substance Use Topics  Alcohol use: Yes Comment: rare Family History Problem Relation Age of Onset  Stroke Father 36  
 Heart Disease Father Lor Enriquez Arthritis-osteo Mother  Cancer Mother [de-identified]  
     breast cancer  Breast Cancer Mother 78 Objective:   
 
Patient Vitals for the past 24 hrs: 
 Temp Pulse Resp BP SpO2  
12/31/20 1000  88  (!) 137/51 95 % 12/31/20 0930  91  (!) 138/51 95 % 12/31/20 0900  91  (!) 134/52 95 % 12/31/20 0830  93  (!) 128/49 94 % 12/31/20 0817  93 (!) 31  94 % 12/31/20 0800 98.7 °F (37.1 °C) 96 30 (!) 129/49 94 % 12/31/20 0730  95  (!) 127/49 94 % 12/31/20 0700  95  (!) 132/50 94 % 12/31/20 0600  92  (!) 119/48 93 % 12/31/20 0500  92  (!) 129/44 94 % 12/31/20 0431  97 (!) 31  94 % 12/31/20 0400 98.6 °F (37 °C) 94 30 (!) 129/43 93 % 12/31/20 0300  95  (!) 129/44 95 % 12/31/20 0200  98  (!) 137/48 94 % 12/31/20 0100  97  (!) 138/46 94 % 12/31/20 0000 98.9 °F (37.2 °C) 98 30 (!) 128/42 92 % 12/30/20 2338  97 30  92 % 12/30/20 2300  97  (!) 125/41 92 % 12/30/20 2200  100  (!) 129/49 93 % 12/30/20 2100  (!) 102  (!) 135/50 93 % 12/30/20 2000 99 °F (37.2 °C) 99 30 (!) 143/50 94 % 12/30/20 1928  (!) 101 30  95 % 12/30/20 1915  (!) 102   95 % 12/30/20 1900  (!) 102  (!) 140/50 95 % 12/30/20 1845  (!) 105  (!) 146/87 95 % 12/30/20 1830  (!) 106  (!) 151/52 95 % 12/30/20 1815  (!) 105  (!) 149/51 92 % 12/30/20 1800  (!) 105  (!) 128/44 95 % 12/30/20 1745  94   95 % 12/30/20 1730  94   94 % 12/30/20 1715  92   94 % 12/30/20 1700  93   94 % 12/30/20 1645  92   94 % 12/30/20 1630  92   93 % 12/30/20 1615  91  (!) 119/49 93 % 12/30/20 1600 98.5 °F (36.9 °C) 89 30 (!) 116/49 93 % 12/30/20 1545  90  (!) 132/43 93 % 12/30/20 1530  90  (!) 131/42 93 % 12/30/20 1515  89  (!) 133/44 93 % 12/30/20 1511  89 30  93 % 12/30/20 1500  91  (!) 133/45 93 % 12/30/20 1400  91  (!) 133/47 93 % 12/30/20 1355     92 % 12/30/20 1354     93 % 12/30/20 1300  89  (!) 131/45 93 % 12/30/20 1230  89  (!) 131/45 93 % 12/30/20 1200 98.3 °F (36.8 °C) 89 30 (!) 125/51 93 % 12/30/20 1145  89  (!) 127/47 93 % 12/30/20 1130  89  (!) 134/48 93 % 12/30/20 1117  88 30  94 % 12/30/20 1115  89  (!) 138/48 93 % 12/30/20 1100  88  (!) 138/49 93 % 12/30/20 1045  87  (!) 137/50 93 % 12/30/20 1030  86  (!) 136/51 92 % 12/31 0701 - 12/31 1900 In: 589.3 [I.V.:469.3] Out: 100 Physical Exam: 
Seen from outside the room General: critically ill, intubated, sedated Lungs: on MV 
CVS:RRR Care Plan discussed with: Dr Kenny Klein Chart reviewed. 
 
y Reviewed previous records  
y Discussion with patient and/or family and questions answered Lab Data Personally Reviewed: (see below) Recent Labs 12/31/20 
3718 12/30/20 
1333 12/30/20 
0352 12/29/20 
7474 WBC 31.5*  --  30.2* 27.7* HGB 9.8*  --  10.3* 10.2*   --  261 260 INR 1.1  --  1.2* 1.2* APTT 31.6*  --  37.9* 34.3* * 132* 135* 140  
K 6.2* 6.4* 5.7* 4.5 * 124* 119* 101* * 122* 118* 106* CREA 7.08* 6.66* 6.11* 4.52* CA 9.0 9.4 9.6 10.1 MG 2.4  --  2.6*  --   
PHOS 11.0*  --   --   --   
 
Lab Results Component Value Date/Time  Color YELLOW/STRAW 12/18/2020 03:42 AM  
 Appearance CLOUDY (A) 12/18/2020 03:42 AM  
 Specific gravity 1.017 12/18/2020 03:42 AM  
 pH (UA) 5.0 12/18/2020 03:42 AM  
 Protein Negative 12/18/2020 03:42 AM  
 Glucose Negative 12/18/2020 03:42 AM  
 Ketone Negative 12/18/2020 03:42 AM  
 Bilirubin Negative 12/18/2020 03:42 AM  
 Urobilinogen 1.0 12/18/2020 03:42 AM  
 Nitrites Negative 12/18/2020 03:42 AM  
 Leukocyte Esterase SMALL (A) 12/18/2020 03:42 AM  
 Epithelial cells MANY (A) 12/18/2020 03:42 AM  
 Bacteria Negative 12/18/2020 03:42 AM  
 WBC 5-10 12/18/2020 03:42 AM  
 RBC 0-5 12/18/2020 03:42 AM  
 
 
Lab Results Component Value Date/Time Iron 21 (L) 08/29/2018 12:56 PM  
 TIBC 226 (L) 08/29/2018 12:56 PM  
 Iron % saturation 9 (LL) 08/29/2018 12:56 PM  
 Ferritin 481 (H) 12/17/2020 12:53 PM  
 
Lab Results Component Value Date/Time Culture result: NO GROWTH 6 DAYS 12/25/2020 03:27 PM  
 Culture result: LIGHT YEAST, (APPARENT CANDIDA ALBICANS) (A) 12/25/2020 12:15 PM  
 Culture result: SCANT NORMAL RESPIRATORY TK 12/25/2020 12:15 PM  
 
Prior to Admission Medications Prescriptions Last Dose Informant Patient Reported? Taking? Olmesartan-amLODIPine-HCTZ 40-10-25 mg tab   No No  
Sig: TAKE 1 TABLET DAILY  
ascorbic acid (VITAMIN C) 500 mg tablet  Self Yes No  
Sig: Take 500 mg by mouth daily. aspirin 81 mg chewable tablet   No No  
Sig: TAKE 1 TABLET BY MOUTH EVERY DAY INDICATIONS: STROKE PREVENTION  Indications: stroke prevention  
atorvastatin (LIPITOR) 20 mg tablet   No No  
Sig: Take 1 Tab by mouth nightly. busPIRone (BUSPAR) 15 mg tablet   No No  
Sig: TAKE 1 TAB BY MOUTH TWO (2) TIMES DAILY AS NEEDED (ANXIETY). citalopram (CELEXA) 20 mg tablet   No No  
Sig: TAKE 1 TABLET BY MOUTH EVERY DAY  
clonazePAM (KlonoPIN) 0.5 mg tablet   No No  
Sig: TAKE 0.5 TAB BY MOUTH NIGHTLY AS NEEDED FOR ANXIETY. MAX DAILY AMOUNT: 0.25MG cpap machine kit   Yes No  
Sig: by Does Not Apply route.  
ergocalciferol (Vitamin D2) 1,250 mcg (50,000 unit) capsule   No No  
Sig: Take 1 Cap by mouth every seven (7) days. ferrous sulfate 325 mg (65 mg iron) tablet   No No  
Sig: TAKE 1 TAB BY MOUTH THREE (3) TIMES DAILY (WITH MEALS). fluticasone propionate (FLONASE) 50 mcg/actuation nasal spray   No No  
Sig: SPRAY 2 SPRAYS INTO EACH NOSTRIL EVERY DAY  
furosemide (LASIX) 80 mg tablet   No No  
Sig: TAKE 1 TABLET BY MOUTH EVERY DAY  
labetaloL (NORMODYNE) 300 mg tablet   No No  
Sig: Take 1 Tab by mouth two (2) times a day. naproxen sodium 220 mg cap   Yes No  
Sig: Take  by mouth every twelve (12) hours as needed (pain). 1-2 caps as needed  
pantoprazole (PROTONIX) 40 mg tablet   No No  
Sig: Take 1 Tab by mouth daily. potassium chloride SR (KLOR-CON 10) 10 mEq tablet   No No  
Sig: TAKE 2 TABLETS BY MOUTH TWICE A DAY Facility-Administered Medications: None Imaging: 
 
Medications list Personally Reviewed   [x]      Yes     []               No   
Thank you for allowing us to participate in the care this patient. We will follow patient with you. Signed By: Anam Nelson, [address] [facility] [address] [place], [address] [place] Phone - [phone] Fax - [phone] [address] [place] [address], [facility] Phone - [phone] Fax - [phone]    
www. [facility]

## 2020-12-31 NOTE — PROGRESS NOTES
0700: Verbal shift change report given to Esther Winchester RN (oncoming nurse) by Cuba Gaona RN (offgoing nurse). Report included the following information SBAR, Kardex, Intake/Output, MAR, Recent Results and Cardiac Rhythm NSR, ST.  
 
0800: Assessment completed; see flowsheets. Pt on nimbex paralytic infusion, does not follow commands. Pt in NSR, on Levophed for BP support at 35. Pulses palpable, no BS; OGT verified BS with placement. Goals of care today to include turning and repositioning as pt tolerates, compassionate extubation today 1400. 
 
 
0827: Nimbex infusion stopper per MD orders. 1200: Reassessment completed. Pt now bites down when RN performing mouth care; does not respond to any other stimuli; does not withdraw to pain any extremity. Repositioned but tolerated poorly, SPO2 dropping to 83%, sustained. 1340: Zoom call started with family members prior to extubation. 1403: Pt successfully extubated. 1417: Pt . MD, supervisor and  staff notified. Family aware, still on zoom call per pt daughter preference.

## 2020-12-31 NOTE — PROGRESS NOTES
Nephrology Progress Note Ralph Mary  
 
[facility]. [facility]              Phone - [phone] Patient: Bret Eason YOB: 1955 Date- 12/31/2020 MRN: [id number] F/u OLIVER, hyperkalemia CONSULTING PHYSICIAN: Dr Jenniffer Doe ADMIT DATE:12/17/2020 PATIENT PCP:Maite Black MD  
 
IMPRESSION/PLAN  
  
# Severe OLIVER w/ hyperkalemia related to COVID-19 # COVID-19 septic shock/ resp failure - Discussed w/ Dr Jenniffer Doe who informed that family has decided to pursue comfort measures now and extubation will be performed today - It was also decided to not start CRRT and let disease run its course 
  
Will sign off Please call back if needed Active Problems: 
  Respiratory failure, acute ([facility] [address] (12/17/2020) Sepsis ([address] (12/17/2020) Pneumonia due to COVID-19 virus (12/17/2020) Goals of care, counseling/discussion () Shortness of breath () Fatigue, unspecified type () Need for emotional support () [x] High complexity decision making was performed 
[x] Patient is at high-risk of decompensation with multiple organ involvement Subjective:  
Remains critically ill, azotemic, hyperkalemic, CRRT not started as family decided for comfort measures Review of Systems:  
 Not performed. Pt on airborne isolation for COVID-19, intubated Past Medical History:  
Diagnosis Date  Anxiety and depression 9/26/2018  Arthritis   
 knees, lower back.  BMI 45.0-49.9, adult ([address] 9/26/2018  Essential tremor 9/26/2018  High cholesterol 8/14/2019  Hypertension  Iron deficiency anemia secondary to inadequate dietary iron intake 9/26/2018  Noncompliance 10/17/2019  Obesity, morbid ([address] 8/29/2018  Vitamin D deficiency 10/31/2018 Past Surgical History:  
Procedure Laterality Date  HX BREAST BIOPSY Left  HX GYN  2009 D&C Prior to Admission medications Medication Sig Start Date End Date Taking? Authorizing Provider  
labetaloL (NORMODYNE) 300 mg tablet Take 1 Tab by mouth two (2) times a day. 12/14/20   Miah Jack MD  
aspirin 81 mg chewable tablet TAKE 1 TABLET BY MOUTH EVERY DAY INDICATIONS: STROKE PREVENTION  Indications: stroke prevention 12/14/20   Miah Jack MD  
furosemide (LASIX) 80 mg tablet TAKE 1 TABLET BY MOUTH EVERY DAY 8/10/20   Miah Jack MD  
busPIRone (BUSPAR) 15 mg tablet TAKE 1 TAB BY MOUTH TWO (2) TIMES DAILY AS NEEDED (ANXIETY). 7/23/20   Miah Jack MD  
Olmesartan-amLODIPine-HCTZ 40-10-25 mg tab TAKE 1 TABLET DAILY 7/13/20   Miah Jack MD  
clonazePAM (KlonoPIN) 0.5 mg tablet TAKE 0.5 TAB BY MOUTH NIGHTLY AS NEEDED FOR ANXIETY. MAX DAILY AMOUNT: 0.25MG 6/29/20   Miah Jack MD  
citalopram (CELEXA) 20 mg tablet TAKE 1 TABLET BY MOUTH EVERY DAY 6/25/20   Miah Jack MD  
atorvastatin (LIPITOR) 20 mg tablet Take 1 Tab by mouth nightly. 6/25/20   Miah Jack MD  
potassium chloride SR (KLOR-CON 10) 10 mEq tablet TAKE 2 TABLETS BY MOUTH TWICE A DAY 6/25/20   Miah Jack MD  
pantoprazole (PROTONIX) 40 mg tablet Take 1 Tab by mouth daily. 6/11/20   Miah Jack MD  
ergocalciferol (Vitamin D2) 1,250 mcg (50,000 unit) capsule Take 1 Cap by mouth every seven (7) days. 6/11/20   Miah Jcak MD  
fluticasone propionate (FLONASE) 50 mcg/actuation nasal spray SPRAY 2 SPRAYS INTO EACH NOSTRIL EVERY DAY 3/9/20   Miah Jack MD  
ferrous sulfate 325 mg (65 mg iron) tablet TAKE 1 TAB BY MOUTH THREE (3) TIMES DAILY (WITH MEALS). 2/3/19   Miah Jack MD  
cpap machine kit by Does Not Apply route. Provider, Historical  
naproxen sodium 220 mg cap Take  by mouth every twelve (12) hours as needed (pain). 1-2 caps as needed    Provider, Historical  
ascorbic acid (VITAMIN C) 500 mg tablet Take 500 mg by mouth daily. Provider, Historical  
 
No Known Allergies Social History Tobacco Use  
  Smoking status: Never Smoker  Smokeless tobacco: Never Used Substance Use Topics  Alcohol use: Yes Comment: rare Family History Problem Relation Age of Onset  Stroke Father 36  
 Heart Disease Father [place] Arthritis-osteo Mother  Cancer Mother [de-identified]  
     breast cancer  Breast Cancer Mother 78 Objective:   
 
Patient Vitals for the past 24 hrs: 
 Temp Pulse Resp BP SpO2  
12/31/20 1000  88  (!) 137/51 95 % 12/31/20 0930  91  (!) 138/51 95 % 12/31/20 0900  91  (!) 134/52 95 % 12/31/20 0830  93  (!) 128/49 94 % 12/31/20 0817  93 (!) 31  94 % 12/31/20 0800 98.7 °F (37.1 °C) 96 30 (!) 129/49 94 % 12/31/20 0730  95  (!) 127/49 94 % 12/31/20 0700  95  (!) 132/50 94 % 12/31/20 0600  92  (!) 119/48 93 % 12/31/20 0500  92  (!) 129/44 94 % 12/31/20 0431  97 (!) 31  94 % 12/31/20 0400 98.6 °F (37 °C) 94 30 (!) 129/43 93 % 12/31/20 0300  95  (!) 129/44 95 % 12/31/20 0200  98  (!) 137/48 94 % 12/31/20 0100  97  (!) 138/46 94 % 12/31/20 0000 98.9 °F (37.2 °C) 98 30 (!) 128/42 92 % 12/30/20 2338  97 30  92 % 12/30/20 2300  97  (!) 125/41 92 % 12/30/20 2200  100  (!) 129/49 93 % 12/30/20 2100  (!) 102  (!) 135/50 93 % 12/30/20 2000 99 °F (37.2 °C) 99 30 (!) 143/50 94 % 12/30/20 1928  (!) 101 30  95 % 12/30/20 1915  (!) 102   95 % 12/30/20 1900  (!) 102  (!) 140/50 95 % 12/30/20 1845  (!) 105  (!) 146/87 95 % 12/30/20 1830  (!) 106  (!) 151/52 95 % 12/30/20 1815  (!) 105  (!) 149/51 92 % 12/30/20 1800  (!) 105  (!) 128/44 95 % 12/30/20 1745  94   95 % 12/30/20 1730  94   94 % 12/30/20 1715  92   94 % 12/30/20 1700  93   94 % 12/30/20 1645  92   94 % 12/30/20 1630  92   93 % 12/30/20 1615  91  (!) 119/49 93 % 12/30/20 1600 98.5 °F (36.9 °C) 89 30 (!) 116/49 93 % 12/30/20 1545  90  (!) 132/43 93 % 12/30/20 1530  90  (!) 131/42 93 % 12/30/20 1515  89  (!) 133/44 93 % 12/30/20 1511  89 30  93 % 12/30/20 1500  91  (!) 133/45 93 % 12/30/20 1400  91  (!) 133/47 93 % 12/30/20 1355     92 % 12/30/20 1354     93 % 12/30/20 1300  89  (!) 131/45 93 % 12/30/20 1230  89  (!) 131/45 93 % 12/30/20 1200 98.3 °F (36.8 °C) 89 30 (!) 125/51 93 % 12/30/20 1145  89  (!) 127/47 93 % 12/30/20 1130  89  (!) 134/48 93 % 12/30/20 1117  88 30  94 % 12/30/20 1115  89  (!) 138/48 93 % 12/30/20 1100  88  (!) 138/49 93 % 12/30/20 1045  87  (!) 137/50 93 % 12/31 0701 - 12/31 1900 In: 589.3 [I.V.:469.3] Out: 100 Physical Exam: 
Seen from outside the room General: critically ill, intubated, sedated Lungs: on MV 
CVS:RRR Care Plan discussed with: Dr Jenniffer Doe Chart reviewed. 
 
y Reviewed previous records  
y Discussion with patient and/or family and questions answered Lab Data Personally Reviewed: (see below) Recent Labs 12/31/20 
3013 12/30/20 
1333 12/30/20 
0352 12/29/20 
5048 WBC 31.5*  --  30.2* 27.7* HGB 9.8*  --  10.3* 10.2*   --  261 260 INR 1.1  --  1.2* 1.2* APTT 31.6*  --  37.9* 34.3* * 132* 135* 140  
K 6.2* 6.4* 5.7* 4.5 * 124* 119* 101* * 122* 118* 106* CREA 7.08* 6.66* 6.11* 4.52* CA 9.0 9.4 9.6 10.1 MG 2.4  --  2.6*  --   
PHOS 11.0*  --   --   --   
 
Lab Results Component Value Date/Time  Color YELLOW/STRAW 12/18/2020 03:42 AM  
 Appearance CLOUDY (A) 12/18/2020 03:42 AM  
 Specific gravity 1.017 12/18/2020 03:42 AM  
 pH (UA) 5.0 12/18/2020 03:42 AM  
 Protein Negative 12/18/2020 03:42 AM  
 Glucose Negative 12/18/2020 03:42 AM  
 Ketone Negative 12/18/2020 03:42 AM  
 Bilirubin Negative 12/18/2020 03:42 AM  
 Urobilinogen 1.0 12/18/2020 03:42 AM  
 Nitrites Negative 12/18/2020 03:42 AM  
 Leukocyte Esterase SMALL (A) 12/18/2020 03:42 AM  
 Epithelial cells MANY (A) 12/18/2020 03:42 AM  
 Bacteria Negative 12/18/2020 03:42 AM  
 WBC 5-10 12/18/2020 03:42 AM  
 RBC 0-5 12/18/2020 03:42 AM  
 
 
Lab Results Component Value Date/Time Iron 21 (L) 08/29/2018 12:56 PM  
 TIBC 226 (L) 08/29/2018 12:56 PM  
 Iron % saturation 9 (LL) 08/29/2018 12:56 PM  
 Ferritin 481 (H) 12/17/2020 12:53 PM  
 
Lab Results Component Value Date/Time Culture result: NO GROWTH 6 DAYS 12/25/2020 03:27 PM  
 Culture result: LIGHT YEAST, (APPARENT CANDIDA ALBICANS) (A) 12/25/2020 12:15 PM  
 Culture result: SCANT NORMAL RESPIRATORY TK 12/25/2020 12:15 PM  
 
Prior to Admission Medications Prescriptions Last Dose Informant Patient Reported? Taking? Olmesartan-amLODIPine-HCTZ 40-10-25 mg tab   No No  
Sig: TAKE 1 TABLET DAILY  
ascorbic acid (VITAMIN C) 500 mg tablet  Self Yes No  
Sig: Take 500 mg by mouth daily. aspirin 81 mg chewable tablet   No No  
Sig: TAKE 1 TABLET BY MOUTH EVERY DAY INDICATIONS: STROKE PREVENTION  Indications: stroke prevention  
atorvastatin (LIPITOR) 20 mg tablet   No No  
Sig: Take 1 Tab by mouth nightly. busPIRone (BUSPAR) 15 mg tablet   No No  
Sig: TAKE 1 TAB BY MOUTH TWO (2) TIMES DAILY AS NEEDED (ANXIETY). citalopram (CELEXA) 20 mg tablet   No No  
Sig: TAKE 1 TABLET BY MOUTH EVERY DAY  
clonazePAM (KlonoPIN) 0.5 mg tablet   No No  
Sig: TAKE 0.5 TAB BY MOUTH NIGHTLY AS NEEDED FOR ANXIETY. MAX DAILY AMOUNT: 0.25MG cpap machine kit   Yes No  
Sig: by Does Not Apply route.  
ergocalciferol (Vitamin D2) 1,250 mcg (50,000 unit) capsule   No No  
Sig: Take 1 Cap by mouth every seven (7) days. ferrous sulfate 325 mg (65 mg iron) tablet   No No  
Sig: TAKE 1 TAB BY MOUTH THREE (3) TIMES DAILY (WITH MEALS).   
fluticasone propionate (FLONASE) 50 mcg/actuation nasal spray   No No  
Sig: SPRAY 2 SPRAYS INTO EACH NOSTRIL EVERY DAY  
furosemide (LASIX) 80 mg tablet   No No  
Sig: TAKE 1 TABLET BY MOUTH EVERY DAY  
labetaloL (NORMODYNE) 300 mg tablet   No No  
 Sig: Take 1 Tab by mouth two (2) times a day. naproxen sodium 220 mg cap   Yes No  
Sig: Take  by mouth every twelve (12) hours as needed (pain). 1-2 caps as needed  
pantoprazole (PROTONIX) 40 mg tablet   No No  
Sig: Take 1 Tab by mouth daily. potassium chloride SR (KLOR-CON 10) 10 mEq tablet   No No  
Sig: TAKE 2 TABLETS BY MOUTH TWICE A DAY Facility-Administered Medications: None Imaging: 
 
Medications list Personally Reviewed   [x]      Yes     []               No   
Thank you for allowing us to participate in the care this patient. We will follow patient with you. Signed By: Cj Woodson, [address] [facility] [place] [facility] [address] [place], [address] Phone - [phone] Fax - [phone] [address] Lyn[id number], [address] [facility] Phone - [phone] Fax - [phone]    
[email or web address]

## 2020-12-31 NOTE — DISCHARGE SUMMARY
Physician Death Discharge Summary Patient ID: 
Lexie Shaw [id number] 
63 y.o. 
1955 Admit Date: 12/17/2020 Discharge Date: 12/31/2020 Admitting Physician: Inez Rizzo MD  
 
Admission Diagnoses: Respiratory failure, acute ([facility] [J96.00] Sepsis ([facility] [A41.9] Pneumonia due to COVID-19 virus [U07.1, J12.89] Discharge Diagnoses:  
Ms. Isa Pittman is 63yo AAF with h/o CKD, HTN, diastolic heart failure. She was admitted with acute hypoxemic respiratory failure from COVID 19 pneumonia, she continued to have progressive hypoxemia despite optimal care and got intubated. She clearly stated to care teams that she would only like to be on ventilator for 7 days and changed her code status to DNR. Today after 7 days in collaboration with family it was decided to do compassionate extubation. Soon after extubation she passed away at 14:17.  
 
 
 
Frederick Jacome MD

## 2020-12-31 NOTE — PROGRESS NOTES
It was noted that Ms Yady Stokes in [facility] was COVID+;  did not enter patient's room and no family was present. Consulted with patient's nurse who confirmed that compassionate extubation was scheduled for around 2:00 pm today. Nurse shared that she would be calling the family later today; encouraged her to notify 's if phone support for family by chaplains would be desired. : . Michele Carney. Gurjit Castro; [facility], to contact [phone] [address]ino call: [phone]

## 2020-12-31 NOTE — PROGRESS NOTES
Pharmacy Automatic Renal Dosing Protocol - Antimicrobials Indication for Antimicrobials: HAP, COVID-19 Current Regimen of Each Antimicrobial: 
Piperacillin/tazobactam3.375g IV q12h, start ; day 2 Vancomycin - dosed on random levels; restarted ; day 2 Previous Antimicrobial Therapy: 
Ceftriaxone 1g IV q24h (Start Date ; End date: ; Received 5 days of tx) Azithromycin 500 mg IV q24h (Start date ; Day # 6) Vancomycin 1500 mg q24hr (Start Date -) Cefepime 2g IV q24h (Start Date -) Goal Level: VANCOMYCIN TROUGH GOAL RANGE Vancomycin Trough: 15 - 20 mcg/mL  (AUC: 400 - 600 mg/hr/Liter/day) Date Dose & Interval Measured (mcg/mL) Extrapolated (mcg/mL)  
 18:47 1500 mg q24h 18.8 18.29  
 4:14 Holding 23.5   
 N/a 22.7   
 none 19.5 Significant Cultures:  
 Blood- NG , final 
 blood cx NGTD pending  sputum cx yeast, normal cristian, pending  blood cx pending  sputum cx pending Paralysis, amputations, malnutrition:  none noted Labs: 
Recent Labs 20 
9968 20 
1333 20 
0352 20 
7654 CREA 7.08* 6.66* 6.11* 4.52* * 122* 118* 106* WBC 31.5*  --  30.2* 27.7* Temp (24hrs), Av.8 °F (37.1 °C), Min:98.5 °F (36.9 °C), Max:99.1 °F (37.3 °C) Procalcitonin:  
 0.88 
 2.77 Is the Patient on Dialysis? No 
 
Creatinine Clearance (mL/min):  
CrCl (Adjusted Body Weight): 12.1 CrCl (Ideal Body Weight): 8.2 Impression/Plan:  
CVVH not started Plan to compassionately extubate today Vanc level 19.5 but based on slow clearance, can recheck tomorrow Change zosyn to 3.375g IV q12 per protocol Antimicrobial stop date pending Pharmacy will follow daily and adjust medications as appropriate for renal function and/or serum levels. Thank you, EDUARDO Alarcon

## 2020-12-31 NOTE — PROGRESS NOTES
12/31/20 5192 ABCDEF Bundle SBT Safety Screen Passed No  
SBT Screen Reason for Failure FiO2 > 50% 
(pt on Veletri)

## 2021-01-02 DIAGNOSIS — F41.9 ANXIETY AND DEPRESSION: ICD-10-CM

## 2021-01-02 DIAGNOSIS — F32.A ANXIETY AND DEPRESSION: ICD-10-CM

## 2021-01-02 RX ORDER — BUSPIRONE HYDROCHLORIDE 15 MG/1
TABLET ORAL
Qty: 90 TAB | Refills: 1 | OUTPATIENT
Start: 2021-01-02

## 2021-01-07 DIAGNOSIS — I10 ESSENTIAL HYPERTENSION: ICD-10-CM

## 2021-01-07 RX ORDER — OLMESARTAN MEDOXOMIL, AMLODIPINE AND HYDROCHLOROTHIAZIDE TABLET 40/10/25 MG 40; 10; 25 MG/1; MG/1; MG/1
TABLET ORAL
Qty: 90 TAB | Refills: 1 | OUTPATIENT
Start: 2021-01-07

## 2021-01-14 DIAGNOSIS — F41.9 ANXIETY AND DEPRESSION: ICD-10-CM

## 2021-01-14 DIAGNOSIS — F32.A ANXIETY AND DEPRESSION: ICD-10-CM

## 2021-01-14 RX ORDER — CITALOPRAM 20 MG/1
TABLET, FILM COATED ORAL
Qty: 90 TAB | Refills: 1 | OUTPATIENT
Start: 2021-01-14

## 2022-09-14 NOTE — H&P
Hospitalist Admission Note    NAME:  Ferdinand Ford   :   1955   MRN:   [id number]     Date of admit: 2018    PCP: Yo Newton MD    Assessment/Plan:     Sepsis ([address] (2018) POA WBC 22.6, , RR to 40, temp 103.2, normal lactate  Left leg cellulitis POA  1 day of rigors, weakness, malaise  Left leg swollen, red, mildly tender  SOB x 1 day  No other clear localizing source  IV ceftriaxone and vanco  IVF  LE doppler doppler for DVT  Check BC  If does not improve over next 48 hours, nay need to image to the leg  Stepdown x 24 hours delilah as the BP was elevated when I saw, transfer to tele if stable    HTN urgency POA  Did not take BP meds today  Resume home BP meds  PRN hydralazine    Hypoxia POA  Elevated left hemidiaphragm POA  Borderline sats  Increased x days, ? Sepsis  Marked decreased BS left base and left hemidiaphram  Increased on CXR, no old CXR for comparison  O2  PRN nebs  Check echo with orthopnea, but pBNP only 600  Ask pulm to see in AM    Hyponatremia POA Na 132  Hold HCTZ  IVF  Recheck in AM    History of spider bite in left leg 15 years ago    Morbid Obesity POA Body mass index is 45.61 kg/(m^2). Given the patient's current clinical presentation, I have a high level of concern for decompensation if discharged from the emergency department. My assessment of this patient's clinical condition and my plan of care is as noted above. DVT prophylaxis with lovenox    Code status: full code  NOK:     History     CHIEF COMPLAINT: \"I felt like I had the flu this morning\"    HISTORY OF PRESENT ILLNESS:A 61-year-old -American female currently with hypertension, depression. She was recently at [facility] the weekend before admission. She did not really go out in the woods, and [place]. No tick bites that she was aware of. The last day or 2, she felt like she was having trouble using her left leg.   It was perhaps a bit more swollen. The family notes that she may have appeared more short of breath and been pursed lip breathing for the past day or 2. Today, the patient woke up and she \"felt like she had the flu. \"  Her left calf is mildly sore and \"tight\". The family noted that it looked red and it felt very warm. She had rigors at home and subjective fevers. She felt subjectively more short of breath. Her family made her come into the emergency room. She denied any headache, sore throat, runny nose, chest pain, abdominal pain, nausea, vomiting, diarrhea, dysuria, joint redness, or swelling. She did have several lesions on her legs for several weeks, both left and right  calves. They were mildly excoriated that she attributed to [employer] [place]. \"  She notes that she had cellulitis years ago in the left leg after she had a brown recluse bite 15 years ago, but notes that she has not had any recent episodes of cellulitis in years. She felt so poorly today she did not take her blood pressure medications.     In the emergency room, she was febrile to 103.2, heart rate was 117. Her white blood cell count was 22.6. She was tachypneic to 40. Her O2 saturations were borderline at 90. Exam revealed a probable left lower extremity cellulitis. Lower extremity ultrasound was negative for DVT. Chest x-ray revealed an elevated left hemidiaphragm of unclear age, and she had markedly decreased breath sounds at the left base. Her blood pressure was markedly elevated when she came in, as high as 174/134. She received a dose of Unasyn. We were called to admit the patient. Past Medical History:   Diagnosis Date    Arthritis     knees, lower back.     Hypertension     Morbid obesity ([address]         Past Surgical History:   Procedure Laterality Date    HX GYN  2009    D&C       Social History   Substance Use Topics    Smoking status: Never Smoker    Smokeless tobacco: Never Used    Alcohol use No    Runs a     Family History   Problem Relation Age of Onset    Stroke Father 36    Heart Disease Father     Arthritis-osteo Mother     Cancer Mother [de-identified]     breast cancer    2 children healthy    No Known Allergies     Prior to Admission medications    Medication Sig Start Date End Date Taking? Authorizing Provider   busPIRone (BUSPAR) 15 mg tablet Take 15 mg by mouth nightly. Yes Historical Provider   Olmesartan-amLODIPine-HCTZ 40-10-25 mg tab Take 1 Tab by mouth daily. Yes Historical Provider   furosemide (LASIX) 40 mg tablet Take 40 mg by mouth every other day. Takes every other day in the evening (confirmed w/ patient)   Yes Historical Provider   labetalol (NORMODYNE) 300 mg tablet Take 300 mg by mouth every twelve (12) hours. Confirmed w/ patient. Rx for Tid, patient only tolerates it BiD   Yes Historical Provider   potassium chloride SA (MICRO-K) 10 mEq capsule Take 10 mEq by mouth daily. Yes Historical Provider   cholecalciferol (VITAMIN D3) 1,000 unit tablet Take 1,000 Units by mouth every Sunday. Indications: Vitamin D Deficiency, Takes unknown units of D3 once weekly   Yes Historical Provider   ascorbic acid (VITAMIN C) 500 mg tablet Take 500 mg by mouth daily.    Yes Historical Provider       Review of symptoms:     POSITIVE= Bold  Negative = not bold  General:  fever, chills, sweats, generalized weakness  Eyes:    blurred vision, eye pain, double vision  ENT:    Coryza, sore throat, trouble swallowing  Respiratory:   cough, sputum, SOB and FLORENCE x 1-2 days  Cardiology:   chest pain, orthopnea, PND, edema  Gastrointestinal:  abdominal pain , N/V, diarrhea, constipation, melena or BRBPR  Genitourinary:  Urgency, dysuria, hematuria  Muskuloskeletal :  Joint redness, swelling or acute joint pain, myalgias  Hematology:  easy bruising, nose or gum bleeding  Dermatological: Left calf warm and hot, ulceration  Endocrine:   Polyuria or polydipsia, heat or hold intolerance  Neurological:  Headache, focal motor or sensory changes     Speech difficulties, memory loss  Psychological: depression, agitation      Objective:   VITALS:    Patient Vitals for the past 24 hrs:   Temp Pulse Resp BP SpO2   18 1615 - (!) 103 25 155/73 94 %   18 1600 - (!) 101 (!) 33 171/76 92 %   18 1545 - (!) 105 30 148/81 93 %   18 1537 - (!) 104 (!) 31 154/75 94 %   18 1453 (!) 101.9 °F (38.8 °C) - - - -   18 1416 - (!) 109 26 (!) 174/134 93 %   18 1400 - (!) 113 (!) 44 (!) 177/94 94 %   18 1348 (!) 103.2 °F (39.6 °C) (!) 117 20 (!) 162/91 90 %     Temp (24hrs), Av.6 °F (39.2 °C), Min:101.9 °F (38.8 °C), Max:103.2 °F (39.6 °C)      O2 Device: Room air    Wt Readings from Last 12 Encounters:   18 136.1 kg (300 lb)   06/10/13 136.5 kg (301 lb)         PHYSICAL EXAM:   General:    Alert, cooperative in no distress, wearing O2  HEENT: Normocephalic, atraumatic    PERRL, EOMI  Sclera no icterus    Nasal mucosa without masses or discharge  Hearing intact to voice    Oropharynx without erythema or exudate  No thrush  Pink MM  Neck:  No meningismus, trachea midline, no carotid bruits     Thyroid not enlarged, no nodules or tenderness  Lungs:   Decreased BS at left base. No wheezing or rales    No accessory muscle use or retractions. Heart:   Regular rate and rhythm,  no murmur or gallop. 1+  LE edema L > R     Dorsal pedis, Posterior tibial and radial pulses 2+ and symmetric  Abdomen:   Soft, non-tender. Not distended. Bowel sounds normal.     No masses, No Hepatosplenomegaly, No Rebound or guarding  Lymph nodes: No cervical or inguinal KARLENE  Musculoskeletal:  No Joint swelling, erythema, warmth.  No Cyanosis or clubbing  Skin:     Left calf swollen, warm, mildly tender from foot to just below the knee    Not Jaundiced   No nodules or thickening    Capillary refill normal  Neurologic: Alert and oriented X 3, follows commands     Cranial nerves 2 to 12 intact    Symmetric motor strength bilaterally       LAB DATA REVIEWED: Recent Results (from the past 12 hour(s))   EKG, 12 LEAD, INITIAL    Collection Time: 08/09/18  1:52 PM   Result Value Ref Range    Ventricular Rate 113 BPM    Atrial Rate 113 BPM    P-R Interval 170 ms    QRS Duration 74 ms    Q-T Interval 306 ms    QTC Calculation (Bezet) 419 ms    Calculated P Axis 31 degrees    Calculated R Axis -19 degrees    Calculated T Axis 50 degrees    Diagnosis       Sinus tachycardia with occasional premature ventricular complexes  Minimal voltage criteria for LVH, may be normal variant  No previous ECGs available     CBC WITH AUTOMATED DIFF    Collection Time: 08/09/18  2:05 PM   Result Value Ref Range    WBC 22.6 (H) 3.6 - 11.0 K/uL    RBC 4.65 3.80 - 5.20 M/uL    HGB 13.4 11.5 - 16.0 g/dL    HCT 41.1 35.0 - 47.0 %    MCV 88.4 80.0 - 99.0 FL    MCH 28.8 26.0 - 34.0 PG    MCHC 32.6 30.0 - 36.5 g/dL    RDW 14.6 (H) 11.5 - 14.5 %    PLATELET 037 893 - 423 K/uL    MPV 11.3 8.9 - 12.9 FL    NRBC 0.0 0  WBC    ABSOLUTE NRBC 0.00 0.00 - 0.01 K/uL    NEUTROPHILS 82 (H) 32 - 75 %    BAND NEUTROPHILS 10 %    LYMPHOCYTES 4 (L) 12 - 49 %    MONOCYTES 4 (L) 5 - 13 %    EOSINOPHILS 0 0 - 7 %    BASOPHILS 0 0 - 1 %    IMMATURE GRANULOCYTES 0 0.0 - 0.5 %    ABS. NEUTROPHILS 20.8 (H) 1.8 - 8.0 K/UL    ABS. LYMPHOCYTES 0.9 0.8 - 3.5 K/UL    ABS. MONOCYTES 0.9 0.0 - 1.0 K/UL    ABS. EOSINOPHILS 0.0 0.0 - 0.4 K/UL    ABS. BASOPHILS 0.0 0.0 - 0.1 K/UL    ABS. IMM.  GRANS. 0.0 0.00 - 0.04 K/UL    DF MANUAL      RBC COMMENTS NORMOCYTIC, NORMOCHROMIC     LACTIC ACID    Collection Time: 08/09/18  2:05 PM   Result Value Ref Range    Lactic acid 1.1 0.4 - 2.0 MMOL/L   TROPONIN I    Collection Time: 08/09/18  2:05 PM   Result Value Ref Range    Troponin-I, Qt. <0.05 <0.05 ng/mL   NT-PRO BNP    Collection Time: 08/09/18  2:05 PM   Result Value Ref Range    NT pro- (H) 0 - 815 PG/ML   METABOLIC PANEL, COMPREHENSIVE    Collection Time: 08/09/18  2:05 PM   Result Value Ref Range    Sodium 132 (L) 136 - 145 mmol/L    Potassium 4.0 3.5 - 5.1 mmol/L    Chloride 99 97 - 108 mmol/L    CO2 26 21 - 32 mmol/L    Anion gap 7 5 - 15 mmol/L    Glucose 102 (H) 65 - 100 mg/dL    BUN 22 (H) 6 - 20 MG/DL    Creatinine 1.07 (H) 0.55 - 1.02 MG/DL    BUN/Creatinine ratio 21 (H) 12 - 20      GFR est AA >60 >60 ml/min/1.73m2    GFR est non-AA 52 (L) >60 ml/min/1.73m2    Calcium 10.0 8.5 - 10.1 MG/DL    Bilirubin, total 0.8 0.2 - 1.0 MG/DL    ALT (SGPT) 23 12 - 78 U/L    AST (SGOT) 22 15 - 37 U/L    Alk. phosphatase 108 45 - 117 U/L    Protein, total 9.3 (H) 6.4 - 8.2 g/dL    Albumin 3.5 3.5 - 5.0 g/dL    Globulin 5.8 (H) 2.0 - 4.0 g/dL    A-G Ratio 0.6 (L) 1.1 - 2.2         EKG as read by me shows  NSR rate 113, LAD, LVH in limb leads  Normal intervals, no ischemic ST-T changes    CXR read by radiology and reviewed by myself shows Frontal and lateral views of the chest obtained no prior. Inspiration is very  limited related to patient's body habitus. There is elevation of the left  hemidiaphragm. The heart size is prominent. Ectasia of the thoracic aorta. No  focal consolidation. Impression: Limited inspiration. Elevation of the left hemidiaphragm no focal consolidation. I saw the patient personally, took a history and did a complete physical exam at the bedside. I performed complex decision making in coming up with a diagnostic and treatment plan for the patient. I reviewed the patient's past medical records, current laboratory and radiology results, and actual Xray films/EKG. I have also discussed this case with the involved ED physician.     Care Plan discussed with:    Patient,  and daughter by phone, ED Doc    Risk of deterioration:  High    Total Time Coordinating Admission: 65    minutes    Total Critical Care Time:         Rossi Simons MD -3
